# Patient Record
Sex: MALE | Race: OTHER | NOT HISPANIC OR LATINO | URBAN - METROPOLITAN AREA
[De-identification: names, ages, dates, MRNs, and addresses within clinical notes are randomized per-mention and may not be internally consistent; named-entity substitution may affect disease eponyms.]

---

## 2018-01-29 ENCOUNTER — INPATIENT (INPATIENT)
Facility: HOSPITAL | Age: 68
LOS: 10 days | Discharge: HOME | DRG: 246 | End: 2018-02-09
Attending: INTERNAL MEDICINE | Admitting: INTERNAL MEDICINE
Payer: MEDICARE

## 2018-01-29 VITALS
HEART RATE: 109 BPM | SYSTOLIC BLOOD PRESSURE: 162 MMHG | OXYGEN SATURATION: 99 % | TEMPERATURE: 98 F | RESPIRATION RATE: 20 BRPM | DIASTOLIC BLOOD PRESSURE: 101 MMHG

## 2018-01-29 LAB
ALBUMIN SERPL ELPH-MCNC: 4.1 G/DL — SIGNIFICANT CHANGE UP (ref 3.3–5)
ALP SERPL-CCNC: 74 U/L — SIGNIFICANT CHANGE UP (ref 40–120)
ALT FLD-CCNC: 12 U/L RC — SIGNIFICANT CHANGE UP (ref 10–45)
ANION GAP SERPL CALC-SCNC: 15 MMOL/L — SIGNIFICANT CHANGE UP (ref 5–17)
AST SERPL-CCNC: 12 U/L — SIGNIFICANT CHANGE UP (ref 10–40)
BASOPHILS # BLD AUTO: 0.1 K/UL — SIGNIFICANT CHANGE UP (ref 0–0.2)
BASOPHILS NFR BLD AUTO: 0.5 % — SIGNIFICANT CHANGE UP (ref 0–2)
BILIRUB SERPL-MCNC: 0.4 MG/DL — SIGNIFICANT CHANGE UP (ref 0.2–1.2)
BUN SERPL-MCNC: 9 MG/DL — SIGNIFICANT CHANGE UP (ref 7–23)
CALCIUM SERPL-MCNC: 9.6 MG/DL — SIGNIFICANT CHANGE UP (ref 8.4–10.5)
CHLORIDE SERPL-SCNC: 97 MMOL/L — SIGNIFICANT CHANGE UP (ref 96–108)
CO2 SERPL-SCNC: 25 MMOL/L — SIGNIFICANT CHANGE UP (ref 22–31)
CREAT SERPL-MCNC: 0.69 MG/DL — SIGNIFICANT CHANGE UP (ref 0.5–1.3)
EOSINOPHIL # BLD AUTO: 0 K/UL — SIGNIFICANT CHANGE UP (ref 0–0.5)
EOSINOPHIL NFR BLD AUTO: 0.3 % — SIGNIFICANT CHANGE UP (ref 0–6)
ETHANOL SERPL-MCNC: SIGNIFICANT CHANGE UP MG/DL (ref 0–10)
GAS PNL BLDV: SIGNIFICANT CHANGE UP
GLUCOSE SERPL-MCNC: 267 MG/DL — HIGH (ref 70–99)
HCT VFR BLD CALC: 37.5 % — LOW (ref 39–50)
HGB BLD-MCNC: 13.8 G/DL — SIGNIFICANT CHANGE UP (ref 13–17)
LYMPHOCYTES # BLD AUTO: 26.4 % — SIGNIFICANT CHANGE UP (ref 13–44)
LYMPHOCYTES # BLD AUTO: 3.2 K/UL — SIGNIFICANT CHANGE UP (ref 1–3.3)
MCHC RBC-ENTMCNC: 33.3 PG — SIGNIFICANT CHANGE UP (ref 27–34)
MCHC RBC-ENTMCNC: 36.7 GM/DL — HIGH (ref 32–36)
MCV RBC AUTO: 90.7 FL — SIGNIFICANT CHANGE UP (ref 80–100)
MONOCYTES # BLD AUTO: 0.7 K/UL — SIGNIFICANT CHANGE UP (ref 0–0.9)
MONOCYTES NFR BLD AUTO: 6 % — SIGNIFICANT CHANGE UP (ref 2–14)
NEUTROPHILS # BLD AUTO: 8 K/UL — HIGH (ref 1.8–7.4)
NEUTROPHILS NFR BLD AUTO: 66.9 % — SIGNIFICANT CHANGE UP (ref 43–77)
PLATELET # BLD AUTO: 281 K/UL — SIGNIFICANT CHANGE UP (ref 150–400)
POTASSIUM SERPL-MCNC: 4.3 MMOL/L — SIGNIFICANT CHANGE UP (ref 3.5–5.3)
POTASSIUM SERPL-SCNC: 4.3 MMOL/L — SIGNIFICANT CHANGE UP (ref 3.5–5.3)
PROT SERPL-MCNC: 7.8 G/DL — SIGNIFICANT CHANGE UP (ref 6–8.3)
RBC # BLD: 4.13 M/UL — LOW (ref 4.2–5.8)
RBC # FLD: 11.5 % — SIGNIFICANT CHANGE UP (ref 10.3–14.5)
SODIUM SERPL-SCNC: 137 MMOL/L — SIGNIFICANT CHANGE UP (ref 135–145)
WBC # BLD: 12 K/UL — HIGH (ref 3.8–10.5)
WBC # FLD AUTO: 12 K/UL — HIGH (ref 3.8–10.5)

## 2018-01-29 PROCEDURE — 99285 EMERGENCY DEPT VISIT HI MDM: CPT | Mod: 25,GC

## 2018-01-29 PROCEDURE — 93010 ELECTROCARDIOGRAM REPORT: CPT

## 2018-01-29 PROCEDURE — 73630 X-RAY EXAM OF FOOT: CPT | Mod: 26,LT

## 2018-01-29 PROCEDURE — 73590 X-RAY EXAM OF LOWER LEG: CPT | Mod: 26,LT

## 2018-01-29 RX ORDER — VANCOMYCIN HCL 1 G
1000 VIAL (EA) INTRAVENOUS ONCE
Qty: 0 | Refills: 0 | Status: COMPLETED | OUTPATIENT
Start: 2018-01-29 | End: 2018-01-30

## 2018-01-29 RX ORDER — SODIUM CHLORIDE 9 MG/ML
1000 INJECTION INTRAMUSCULAR; INTRAVENOUS; SUBCUTANEOUS ONCE
Qty: 0 | Refills: 0 | Status: COMPLETED | OUTPATIENT
Start: 2018-01-29 | End: 2018-01-29

## 2018-01-29 RX ORDER — PIPERACILLIN AND TAZOBACTAM 4; .5 G/20ML; G/20ML
3.38 INJECTION, POWDER, LYOPHILIZED, FOR SOLUTION INTRAVENOUS ONCE
Qty: 0 | Refills: 0 | Status: COMPLETED | OUTPATIENT
Start: 2018-01-29 | End: 2018-01-29

## 2018-01-29 RX ADMIN — SODIUM CHLORIDE 1000 MILLILITER(S): 9 INJECTION INTRAMUSCULAR; INTRAVENOUS; SUBCUTANEOUS at 23:50

## 2018-01-29 RX ADMIN — Medication 25 MILLIGRAM(S): at 23:50

## 2018-01-29 RX ADMIN — PIPERACILLIN AND TAZOBACTAM 200 GRAM(S): 4; .5 INJECTION, POWDER, LYOPHILIZED, FOR SOLUTION INTRAVENOUS at 23:50

## 2018-01-29 NOTE — ED PROVIDER NOTE - ATTENDING CONTRIBUTION TO CARE
Patient is a 66 yo M with history of DM, HTN, ETOH abuse sent in by pcp for gangrene of left 1st toe. Per wife, this has been worsening over the past 3 weeks. No fevers, chills, nausea, vomiting.   VS noted  Gen. no acute distress, Non toxic   HEENT: EOMI, mmm  Lungs: CTAB/L no C/ W /R   CVS: RRR   Abd; Soft non tender, non distended   Ext: left hallux: necrotic, green, no obvious discharge  Neuro: awake, follows commands, CN grossly intact, non focal clear speech  a/p: gangrene of left hallux: Abx, esr, crp, pre-op labs, ivf, podiatry consult

## 2018-01-29 NOTE — ED PROVIDER NOTE - OBJECTIVE STATEMENT
68yo male pmh DM, etoh pancreatitis p/w toe gangrene sent in by PCP Dr Pollo Pretty. 66yo male pmh DM, etoh pancreatitis p/w toe gangrene sent in by PCP Dr Pollo Pretty. C/o left 1st toe pain x 1 week. Last etoh this afternoon, ~100ml hard liquor. Denies fever, chills, chest pain, dyspnea, palpitations, dizziness, weakness, recent cough, nausea, vomiting, diarrhea, abdominal pain, bladder and bowel problems, back pain, leg swelling, sick contact, recent long travel.    Significant past medical hx/surgical hx/social hx and review of systems can be found above in the history of present illness.

## 2018-01-29 NOTE — ED ADULT NURSE NOTE - OBJECTIVE STATEMENT
Received patient awake and alert x 4, presenting with left toe gangrene. Patient was sent by his PCP. C/O left 1st toe pain x 1 week. Denies any fever/chills, no n/v/d, no abdominal pain. no CP/SOB. Breathing unlabored with no S/S of distress noted, safety maintained, will continue to monitor.

## 2018-01-29 NOTE — ED ADULT NURSE NOTE - PMH
Alcohol Dependency    CAD (coronary artery disease)    Diabetes mellitus    Diabetes Mellitus    Hypertension

## 2018-01-30 DIAGNOSIS — I96 GANGRENE, NOT ELSEWHERE CLASSIFIED: ICD-10-CM

## 2018-01-30 DIAGNOSIS — Z87.898 PERSONAL HISTORY OF OTHER SPECIFIED CONDITIONS: ICD-10-CM

## 2018-01-30 DIAGNOSIS — Z29.9 ENCOUNTER FOR PROPHYLACTIC MEASURES, UNSPECIFIED: ICD-10-CM

## 2018-01-30 DIAGNOSIS — E11.9 TYPE 2 DIABETES MELLITUS WITHOUT COMPLICATIONS: ICD-10-CM

## 2018-01-30 DIAGNOSIS — E11.69 TYPE 2 DIABETES MELLITUS WITH OTHER SPECIFIED COMPLICATION: ICD-10-CM

## 2018-01-30 DIAGNOSIS — I25.10 ATHEROSCLEROTIC HEART DISEASE OF NATIVE CORONARY ARTERY WITHOUT ANGINA PECTORIS: ICD-10-CM

## 2018-01-30 DIAGNOSIS — I77.1 STRICTURE OF ARTERY: ICD-10-CM

## 2018-01-30 LAB
ALBUMIN SERPL ELPH-MCNC: 3.7 G/DL — SIGNIFICANT CHANGE UP (ref 3.3–5)
ALP SERPL-CCNC: 76 U/L — SIGNIFICANT CHANGE UP (ref 40–120)
ALT FLD-CCNC: 12 U/L — SIGNIFICANT CHANGE UP (ref 10–45)
ANION GAP SERPL CALC-SCNC: 14 MMOL/L — SIGNIFICANT CHANGE UP (ref 5–17)
AST SERPL-CCNC: 16 U/L — SIGNIFICANT CHANGE UP (ref 10–40)
BASOPHILS # BLD AUTO: 0.02 K/UL — SIGNIFICANT CHANGE UP (ref 0–0.2)
BASOPHILS NFR BLD AUTO: 0.2 % — SIGNIFICANT CHANGE UP (ref 0–2)
BILIRUB SERPL-MCNC: 0.3 MG/DL — SIGNIFICANT CHANGE UP (ref 0.2–1.2)
BUN SERPL-MCNC: 10 MG/DL — SIGNIFICANT CHANGE UP (ref 7–23)
CALCIUM SERPL-MCNC: 8.9 MG/DL — SIGNIFICANT CHANGE UP (ref 8.4–10.5)
CHLORIDE SERPL-SCNC: 96 MMOL/L — SIGNIFICANT CHANGE UP (ref 96–108)
CO2 SERPL-SCNC: 25 MMOL/L — SIGNIFICANT CHANGE UP (ref 22–31)
CREAT SERPL-MCNC: 0.81 MG/DL — SIGNIFICANT CHANGE UP (ref 0.5–1.3)
EOSINOPHIL # BLD AUTO: 0.08 K/UL — SIGNIFICANT CHANGE UP (ref 0–0.5)
EOSINOPHIL NFR BLD AUTO: 0.8 % — SIGNIFICANT CHANGE UP (ref 0–6)
GLUCOSE BLDC GLUCOMTR-MCNC: 231 MG/DL — HIGH (ref 70–99)
GLUCOSE BLDC GLUCOMTR-MCNC: 253 MG/DL — HIGH (ref 70–99)
GLUCOSE BLDC GLUCOMTR-MCNC: 308 MG/DL — HIGH (ref 70–99)
GLUCOSE BLDC GLUCOMTR-MCNC: 316 MG/DL — HIGH (ref 70–99)
GLUCOSE SERPL-MCNC: 443 MG/DL — HIGH (ref 70–99)
HBA1C BLD-MCNC: 13.2 % — HIGH (ref 4–5.6)
HCT VFR BLD CALC: 37.4 % — LOW (ref 39–50)
HGB BLD-MCNC: 12.5 G/DL — LOW (ref 13–17)
IMM GRANULOCYTES NFR BLD AUTO: 0.1 % — SIGNIFICANT CHANGE UP (ref 0–1.5)
LYMPHOCYTES # BLD AUTO: 2.64 K/UL — SIGNIFICANT CHANGE UP (ref 1–3.3)
LYMPHOCYTES # BLD AUTO: 26 % — SIGNIFICANT CHANGE UP (ref 13–44)
MCHC RBC-ENTMCNC: 30.4 PG — SIGNIFICANT CHANGE UP (ref 27–34)
MCHC RBC-ENTMCNC: 33.4 GM/DL — SIGNIFICANT CHANGE UP (ref 32–36)
MCV RBC AUTO: 91 FL — SIGNIFICANT CHANGE UP (ref 80–100)
MONOCYTES # BLD AUTO: 0.93 K/UL — HIGH (ref 0–0.9)
MONOCYTES NFR BLD AUTO: 9.2 % — SIGNIFICANT CHANGE UP (ref 2–14)
NEUTROPHILS # BLD AUTO: 6.48 K/UL — SIGNIFICANT CHANGE UP (ref 1.8–7.4)
NEUTROPHILS NFR BLD AUTO: 63.7 % — SIGNIFICANT CHANGE UP (ref 43–77)
PLATELET # BLD AUTO: 232 K/UL — SIGNIFICANT CHANGE UP (ref 150–400)
POTASSIUM SERPL-MCNC: 4.1 MMOL/L — SIGNIFICANT CHANGE UP (ref 3.5–5.3)
POTASSIUM SERPL-SCNC: 4.1 MMOL/L — SIGNIFICANT CHANGE UP (ref 3.5–5.3)
PROT SERPL-MCNC: 7.2 G/DL — SIGNIFICANT CHANGE UP (ref 6–8.3)
RBC # BLD: 4.11 M/UL — LOW (ref 4.2–5.8)
RBC # FLD: 12.5 % — SIGNIFICANT CHANGE UP (ref 10.3–14.5)
SODIUM SERPL-SCNC: 135 MMOL/L — SIGNIFICANT CHANGE UP (ref 135–145)
WBC # BLD: 10.16 K/UL — SIGNIFICANT CHANGE UP (ref 3.8–10.5)
WBC # FLD AUTO: 10.16 K/UL — SIGNIFICANT CHANGE UP (ref 3.8–10.5)

## 2018-01-30 PROCEDURE — 99223 1ST HOSP IP/OBS HIGH 75: CPT

## 2018-01-30 PROCEDURE — 93923 UPR/LXTR ART STDY 3+ LVLS: CPT | Mod: 26

## 2018-01-30 PROCEDURE — 73720 MRI LWR EXTREMITY W/O&W/DYE: CPT | Mod: 26,LT

## 2018-01-30 PROCEDURE — 99222 1ST HOSP IP/OBS MODERATE 55: CPT

## 2018-01-30 PROCEDURE — 99221 1ST HOSP IP/OBS SF/LOW 40: CPT

## 2018-01-30 RX ORDER — SODIUM CHLORIDE 9 MG/ML
1000 INJECTION, SOLUTION INTRAVENOUS
Qty: 0 | Refills: 0 | Status: DISCONTINUED | OUTPATIENT
Start: 2018-01-30 | End: 2018-02-09

## 2018-01-30 RX ORDER — PREGABALIN 225 MG/1
1000 CAPSULE ORAL DAILY
Qty: 0 | Refills: 0 | Status: DISCONTINUED | OUTPATIENT
Start: 2018-01-30 | End: 2018-02-09

## 2018-01-30 RX ORDER — INSULIN GLARGINE 100 [IU]/ML
40 INJECTION, SOLUTION SUBCUTANEOUS EVERY MORNING
Qty: 0 | Refills: 0 | Status: DISCONTINUED | OUTPATIENT
Start: 2018-01-30 | End: 2018-01-30

## 2018-01-30 RX ORDER — INSULIN LISPRO 100/ML
12 VIAL (ML) SUBCUTANEOUS
Qty: 0 | Refills: 0 | Status: DISCONTINUED | OUTPATIENT
Start: 2018-01-30 | End: 2018-01-30

## 2018-01-30 RX ORDER — PIPERACILLIN AND TAZOBACTAM 4; .5 G/20ML; G/20ML
3.38 INJECTION, POWDER, LYOPHILIZED, FOR SOLUTION INTRAVENOUS EVERY 8 HOURS
Qty: 0 | Refills: 0 | Status: DISCONTINUED | OUTPATIENT
Start: 2018-01-30 | End: 2018-01-31

## 2018-01-30 RX ORDER — DEXTROSE 50 % IN WATER 50 %
25 SYRINGE (ML) INTRAVENOUS ONCE
Qty: 0 | Refills: 0 | Status: DISCONTINUED | OUTPATIENT
Start: 2018-01-30 | End: 2018-02-09

## 2018-01-30 RX ORDER — METOPROLOL TARTRATE 50 MG
0 TABLET ORAL
Qty: 60 | Refills: 0 | COMMUNITY

## 2018-01-30 RX ORDER — ACETAMINOPHEN 500 MG
650 TABLET ORAL EVERY 6 HOURS
Qty: 0 | Refills: 0 | Status: DISCONTINUED | OUTPATIENT
Start: 2018-01-30 | End: 2018-02-09

## 2018-01-30 RX ORDER — INSULIN LISPRO 100/ML
VIAL (ML) SUBCUTANEOUS AT BEDTIME
Qty: 0 | Refills: 0 | Status: DISCONTINUED | OUTPATIENT
Start: 2018-01-30 | End: 2018-02-09

## 2018-01-30 RX ORDER — PREGABALIN 225 MG/1
0 CAPSULE ORAL
Qty: 90 | Refills: 0 | COMMUNITY

## 2018-01-30 RX ORDER — INSULIN LISPRO 100/ML
VIAL (ML) SUBCUTANEOUS
Qty: 0 | Refills: 0 | Status: DISCONTINUED | OUTPATIENT
Start: 2018-01-30 | End: 2018-02-09

## 2018-01-30 RX ORDER — INSULIN GLARGINE 100 [IU]/ML
60 INJECTION, SOLUTION SUBCUTANEOUS
Qty: 0 | Refills: 0 | COMMUNITY

## 2018-01-30 RX ORDER — ATORVASTATIN CALCIUM 80 MG/1
0 TABLET, FILM COATED ORAL
Qty: 30 | Refills: 0 | COMMUNITY

## 2018-01-30 RX ORDER — HYDRALAZINE HCL 50 MG
5 TABLET ORAL ONCE
Qty: 0 | Refills: 0 | Status: COMPLETED | OUTPATIENT
Start: 2018-01-30 | End: 2018-01-30

## 2018-01-30 RX ORDER — VANCOMYCIN HCL 1 G
1000 VIAL (EA) INTRAVENOUS EVERY 12 HOURS
Qty: 0 | Refills: 0 | Status: DISCONTINUED | OUTPATIENT
Start: 2018-01-30 | End: 2018-01-31

## 2018-01-30 RX ORDER — GABAPENTIN 400 MG/1
0 CAPSULE ORAL
Qty: 0 | Refills: 0 | COMMUNITY

## 2018-01-30 RX ORDER — GLUCAGON INJECTION, SOLUTION 0.5 MG/.1ML
1 INJECTION, SOLUTION SUBCUTANEOUS ONCE
Qty: 0 | Refills: 0 | Status: DISCONTINUED | OUTPATIENT
Start: 2018-01-30 | End: 2018-02-09

## 2018-01-30 RX ORDER — INSULIN ASPART 100 [IU]/ML
15 INJECTION, SOLUTION SUBCUTANEOUS
Qty: 0 | Refills: 0 | COMMUNITY

## 2018-01-30 RX ORDER — INSULIN LISPRO 100/ML
8 VIAL (ML) SUBCUTANEOUS
Qty: 0 | Refills: 0 | Status: DISCONTINUED | OUTPATIENT
Start: 2018-01-30 | End: 2018-01-31

## 2018-01-30 RX ORDER — ASPIRIN/CALCIUM CARB/MAGNESIUM 324 MG
81 TABLET ORAL DAILY
Qty: 0 | Refills: 0 | Status: DISCONTINUED | OUTPATIENT
Start: 2018-01-30 | End: 2018-01-30

## 2018-01-30 RX ORDER — HEPARIN SODIUM 5000 [USP'U]/ML
5000 INJECTION INTRAVENOUS; SUBCUTANEOUS EVERY 12 HOURS
Qty: 0 | Refills: 0 | Status: DISCONTINUED | OUTPATIENT
Start: 2018-01-30 | End: 2018-02-09

## 2018-01-30 RX ORDER — DEXTROSE 50 % IN WATER 50 %
1 SYRINGE (ML) INTRAVENOUS ONCE
Qty: 0 | Refills: 0 | Status: DISCONTINUED | OUTPATIENT
Start: 2018-01-30 | End: 2018-02-09

## 2018-01-30 RX ORDER — METOPROLOL TARTRATE 50 MG
0 TABLET ORAL
Qty: 0 | Refills: 0 | COMMUNITY

## 2018-01-30 RX ORDER — INSULIN GLARGINE 100 [IU]/ML
40 INJECTION, SOLUTION SUBCUTANEOUS AT BEDTIME
Qty: 0 | Refills: 0 | Status: DISCONTINUED | OUTPATIENT
Start: 2018-01-30 | End: 2018-01-31

## 2018-01-30 RX ORDER — ATORVASTATIN CALCIUM 80 MG/1
20 TABLET, FILM COATED ORAL AT BEDTIME
Qty: 0 | Refills: 0 | Status: DISCONTINUED | OUTPATIENT
Start: 2018-01-30 | End: 2018-01-30

## 2018-01-30 RX ORDER — GABAPENTIN 400 MG/1
300 CAPSULE ORAL
Qty: 0 | Refills: 0 | Status: DISCONTINUED | OUTPATIENT
Start: 2018-01-30 | End: 2018-02-09

## 2018-01-30 RX ORDER — METOPROLOL TARTRATE 50 MG
25 TABLET ORAL DAILY
Qty: 0 | Refills: 0 | Status: DISCONTINUED | OUTPATIENT
Start: 2018-01-30 | End: 2018-02-09

## 2018-01-30 RX ORDER — METOPROLOL TARTRATE 50 MG
25 TABLET ORAL
Qty: 0 | Refills: 0 | Status: DISCONTINUED | OUTPATIENT
Start: 2018-01-30 | End: 2018-01-30

## 2018-01-30 RX ADMIN — PREGABALIN 1000 MICROGRAM(S): 225 CAPSULE ORAL at 17:33

## 2018-01-30 RX ADMIN — INSULIN GLARGINE 40 UNIT(S): 100 INJECTION, SOLUTION SUBCUTANEOUS at 09:59

## 2018-01-30 RX ADMIN — GABAPENTIN 300 MILLIGRAM(S): 400 CAPSULE ORAL at 13:32

## 2018-01-30 RX ADMIN — HEPARIN SODIUM 5000 UNIT(S): 5000 INJECTION INTRAVENOUS; SUBCUTANEOUS at 06:31

## 2018-01-30 RX ADMIN — HEPARIN SODIUM 5000 UNIT(S): 5000 INJECTION INTRAVENOUS; SUBCUTANEOUS at 17:37

## 2018-01-30 RX ADMIN — Medication 12 UNIT(S): at 10:01

## 2018-01-30 RX ADMIN — PIPERACILLIN AND TAZOBACTAM 25 GRAM(S): 4; .5 INJECTION, POWDER, LYOPHILIZED, FOR SOLUTION INTRAVENOUS at 09:57

## 2018-01-30 RX ADMIN — INSULIN GLARGINE 40 UNIT(S): 100 INJECTION, SOLUTION SUBCUTANEOUS at 23:10

## 2018-01-30 RX ADMIN — Medication 25 MILLIGRAM(S): at 06:31

## 2018-01-30 RX ADMIN — Medication 25 MILLIGRAM(S): at 17:37

## 2018-01-30 RX ADMIN — GABAPENTIN 300 MILLIGRAM(S): 400 CAPSULE ORAL at 21:20

## 2018-01-30 RX ADMIN — Medication 4: at 13:28

## 2018-01-30 RX ADMIN — PIPERACILLIN AND TAZOBACTAM 25 GRAM(S): 4; .5 INJECTION, POWDER, LYOPHILIZED, FOR SOLUTION INTRAVENOUS at 17:33

## 2018-01-30 RX ADMIN — Medication 250 MILLIGRAM(S): at 16:27

## 2018-01-30 RX ADMIN — Medication 6: at 17:53

## 2018-01-30 RX ADMIN — Medication 8: at 10:02

## 2018-01-30 RX ADMIN — Medication 8 UNIT(S): at 13:28

## 2018-01-30 RX ADMIN — GABAPENTIN 300 MILLIGRAM(S): 400 CAPSULE ORAL at 09:55

## 2018-01-30 RX ADMIN — Medication 250 MILLIGRAM(S): at 04:09

## 2018-01-30 RX ADMIN — Medication 5 MILLIGRAM(S): at 11:55

## 2018-01-30 RX ADMIN — Medication 2: at 23:08

## 2018-01-30 NOTE — CONSULT NOTE ADULT - SUBJECTIVE AND OBJECTIVE BOX
General Surgery Consult  Consulting surgical team: Vascular  Consulting attending: Alfredo    HPI:  67M with PMH significant for T2DM (uncontrolled) and CAD s/p stents presents with gangrene of 1st toe on LLE. Patient is not reliable historian, but wife states that wound has been present for ~1 week. No drainage or purulence. Patient intermittently complains of pain in LLE, denies any such problems on R side. No fevers or chills at home. Patient ambulates at home with cane very slowly and also has diabetic neuropathy.    Patient has never seen a vascular surgeon in the past. Quit smoking last year, used to smoke 1ppd for decades.  Patient currently only complaining of pain in L toe. Now s/p debridement at beside with podiatry.      PAST MEDICAL HISTORY:  CAD (coronary artery disease)  Diabetes mellitus  Diabetes mellitus  Hypertension  Alcohol Dependency  Diabetes Mellitus      PAST SURGICAL HISTORY:  Stented coronary artery      MEDICATIONS:  vancomycin  IVPB 1000 milliGRAM(s) IV Intermittent once      ALLERGIES:  No Known Allergies      VITALS & I/Os:  Vital Signs Last 24 Hrs  T(C): 36.9 (29 Jan 2018 20:27), Max: 36.9 (29 Jan 2018 20:27)  T(F): 98.5 (29 Jan 2018 20:27), Max: 98.5 (29 Jan 2018 20:27)  HR: 109 (29 Jan 2018 20:27) (109 - 109)  BP: 162/101 (29 Jan 2018 20:27) (162/101 - 162/101)  BP(mean): --  RR: 20 (29 Jan 2018 20:27) (20 - 20)  SpO2: 99% (29 Jan 2018 20:27) (99% - 99%)    I&O's Summary      PHYSICAL EXAM:  General: No acute distress  Respiratory: Nonlabored  Cardiovascular: RRR  Abdominal: Soft, nondistended, nontender. No rebound or guarding. No organomegaly, no palpable mass.  Extremities: Warm b/l palp fem and pop pulses. R DP PT singals. L PT signal only. L first toe with gangrenous tip.    LABS:                        13.8   12.0  )-----------( 281      ( 29 Jan 2018 23:03 )             37.5     01-29    137  |  97  |  9   ----------------------------<  267<H>  4.3   |  25  |  0.69    Ca    9.6      29 Jan 2018 23:03    TPro  7.8  /  Alb  4.1  /  TBili  0.4  /  DBili  x   /  AST  12  /  ALT  12  /  AlkPhos  74  01-29    Lactate:  01-29 @ 23:03  2.6                  IMAGING:

## 2018-01-30 NOTE — CONSULT NOTE ADULT - ASSESSMENT
67M with gangrene of L great toe  - F/u JOSAFAT/PVRs  - Continue IV abx  - Continue medical optimization  - D/w vascular fellow    DIMITRIOS Wilder MD PGY 2   [id number] 67M with gangrene of L great toe    - will d/w pt lle angio   - Continue IV abx  - Continue medical optimization  -

## 2018-01-30 NOTE — CONSULT NOTE ADULT - ASSESSMENT
Assessment  DMT2: 67y Male with DM T2 with hyperglycemia on insulin, blood sugars running high,  no hypoglycemia, non compliant with low carb diet.  CAD: On medications, monitored.  Gangrene: Vascular/pod FU  HTN: Controlled, On med.

## 2018-01-30 NOTE — H&P ADULT - NSHPLABSRESULTS_GEN_ALL_CORE
Labs personally reviewed:                          13.8   12.0  )-----------( 281      ( 29 Jan 2018 23:03 )             37.5     01-29    137  |  97  |  9   ----------------------------<  267<H>  4.3   |  25  |  0.69    Ca    9.6      29 Jan 2018 23:03    TPro  7.8  /  Alb  4.1  /  TBili  0.4  /  DBili  x   /  AST  12  /  ALT  12  /  AlkPhos  74  01-29        LIVER FUNCTIONS - ( 29 Jan 2018 23:03 )  Alb: 4.1 g/dL / Pro: 7.8 g/dL / ALK PHOS: 74 U/L / ALT: 12 U/L RC / AST: 12 U/L / GGT: x               CAPILLARY BLOOD GLUCOSE      POCT Blood Glucose.: 249 mg/dL (29 Jan 2018 20:36)      Imaging:  Xray left foot reviewed  :Lucency involving the tuft of the distal phalanx of the   hallux concerning for erosion.       EKG personally reviewed sinus tachycardia 104, no s-t elevation

## 2018-01-30 NOTE — CONSULT NOTE ADULT - SUBJECTIVE AND OBJECTIVE BOX
HPI:  Patient is a 67 year old male with a past medical history significant for DM type II, h/o  etoh abuse and  pancreatitis, Cad s/p stent,  PVD, presents with worsening  left foot infection over the past week.      Patients wife reports a sore on his left 1st toe which has worsened over the past week. She noticed pus and blood on his sock, and the infection has worsened. She has treated it with honey dressing with minimal improvement. On day of admission, patient was seen by PMD and referred to hospital for further evaluation. Patient reports minimal pain at site.  He has no fever or chills. Per wife patient has limited ambulation and confined mostly to home because of declining memory and function. He can climb a flight of stairs slowly without active cardio-pulmonary symptoms. He has no shortness of breath or chest pain. (30 Jan 2018 04:39)  Patient has history of diabetes,  on large dose insulin at home, no recent hypoglycemic episodes, no polyuria polydipsia. Patient follows up with PCP/Dr. Castillo for diabetes management.    PAST MEDICAL & SURGICAL HISTORY:  CAD (coronary artery disease)  Diabetes mellitus  Hypertension  Alcohol Dependency  Diabetes Mellitus  Stented coronary artery      FAMILY HISTORY:  Family history of diabetes mellitus (Father)      Social History:    Outpatient Medications:    MEDICATIONS  (STANDING):  atorvastatin 20 milliGRAM(s) Oral at bedtime  cyanocobalamin 1000 MICROGram(s) Oral daily  dextrose 5%. 1000 milliLiter(s) (50 mL/Hr) IV Continuous <Continuous>  dextrose 50% Injectable 25 Gram(s) IV Push once  gabapentin 300 milliGRAM(s) Oral three times a day  heparin  Injectable 5000 Unit(s) SubCutaneous every 12 hours  insulin glargine Injectable (LANTUS) 40 Unit(s) SubCutaneous at bedtime  insulin lispro (HumaLOG) corrective regimen sliding scale   SubCutaneous three times a day before meals  insulin lispro (HumaLOG) corrective regimen sliding scale   SubCutaneous at bedtime  insulin lispro Injectable (HumaLOG) 8 Unit(s) SubCutaneous three times a day before meals  metoprolol     tartrate 25 milliGRAM(s) Oral two times a day  piperacillin/tazobactam IVPB. 3.375 Gram(s) IV Intermittent every 8 hours  vancomycin  IVPB 1000 milliGRAM(s) IV Intermittent every 12 hours    MEDICATIONS  (PRN):  acetaminophen   Tablet. 650 milliGRAM(s) Oral every 6 hours PRN Mild Pain (1 - 3)  dextrose Gel 1 Dose(s) Oral once PRN Blood Glucose LESS THAN 70 milliGRAM(s)/deciliter  glucagon  Injectable 1 milliGRAM(s) IntraMuscular once PRN Glucose LESS THAN 70 milligrams/deciliter      Allergies    No Known Allergies    Intolerances      Review of Systems:  Constitutional: No fever, no chills  Eyes: No blurry vision  Neuro: No tremors  HEENT: No pain, no neck swelling  Cardiovascular: No chest pain, no palpitations  Respiratory: Has SOB, no cough  GI: No nausea, vomiting, abdominal pain  : No dysuria  Skin: no rash  MSK: Gangrene,  leg swelling, foot ulcers.  Psych: no depression  Endocrine: no polyuria, polydipsia    ALL OTHER SYSTEMS REVIEWED AND NEGATIVE    UNABLE TO OBTAIN    PHYSICAL EXAM:  VITALS: T(C): 37.2 (01-30-18 @ 12:30)  T(F): 98.9 (01-30-18 @ 12:30), Max: 98.9 (01-30-18 @ 08:30)  HR: 90 (01-30-18 @ 12:30) (89 - 109)  BP: 151/84 (01-30-18 @ 12:30) (149/91 - 169/86)  RR:  (16 - 20)  SpO2:  (95% - 99%)  Wt(kg): --  GENERAL: NAD, well-groomed, well-developed  EYES: No proptosis, no lid lag  HEENT:  Atraumatic, Normocephalic  THYROID: Normal size, no palpable nodules  RESPIRATORY: Clear to auscultation bilaterally; No rales, rhonchi, wheezing  CARDIOVASCULAR: Si S2, No murmurs;  GI: Soft, non distended, normal bowel sounds  SKIN: Dry, intact, No rashes or lesions  MUSCULOSKELETAL: Has BL lower extremity edema.  NEURO:  no tremor, sensation decreased in feet BL,    POCT Blood Glucose.: 231 mg/dL (01-30-18 @ 12:57)  POCT Blood Glucose.: 308 mg/dL (01-30-18 @ 09:56)  POCT Blood Glucose.: 249 mg/dL (01-29-18 @ 20:36)                            12.5   10.16 )-----------( 232      ( 30 Jan 2018 07:42 )             37.4       01-30    135  |  96  |  10  ----------------------------<  443<H>  4.1   |  25  |  0.81    EGFR if : 107  EGFR if non : 92    Ca    8.9      01-30    TPro  7.2  /  Alb  3.7  /  TBili  0.3  /  DBili  x   /  AST  16  /  ALT  12  /  AlkPhos  76  01-30      Thyroid Function Tests:      Hemoglobin A1C, Whole Blood: 13.2 % <H> [4.0 - 5.6] (01-30-18 @ 07:42)          Radiology:

## 2018-01-30 NOTE — CONSULT NOTE ADULT - SUBJECTIVE AND OBJECTIVE BOX
Podiatry pager #: [phone]    Patient is a 67y old  Male who presents with a chief complaint of L foot pain    HPI: 66yo male pmh DM, etoh pancreatitis p/w toe gangrene sent in by PCP Dr Pollo Pretty. C/o left 1st toe pain x 1 week. Last etoh this afternoon, ~100ml hard liquor. Denies fever, chills, chest pain, dyspnea, palpitations, dizziness, weakness, recent cough, nausea, vomiting, diarrhea, abdominal pain, bladder and bowel problems, back pain, leg swelling, sick contact, recent long travel.    Per wife, pt is non ambulatory due to painful neuropathy, states it is very painful when ambulating. takes gabapentin. Does not recall how long he has been like this but claims he has been worsening since his longterm. Denies previously seeing a vascular or podiatric doctor. Had card stent 10 years ago and has not seen cardiologist in 10 years.     PAST MEDICAL & SURGICAL HISTORY:  CAD (coronary artery disease)  Diabetes mellitus  Hypertension  Alcohol Dependency  Diabetes Mellitus  Stented coronary artery    MEDICATIONS  (STANDING):  vancomycin  IVPB 1000 milliGRAM(s) IV Intermittent once    MEDICATIONS  (PRN):    Allergies    No Known Allergies    Intolerances    VITALS:    Vital Signs Last 24 Hrs  T(C): 36.9 (29 Jan 2018 20:27), Max: 36.9 (29 Jan 2018 20:27)  T(F): 98.5 (29 Jan 2018 20:27), Max: 98.5 (29 Jan 2018 20:27)  HR: 109 (29 Jan 2018 20:27) (109 - 109)  BP: 162/101 (29 Jan 2018 20:27) (162/101 - 162/101)  BP(mean): --  RR: 20 (29 Jan 2018 20:27) (20 - 20)  SpO2: 99% (29 Jan 2018 20:27) (99% - 99%)    LABS:                   13.8   12.0  )-----------( 281      ( 29 Jan 2018 23:03 )             37.5     01-29    137  |  97  |  9   ----------------------------<  267<H>  4.3   |  25  |  0.69    Ca    9.6      29 Jan 2018 23:03    TPro  7.8  /  Alb  4.1  /  TBili  0.4  /  DBili  x   /  AST  12  /  ALT  12  /  AlkPhos  74  01-29    CAPILLARY BLOOD GLUCOSE    POCT Blood Glucose.: 249 mg/dL (29 Jan 2018 20:36)    LOWER EXTREMITY PHYSICAL EXAM:    Vasular: DP/PT 0/4, B/L, Temperature gradient WNL, B/L.   Neuro: Epicritic sensation intact to the level of the foot, B/L.  Musculoskeletal/Ortho: No gross pedal deformities noted, B/L.   Skin: Dry scaly flaking skin to shins B/L.     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 no purulence, no malodor, no periwound erythema or edema noted.     Wound #2 & 3:  L foot lateral 5th met head and shaft ulcers to subQ, fibrotic plug, no periwound erythema or edema, no purulence, no malodor    Wound #4:  L foot posterior heel fissure, no open lesion      RADIOLOGY & ADDITIONAL STUDIES:  < from: Xray Foot AP + Lateral + Oblique, Left (01.29.18 @ 23:33) >    ******PRELIMINARY REPORT******    ******PRELIMINARY REPORT******          EXAM:  FOOT COMPLETE LEFT (MIN 3 VIEWS)                            PROCEDURE DATE:  01/29/2018      ******PRELIMINARY REPORT******    ******PRELIMINARY REPORT******          INTERPRETATION:  Lucency involving the tuft of the distal phalanx of the   hallux concerning for erosion if clinical suspicion for active myelitis   recommend MRI with contrast if no contraindications for further   evaluation..    ******PRELIMINARY REPORT******    ******PRELIMINARY REPORT******          JESICA LEE M.D., RADIOLOGY RESIDENT    < end of copied text >    Cultures pending

## 2018-01-30 NOTE — H&P ADULT - NSHPSOCIALHISTORY_GEN_ALL_CORE
Social History:    Marital Status:  ( x  )    (   ) Single    (   )    (  )   Occupation:   Lives with: (  ) alone  (  ) children   ( x ) spouse   (  ) parents  (  ) other    Substance Use (street drugs): (x  ) never used  (  ) other:  Tobacco Usage:  (   ) never smoked   (  x ) former smoker, quit one year ago smoked for many years   (   ) current smoker  (     ) pack year  (        ) last cigarette date  Alcohol Usage: history of etoh abuse , no longer drinking per wife

## 2018-01-30 NOTE — H&P ADULT - NSHPPHYSICALEXAM_GEN_ALL_CORE
Vital Signs Last 24 Hrs  T(C): 36.8 (30 Jan 2018 00:15), Max: 36.9 (29 Jan 2018 20:27)  T(F): 98.2 (30 Jan 2018 00:15), Max: 98.5 (29 Jan 2018 20:27)  HR: 96 (30 Jan 2018 00:15) (96 - 109)  BP: 149/91 (30 Jan 2018 00:15) (149/91 - 162/101)  BP(mean): --  RR: 18 (30 Jan 2018 00:15) (18 - 20)  SpO2: 99% (30 Jan 2018 00:15) (99% - 99%)    GENERAL: No acute distress, well-developed, mild tremor , alert and oriented to self and place , not oriented to time   HEAD:  Atraumatic, Normocephalic  ENT: EOMI, PERRLA, conjunctiva and sclera clear,  moist mucosa no pharyngeal erythema or exudates   NECK: supple , no JVD   CHEST/LUNG: Clear to auscultation bilaterally; No wheeze, equal breath sounds bilaterally   BACK: No spinal tenderness,  No CVA tenderness   HEART: Regular rate and rhythm; No murmurs, rubs, or gallops  ABDOMEN: Soft, Nontender, Nondistended; Bowel sounds present  EXTREMITIES:  No clubbing, cyanosis, or edema  MSK: No joint swelling or effusions, ROM intact   PSYCH: Normal behavior/affect  NEUROLOGY: AAOx2, non-focal, cranial nerves intact  SKIN: left 1st toe with dry gangrene, non tender, sensation absent,  minimal purulent discharge, s/p sharp excisional debridement of left hallux, in clean dressing wound packed

## 2018-01-30 NOTE — CONSULT NOTE ADULT - SUBJECTIVE AND OBJECTIVE BOX
Patient is a 67y old  Male who presents with a chief complaint of Toe infection x one week       HPI:  Patient is a 67 year old male with a past medical history significant for DM type II, h/o  etoh abuse and  pancreatitis, Cad s/p stent,  PVD, presents with worsening  left foot infection over the past week.      Patients daughter reports they noted a sore on his left 1st toe which has worsened over the past week. She noticed pus and blood on his sock, and the infection has worsened. She has treated it with honey dressing with minimal improvement. On day of admission, patient was seen by PMD and referred to hospital for further evaluation. Patient denies any pain at site.  He had no fever or chills. Patient has limited ambulation and confined mostly to home because of declining memory and function. He can climb a flight of stairs slowly without active cardio-pulmonary symptoms. He has no shortness of breath or chest pain. Per daughter pt very non compliant, has chills all the time. They did not notice any worsening lethargy.       PAST MEDICAL & SURGICAL HISTORY:  CAD (coronary artery disease)  Diabetes mellitus  Hypertension  Alcohol Dependency  Diabetes Mellitus  Stented coronary artery      REVIEW OF SYSTEMS    General: chronic, unchanged chills. Fevers absent    Skin: Dry skin   	  Ophthalmologic: Denies any visual complaints, discharge, redness.  	  ENMT: No nasal congestion or throat pain.     Respiratory and Thorax: No cough, sputum or chest pain. Denies shortness of breath.  	  Cardiovascular: No chest pain, palpitations.    Gastrointestinal: No nausea, abdominal pain or diarrhea.    Genitourinary: No dysuria, frequency. No flank pain.    Musculoskeletal: No joint swelling or pain.    Neurological:  No extremity weakness.    Psychiatric: No hallucinations	    Allergic/Immunologic:	No hives or rash     Social history:  , retired [employer] worker, former smoker, current EtoH use.    FAMILY HISTORY:  Family history of diabetes mellitus (Father)      Allergies    No Known Allergies      Antimicrobials:    piperacillin/tazobactam IVPB. 3.375 Gram(s) IV Intermittent every 8 hours  vancomycin  IVPB 1000 milliGRAM(s) IV Intermittent every 12 hours        Vital Signs Last 24 Hrs  T(C): 36.6 (30 Jan 2018 13:30), Max: 37.2 (30 Jan 2018 08:30)  T(F): 97.9 (30 Jan 2018 13:30), Max: 98.9 (30 Jan 2018 08:30)  HR: 92 (30 Jan 2018 13:30) (89 - 109)  BP: 149/81 (30 Jan 2018 13:30) (149/81 - 169/86)  RR: 16 (30 Jan 2018 13:30) (16 - 20)  SpO2: 96% (30 Jan 2018 13:30) (95% - 99%)    PHYSICAL EXAM: Patient in no acute distress.    Constitutional: Comfortable. Awake and alert    Eyes: No discharge or conjunctival injection    ENMT: No thrush. No pharyngeal exudate or erythema.    Neck: Supple, No LN.    Respiratory: Good air entry bilaterally.    Cardiovascular: S1 S2 wnl, ? + murmurs. Pt examined in ER.     Gastrointestinal: Soft BS(+) no tenderness, non distended.    Genitourinary: No CVA tenderness     Extremities: No edema.    Vascular: peripheral pulses not palpable to me.     Neurological:  No grossly focal deficits.    Skin: Dry flaky skin with lt great toe tip with necrotic tissue, no malodor, no discharge. 2 more areas of superficial ulceration laterally with central scab.    Musculoskeletal: No joint swelling.                              12.5   10.16 )-----------( 232      ( 30 Jan 2018 07:42 )             37.4       01-30    135  |  96  |  10  ----------------------------<  443<H>  4.1   |  25  |  0.81    Ca    8.9      30 Jan 2018 07:32    TPro  7.2  /  Alb  3.7  /  TBili  0.3  /  DBili  x   /  AST  16  /  ALT  12  /  AlkPhos  76  01-30      Radiology: Films Reviewed by me [ x]  < from: [facility] Physiol Extremity Lower 3+ Level, BI (01.30.18 @ 09:34) >  Impression: The diagnostic quality of the examination is adversely   impacted by the noncompressible nature of the arteries.  The examination is sufficient to show severe left lower extremity   arterial vascular disease.    Likely the disease affects the left femoral-popliteal segment and the   left trifurcation arteries.    < from: Xray Foot AP + Lateral + Oblique, Left (01.29.18 @ 23:33) >  IMPRESSION:   Erosion involving the tuft of the distal phalanx of the hallux,   suspicious for osteomyelitis. Correlate with pending MRI.    < from: Xray Tibia + Fibula 2 Views, Left (01.29.18 @ 23:34) >  IMPRESSION:  No radiographic evidence of osteomyelitis

## 2018-01-30 NOTE — H&P ADULT - HISTORY OF PRESENT ILLNESS
Patient is a 67 year old male with a past medical history significant for DM type II, h/o  etoh abuse and  pancreatitis, Cad s/p stent,  PVD, presents with worsening  left foot infection over the past week.      Patients wife reports a sore on his left 1st toe which has worsened over the past week. She noticed pus and blood on his sock, and the infection has worsened. She has treated it with honey dressing with minimal improvement. On day of admission, patient was seen by PMD and referred to hospital for further evaluation. Patient reports minimal pain at site.  He has no fever or chills. Per wife patient has limited ambulation and confined mostly to home because of declining memory and function. He can climb a flight of stairs slowly without active cardio-pulmonary symptoms. He has no shortness of breath or chest pain.

## 2018-01-30 NOTE — H&P ADULT - PROBLEM SELECTOR PLAN 2
uncontrolled - Wife not clear on exact dosing  of insulin , will therefore reduce insulin dosing and up titrate to goal of -180   - Lantus   - Lispro   - Insulin correction scale   - f/u A1c - if > 9% would obtain endocrine consult   - consistent carb diet

## 2018-01-30 NOTE — CONSULT NOTE ADULT - ASSESSMENT
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 Leucocytosis resolved, no fever.     Plan:  Continue with Zosyn 3.375 gm iv q8h with vancomycin 1000 mg iv q12h  Check trough prior to 4th dose.   Follow up blood culture  Podiatry on board, s/p debridement  Follow up cx  Will need amputation  Vascular following  continue with wound care.

## 2018-01-30 NOTE — H&P ADULT - NSHPREVIEWOFSYSTEMS_GEN_ALL_CORE
CONSTITUTIONAL: No weakness, fevers or chills  EYES/ENT: No visual changes;  No dysphagia  NECK: No pain or stiffness  RESPIRATORY: No cough, wheezing, hemoptysis; No shortness of breath  CARDIOVASCULAR: No chest pain or palpitations; No lower extremity edema  EXTREMITIES: no le edema, cyanosis, clubbing  GASTROINTESTINAL: No abdominal or epigastric pain. No nausea, vomiting, or hematemesis; No diarrhea or constipation. No melena or hematochezia.  BACK: No back pain  GENITOURINARY: No dysuria, frequency or hematuria  NEUROLOGICAL: No numbness or weakness  MSK: + foot pain  SKIN: ulcers on feet , toe infection   PSYCH: no agitation  All other review of systems is negative unless indicated above.

## 2018-01-30 NOTE — CONSULT NOTE ADULT - ASSESSMENT
6 y/o M w/ L foot distal hallux ulcer to bone, lateral 5th met ulcers to subQ  - pt seen and examined  - Rec IV vanco, zosyn  - JOSAFAT/PVR ordered   - Pt cannot get MRI due to card stent, consider WBC scan in am  - Likely to need vasc consult (p) JOSAFAT's  - Sharp excisional debridement of left hallux ulcer to bone using sterile suture removal kit including removal of subQ tissue but not muscle, tendon or bone. Nail also removed. Pt tolerated well  - Cx taken  - Wounds dressed with betadine, DSD  - Pt will need hallux amputation at this admission  - please cardiac/ med optimize for OR  - Will cont to follow  - Will d/w attending

## 2018-01-30 NOTE — CONSULT NOTE ADULT - PROBLEM SELECTOR RECOMMENDATION 9
Will start Lantus 0 units at bed time.  Will decrease Humalog to 8 units before each meal in addition to Humalog correction scale coverage.  Patient counseled for compliance with consistent low carb diet.
as above

## 2018-01-30 NOTE — H&P ADULT - PROBLEM SELECTOR PLAN 1
case discussed with  podiatry resident will require hallux amputation , but first needs vascular studies,  possible erosion of bone  on plain film will obtain MRI to evaluate for osteomyelitis  , s/p sharp debridement and biopsy by podiatry   - Continue Vancomycin and zosyn   - MRI foot   - F/u JOSAFAT/PVR   - F/u wound culture  - Day team to follow up further vascular and podiatry recommendations

## 2018-01-30 NOTE — H&P ADULT - ASSESSMENT
67M w/pmh  DM , h/o  etoh abuse and  pancreatitis, Cad s/p stent,  PVD, presents with worsening  left foot infection over the past week.

## 2018-01-31 ENCOUNTER — TRANSCRIPTION ENCOUNTER (OUTPATIENT)
Age: 68
End: 2018-01-31

## 2018-01-31 LAB
ANION GAP SERPL CALC-SCNC: 14 MMOL/L — SIGNIFICANT CHANGE UP (ref 5–17)
BLD GP AB SCN SERPL QL: NEGATIVE — SIGNIFICANT CHANGE UP
BUN SERPL-MCNC: 16 MG/DL — SIGNIFICANT CHANGE UP (ref 7–23)
CALCIUM SERPL-MCNC: 9.2 MG/DL — SIGNIFICANT CHANGE UP (ref 8.4–10.5)
CHLORIDE SERPL-SCNC: 99 MMOL/L — SIGNIFICANT CHANGE UP (ref 96–108)
CO2 SERPL-SCNC: 23 MMOL/L — SIGNIFICANT CHANGE UP (ref 22–31)
CREAT SERPL-MCNC: 0.67 MG/DL — SIGNIFICANT CHANGE UP (ref 0.5–1.3)
GLUCOSE BLDC GLUCOMTR-MCNC: 220 MG/DL — HIGH (ref 70–99)
GLUCOSE BLDC GLUCOMTR-MCNC: 237 MG/DL — HIGH (ref 70–99)
GLUCOSE BLDC GLUCOMTR-MCNC: 265 MG/DL — HIGH (ref 70–99)
GLUCOSE BLDC GLUCOMTR-MCNC: 327 MG/DL — HIGH (ref 70–99)
GLUCOSE SERPL-MCNC: 355 MG/DL — HIGH (ref 70–99)
MAGNESIUM SERPL-MCNC: 1.9 MG/DL — SIGNIFICANT CHANGE UP (ref 1.6–2.6)
PHOSPHATE SERPL-MCNC: 2.9 MG/DL — SIGNIFICANT CHANGE UP (ref 2.5–4.5)
POTASSIUM SERPL-MCNC: 4.1 MMOL/L — SIGNIFICANT CHANGE UP (ref 3.5–5.3)
POTASSIUM SERPL-SCNC: 4.1 MMOL/L — SIGNIFICANT CHANGE UP (ref 3.5–5.3)
RH IG SCN BLD-IMP: POSITIVE — SIGNIFICANT CHANGE UP
SODIUM SERPL-SCNC: 136 MMOL/L — SIGNIFICANT CHANGE UP (ref 135–145)
VANCOMYCIN TROUGH SERPL-MCNC: 9.8 UG/ML — LOW (ref 10–20)

## 2018-01-31 PROCEDURE — 71045 X-RAY EXAM CHEST 1 VIEW: CPT | Mod: 26

## 2018-01-31 PROCEDURE — 99232 SBSQ HOSP IP/OBS MODERATE 35: CPT

## 2018-01-31 RX ORDER — PIPERACILLIN AND TAZOBACTAM 4; .5 G/20ML; G/20ML
3.38 INJECTION, POWDER, LYOPHILIZED, FOR SOLUTION INTRAVENOUS EVERY 8 HOURS
Qty: 0 | Refills: 0 | Status: DISCONTINUED | OUTPATIENT
Start: 2018-01-31 | End: 2018-02-08

## 2018-01-31 RX ORDER — INSULIN GLARGINE 100 [IU]/ML
32 INJECTION, SOLUTION SUBCUTANEOUS AT BEDTIME
Qty: 0 | Refills: 0 | Status: DISCONTINUED | OUTPATIENT
Start: 2018-01-31 | End: 2018-02-01

## 2018-01-31 RX ORDER — SODIUM CHLORIDE 9 MG/ML
1000 INJECTION INTRAMUSCULAR; INTRAVENOUS; SUBCUTANEOUS
Qty: 0 | Refills: 0 | Status: DISCONTINUED | OUTPATIENT
Start: 2018-01-31 | End: 2018-02-01

## 2018-01-31 RX ORDER — INSULIN GLARGINE 100 [IU]/ML
40 INJECTION, SOLUTION SUBCUTANEOUS AT BEDTIME
Qty: 0 | Refills: 0 | Status: DISCONTINUED | OUTPATIENT
Start: 2018-01-31 | End: 2018-01-31

## 2018-01-31 RX ORDER — VANCOMYCIN HCL 1 G
1000 VIAL (EA) INTRAVENOUS EVERY 12 HOURS
Qty: 0 | Refills: 0 | Status: DISCONTINUED | OUTPATIENT
Start: 2018-01-31 | End: 2018-01-31

## 2018-01-31 RX ORDER — INSULIN LISPRO 100/ML
12 VIAL (ML) SUBCUTANEOUS ONCE
Qty: 0 | Refills: 0 | Status: COMPLETED | OUTPATIENT
Start: 2018-01-31 | End: 2018-01-31

## 2018-01-31 RX ORDER — INSULIN LISPRO 100/ML
12 VIAL (ML) SUBCUTANEOUS
Qty: 0 | Refills: 0 | Status: DISCONTINUED | OUTPATIENT
Start: 2018-01-31 | End: 2018-02-01

## 2018-01-31 RX ADMIN — PIPERACILLIN AND TAZOBACTAM 25 GRAM(S): 4; .5 INJECTION, POWDER, LYOPHILIZED, FOR SOLUTION INTRAVENOUS at 01:22

## 2018-01-31 RX ADMIN — Medication 4: at 08:23

## 2018-01-31 RX ADMIN — Medication 250 MILLIGRAM(S): at 05:15

## 2018-01-31 RX ADMIN — Medication 8 UNIT(S): at 08:23

## 2018-01-31 RX ADMIN — Medication 6: at 17:30

## 2018-01-31 RX ADMIN — GABAPENTIN 300 MILLIGRAM(S): 400 CAPSULE ORAL at 05:14

## 2018-01-31 RX ADMIN — Medication 4: at 12:51

## 2018-01-31 RX ADMIN — Medication 12 UNIT(S): at 12:50

## 2018-01-31 RX ADMIN — Medication 2: at 22:17

## 2018-01-31 RX ADMIN — HEPARIN SODIUM 5000 UNIT(S): 5000 INJECTION INTRAVENOUS; SUBCUTANEOUS at 05:16

## 2018-01-31 RX ADMIN — Medication 25 MILLIGRAM(S): at 05:14

## 2018-01-31 RX ADMIN — PIPERACILLIN AND TAZOBACTAM 25 GRAM(S): 4; .5 INJECTION, POWDER, LYOPHILIZED, FOR SOLUTION INTRAVENOUS at 09:25

## 2018-01-31 RX ADMIN — HEPARIN SODIUM 5000 UNIT(S): 5000 INJECTION INTRAVENOUS; SUBCUTANEOUS at 17:33

## 2018-01-31 RX ADMIN — PREGABALIN 1000 MICROGRAM(S): 225 CAPSULE ORAL at 12:51

## 2018-01-31 RX ADMIN — GABAPENTIN 300 MILLIGRAM(S): 400 CAPSULE ORAL at 17:33

## 2018-01-31 RX ADMIN — PIPERACILLIN AND TAZOBACTAM 25 GRAM(S): 4; .5 INJECTION, POWDER, LYOPHILIZED, FOR SOLUTION INTRAVENOUS at 17:51

## 2018-01-31 RX ADMIN — INSULIN GLARGINE 32 UNIT(S): 100 INJECTION, SOLUTION SUBCUTANEOUS at 22:16

## 2018-01-31 RX ADMIN — Medication 12 UNIT(S): at 17:31

## 2018-01-31 NOTE — DISCHARGE NOTE ADULT - CARE PLAN
Principal Discharge DX:	Coronary artery disease involving native coronary artery of native heart without angina pectoris  Goal:	You will be chest pain free.  Assessment and plan of treatment:	Low salt, low fat diet.   Weight management.   Take medications as prescribed.    No smoking.  Follow up appointments with your doctor(s)  as instructed.  Secondary Diagnosis:	Essential hypertension  Goal:	Your blood pressure will be controlled.  Assessment and plan of treatment:	Continue with your blood pressure medications; eat a heart healthy diet with low salt diet; exercise regularly (consult with your physician or cardiologist first); maintain a heart healthy weight; if you smoke - quit (A resource to help you stop smoking is the [facility] – phone number [phone]); include healthy ways to manage stress. Continue to follow with your primary care physician and cardiologist.  Secondary Diagnosis:	Type 2 diabetes mellitus with diabetic peripheral angiopathy with gangrene, unspecified long term insulin use status  Goal:	Your hemoglobin A1C will be at a normal range (normal range is from 6-8)  Assessment and plan of treatment:	Continue to follow with your primary care MD or your endocrinologist. Discuss what the goal hemoglobin A1C level is for you.  Follow a heart healthy diabetic diet. If you check your fingerstick glucose at home, call your MD if it is greater than 250mg/dL on 2 occasions or less than 100mg/dL on 2 occasions. Know signs of low blood sugar, such as: dizziness, shakiness, sweating, confusion, hunger, nervousness- drink 4 ounces apple juice if occurs and call your doctor. Know early signs of high blood sugar, such as: frequent urination, increased thirst, blurry vision, fatigue, headache - call your doctor if this occurs.  Secondary Diagnosis:	Uncomplicated alcohol dependence  Goal:	You will quit drinking  Assessment and plan of treatment:	Follow recommendations given to you by   Secondary Diagnosis:	PVD (peripheral vascular disease) with claudication  Goal:	You will have relief of symptoms  Assessment and plan of treatment:	Follow-up with Dr Fuentes in 1 week and continue your Aspirin and Brilinta  Assessment and plan of treatment:	Do not stop your Aspirin or Brilinta unless instructed to do so by your cardiologist.

## 2018-01-31 NOTE — PROGRESS NOTE ADULT - ASSESSMENT
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 Leucocytosis resolved, no fever. polymicrobial growth on wound cx.     Plan:  Continue with Zosyn 3.375 gm iv q8h   No MRSA, can d/c vancomycin.   Follow up prelim blood culture  Podiatry on board, s/p debridement  Will eventually need amputation  Vascular following  continue with wound care.

## 2018-01-31 NOTE — DISCHARGE NOTE ADULT - CARE PROVIDERS DIRECT ADDRESSES
,DirectAddress_Unknown,[email or web address],DirectAddress_Unknown,DirectAddress_Unknown

## 2018-01-31 NOTE — PROGRESS NOTE ADULT - ASSESSMENT
67M with gangrene of L great toe    - d/w pt lle angio indisctions risks and benefits  pt wants to proceed   will proceed a lle angio thurs am   - Continue IV abx  - Continue medical optimization  -

## 2018-01-31 NOTE — PROGRESS NOTE ADULT - SUBJECTIVE AND OBJECTIVE BOX
PRE OPERATIVE NOTE    Pre-op Diagnosis: LLE PVD  Procedure: LLE angio  Surgeon: Alfredo                          12.5   10.16 )-----------( 232      ( 2018 07:42 )             37.4         136  |  99  |  16  ----------------------------<  355<H>  4.1   |  23  |  0.67    Ca    9.2      2018 07:55  Phos  2.9       Mg     1.9         TPro  7.2  /  Alb  3.7  /  TBili  0.3  /  DBili  x   /  AST  16  /  ALT  12  /  AlkPhos  76      LIVER FUNCTIONS - ( 2018 07:32 )  Alb: 3.7 g/dL / Pro: 7.2 g/dL / ALK PHOS: 76 U/L / ALT: 12 U/L / AST: 16 U/L / GGT: x               CAPILLARY BLOOD GLUCOSE      POCT Blood Glucose.: 220 mg/dL (2018 11:53)  Pregnancy n/a  UA pending  Type & Screen: pending  CXR: pending  EK/29 SINUS TACHYCARDIA POSSIBLE LEFT ATRIAL ENLARGEMENT LEFT VENTRICULAR HYPERTROPHY NONSPECIFIC ST AND T WAVE ABNORMALITY, ABNORMAL ECG

## 2018-01-31 NOTE — DISCHARGE NOTE ADULT - MEDICATION SUMMARY - MEDICATIONS TO TAKE
I will START or STAY ON the medications listed below when I get home from the hospital:    Ultra-Thin II Ins Pen Needles (pen needle, diabetic)   -- 1 each subcutaneous 3 times a day MDD:3  -- Indication: For Diabetes mellitus    aspirin 81 mg oral tablet  -- 1 tab(s) by mouth once a day MDD:1  -- Indication: For Heart     gabapentin 300 mg oral capsule  -- 1 cap(s) by mouth 2 times a day  -- Indication: For Neuropathy    NovoLOG FlexPen 100 units/mL injectable solution  -- 23 unit(s) injectable 3 times a day MDD:69 units per day  -- Check with your doctor before becoming pregnant.  Do not drink alcoholic beverages when taking this medication.  Keep in refrigerator.  Do not freeze.  Obtain medical advice before taking any non-prescription drugs as some may affect the action of this medication.    -- Indication: For Diabetes mellitus    insulin glargine  -- 20 unit(s)  once a day morning  -- Indication: For Diabetes mellitus    insulin glargine  -- 58 unit(s)  once a day (at bedtime)  -- Indication: For Diabetes mellitus    Brilinta (ticagrelor) 90 mg oral tablet  -- 1 tab(s) by mouth 2 times a day MDD:2  -- It is very important that you take or use this exactly as directed.  Do not skip doses or discontinue unless directed by your doctor.  Obtain medical advice before taking any non-prescription drugs as some may affect the action of this medication.    -- Indication: For Heart     metoprolol succinate 25 mg oral tablet, extended release  -- 1 tab(s) by mouth once a day  -- Indication: For Heart     amoxicillin-clavulanate 875 mg-125 mg oral tablet  -- 1 tab(s) by mouth 2 times a day MDD:2  -- Indication: For Toe infection    Multiple Vitamins oral tablet  -- 1 tab(s) by mouth once a day MDD:1  -- Indication: For Vitamin    VITAMIN B-12 1,000 MCG TABLET  -- 1 tab(s) by mouth once a day MDD:1  -- Indication: For Vitamin    folic acid 1 mg oral tablet  -- 1 tab(s) by mouth once a day MDD:1  -- Indication: For Vitamin    Vitamin D3  -- 1 cap(s) by mouth once a day  -- Indication: For Vitamin I will START or STAY ON the medications listed below when I get home from the hospital:    Ultra-Thin II Ins Pen Needles (pen needle, diabetic)   -- 1 each subcutaneous 3 times a day MDD:3  -- Indication: For Diabetes mellitus    aspirin 81 mg oral tablet  -- 1 tab(s) by mouth once a day MDD:1  -- Indication: For Stent of Heart     gabapentin 300 mg oral capsule  -- 1 cap(s) by mouth 2 times a day  -- Indication: For Neuropathy    NovoLOG FlexPen 100 units/mL injectable solution  -- 23 unit(s) injectable 3 times a day MDD:69 units per day  -- Check with your doctor before becoming pregnant.  Do not drink alcoholic beverages when taking this medication.  Keep in refrigerator.  Do not freeze.  Obtain medical advice before taking any non-prescription drugs as some may affect the action of this medication.    -- Indication: For Diabetes mellitus    insulin glargine  -- 20 unit(s)  once a day morning  -- Indication: For Diabetes mellitus    insulin glargine  -- 58 unit(s)  once a day (at bedtime)  -- Indication: For Diabetes mellitus    Brilinta (ticagrelor) 90 mg oral tablet  -- 1 tab(s) by mouth 2 times a day MDD:2  -- It is very important that you take or use this exactly as directed.  Do not skip doses or discontinue unless directed by your doctor.  Obtain medical advice before taking any non-prescription drugs as some may affect the action of this medication.    -- Indication: For Stent of HEART     metoprolol succinate 25 mg oral tablet, extended release  -- 1 tab(s) by mouth once a day  -- Indication: For Heart     amoxicillin-clavulanate 875 mg-125 mg oral tablet  -- 1 tab(s) by mouth 2 times a day MDD:2  -- Indication: For TOE infection    Multiple Vitamins oral tablet  -- 1 tab(s) by mouth once a day MDD:1  -- Indication: For Vitamin    folic acid 1 mg oral tablet  -- 1 tab(s) by mouth once a day MDD:1  -- Indication: For Vitamin    cyanocobalamin 1000 mcg oral tablet  -- 1 tab(s) by mouth once a day MDD:1  -- Indication: For VITAMIN    Vitamin D3  -- 1 cap(s) by mouth once a day  -- Indication: For Vitamin I will START or STAY ON the medications listed below when I get home from the hospital:    Ultra-Thin II Ins Pen Needles (pen needle, diabetic)   -- 1 each subcutaneous 3 times a day MDD:3  -- Indication: For Diabetes mellitus    Ultra-Thin II Ins Pen Needles (pen needle, diabetic)   -- 1 each subcutaneous 2 times a day MDD:2   -- Indication: For Diabetes mellitus    aspirin 81 mg oral tablet  -- 1 tab(s) by mouth once a day MDD:1  -- Indication: For Stent of Heart     gabapentin 300 mg oral capsule  -- 1 cap(s) by mouth 2 times a day  -- Indication: For Neuropathy    NovoLOG FlexPen 100 units/mL injectable solution  -- 23 unit(s) injectable 3 times a day MDD:69 units per day  -- Check with your doctor before becoming pregnant.  Do not drink alcoholic beverages when taking this medication.  Keep in refrigerator.  Do not freeze.  Obtain medical advice before taking any non-prescription drugs as some may affect the action of this medication.    -- Indication: For Diabetes mellitus    Lantus Solostar Pen 100 units/mL subcutaneous solution  -- 41 unit(s) subcutaneous 2 times a day  Take 58 units in bedtime, and 23 units in am MDD:2  -- Do not drink alcoholic beverages when taking this medication.  It is very important that you take or use this exactly as directed.  Do not skip doses or discontinue unless directed by your doctor.  Keep in refrigerator.  Do not freeze.    -- Indication: For Diabetes mellitus    Brilinta (ticagrelor) 90 mg oral tablet  -- 1 tab(s) by mouth 2 times a day MDD:2  -- It is very important that you take or use this exactly as directed.  Do not skip doses or discontinue unless directed by your doctor.  Obtain medical advice before taking any non-prescription drugs as some may affect the action of this medication.    -- Indication: For Stent of HEART     metoprolol succinate 25 mg oral tablet, extended release  -- 1 tab(s) by mouth once a day  -- Indication: For Heart     amoxicillin-clavulanate 875 mg-125 mg oral tablet  -- 1 tab(s) by mouth 2 times a day MDD:2  -- Indication: For TOE infection    Multiple Vitamins oral tablet  -- 1 tab(s) by mouth once a day MDD:1  -- Indication: For Vitamin    folic acid 1 mg oral tablet  -- 1 tab(s) by mouth once a day MDD:1  -- Indication: For Vitamin    cyanocobalamin 1000 mcg oral tablet  -- 1 tab(s) by mouth once a day MDD:1  -- Indication: For VITAMIN    Vitamin D3  -- 1 cap(s) by mouth once a day  -- Indication: For Vitamin

## 2018-01-31 NOTE — DISCHARGE NOTE ADULT - PLAN OF CARE
You will be chest pain free. Low salt, low fat diet.   Weight management.   Take medications as prescribed.    No smoking.  Follow up appointments with your doctor(s)  as instructed. Your blood pressure will be controlled. Continue with your blood pressure medications; eat a heart healthy diet with low salt diet; exercise regularly (consult with your physician or cardiologist first); maintain a heart healthy weight; if you smoke - quit (A resource to help you stop smoking is the [facility] – phone number [phone]); include healthy ways to manage stress. Continue to follow with your primary care physician and cardiologist. Your hemoglobin A1C will be at a normal range (normal range is from 6-8) Continue to follow with your primary care MD or your endocrinologist. Discuss what the goal hemoglobin A1C level is for you.  Follow a heart healthy diabetic diet. If you check your fingerstick glucose at home, call your MD if it is greater than 250mg/dL on 2 occasions or less than 100mg/dL on 2 occasions. Know signs of low blood sugar, such as: dizziness, shakiness, sweating, confusion, hunger, nervousness- drink 4 ounces apple juice if occurs and call your doctor. Know early signs of high blood sugar, such as: frequent urination, increased thirst, blurry vision, fatigue, headache - call your doctor if this occurs. You will quit drinking Follow recommendations given to you by  You will have relief of symptoms Follow-up with Dr Fuentes in 1 week and continue your Aspirin and Brilinta Do not stop your Aspirin or Brilinta unless instructed to do so by your cardiologist.

## 2018-01-31 NOTE — DISCHARGE NOTE ADULT - NSFTFHOMEOTHERFT_GEN_ALL_CORE
visiting nurse services apply betadine paint to wounds to left foot hallux and lateral fifth metatarsal and cover with 4x4 gauze and chani. To be performed daily. Make sure to keep wounds dry, DO NOT GET WET.

## 2018-01-31 NOTE — DISCHARGE NOTE ADULT - MEDICATION SUMMARY - MEDICATIONS TO STOP TAKING
I will STOP taking the medications listed below when I get home from the hospital:    gabapentin    metoprolol    Lantus 100 units/mL subcutaneous solution  -- 60 unit(s) subcutaneous 2 times a day    NovoLOG 100 units/mL injectable solution  -- 15 unit(s) injectable 3 times a day    VITAMIN B-12 1,000 MCG TABLET    Colace 100 mg oral capsule  -- 1 cap(s) by mouth once a day, As Needed    Tresiba FlexTouch 200 units/mL subcutaneous solution  -- 15 unit(s) subcutaneous 2 times a day    NovoLOG FlexPen 100 units/mL injectable solution  -- 20 unit(s) injectable 3 times a day

## 2018-01-31 NOTE — PROGRESS NOTE ADULT - ASSESSMENT
A/P: 67y Male planned for above procedure  NPO past midnight, except medications  IVF spoke with NP Brandi to adjust insulin overnight for NPO status, and for IVF   Pain/nausea control  AM labs, CBC, BMP, T&S, coags  CXR, UA in AM  Consent to be obtained    [id number]

## 2018-01-31 NOTE — CHART NOTE - NSCHARTNOTEFT_GEN_A_CORE
Vascular Surgery    Pt to undergo LLE angiogram tomorrow  Please keep pt NPOpMN and draw AM labs  Please document medical clearance for procedure.    DIMITRIOS Wilder MD PGY 2   [id number]

## 2018-01-31 NOTE — PROGRESS NOTE ADULT - ASSESSMENT
67M w/pmh  DM , h/o  etoh abuse and  pancreatitis, Cad s/p stent,  PVD, presents with worsening  left foot infection over the past week.    Problem/Plan - 1:  ·  Problem: Diabetic foot infection.  Plan: case discussed with  podiatry resident will require hallux amputation , but first needs vascular studies,  possible erosion of bone  on plain film will obtain MRI to evaluate for osteomyelitis  , s/p sharp debridement and biopsy by podiatry   - Continue Vancomycin and zosyn   - MRI foot REVIEWED  - Vascular fu  - amputation in am     Problem/Plan - 2:  ·  Problem: Diabetes mellitus.  Plan: uncontrolled - Wife not clear on exact dosing  of insulin , will therefore reduce insulin dosing and up titrate to goal of -180   - endo fu  - basal/bolus    Problem/Plan - 3:  ·  Problem: CAD (coronary artery disease).  Plan: -  hold ASA in anticipation for surgical intervention   -  continue metoprolol   - cards fu

## 2018-01-31 NOTE — DISCHARGE NOTE ADULT - CARE PROVIDER_API CALL
Afshan Gandhi), EndocrinologyMetabDiabetes; Internal Medicine  [address] [place] [address]  Phone: [phone]  Fax: [phone]    Jose Fuentes), Vascular Surgery  [address] [facility]  [address]  [place] [address]  Phone: [phone]  Fax: [phone]    Gurpreet Gavin), Cardiovascular Disease; Internal Medicine; Interventional Cardiology; Nuclear Cardiology  [address] [facility] [address]  [place] [address]  Phone: [phone]  Fax: [phone]    Lulu Michele (KIRSTIN), Surgery  Dept Director  [address] [place] [address]  Phone: [phone]  Fax: [phone]

## 2018-01-31 NOTE — PROGRESS NOTE ADULT - SUBJECTIVE AND OBJECTIVE BOX
67y old  Male who presents with a chief complaint of Toe infection x one week       Interval history:  Afebrile, having pain in the toe today.       Antimicrobials:    piperacillin/tazobactam IVPB. 3.375 Gram(s) IV Intermittent every 8 hours  vancomycin  IVPB 1000 milliGRAM(s) IV Intermittent every 12 hours    REVIEW OF SYSTEMS:    No chest pain or palpitations  No cough, no SOB  No N/V/D, no abdominal pain  No dysuria or frequency  No rash.     Vital Signs Last 24 Hrs  T(C): 36.7 (01-31-18 @ 12:25), Max: 37.5 (01-31-18 @ 00:34)  T(F): 98 (01-31-18 @ 12:25), Max: 99.5 (01-31-18 @ 00:34)  HR: 76 (01-31-18 @ 12:25) (74 - 93)  BP: 134/84 (01-31-18 @ 12:25) (114/71 - 164/89)  RR: 18 (01-31-18 @ 12:25) (17 - 18)  SpO2: 99% (01-31-18 @ 12:25) (97% - 100%)    PHYSICAL EXAM:  Patient in no acute distress. AAOX3.  No icterus, no oral ulcers.   Cardiovascular: S1S2 normal.  Lungs: Good air entry B/L lung fields.  Gastrointestinal: soft, nontender, nondistended.  Extremities: no edema. lt great toe dry gangrene at the tip.   IV sites not inflamed.                           12.5   10.16 )-----------( 232      ( 30 Jan 2018 07:42 )             37.4   01-31    136  |  99  |  16  ----------------------------<  355<H>  4.1   |  23  |  0.67    Ca    9.2      31 Jan 2018 07:55  Phos  2.9     01-31  Mg     1.9     01-31    TPro  7.2  /  Alb  3.7  /  TBili  0.3  /  DBili  x   /  AST  16  /  ALT  12  /  AlkPhos  76  01-30      LIVER FUNCTIONS - ( 30 Jan 2018 07:32 )  Alb: 3.7 g/dL / Pro: 7.2 g/dL / ALK PHOS: 76 U/L / ALT: 12 U/L / AST: 16 U/L / GGT: x               Culture - Other (collected 30 Jan 2018 03:57)  Source: Skin left first toe  Preliminary Report (31 Jan 2018 09:30):    Few Klebsiella pneumoniae Susceptibility to follow.    Numerous Alpha hemolytic strep    Rule Out Beta hemolytic streptococcus    Culture - Blood (collected 30 Jan 2018 01:33)  Source: .Blood Blood-Venous  Preliminary Report (31 Jan 2018 02:01):    No growth to date.    Culture - Blood (collected 30 Jan 2018 01:33)  Source: .Blood Blood-Peripheral  Preliminary Report (31 Jan 2018 02:01):    No growth to date.

## 2018-01-31 NOTE — PROGRESS NOTE ADULT - SUBJECTIVE AND OBJECTIVE BOX
Patient is a 67y old  Male who presents with a chief complaint of Toe infection x one week (31 Jan 2018 09:41)  nad      INTERVAL HPI/OVERNIGHT EVENTS:  T(C): 36.7 (01-31-18 @ 12:25), Max: 37.5 (01-31-18 @ 00:34)  HR: 76 (01-31-18 @ 12:25) (74 - 93)  BP: 134/84 (01-31-18 @ 12:25) (114/71 - 160/84)  RR: 18 (01-31-18 @ 12:25) (17 - 18)  SpO2: 99% (01-31-18 @ 12:25) (97% - 100%)  Wt(kg): --  I&O's Summary    30 Jan 2018 07:01  -  31 Jan 2018 07:00  --------------------------------------------------------  IN: 440 mL / OUT: 700 mL / NET: -260 mL    31 Jan 2018 07:01  -  31 Jan 2018 18:30  --------------------------------------------------------  IN: 800 mL / OUT: 300 mL / NET: 500 mL        LABS:                        12.5   10.16 )-----------( 232      ( 30 Jan 2018 07:42 )             37.4     01-31    136  |  99  |  16  ----------------------------<  355<H>  4.1   |  23  |  0.67    Ca    9.2      31 Jan 2018 07:55  Phos  2.9     01-31  Mg     1.9     01-31    TPro  7.2  /  Alb  3.7  /  TBili  0.3  /  DBili  x   /  AST  16  /  ALT  12  /  AlkPhos  76  01-30        CAPILLARY BLOOD GLUCOSE      POCT Blood Glucose.: 265 mg/dL (31 Jan 2018 16:55)  POCT Blood Glucose.: 220 mg/dL (31 Jan 2018 11:53)  POCT Blood Glucose.: 237 mg/dL (31 Jan 2018 08:03)  POCT Blood Glucose.: 316 mg/dL (30 Jan 2018 23:06)            MEDICATIONS  (STANDING):  cyanocobalamin 1000 MICROGram(s) Oral daily  dextrose 5%. 1000 milliLiter(s) (50 mL/Hr) IV Continuous <Continuous>  dextrose 50% Injectable 25 Gram(s) IV Push once  gabapentin 300 milliGRAM(s) Oral two times a day  heparin  Injectable 5000 Unit(s) SubCutaneous every 12 hours  insulin glargine Injectable (LANTUS) 32 Unit(s) SubCutaneous at bedtime  insulin lispro (HumaLOG) corrective regimen sliding scale   SubCutaneous three times a day before meals  insulin lispro (HumaLOG) corrective regimen sliding scale   SubCutaneous at bedtime  insulin lispro Injectable (HumaLOG) 12 Unit(s) SubCutaneous three times a day before meals  metoprolol succinate ER 25 milliGRAM(s) Oral daily  piperacillin/tazobactam IVPB. 3.375 Gram(s) IV Intermittent every 8 hours  sodium chloride 0.9%. 1000 milliLiter(s) (50 mL/Hr) IV Continuous <Continuous>    MEDICATIONS  (PRN):  acetaminophen   Tablet. 650 milliGRAM(s) Oral every 6 hours PRN Mild Pain (1 - 3)  dextrose Gel 1 Dose(s) Oral once PRN Blood Glucose LESS THAN 70 milliGRAM(s)/deciliter  glucagon  Injectable 1 milliGRAM(s) IntraMuscular once PRN Glucose LESS THAN 70 milligrams/deciliter          PHYSICAL EXAM:  GENERAL: NAD, well-groomed, well-developed  HEAD:  Atraumatic, Normocephalic  CHEST/LUNG: Clear to percussion bilaterally; No rales, rhonchi, wheezing, or rubs  HEART: Regular rate and rhythm; No murmurs, rubs, or gallops  ABDOMEN: Soft, Nontender, Nondistended; Bowel sounds present  EXTREMITIES:  2+ Peripheral Pulses, No clubbing, cyanosis, or edema  LYMPH: No lymphadenopathy noted  SKIN: No rashes or lesions    Care Discussed with Consultants/Other Providers [ ] YES  [ ] NO

## 2018-01-31 NOTE — PROGRESS NOTE ADULT - SUBJECTIVE AND OBJECTIVE BOX
Chief complaint  Patient is a 67y old  Male who presents with a chief complaint of Toe infection x one week (31 Jan 2018 09:41)   Review of systems  Patient in bed, looks comfortable, no fever, no hypoglycemia.    Labs and Fingersticks  CAPILLARY BLOOD GLUCOSE      POCT Blood Glucose.: 220 mg/dL (31 Jan 2018 11:53)  POCT Blood Glucose.: 237 mg/dL (31 Jan 2018 08:03)  POCT Blood Glucose.: 316 mg/dL (30 Jan 2018 23:06)  POCT Blood Glucose.: 253 mg/dL (30 Jan 2018 17:47)      Anion Gap, Serum: 14 (01-31 @ 07:55)  Anion Gap, Serum: 14 (01-30 @ 07:32)  Anion Gap, Serum: 15 (01-29 @ 23:03)    Hemoglobin A1C, Whole Blood: 13.2 <H> (01-30 @ 07:42)    Calcium, Total Serum: 9.2 (01-31 @ 07:55)  Calcium, Total Serum: 8.9 (01-30 @ 07:32)  Calcium, Total Serum: 9.6 (01-29 @ 23:03)  Albumin, Serum: 3.7 (01-30 @ 07:32)  Albumin, Serum: 4.1 (01-29 @ 23:03)    Alanine Aminotransferase (ALT/SGPT): 12 (01-30 @ 07:32)  Alanine Aminotransferase (ALT/SGPT): 12 (01-29 @ 23:03)  Alkaline Phosphatase, Serum: 76 (01-30 @ 07:32)  Alkaline Phosphatase, Serum: 74 (01-29 @ 23:03)  Aspartate Aminotransferase (AST/SGOT): 16 (01-30 @ 07:32)  Aspartate Aminotransferase (AST/SGOT): 12 (01-29 @ 23:03)        01-31    136  |  99  |  16  ----------------------------<  355<H>  4.1   |  23  |  0.67    Ca    9.2      31 Jan 2018 07:55  Phos  2.9     01-31  Mg     1.9     01-31    TPro  7.2  /  Alb  3.7  /  TBili  0.3  /  DBili  x   /  AST  16  /  ALT  12  /  AlkPhos  76  01-30                        12.5   10.16 )-----------( 232      ( 30 Jan 2018 07:42 )             37.4     Medications  MEDICATIONS  (STANDING):  cyanocobalamin 1000 MICROGram(s) Oral daily  dextrose 5%. 1000 milliLiter(s) (50 mL/Hr) IV Continuous <Continuous>  dextrose 50% Injectable 25 Gram(s) IV Push once  gabapentin 300 milliGRAM(s) Oral two times a day  heparin  Injectable 5000 Unit(s) SubCutaneous every 12 hours  insulin glargine Injectable (LANTUS) 40 Unit(s) SubCutaneous at bedtime  insulin lispro (HumaLOG) corrective regimen sliding scale   SubCutaneous three times a day before meals  insulin lispro (HumaLOG) corrective regimen sliding scale   SubCutaneous at bedtime  insulin lispro Injectable (HumaLOG) 12 Unit(s) SubCutaneous three times a day before meals  metoprolol succinate ER 25 milliGRAM(s) Oral daily  piperacillin/tazobactam IVPB. 3.375 Gram(s) IV Intermittent every 8 hours  vancomycin  IVPB 1000 milliGRAM(s) IV Intermittent every 12 hours      Physical Exam  General: Patient comfortable in bed  Vital Signs Last 12 Hrs  T(F): 98 (01-31-18 @ 12:25), Max: 99.1 (01-31-18 @ 02:35)  HR: 76 (01-31-18 @ 12:25) (74 - 88)  BP: 134/84 (01-31-18 @ 12:25) (114/71 - 159/80)  BP(mean): --  RR: 18 (01-31-18 @ 12:25) (17 - 18)  SpO2: 99% (01-31-18 @ 12:25) (98% - 100%)  Neck: No palpable thyroid nodules.  CVS: S1S2, No murmurs  Respiratory: No wheezing, no crepitations  GI: Abdomen soft, bowel sounds positive  Musculoskeletal:  edema lower extremities.   Skin: No skin rashes, no ecchymosis    Diagnostics

## 2018-01-31 NOTE — PROGRESS NOTE ADULT - SUBJECTIVE AND OBJECTIVE BOX
Patient is a 67y old  Male who presents with a chief complaint of Toe infection x one week (31 Jan 2018 09:41)      Vascular Surgery Attending Progress Note    Interval HPI: pt  c/o left toe mild discomfort    Medications:  acetaminophen   Tablet. 650 milliGRAM(s) Oral every 6 hours PRN  cyanocobalamin 1000 MICROGram(s) Oral daily  dextrose 5%. 1000 milliLiter(s) IV Continuous <Continuous>  dextrose 50% Injectable 25 Gram(s) IV Push once  dextrose Gel 1 Dose(s) Oral once PRN  gabapentin 300 milliGRAM(s) Oral two times a day  glucagon  Injectable 1 milliGRAM(s) IntraMuscular once PRN  heparin  Injectable 5000 Unit(s) SubCutaneous every 12 hours  insulin glargine Injectable (LANTUS) 40 Unit(s) SubCutaneous at bedtime  insulin lispro (HumaLOG) corrective regimen sliding scale   SubCutaneous three times a day before meals  insulin lispro (HumaLOG) corrective regimen sliding scale   SubCutaneous at bedtime  insulin lispro Injectable (HumaLOG) 8 Unit(s) SubCutaneous three times a day before meals  metoprolol succinate ER 25 milliGRAM(s) Oral daily  piperacillin/tazobactam IVPB. 3.375 Gram(s) IV Intermittent every 8 hours  vancomycin  IVPB 1000 milliGRAM(s) IV Intermittent every 12 hours      Vital Signs Last 24 Hrs  T(C): 37.2 (31 Jan 2018 08:27), Max: 37.5 (31 Jan 2018 00:34)  T(F): 99 (31 Jan 2018 08:27), Max: 99.5 (31 Jan 2018 00:34)  HR: 74 (31 Jan 2018 08:27) (74 - 96)  BP: 114/71 (31 Jan 2018 08:27) (114/71 - 164/89)  BP(mean): --  RR: 18 (31 Jan 2018 08:27) (16 - 18)  SpO2: 98% (31 Jan 2018 08:27) (96% - 100%)  I&O's Summary    30 Jan 2018 07:01  -  31 Jan 2018 07:00  --------------------------------------------------------  IN: 440 mL / OUT: 700 mL / NET: -260 mL    31 Jan 2018 07:01  -  31 Jan 2018 11:53  --------------------------------------------------------  IN: 360 mL / OUT: 300 mL / NET: 60 mL        Physical Exam:  Neuro  A&Ox3 VSS  Vascular:  toe wound stable   Musculoskeletal: wnl   LABS:                        12.5   10.16 )-----------( 232      ( 30 Jan 2018 07:42 )             37.4     01-31    136  |  99  |  16  ----------------------------<  355<H>  4.1   |  23  |  0.67    Ca    9.2      31 Jan 2018 07:55  Phos  2.9     01-31  Mg     1.9     01-31    TPro  7.2  /  Alb  3.7  /  TBili  0.3  /  DBili  x   /  AST  16  /  ALT  12  /  AlkPhos  76  01-30        VIVIENNE SKAGGS MD  [phone]

## 2018-01-31 NOTE — DISCHARGE NOTE ADULT - ADDITIONAL INSTRUCTIONS
Podiatry discharge instructions:   Please have visiting nurse services apply betadine paint to wounds to left foot hallux and lateral fifth metatarsal and cover with 4x4 gauze and chani. To be performed daily. Make sure to keep wounds dry, DO NOT GET WET  Please take full course of antibiotics as prescribed  Please follow up at the wound care center with Dr. Angel or Dr. Hernandez, associates of Dr. Marlow, within 1 week of discharge. Please call [phone] to make an appointment. Podiatry discharge instructions:   Please have visiting nurse services apply betadine paint to wounds to left foot hallux and lateral fifth metatarsal and cover with 4x4 gauze and chani. To be performed daily. Make sure to keep wounds dry, DO NOT GET WET.  Please take full course of antibiotics as prescribed.  Please follow up at the wound care center with Dr. Angel or Dr. Hernandez, associates of Dr. Marlow, within 1 week of discharge. Please call [phone] to make an appointment. Podiatry discharge instructions:   Please have visiting nurse services apply betadine paint to wounds to left foot hallux and lateral fifth metatarsal and cover with 4x4 gauze and chani. To be performed daily. Make sure to keep wounds dry, DO NOT GET WET.  Please take full course of antibiotics as prescribed.  Please follow up at the wound care center with Dr. Angel or Dr. Hernandez, associates of Dr. Marlow, within 1 week of discharge. Please call [phone] to make an appointment.    Cardiology follow-up with Dr Gavin f/u appt for 2/15/18 at 1:30 pm    Endocrinology follow -up with Dr Afshan Gandhi in office in 1 week Podiatry discharge instructions:   Please have visiting nurse services apply betadine paint to wounds to left foot hallux and lateral fifth metatarsal and cover with 4x4 gauze and chani. To be performed daily. Make sure to keep wounds dry, DO NOT GET WET.  Please take full course of antibiotics as prescribed.  Please follow up at the wound care center with Dr. Angel or Dr. Hernandez, associates of Dr. Marlow, within 1 week of discharge. Please call [phone] to make an appointment.    Cardiology follow-up with Dr Gavin f/u appt for 2/15/18 at 1:30 pm    Endocrinology follow -up with Dr Afshan Gandhi in office in 1 week  ****************************Do not stop your Asprin or Brilinta unless instructed to do so by your cardiologist. **********************

## 2018-01-31 NOTE — DISCHARGE NOTE ADULT - HOSPITAL COURSE
HPI:  Patient is a 67 year old male with a past medical history significant for DM type II, h/o  etoh abuse and  pancreatitis, Cad s/p stent,  PVD, presents with worsening  left foot infection over the past week.      Patients wife reports a sore on his left 1st toe which has worsened over the past week. She noticed pus and blood on his sock, and the infection has worsened. She has treated it with honey dressing with minimal improvement. On day of admission, patient was seen by PMD and referred to hospital for further evaluation. Patient reports minimal pain at site.  He has no fever or chills. Per wife patient has limited ambulation and confined mostly to home because of declining memory and function. He can climb a flight of stairs slowly without active cardio-pulmonary symptoms. He has no shortness of breath or chest pain. (30 Jan 2018 04:39)    Pt s/p PCI s/p cardiac cath CANDIDO x 1pLAD, CANDIDO x 1 dCx, CANDIDO x 1 pOM1 (02/06) via right radial and RCA (2/6) via right radial  asa 81mg daily/ ticagrelor BID/ BB   from cv standpoint cleared from DC , f/u appt for2/15/18 at 1:30 pm     pt with dry gangrene to left first toe w/ OM on po abx for 5 days as per ID recommendations. pt to follow-up with vascular surgery for PVD and podiatry.  Home on current insulin regimen and follow up with endocrinology in 1 week.   Pt to be discharged home with home PT and wound care set up by case management. HPI:  Patient is a 67 year old male with a past medical history significant for DM type II, h/o  etoh abuse and  pancreatitis, Cad s/p stent,  PVD, presents with worsening  left foot infection over the past week.      Patients wife reports a sore on his left 1st toe which has worsened over the past week. She noticed pus and blood on his sock, and the infection has worsened. She has treated it with honey dressing with minimal improvement. On day of admission, patient was seen by PMD and referred to hospital for further evaluation. Patient reports minimal pain at site.  He has no fever or chills. Per wife patient has limited ambulation and confined mostly to home because of declining memory and function. He can climb a flight of stairs slowly without active cardio-pulmonary symptoms. He has no shortness of breath or chest pain. (30 Jan 2018 04:39)    Pt s/p PCI s/p cardiac cath CANDIDO x 1pLAD, CANDIDO x 1 dCx, CANDIDO x 1 pOM1 (02/05) via right radial and RCA (2/6) via right radial  asa 81mg daily/ ticagrelor BID/ BB   from cv standpoint cleared from DC , f/u appt for2/15/18 at 1:30 pm     pt with dry gangrene to left first toe w/ OM on po abx for 5 days as per ID recommendations. pt to follow-up with vascular surgery for PVD and podiatry.  Home on current insulin regimen and follow up with endocrinology in 1 week.   Pt to be discharged home with home PT and wound care set up by case management. HPI:  Patient is a 67 year old male with a past medical history significant for DM type II, h/o  etoh abuse and  pancreatitis, Cad s/p stent,  PVD, presents with worsening  left foot infection over the past week.      Patients wife reports a sore on his left 1st toe which has worsened over the past week. She noticed pus and blood on his sock, and the infection has worsened. She has treated it with honey dressing with minimal improvement. On day of admission, patient was seen by PMD and referred to hospital for further evaluation. Patient reports minimal pain at site.  He has no fever or chills. Per wife patient has limited ambulation and confined mostly to home because of declining memory and function. He can climb a flight of stairs slowly without active cardio-pulmonary symptoms. He has no shortness of breath or chest pain. (30 Jan 2018 04:39)    Pt s/p PCI s/p cardiac cath CANDIDO x 1pLAD, CANDIDO x 1 dCx, CANDIDO x 1 pOM1 (02/05) via right radial and RCA (2/6) via right radial  asa 81mg daily/ ticagrelor BID/ BB   from cv standpoint cleared from DC , f/u appt for2/15/18 at 1:30 pm     pt with dry gangrene to left first toe w/ OM on po abx for 5 days (started 2/8/17) as per ID recommendations. pt to follow-up with vascular surgery for PVD and podiatry.  Home on current insulin regimen and follow up with endocrinology in 1 week.   Pt to be discharged home with home PT and wound care set up by case management.

## 2018-01-31 NOTE — DISCHARGE NOTE ADULT - SECONDARY DIAGNOSIS.
Essential hypertension Type 2 diabetes mellitus with diabetic peripheral angiopathy with gangrene, unspecified long term insulin use status Uncomplicated alcohol dependence PVD (peripheral vascular disease) with claudication

## 2018-01-31 NOTE — DISCHARGE NOTE ADULT - PATIENT PORTAL LINK FT
“You can access the Follow[employer] Patient Portal, offered by [facility], by registering with the following website: [email or web address]”

## 2018-01-31 NOTE — PROGRESS NOTE ADULT - SUBJECTIVE AND OBJECTIVE BOX
pt seen at bedside in NAD. dressing to left foot c/d/i  left hallux distal to IPJ necrotic and mummified no pus no signs of acute infection  MRI positive for OM distal phalanx  applied betadine with DSD  await [facility] plan for angio if revasc successful them will require toe amputation with this admission  will continue to follow

## 2018-01-31 NOTE — DISCHARGE NOTE ADULT - FINDINGS/TREATMENT
s/p PCI s/p cardiac cath CANDIDO x 1pLAD, CANDIDO x 1 dCx, CANDIDO x 1 pOM1 (02/06) via right radial and RCA (2/6) via right radial s/p PCI s/p cardiac cath CANDIDO x 1pLAD, CANDIDO x 1 dCx, CANDIDO x 1 pOM1 (02/05) via right radial and RCA (2/6) via right radial

## 2018-02-01 LAB
-  AMIKACIN: SIGNIFICANT CHANGE UP
-  AMPICILLIN/SULBACTAM: SIGNIFICANT CHANGE UP
-  AMPICILLIN: SIGNIFICANT CHANGE UP
-  AZTREONAM: SIGNIFICANT CHANGE UP
-  CEFAZOLIN: SIGNIFICANT CHANGE UP
-  CEFEPIME: SIGNIFICANT CHANGE UP
-  CEFOXITIN: SIGNIFICANT CHANGE UP
-  CEFTAZIDIME: SIGNIFICANT CHANGE UP
-  CEFTRIAXONE: SIGNIFICANT CHANGE UP
-  CIPROFLOXACIN: SIGNIFICANT CHANGE UP
-  ERTAPENEM: SIGNIFICANT CHANGE UP
-  GENTAMICIN: SIGNIFICANT CHANGE UP
-  IMIPENEM: SIGNIFICANT CHANGE UP
-  LEVOFLOXACIN: SIGNIFICANT CHANGE UP
-  MEROPENEM: SIGNIFICANT CHANGE UP
-  PIPERACILLIN/TAZOBACTAM: SIGNIFICANT CHANGE UP
-  TOBRAMYCIN: SIGNIFICANT CHANGE UP
-  TRIMETHOPRIM/SULFAMETHOXAZOLE: SIGNIFICANT CHANGE UP
APPEARANCE UR: CLEAR — SIGNIFICANT CHANGE UP
APTT BLD: 27.9 SEC — SIGNIFICANT CHANGE UP (ref 27.5–37.4)
BILIRUB UR-MCNC: NEGATIVE — SIGNIFICANT CHANGE UP
BUN SERPL-MCNC: 16 MG/DL — SIGNIFICANT CHANGE UP (ref 7–23)
CALCIUM SERPL-MCNC: 9.2 MG/DL — SIGNIFICANT CHANGE UP (ref 8.4–10.5)
CHLORIDE SERPL-SCNC: 101 MMOL/L — SIGNIFICANT CHANGE UP (ref 96–108)
CO2 SERPL-SCNC: 24 MMOL/L — SIGNIFICANT CHANGE UP (ref 22–31)
COLOR SPEC: YELLOW — SIGNIFICANT CHANGE UP
CREAT SERPL-MCNC: 0.81 MG/DL — SIGNIFICANT CHANGE UP (ref 0.5–1.3)
DIFF PNL FLD: NEGATIVE — SIGNIFICANT CHANGE UP
GLUCOSE BLDC GLUCOMTR-MCNC: 176 MG/DL — HIGH (ref 70–99)
GLUCOSE BLDC GLUCOMTR-MCNC: 223 MG/DL — HIGH (ref 70–99)
GLUCOSE BLDC GLUCOMTR-MCNC: 278 MG/DL — HIGH (ref 70–99)
GLUCOSE BLDC GLUCOMTR-MCNC: 290 MG/DL — HIGH (ref 70–99)
GLUCOSE BLDC GLUCOMTR-MCNC: 299 MG/DL — HIGH (ref 70–99)
GLUCOSE SERPL-MCNC: 213 MG/DL — HIGH (ref 70–99)
GLUCOSE UR QL: 1000 MG/DL
HCT VFR BLD CALC: 34.3 % — LOW (ref 39–50)
HGB BLD-MCNC: 12.2 G/DL — LOW (ref 13–17)
INR BLD: 1.01 RATIO — SIGNIFICANT CHANGE UP (ref 0.88–1.16)
KETONES UR-MCNC: NEGATIVE — SIGNIFICANT CHANGE UP
LEUKOCYTE ESTERASE UR-ACNC: NEGATIVE — SIGNIFICANT CHANGE UP
MCHC RBC-ENTMCNC: 32.7 PG — SIGNIFICANT CHANGE UP (ref 27–34)
MCHC RBC-ENTMCNC: 35.7 GM/DL — SIGNIFICANT CHANGE UP (ref 32–36)
MCV RBC AUTO: 91.8 FL — SIGNIFICANT CHANGE UP (ref 80–100)
METHOD TYPE: SIGNIFICANT CHANGE UP
NITRITE UR-MCNC: NEGATIVE — SIGNIFICANT CHANGE UP
PH UR: 6 — SIGNIFICANT CHANGE UP (ref 5–8)
PLATELET # BLD AUTO: 247 K/UL — SIGNIFICANT CHANGE UP (ref 150–400)
POTASSIUM SERPL-MCNC: 3.6 MMOL/L — SIGNIFICANT CHANGE UP (ref 3.5–5.3)
POTASSIUM SERPL-SCNC: 3.6 MMOL/L — SIGNIFICANT CHANGE UP (ref 3.5–5.3)
PROT UR-MCNC: NEGATIVE MG/DL — SIGNIFICANT CHANGE UP
PROTHROM AB SERPL-ACNC: 10.9 SEC — SIGNIFICANT CHANGE UP (ref 9.8–12.7)
RBC # BLD: 3.73 M/UL — LOW (ref 4.2–5.8)
RBC # FLD: 11.4 % — SIGNIFICANT CHANGE UP (ref 10.3–14.5)
SODIUM SERPL-SCNC: 138 MMOL/L — SIGNIFICANT CHANGE UP (ref 135–145)
SP GR SPEC: 1.02 — SIGNIFICANT CHANGE UP (ref 1.01–1.02)
UROBILINOGEN FLD QL: 1 MG/DL — SIGNIFICANT CHANGE UP
WBC # BLD: 9.1 K/UL — SIGNIFICANT CHANGE UP (ref 3.8–10.5)
WBC # FLD AUTO: 9.1 K/UL — SIGNIFICANT CHANGE UP (ref 3.8–10.5)

## 2018-02-01 PROCEDURE — 36245 INS CATH ABD/L-EXT ART 1ST: CPT | Mod: LT

## 2018-02-01 PROCEDURE — 75625 CONTRAST EXAM ABDOMINL AORTA: CPT | Mod: 26

## 2018-02-01 PROCEDURE — 99232 SBSQ HOSP IP/OBS MODERATE 35: CPT

## 2018-02-01 PROCEDURE — 75710 ARTERY X-RAYS ARM/LEG: CPT | Mod: 26,LT

## 2018-02-01 RX ORDER — INSULIN LISPRO 100/ML
15 VIAL (ML) SUBCUTANEOUS
Qty: 0 | Refills: 0 | Status: DISCONTINUED | OUTPATIENT
Start: 2018-02-01 | End: 2018-02-02

## 2018-02-01 RX ORDER — INSULIN GLARGINE 100 [IU]/ML
40 INJECTION, SOLUTION SUBCUTANEOUS AT BEDTIME
Qty: 0 | Refills: 0 | Status: DISCONTINUED | OUTPATIENT
Start: 2018-02-01 | End: 2018-02-02

## 2018-02-01 RX ORDER — SODIUM CHLORIDE 9 MG/ML
1000 INJECTION INTRAMUSCULAR; INTRAVENOUS; SUBCUTANEOUS
Qty: 0 | Refills: 0 | Status: DISCONTINUED | OUTPATIENT
Start: 2018-02-01 | End: 2018-02-09

## 2018-02-01 RX ADMIN — Medication 6: at 17:10

## 2018-02-01 RX ADMIN — HEPARIN SODIUM 5000 UNIT(S): 5000 INJECTION INTRAVENOUS; SUBCUTANEOUS at 17:10

## 2018-02-01 RX ADMIN — SODIUM CHLORIDE 50 MILLILITER(S): 9 INJECTION INTRAMUSCULAR; INTRAVENOUS; SUBCUTANEOUS at 00:58

## 2018-02-01 RX ADMIN — PIPERACILLIN AND TAZOBACTAM 25 GRAM(S): 4; .5 INJECTION, POWDER, LYOPHILIZED, FOR SOLUTION INTRAVENOUS at 18:42

## 2018-02-01 RX ADMIN — GABAPENTIN 300 MILLIGRAM(S): 400 CAPSULE ORAL at 05:02

## 2018-02-01 RX ADMIN — Medication 25 MILLIGRAM(S): at 05:02

## 2018-02-01 RX ADMIN — Medication 1: at 22:44

## 2018-02-01 RX ADMIN — Medication 15 UNIT(S): at 17:10

## 2018-02-01 RX ADMIN — GABAPENTIN 300 MILLIGRAM(S): 400 CAPSULE ORAL at 17:11

## 2018-02-01 RX ADMIN — PIPERACILLIN AND TAZOBACTAM 25 GRAM(S): 4; .5 INJECTION, POWDER, LYOPHILIZED, FOR SOLUTION INTRAVENOUS at 00:58

## 2018-02-01 RX ADMIN — Medication 2: at 10:58

## 2018-02-01 RX ADMIN — PREGABALIN 1000 MICROGRAM(S): 225 CAPSULE ORAL at 12:59

## 2018-02-01 RX ADMIN — Medication 15 UNIT(S): at 10:59

## 2018-02-01 RX ADMIN — PIPERACILLIN AND TAZOBACTAM 25 GRAM(S): 4; .5 INJECTION, POWDER, LYOPHILIZED, FOR SOLUTION INTRAVENOUS at 11:02

## 2018-02-01 RX ADMIN — INSULIN GLARGINE 40 UNIT(S): 100 INJECTION, SOLUTION SUBCUTANEOUS at 22:44

## 2018-02-01 NOTE — PROGRESS NOTE ADULT - ASSESSMENT
LEFT hallux ulcer to bone with underlying osteomyelitis, lateral 5th metatarsal ulcers to subQ    Plan:  - pt seen and examined  - Cont abx  - Imaging and cultures reviewed as above.  - Cont local care for now.  - Patient to undergo angiogram.  Pending results will plan for hallux amputation vs partial 1st ray amputation, likely to be performed Tuesday afternoon.  Please document clearance.

## 2018-02-01 NOTE — PROGRESS NOTE ADULT - ASSESSMENT
Assessment  DMT2: 67y Male with DM T2 with hyperglycemia on insulin, blood sugars improving, was NPO, no hypoglycemia, non compliant with low carb diet.  CAD: On medications, monitored.  Gangrene: Vascular/pod FU  HTN: Controlled, On med.

## 2018-02-01 NOTE — PROGRESS NOTE ADULT - ASSESSMENT
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 Leucocytosis resolved, no fever. polymicrobial growth on wound cx.     Plan:  Continue with Zosyn 3.375 gm iv q8h   Follow up prelim blood culture and wound cx.  Podiatry on board, s/p debridement  Will eventually need amputation  Vascular following, plan for angiogram.   continue with wound care.

## 2018-02-01 NOTE — PROGRESS NOTE ADULT - ASSESSMENT
67M w/pmh  DM , h/o  etoh abuse and  pancreatitis, Cad s/p stent,  PVD, presents with worsening  left foot infection over the past week.    Problem/Plan - 1:  ·  Problem: Diabetic foot infection.  Plan: case discussed with  podiatry resident will require hallux amputation , but first needs vascular studies,  possible erosion of bone  on plain film will obtain MRI to evaluate for osteomyelitis  , s/p sharp debridement and biopsy by podiatry   - Continue antibiotics as per ID  - MRI foot REVIEWED  - Vascular fu  - sp amputation     Problem/Plan - 2:  ·  Problem: Diabetes mellitus.  Plan: uncontrolled - Wife not clear on exact dosing  of insulin , will therefore reduce insulin dosing and up titrate to goal of -180   - endo fu  - basal/bolus    Problem/Plan - 3:  ·  Problem: CAD (coronary artery disease).  Plan: -  hold ASA in anticipation for surgical intervention   -  continue metoprolol   - cards fu    case discussed with pt and family at length 67M w/pmh  DM , h/o  etoh abuse and  pancreatitis, Cad s/p stent,  PVD, presents with worsening  left foot infection over the past week.    Problem/Plan - 1:  ·  Problem: Diabetic foot infection.  Plan: case discussed with  podiatry resident will require hallux amputation , but first needs vascular studies,  possible erosion of bone  on plain film will obtain MRI to evaluate for osteomyelitis  , s/p sharp debridement and biopsy by podiatry   - Continue antibiotics as per ID  - MRI foot REVIEWED  - Vascular fu  - sp angiogram    Problem/Plan - 2:  ·  Problem: Diabetes mellitus.  Plan: uncontrolled - Wife not clear on exact dosing  of insulin , will therefore reduce insulin dosing and up titrate to goal of -180   - endo fu  - basal/bolus    Problem/Plan - 3:  ·  Problem: CAD (coronary artery disease).  Plan: -  hold ASA in anticipation for surgical intervention   -  continue metoprolol   - cards fu    case discussed with pt and family at length

## 2018-02-01 NOTE — PROGRESS NOTE ADULT - SUBJECTIVE AND OBJECTIVE BOX
Patient is a 67y old  Male who presents with a chief complaint of Toe infection x one week (2018 09:41)       INTERVAL HPI/OVERNIGHT EVENTS:  Patient seen and evaluated at bedside.  Pt is resting comfortable in NAD. Denies N/V/F/C.     Allergies    No Known Allergies    Intolerances        Vital Signs Last 24 Hrs  T(C): 37.7 (2018 04:21), Max: 37.7 (2018 20:15)  T(F): 99.8 (2018 04:21), Max: 99.8 (2018 20:15)  HR: 83 (2018 04:21) (74 - 88)  BP: 179/91 (2018 04:21) (114/71 - 179/91)  BP(mean): --  RR: 18 (2018 04:21) (18 - 18)  SpO2: 97% (2018 04:21) (97% - 99%)    LABS:                        12.2   9.1   )-----------( 247      ( 2018 06:56 )             34.3     02-    138  |  101  |  16  ----------------------------<  213<H>  3.6   |  24  |  0.81    Ca    9.2      2018 06:56  Phos  2.9       Mg     1.9           PT/INR - ( 2018 06:56 )   PT: 10.9 sec;   INR: 1.01 ratio         PTT - ( 2018 06:56 )  PTT:27.9 sec  Urinalysis Basic - ( 2018 21:57 )    Color: Yellow / Appearance: Clear / S.021 / pH: x  Gluc: x / Ketone: Negative  / Bili: Negative / Urobili: 1.0 mg/dL   Blood: x / Protein: Negative mg/dL / Nitrite: Negative   Leuk Esterase: Negative / RBC: x / WBC x   Sq Epi: x / Non Sq Epi: x / Bacteria: x      CAPILLARY BLOOD GLUCOSE      POCT Blood Glucose.: 223 mg/dL (2018 06:21)  POCT Blood Glucose.: 327 mg/dL (2018 22:10)  POCT Blood Glucose.: 265 mg/dL (2018 16:55)  POCT Blood Glucose.: 220 mg/dL (2018 11:53)  POCT Blood Glucose.: 237 mg/dL (2018 08:03)      Lower Extremity Physical Exam:  Vasular: DP/PT 0/4, B/L, Temperature gradient WNL, B/L.   Neuro: Epicritic sensation intact to the level of the foot, B/L.  Musculoskeletal/Ortho: No gross pedal deformities noted, B/L.   Skin: Dry scaly flaking skin to shins B/L.     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 no purulence, no malodor, no periwound erythema or edema noted.     Wound #2 & 3:  L foot lateral 5th met head and shaft ulcers to subQ, fibrotic plug, no periwound erythema or edema, no purulence, no malodor    Wound #4:  L foot posterior heel fissure, no open lesion    RADIOLOGY & ADDITIONAL TESTS:  < from: MR Foot w/wo IV Cont, Left (18 @ 20:07) >  EXAM:  MR FOOT WAW IC LT                            PROCEDURE DATE:  2018            INTERPRETATION:  EXAMINATION: MRI left foot with and without contrast    CLINICAL INFORMATION: Left foot ulcer. Evaluate for osteomyelitis.    TECHNIQUE: Multiplanar, multisequential MR imaging was performed. This   includes T1-weighted images after the administration of 6 mL of   intravenous gadolinium. 1.5 mL were discarded    FINDINGS: The exam is limited by motion artifact. There is suspected to   be a cutaneous ulceration along the distal aspect of the hallux. There is   mild high T2 signal in the distal phalanx of the hallux. There is   nonspecific dorsal subcutaneous soft tissue edema. There are no gross   rim-enhancing fluid collections to suggest abscess. There is mild fatty   atrophy and increased T2 signal musculature surrounding the foot,   consistent with subacute denervation.    IMPRESSION: Motion limited exam. Mild signal alteration in the distal   phalanx of the hallux. Given that there appears to be an erosion of the   tuft of the hallux distal phalanx on the prior radiographs of 2018,   the findings are suspicious for osteomyelitis.                    GRUPO MARCUS M.D., ATTENDING RADIOLOGIST  This document has been electronically signed. 2018  9:55AM                < end of copied text >    < from: Xray Foot AP + Lateral + Oblique, Left (18 @ 23:33) >    EXAM:  FOOT COMPLETE LEFT (MIN 3 VIEWS)                            PROCEDURE DATE:  2018            INTERPRETATION:  CLINICAL INDICATION: Concern for osteomyelitis.    EXAM: Frontal, lateral and oblique views of the left foot     COMPARISON: There are no similar prior studies available for comparison.     FINDINGS:  Erosion involving the tuft of the distal phalanx of the hallux,   suspicious for osteomyelitis..  Chronic/healed malunited fracture of the distal aspect of the fifth   metatarsal. There are vascular calcifications. There is a plantar   calcaneal enthesophyte. There is diffuse soft tissue swelling.      IMPRESSION:   Erosion involving the tuft of the distal phalanx of the hallux,   suspicious for osteomyelitis. Correlate with pending MRI.        < end of copied text >  Culture - Other (18 @ 03:57)    Specimen Source: Skin left first toe    Culture Results:   Few Klebsiella pneumoniae Susceptibility to follow.  Numerous Alpha hemolytic strep  Rule Out Beta hemolytic streptococcus

## 2018-02-01 NOTE — BRIEF OPERATIVE NOTE - PROCEDURE
<<-----Click on this checkbox to enter Procedure Angiogram, extremity, left  02/01/2018    Active  ROCIO

## 2018-02-01 NOTE — PROGRESS NOTE ADULT - SUBJECTIVE AND OBJECTIVE BOX
Patient is a 67y old  Male who presents with a chief complaint of Toe infection x one week (2018 09:41)  NAD, family at bedside      INTERVAL HPI/OVERNIGHT EVENTS:  T(C): 37.1 (18 @ 11:44), Max: 37.7 (18 @ 20:15)  HR: 81 (18 @ 11:44) (71 - 88)  BP: 122/76 (18 @ 11:44) (122/72 - 179/91)  RR: 18 (18 @ 11:44) (18 - 18)  SpO2: 98% (18 @ 11:44) (71% - 99%)  Wt(kg): --  I&O's Summary    2018 07:  -  2018 07:00  --------------------------------------------------------  IN: 1600 mL / OUT: 300 mL / NET: 1300 mL    2018 07:01  -  2018 14:16  --------------------------------------------------------  IN: 480 mL / OUT: 0 mL / NET: 480 mL        LABS:                        12.2   9.1   )-----------( 247      ( 2018 06:56 )             34.3         138  |  101  |  16  ----------------------------<  213<H>  3.6   |  24  |  0.81    Ca    9.2      2018 06:56  Phos  2.9       Mg     1.9           PT/INR - ( 2018 06:56 )   PT: 10.9 sec;   INR: 1.01 ratio         PTT - ( 2018 06:56 )  PTT:27.9 sec  Urinalysis Basic - ( 2018 21:57 )    Color: Yellow / Appearance: Clear / S.021 / pH: x  Gluc: x / Ketone: Negative  / Bili: Negative / Urobili: 1.0 mg/dL   Blood: x / Protein: Negative mg/dL / Nitrite: Negative   Leuk Esterase: Negative / RBC: x / WBC x   Sq Epi: x / Non Sq Epi: x / Bacteria: x      CAPILLARY BLOOD GLUCOSE      POCT Blood Glucose.: 176 mg/dL (2018 10:52)  POCT Blood Glucose.: 223 mg/dL (2018 06:21)  POCT Blood Glucose.: 327 mg/dL (2018 22:10)  POCT Blood Glucose.: 265 mg/dL (2018 16:55)        Urinalysis Basic - ( 2018 21:57 )    Color: Yellow / Appearance: Clear / S.021 / pH: x  Gluc: x / Ketone: Negative  / Bili: Negative / Urobili: 1.0 mg/dL   Blood: x / Protein: Negative mg/dL / Nitrite: Negative   Leuk Esterase: Negative / RBC: x / WBC x   Sq Epi: x / Non Sq Epi: x / Bacteria: x        MEDICATIONS  (STANDING):  cyanocobalamin 1000 MICROGram(s) Oral daily  dextrose 5%. 1000 milliLiter(s) (50 mL/Hr) IV Continuous <Continuous>  dextrose 50% Injectable 25 Gram(s) IV Push once  gabapentin 300 milliGRAM(s) Oral two times a day  heparin  Injectable 5000 Unit(s) SubCutaneous every 12 hours  insulin glargine Injectable (LANTUS) 40 Unit(s) SubCutaneous at bedtime  insulin lispro (HumaLOG) corrective regimen sliding scale   SubCutaneous three times a day before meals  insulin lispro (HumaLOG) corrective regimen sliding scale   SubCutaneous at bedtime  insulin lispro Injectable (HumaLOG) 15 Unit(s) SubCutaneous three times a day before meals  metoprolol succinate ER 25 milliGRAM(s) Oral daily  piperacillin/tazobactam IVPB. 3.375 Gram(s) IV Intermittent every 8 hours  sodium chloride 0.9%. 1000 milliLiter(s) (100 mL/Hr) IV Continuous <Continuous>    MEDICATIONS  (PRN):  acetaminophen   Tablet. 650 milliGRAM(s) Oral every 6 hours PRN Mild Pain (1 - 3)  dextrose Gel 1 Dose(s) Oral once PRN Blood Glucose LESS THAN 70 milliGRAM(s)/deciliter  glucagon  Injectable 1 milliGRAM(s) IntraMuscular once PRN Glucose LESS THAN 70 milligrams/deciliter          PHYSICAL EXAM:  GENERAL: NAD, well-groomed, well-developed  HEAD:  Atraumatic, Normocephalic  CHEST/LUNG: Clear to percussion bilaterally; No rales, rhonchi, wheezing, or rubs  HEART: Regular rate and rhythm; No murmurs, rubs, or gallops  ABDOMEN: Soft, Nontender, Nondistended; Bowel sounds present  EXTREMITIES:  2+ Peripheral Pulses, No clubbing, cyanosis, or edema  LYMPH: No lymphadenopathy noted  SKIN: No rashes or lesions    Care Discussed with Consultants/Other Providers [+ ] YES  [ ] NO

## 2018-02-01 NOTE — CHART NOTE - NSCHARTNOTEFT_GEN_A_CORE
Vascular Surgery    Pt is s/p LLE angiogram showing multilevel disease and He will need lower extremity bypass.  Please obtain cardiac clearance and risk stratification for lower extremity bypass  D/w Dr. Alfredo Wilder MD PGY 2   0020

## 2018-02-01 NOTE — BRIEF OPERATIVE NOTE - OPERATION/FINDINGS
Diagnostic LLE Angio with R jackelyn access  Disease of SFA, above and below knee popliteal, and proximal AT.

## 2018-02-01 NOTE — PROGRESS NOTE ADULT - SUBJECTIVE AND OBJECTIVE BOX
Chief complaint  Patient is a 67y old  Male who presents with a chief complaint of Toe infection x one week (31 Jan 2018 09:41)   Review of systems  Patient in bed, looks comfortable, no fever, no hypoglycemia.    Labs and Fingersticks  CAPILLARY BLOOD GLUCOSE      POCT Blood Glucose.: 176 mg/dL (01 Feb 2018 10:52)  POCT Blood Glucose.: 223 mg/dL (01 Feb 2018 06:21)  POCT Blood Glucose.: 327 mg/dL (31 Jan 2018 22:10)  POCT Blood Glucose.: 265 mg/dL (31 Jan 2018 16:55)      Anion Gap, Serum: 14 (01-31 @ 07:55)      Calcium, Total Serum: 9.2 (02-01 @ 06:56)  Calcium, Total Serum: 9.2 (01-31 @ 07:55)          02-01    138  |  101  |  16  ----------------------------<  213<H>  3.6   |  24  |  0.81    Ca    9.2      01 Feb 2018 06:56  Phos  2.9     01-31  Mg     1.9     01-31                          12.2   9.1   )-----------( 247      ( 01 Feb 2018 06:56 )             34.3     Medications  MEDICATIONS  (STANDING):  cyanocobalamin 1000 MICROGram(s) Oral daily  dextrose 5%. 1000 milliLiter(s) (50 mL/Hr) IV Continuous <Continuous>  dextrose 50% Injectable 25 Gram(s) IV Push once  gabapentin 300 milliGRAM(s) Oral two times a day  heparin  Injectable 5000 Unit(s) SubCutaneous every 12 hours  insulin glargine Injectable (LANTUS) 40 Unit(s) SubCutaneous at bedtime  insulin lispro (HumaLOG) corrective regimen sliding scale   SubCutaneous three times a day before meals  insulin lispro (HumaLOG) corrective regimen sliding scale   SubCutaneous at bedtime  insulin lispro Injectable (HumaLOG) 15 Unit(s) SubCutaneous three times a day before meals  metoprolol succinate ER 25 milliGRAM(s) Oral daily  piperacillin/tazobactam IVPB. 3.375 Gram(s) IV Intermittent every 8 hours  sodium chloride 0.9%. 1000 milliLiter(s) (100 mL/Hr) IV Continuous <Continuous>      Physical Exam  General: Patient comfortable in bed  Vital Signs Last 12 Hrs  T(F): 98.8 (02-01-18 @ 11:44), Max: 99.8 (02-01-18 @ 04:21)  HR: 81 (02-01-18 @ 11:44) (71 - 83)  BP: 122/76 (02-01-18 @ 11:44) (122/76 - 179/91)  BP(mean): --  RR: 18 (02-01-18 @ 11:44) (18 - 18)  SpO2: 98% (02-01-18 @ 11:44) (71% - 98%)  Neck: No palpable thyroid nodules.  CVS: S1S2, No murmurs  Respiratory: No wheezing, no crepitations  GI: Abdomen soft, bowel sounds positive  Musculoskeletal:  edema lower extremities.   Skin: No skin rashes, no ecchymosis    Diagnostics

## 2018-02-02 LAB
-  AMPICILLIN/SULBACTAM: SIGNIFICANT CHANGE UP
-  CEFAZOLIN: SIGNIFICANT CHANGE UP
-  CIPROFLOXACIN: SIGNIFICANT CHANGE UP
-  CLINDAMYCIN: SIGNIFICANT CHANGE UP
-  ERYTHROMYCIN: SIGNIFICANT CHANGE UP
-  GENTAMICIN: SIGNIFICANT CHANGE UP
-  LEVOFLOXACIN: SIGNIFICANT CHANGE UP
-  MOXIFLOXACIN(AEROBIC): SIGNIFICANT CHANGE UP
-  OXACILLIN: SIGNIFICANT CHANGE UP
-  PENICILLIN: SIGNIFICANT CHANGE UP
-  RIFAMPIN: SIGNIFICANT CHANGE UP
-  TETRACYCLINE: SIGNIFICANT CHANGE UP
-  TRIMETHOPRIM/SULFAMETHOXAZOLE: SIGNIFICANT CHANGE UP
-  VANCOMYCIN: SIGNIFICANT CHANGE UP
CULTURE RESULTS: SIGNIFICANT CHANGE UP
GLUCOSE BLDC GLUCOMTR-MCNC: 147 MG/DL — HIGH (ref 70–99)
GLUCOSE BLDC GLUCOMTR-MCNC: 205 MG/DL — HIGH (ref 70–99)
GLUCOSE BLDC GLUCOMTR-MCNC: 230 MG/DL — HIGH (ref 70–99)
GLUCOSE BLDC GLUCOMTR-MCNC: 258 MG/DL — HIGH (ref 70–99)
GLUCOSE BLDC GLUCOMTR-MCNC: 269 MG/DL — HIGH (ref 70–99)
METHOD TYPE: SIGNIFICANT CHANGE UP
ORGANISM # SPEC MICROSCOPIC CNT: SIGNIFICANT CHANGE UP
SPECIMEN SOURCE: SIGNIFICANT CHANGE UP

## 2018-02-02 PROCEDURE — 99232 SBSQ HOSP IP/OBS MODERATE 35: CPT

## 2018-02-02 RX ORDER — INSULIN GLARGINE 100 [IU]/ML
50 INJECTION, SOLUTION SUBCUTANEOUS AT BEDTIME
Qty: 0 | Refills: 0 | Status: DISCONTINUED | OUTPATIENT
Start: 2018-02-02 | End: 2018-02-03

## 2018-02-02 RX ORDER — INSULIN LISPRO 100/ML
18 VIAL (ML) SUBCUTANEOUS
Qty: 0 | Refills: 0 | Status: DISCONTINUED | OUTPATIENT
Start: 2018-02-02 | End: 2018-02-03

## 2018-02-02 RX ADMIN — INSULIN GLARGINE 50 UNIT(S): 100 INJECTION, SOLUTION SUBCUTANEOUS at 22:17

## 2018-02-02 RX ADMIN — GABAPENTIN 300 MILLIGRAM(S): 400 CAPSULE ORAL at 17:32

## 2018-02-02 RX ADMIN — PIPERACILLIN AND TAZOBACTAM 25 GRAM(S): 4; .5 INJECTION, POWDER, LYOPHILIZED, FOR SOLUTION INTRAVENOUS at 00:32

## 2018-02-02 RX ADMIN — Medication 6: at 08:49

## 2018-02-02 RX ADMIN — Medication 15 UNIT(S): at 12:39

## 2018-02-02 RX ADMIN — Medication 15 UNIT(S): at 08:49

## 2018-02-02 RX ADMIN — Medication 4: at 12:40

## 2018-02-02 RX ADMIN — GABAPENTIN 300 MILLIGRAM(S): 400 CAPSULE ORAL at 05:27

## 2018-02-02 RX ADMIN — PIPERACILLIN AND TAZOBACTAM 25 GRAM(S): 4; .5 INJECTION, POWDER, LYOPHILIZED, FOR SOLUTION INTRAVENOUS at 17:35

## 2018-02-02 RX ADMIN — PIPERACILLIN AND TAZOBACTAM 25 GRAM(S): 4; .5 INJECTION, POWDER, LYOPHILIZED, FOR SOLUTION INTRAVENOUS at 10:05

## 2018-02-02 RX ADMIN — HEPARIN SODIUM 5000 UNIT(S): 5000 INJECTION INTRAVENOUS; SUBCUTANEOUS at 17:32

## 2018-02-02 RX ADMIN — HEPARIN SODIUM 5000 UNIT(S): 5000 INJECTION INTRAVENOUS; SUBCUTANEOUS at 05:27

## 2018-02-02 RX ADMIN — Medication 25 MILLIGRAM(S): at 05:27

## 2018-02-02 RX ADMIN — Medication 18 UNIT(S): at 17:31

## 2018-02-02 RX ADMIN — PREGABALIN 1000 MICROGRAM(S): 225 CAPSULE ORAL at 12:40

## 2018-02-02 NOTE — PROGRESS NOTE ADULT - SUBJECTIVE AND OBJECTIVE BOX
Vascular Surgery    Patient seen and examined at bedside. No events overnight. Feeling well this AM. No new complaints. No paresthesias or issues with motor or sensation in bilateral legs.    Vital Signs Last 24 Hrs  T(C): 37 (18 @ 04:23), Max: 37.1 (18 @ 11:44)  T(F): 98.6 (18 @ 04:23), Max: 98.8 (18 @ 11:44)  HR: 73 (18 @ 04:23) (73 - 82)  BP: 172/84 (18 @ 04:23) (122/76 - 172/84)  BP(mean): --  RR: 18 (18 @ 04:23) (18 - 18)  SpO2: 95% (18 @ 04:23) (95% - 98%)  I&O's Detail    2018 07:01  -  2018 07:00  --------------------------------------------------------  IN:    IV PiggyBack: 200 mL    Oral Fluid: 800 mL    sodium chloride 0.9%.: 200 mL  Total IN: 1200 mL    OUT:    Voided: 500 mL  Total OUT: 500 mL    Total NET: 700 mL      2018 07:01  -  2018 09:49  --------------------------------------------------------  IN:  Total IN: 0 mL    OUT:    Voided: 600 mL  Total OUT: 600 mL    Total NET: -600 mL    PE  Gen: NAD  R groin site soft, without hematoma  B/L feet with motor/sensation intact, warm, well perfused                          12.2   9.1   )-----------( 247      ( 2018 06:56 )             34.3     02-    138  |  101  |  16  ----------------------------<  213<H>  3.6   |  24  |  0.81    Ca    9.2      2018 06:56      PT/INR - ( 2018 06:56 )   PT: 10.9 sec;   INR: 1.01 ratio         PTT - ( 2018 06:56 )  PTT:27.9 sec  CAPILLARY BLOOD GLUCOSE      POCT Blood Glucose.: 258 mg/dL (2018 08:47)  POCT Blood Glucose.: 269 mg/dL (2018 07:40)  POCT Blood Glucose.: 290 mg/dL (2018 22:42)  POCT Blood Glucose.: 278 mg/dL (2018 21:31)  POCT Blood Glucose.: 299 mg/dL (2018 16:47)  POCT Blood Glucose.: 176 mg/dL (2018 10:52)    Urinalysis Basic - ( 2018 21:57 )    Color: Yellow / Appearance: Clear / S.021 / pH: x  Gluc: x / Ketone: Negative  / Bili: Negative / Urobili: 1.0 mg/dL   Blood: x / Protein: Negative mg/dL / Nitrite: Negative   Leuk Esterase: Negative / RBC: x / WBC x   Sq Epi: x / Non Sq Epi: x / Bacteria: x      MEDICATIONS  (STANDING):  cyanocobalamin 1000 MICROGram(s) Oral daily  dextrose 5%. 1000 milliLiter(s) (50 mL/Hr) IV Continuous <Continuous>  dextrose 50% Injectable 25 Gram(s) IV Push once  gabapentin 300 milliGRAM(s) Oral two times a day  heparin  Injectable 5000 Unit(s) SubCutaneous every 12 hours  insulin glargine Injectable (LANTUS) 40 Unit(s) SubCutaneous at bedtime  insulin lispro (HumaLOG) corrective regimen sliding scale   SubCutaneous three times a day before meals  insulin lispro (HumaLOG) corrective regimen sliding scale   SubCutaneous at bedtime  insulin lispro Injectable (HumaLOG) 15 Unit(s) SubCutaneous three times a day before meals  metoprolol succinate ER 25 milliGRAM(s) Oral daily  piperacillin/tazobactam IVPB. 3.375 Gram(s) IV Intermittent every 8 hours  sodium chloride 0.9%. 1000 milliLiter(s) (100 mL/Hr) IV Continuous <Continuous>    MEDICATIONS  (PRN):  acetaminophen   Tablet. 650 milliGRAM(s) Oral every 6 hours PRN Mild Pain (1 - 3)  dextrose Gel 1 Dose(s) Oral once PRN Blood Glucose LESS THAN 70 milliGRAM(s)/deciliter  glucagon  Injectable 1 milliGRAM(s) IntraMuscular once PRN Glucose LESS THAN 70 milligrams/deciliter Vascular Surgery    Patient seen and examined at bedside. No events overnight. Feeling well this AM. No new complaints. No paresthesias or issues with motor or sensation in bilateral legs. pt  c/o mild left  toe 1 tip discomfort     Vital Signs Last 24 Hrs  T(C): 37 (18 @ 04:23), Max: 37.1 (18 @ 11:44)  T(F): 98.6 (18 @ 04:23), Max: 98.8 (18 @ 11:44)  HR: 73 (18 @ 04:23) (73 - 82)  BP: 172/84 (18 @ 04:23) (122/76 - 172/84)  BP(mean): --  RR: 18 (18 @ 04:23) (18 - 18)  SpO2: 95% (18 @ 04:23) (95% - 98%)  I&O's Detail    2018 07:01  -  2018 07:00  --------------------------------------------------------  IN:    IV PiggyBack: 200 mL    Oral Fluid: 800 mL    sodium chloride 0.9%.: 200 mL  Total IN: 1200 mL    OUT:    Voided: 500 mL  Total OUT: 500 mL    Total NET: 700 mL      2018 07:01  -  2018 09:49  --------------------------------------------------------  IN:  Total IN: 0 mL    OUT:    Voided: 600 mL  Total OUT: 600 mL    Total NET: -600 mL    PE  Gen: NAD  R groin site soft, without hematoma dressing removed   B/L feet with motor/sensation intact, warm, well perfused rle  left foot ischemia unchanged                           12.2   9.1   )-----------( 247      ( 2018 06:56 )             34.3     02-01    138  |  101  |  16  ----------------------------<  213<H>  3.6   |  24  |  0.81    Ca    9.2      2018 06:56      PT/INR - ( 2018 06:56 )   PT: 10.9 sec;   INR: 1.01 ratio         PTT - ( 2018 06:56 )  PTT:27.9 sec  CAPILLARY BLOOD GLUCOSE      POCT Blood Glucose.: 258 mg/dL (2018 08:47)  POCT Blood Glucose.: 269 mg/dL (2018 07:40)  POCT Blood Glucose.: 290 mg/dL (2018 22:42)  POCT Blood Glucose.: 278 mg/dL (2018 21:31)  POCT Blood Glucose.: 299 mg/dL (2018 16:47)  POCT Blood Glucose.: 176 mg/dL (2018 10:52)    Urinalysis Basic - ( 2018 21:57 )    Color: Yellow / Appearance: Clear / S.021 / pH: x  Gluc: x / Ketone: Negative  / Bili: Negative / Urobili: 1.0 mg/dL   Blood: x / Protein: Negative mg/dL / Nitrite: Negative   Leuk Esterase: Negative / RBC: x / WBC x   Sq Epi: x / Non Sq Epi: x / Bacteria: x      MEDICATIONS  (STANDING):  cyanocobalamin 1000 MICROGram(s) Oral daily  dextrose 5%. 1000 milliLiter(s) (50 mL/Hr) IV Continuous <Continuous>  dextrose 50% Injectable 25 Gram(s) IV Push once  gabapentin 300 milliGRAM(s) Oral two times a day  heparin  Injectable 5000 Unit(s) SubCutaneous every 12 hours  insulin glargine Injectable (LANTUS) 40 Unit(s) SubCutaneous at bedtime  insulin lispro (HumaLOG) corrective regimen sliding scale   SubCutaneous three times a day before meals  insulin lispro (HumaLOG) corrective regimen sliding scale   SubCutaneous at bedtime  insulin lispro Injectable (HumaLOG) 15 Unit(s) SubCutaneous three times a day before meals  metoprolol succinate ER 25 milliGRAM(s) Oral daily  piperacillin/tazobactam IVPB. 3.375 Gram(s) IV Intermittent every 8 hours  sodium chloride 0.9%. 1000 milliLiter(s) (100 mL/Hr) IV Continuous <Continuous>    MEDICATIONS  (PRN):  acetaminophen   Tablet. 650 milliGRAM(s) Oral every 6 hours PRN Mild Pain (1 - 3)  dextrose Gel 1 Dose(s) Oral once PRN Blood Glucose LESS THAN 70 milliGRAM(s)/deciliter  glucagon  Injectable 1 milliGRAM(s) IntraMuscular once PRN Glucose LESS THAN 70 milligrams/deciliter

## 2018-02-02 NOTE — PROGRESS NOTE ADULT - ASSESSMENT
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 Leucocytosis resolved, no fever. polymicrobial growth on wound cx.     Plan:  Continue with Zosyn 3.375 gm iv q8h   Follow up prelim blood culture and wound cx.  Podiatry on board, s/p debridement  Will eventually need amputation  Vascular following, s/p angiogram, plan for bypass next week.   continue with wound care.

## 2018-02-02 NOTE — STUDENT SIGN OFF DOCUMENT - DOCUMENTS STUDENTS ARE SIGNED OFF ON
Assessment and Intervention/Vital Signs/Input and Output Provider Contact Note/Dietitian Initial Evaluation/Patient Profile

## 2018-02-02 NOTE — PROGRESS NOTE ADULT - SUBJECTIVE AND OBJECTIVE BOX
Patient is a 67y old  Male who presents with a chief complaint of Toe infection x one week (2018 09:41)      INTERVAL HPI/OVERNIGHT EVENTS:  T(C): 36.7 (18 @ 13:00), Max: 37 (18 @ 04:23)  HR: 80 (18 @ 13:00) (73 - 82)  BP: 118/74 (18 @ 13:00) (118/74 - 172/84)  RR: 18 (18 @ 13:00) (18 - 18)  SpO2: 96% (18 @ 13:00) (95% - 96%)  Wt(kg): --  I&O's Summary    2018 07:01  -  2018 07:00  --------------------------------------------------------  IN: 1200 mL / OUT: 500 mL / NET: 700 mL    2018 07:01  -  2018 17:25  --------------------------------------------------------  IN: 0 mL / OUT: 600 mL / NET: -600 mL        LABS:                        12.2   9.1   )-----------( 247      ( 2018 06:56 )             34.3     02-    138  |  101  |  16  ----------------------------<  213<H>  3.6   |  24  |  0.81    Ca    9.2      2018 06:56      PT/INR - ( 2018 06:56 )   PT: 10.9 sec;   INR: 1.01 ratio         PTT - ( 2018 06:56 )  PTT:27.9 sec  Urinalysis Basic - ( 2018 21:57 )    Color: Yellow / Appearance: Clear / S.021 / pH: x  Gluc: x / Ketone: Negative  / Bili: Negative / Urobili: 1.0 mg/dL   Blood: x / Protein: Negative mg/dL / Nitrite: Negative   Leuk Esterase: Negative / RBC: x / WBC x   Sq Epi: x / Non Sq Epi: x / Bacteria: x      CAPILLARY BLOOD GLUCOSE      POCT Blood Glucose.: 147 mg/dL (2018 16:56)  POCT Blood Glucose.: 230 mg/dL (2018 12:01)  POCT Blood Glucose.: 258 mg/dL (2018 08:47)  POCT Blood Glucose.: 269 mg/dL (2018 07:40)  POCT Blood Glucose.: 290 mg/dL (2018 22:42)  POCT Blood Glucose.: 278 mg/dL (2018 21:31)        Urinalysis Basic - ( 2018 21:57 )    Color: Yellow / Appearance: Clear / S.021 / pH: x  Gluc: x / Ketone: Negative  / Bili: Negative / Urobili: 1.0 mg/dL   Blood: x / Protein: Negative mg/dL / Nitrite: Negative   Leuk Esterase: Negative / RBC: x / WBC x   Sq Epi: x / Non Sq Epi: x / Bacteria: x        MEDICATIONS  (STANDING):  cyanocobalamin 1000 MICROGram(s) Oral daily  dextrose 5%. 1000 milliLiter(s) (50 mL/Hr) IV Continuous <Continuous>  dextrose 50% Injectable 25 Gram(s) IV Push once  gabapentin 300 milliGRAM(s) Oral two times a day  heparin  Injectable 5000 Unit(s) SubCutaneous every 12 hours  insulin glargine Injectable (LANTUS) 50 Unit(s) SubCutaneous at bedtime  insulin lispro (HumaLOG) corrective regimen sliding scale   SubCutaneous three times a day before meals  insulin lispro (HumaLOG) corrective regimen sliding scale   SubCutaneous at bedtime  insulin lispro Injectable (HumaLOG) 18 Unit(s) SubCutaneous three times a day before meals  metoprolol succinate ER 25 milliGRAM(s) Oral daily  piperacillin/tazobactam IVPB. 3.375 Gram(s) IV Intermittent every 8 hours  sodium chloride 0.9%. 1000 milliLiter(s) (100 mL/Hr) IV Continuous <Continuous>    MEDICATIONS  (PRN):  acetaminophen   Tablet. 650 milliGRAM(s) Oral every 6 hours PRN Mild Pain (1 - 3)  dextrose Gel 1 Dose(s) Oral once PRN Blood Glucose LESS THAN 70 milliGRAM(s)/deciliter  glucagon  Injectable 1 milliGRAM(s) IntraMuscular once PRN Glucose LESS THAN 70 milligrams/deciliter          PHYSICAL EXAM:  GENERAL: NAD, well-groomed, well-developed  HEAD:  Atraumatic, Normocephalic  CHEST/LUNG: Clear to percussion bilaterally; No rales, rhonchi, wheezing, or rubs  HEART: Regular rate and rhythm; No murmurs, rubs, or gallops  ABDOMEN: Soft, Nontender, Nondistended; Bowel sounds present  EXTREMITIES:  Left foot dressing  LYMPH: No lymphadenopathy noted  SKIN: No rashes or lesions    Care Discussed with Consultants/Other Providers [ ] YES  [ ] NO

## 2018-02-02 NOTE — PROGRESS NOTE ADULT - SUBJECTIVE AND OBJECTIVE BOX
67y old  Male who presents with a chief complaint of Toe infection x one week       Interval history/ROS:   Afebrile, no cough, no SOB, no N/V/D, no abdominal pain, denies dysuria.       Antimicrobials:    piperacillin/tazobactam IVPB. 3.375 Gram(s) IV Intermittent every 8 hours      Vital Signs Last 24 Hrs  T(C): 36.7 (02-02-18 @ 13:00), Max: 37 (02-02-18 @ 04:23)  T(F): 98.1 (02-02-18 @ 13:00), Max: 98.6 (02-02-18 @ 04:23)  HR: 80 (02-02-18 @ 13:00) (73 - 82)  BP: 118/74 (02-02-18 @ 13:00) (118/74 - 172/84)  RR: 18 (02-02-18 @ 13:00) (18 - 18)  SpO2: 96% (02-02-18 @ 13:00) (95% - 96%)    PHYSICAL EXAM:  Patient in no acute distress. Alert, awake.   No icterus, no oral ulcers.  Cardiovascular: S1S2 normal.  Lungs: Good air entry B/L lung fields.  Gastrointestinal: soft, nontender, nondistended.  Extremities: Lt foot with dressing, dry gangrene at the tip of great toe.   IV sites not inflamed.                           12.2   9.1   )-----------( 247      ( 01 Feb 2018 06:56 )             34.3   02-01    138  |  101  |  16  ----------------------------<  213<H>  3.6   |  24  |  0.81    Ca    9.2      01 Feb 2018 06:56

## 2018-02-02 NOTE — DIETITIAN INITIAL EVALUATION ADULT. - PT NOT SOURCE
Patient with lack of interest in interview, however patient's wife was able to provide information./other (specify)

## 2018-02-02 NOTE — DIETITIAN INITIAL EVALUATION ADULT. - NS AS NUTRI INTERV ED CONTENT
Patient sleeping during education. Education was provided to patient's wife. Education provided food sources of carbohydrate, simplex and complex carbohydrates, pairing carbohydrates with a protein for glycemic control, portion control and the importance of monitoring blood sugar levels./Purpose of the nutrition education Patient sleeping during education. Education was provided to patient's wife. Education provided food sources of carbohydrate, simplex and complex carbohydrates, pairing carbohydrates with a protein for glycemic control, portion control and the importance of monitoring blood sugar levels. Education was also provide on different ways to limit salt intake- using different herbs and spice and not using the salt shaker at the dinner table./Purpose of the nutrition education

## 2018-02-02 NOTE — DIETITIAN INITIAL EVALUATION ADULT. - ENERGY NEEDS
Ht: 62 inches Wt: 134.9 pounds  BMI: 24.6  kg/m2 IBW: 118 pounds (+/-10%) %IBW: 114 pounds.     Edema: No edema noted. Skin: Wound gangrene- left 1st toe, left upper lateral foot ulcer, left medial lateral foot ulcer.

## 2018-02-02 NOTE — DIETITIAN INITIAL EVALUATION ADULT. - NS AS NUTRI INTERV MEALS SNACK
General/healthful diet/Continue with current diet order: Consistent Carbohydrate snack/ Low sodium and regular

## 2018-02-02 NOTE — DIETITIAN INITIAL EVALUATION ADULT. - OTHER INFO
Patient seen for LOS on 4D. Patient is a 67 year old male with a past medical history significant for DM type II, h/o  etoh abuse and  pancreatitis, Cad s/p stent,  PVD, presents with worsening  left foot infection over the past week. Patient is planned for hallux amputation vs partial 1st ray amputation, likely to be to performed Tuesday afternoon. Patient s/p LLE angiogram showing multilevel disease and will need lower extremity bypass. Per patient's wife, patient is eating well. Denies any difficulties chewing or swallowing. Patient's wife reported patient is constipated, reporting last BM was on 1/30. Last BM per flow sheet: 2/1. Current weight: 134.9 pounds. No recent weight change. Patient wife stated patient only monitors his blood sugars once a week, however they notice when his blood sugars are high. Wife stated patent's blood sugars are normally in the 200s. Patient does take his insulin at home. Patient seen for nutrition consult of A1c of 13.2% on 4DSU. Patient is a 67 year old male with a past medical history significant for DM type II, h/o  etoh abuse and  pancreatitis, Cad s/p stent,  PVD, presents with worsening  left foot infection over the past week. Patient is planned for hallux amputation vs partial 1st ray amputation, likely to be to performed Tuesday afternoon. Patient s/p LLE angiogram showing multilevel disease and will need lower extremity bypass. Per patient's wife, patient is eating well. Denies any difficulties chewing or swallowing. Patient's wife reported patient is constipated, reporting last BM was on 1/30. Last BM per flow sheet: 2/1. Current weight: 134.9 pounds. No recent weight change. Patient wife stated patient only monitors his blood sugars once a week, however they notice when his blood sugars are high. Wife stated patent's blood sugars are normally in the 200s. Patient does take his insulin at home.

## 2018-02-02 NOTE — PROGRESS NOTE ADULT - SUBJECTIVE AND OBJECTIVE BOX
Chief complaint  Patient is a 67y old  Male who presents with a chief complaint of Toe infection x one week (31 Jan 2018 09:41)   Review of systems  Patient in bed, looks comfortable, no fever, no hypoglycemia.    Labs and Fingersticks  CAPILLARY BLOOD GLUCOSE      POCT Blood Glucose.: 230 mg/dL (02 Feb 2018 12:01)  POCT Blood Glucose.: 258 mg/dL (02 Feb 2018 08:47)  POCT Blood Glucose.: 269 mg/dL (02 Feb 2018 07:40)  POCT Blood Glucose.: 290 mg/dL (01 Feb 2018 22:42)  POCT Blood Glucose.: 278 mg/dL (01 Feb 2018 21:31)  POCT Blood Glucose.: 299 mg/dL (01 Feb 2018 16:47)          Calcium, Total Serum: 9.2 (02-01 @ 06:56)          02-01    138  |  101  |  16  ----------------------------<  213<H>  3.6   |  24  |  0.81    Ca    9.2      01 Feb 2018 06:56                          12.2   9.1   )-----------( 247      ( 01 Feb 2018 06:56 )             34.3     Medications  MEDICATIONS  (STANDING):  cyanocobalamin 1000 MICROGram(s) Oral daily  dextrose 5%. 1000 milliLiter(s) (50 mL/Hr) IV Continuous <Continuous>  dextrose 50% Injectable 25 Gram(s) IV Push once  gabapentin 300 milliGRAM(s) Oral two times a day  heparin  Injectable 5000 Unit(s) SubCutaneous every 12 hours  insulin glargine Injectable (LANTUS) 50 Unit(s) SubCutaneous at bedtime  insulin lispro (HumaLOG) corrective regimen sliding scale   SubCutaneous three times a day before meals  insulin lispro (HumaLOG) corrective regimen sliding scale   SubCutaneous at bedtime  insulin lispro Injectable (HumaLOG) 18 Unit(s) SubCutaneous three times a day before meals  metoprolol succinate ER 25 milliGRAM(s) Oral daily  piperacillin/tazobactam IVPB. 3.375 Gram(s) IV Intermittent every 8 hours  sodium chloride 0.9%. 1000 milliLiter(s) (100 mL/Hr) IV Continuous <Continuous>      Physical Exam  General: Patient comfortable in bed  Vital Signs Last 12 Hrs  T(F): 98.1 (02-02-18 @ 13:00), Max: 98.6 (02-02-18 @ 04:23)  HR: 80 (02-02-18 @ 13:00) (73 - 80)  BP: 118/74 (02-02-18 @ 13:00) (118/74 - 172/84)  BP(mean): --  RR: 18 (02-02-18 @ 13:00) (18 - 18)  SpO2: 96% (02-02-18 @ 13:00) (95% - 96%)  Neck: No palpable thyroid nodules.  CVS: S1S2, No murmurs  Respiratory: No wheezing, no crepitations  GI: Abdomen soft, bowel sounds positive  Musculoskeletal:  edema lower extremities.   Skin: No skin rashes, no ecchymosis    Diagnostics

## 2018-02-02 NOTE — PROGRESS NOTE ADULT - ASSESSMENT
Assessment  DMT2: 67y Male with DM T2 with hyperglycemia on insulin, blood sugars high, no hypoglycemia, non compliant with low carb diet.  CAD: On medications, monitored.  Gangrene: Vascular/pod FU  HTN: Controlled, On med.

## 2018-02-02 NOTE — DIETITIAN INITIAL EVALUATION ADULT. - ORAL INTAKE PTA
good/Per wife patient has a good appetite. Wife reported patient consumes a lot of carb heavy meals like bread, rice, and some concentrated sweets. Rarely eats fruits and vegetables at home. Lunch and dinner is usually chicken and fish with rice. Patient will drink tea( with sugar substitute), water and juice. Wife reported patient use to drink all day, however patient now drinks only 1 to 2 drinks a day about 150 mL. Patient's wife endorsed patient taking B12, reported patient take other vitamins, but unable to recall. good/Per wife patient has a good appetite. NKFA. Wife reported patient consumes a lot of carb heavy meals like bread, rice, and some concentrated sweets. Rarely eats fruits and vegetables at home. Lunch and dinner is usually chicken and fish with rice. Patient will drink tea( with sugar substitute), water and juice. Wife reported patient use to drink all day, however patient now drinks only 1 to 2 drinks a day about 150 mL. Patient's wife endorsed patient taking B12, reported patient take other vitamins, but unable to recall.

## 2018-02-02 NOTE — PROGRESS NOTE ADULT - ASSESSMENT
Problem/Plan - 1:  ·  Problem: Diabetic foot infection.  Plan: case discussed with  podiatry resident will require hallux amputation , but first needs vascular studies,  possible erosion of bone  on plain film will obtain MRI to evaluate for osteomyelitis  , s/p sharp debridement and biopsy by podiatry   - Continue antibiotics as per ID  - MRI foot REVIEWED  - Vascular fu  - sp angiogram  - amputation as per podiatry    Problem/Plan - 2:  ·  Problem: Diabetes mellitus.  Plan: uncontrolled - Wife not clear on exact dosing  of insulin , will therefore reduce insulin dosing and up titrate to goal of -180   - endo fu  - basal/bolus    Problem/Plan - 3:  ·  Problem: CAD (coronary artery disease).  Plan: -  hold ASA in anticipation for surgical intervention   -  continue metoprolol   - cards fu

## 2018-02-02 NOTE — PROGRESS NOTE ADULT - ASSESSMENT
67M with PAD, s/p LLE angio with multiple areas of disease  - Please obtain cardiac risk stratification for lower extremity bypass  - Please obtain b/l LE vein mapping    DIMITRIOS Wilder MD PGY 2   [id number] with PAD, s/p LLE angio with multilevel arterial occ dz     - Please obtain cardiac risk stratification for lower extremity bypass will need  fem distal byp  lle angio findings reviewed w pt and  operative options indications risks and benefits pt consents   - Please obtain b/l LE vein mapping  will dede tent for next rajendra Wilder MD PGY 2   [id number]

## 2018-02-03 LAB
ANION GAP SERPL CALC-SCNC: 14 MMOL/L — SIGNIFICANT CHANGE UP (ref 5–17)
BUN SERPL-MCNC: 19 MG/DL — SIGNIFICANT CHANGE UP (ref 7–23)
CALCIUM SERPL-MCNC: 9.9 MG/DL — SIGNIFICANT CHANGE UP (ref 8.4–10.5)
CHLORIDE SERPL-SCNC: 97 MMOL/L — SIGNIFICANT CHANGE UP (ref 96–108)
CO2 SERPL-SCNC: 24 MMOL/L — SIGNIFICANT CHANGE UP (ref 22–31)
CREAT SERPL-MCNC: 0.79 MG/DL — SIGNIFICANT CHANGE UP (ref 0.5–1.3)
GLUCOSE BLDC GLUCOMTR-MCNC: 216 MG/DL — HIGH (ref 70–99)
GLUCOSE BLDC GLUCOMTR-MCNC: 227 MG/DL — HIGH (ref 70–99)
GLUCOSE BLDC GLUCOMTR-MCNC: 250 MG/DL — HIGH (ref 70–99)
GLUCOSE BLDC GLUCOMTR-MCNC: 252 MG/DL — HIGH (ref 70–99)
GLUCOSE SERPL-MCNC: 231 MG/DL — HIGH (ref 70–99)
HCT VFR BLD CALC: 35.9 % — LOW (ref 39–50)
HGB BLD-MCNC: 12.7 G/DL — LOW (ref 13–17)
MCHC RBC-ENTMCNC: 31.1 PG — SIGNIFICANT CHANGE UP (ref 27–34)
MCHC RBC-ENTMCNC: 35.4 GM/DL — SIGNIFICANT CHANGE UP (ref 32–36)
MCV RBC AUTO: 88 FL — SIGNIFICANT CHANGE UP (ref 80–100)
PLATELET # BLD AUTO: 249 K/UL — SIGNIFICANT CHANGE UP (ref 150–400)
POTASSIUM SERPL-MCNC: 4 MMOL/L — SIGNIFICANT CHANGE UP (ref 3.5–5.3)
POTASSIUM SERPL-SCNC: 4 MMOL/L — SIGNIFICANT CHANGE UP (ref 3.5–5.3)
RBC # BLD: 4.08 M/UL — LOW (ref 4.2–5.8)
RBC # FLD: 12.6 % — SIGNIFICANT CHANGE UP (ref 10.3–14.5)
SODIUM SERPL-SCNC: 135 MMOL/L — SIGNIFICANT CHANGE UP (ref 135–145)
WBC # BLD: 9.66 K/UL — SIGNIFICANT CHANGE UP (ref 3.8–10.5)
WBC # FLD AUTO: 9.66 K/UL — SIGNIFICANT CHANGE UP (ref 3.8–10.5)

## 2018-02-03 RX ORDER — INSULIN LISPRO 100/ML
23 VIAL (ML) SUBCUTANEOUS
Qty: 0 | Refills: 0 | Status: DISCONTINUED | OUTPATIENT
Start: 2018-02-03 | End: 2018-02-09

## 2018-02-03 RX ORDER — INSULIN GLARGINE 100 [IU]/ML
58 INJECTION, SOLUTION SUBCUTANEOUS AT BEDTIME
Qty: 0 | Refills: 0 | Status: DISCONTINUED | OUTPATIENT
Start: 2018-02-03 | End: 2018-02-09

## 2018-02-03 RX ADMIN — PIPERACILLIN AND TAZOBACTAM 25 GRAM(S): 4; .5 INJECTION, POWDER, LYOPHILIZED, FOR SOLUTION INTRAVENOUS at 08:39

## 2018-02-03 RX ADMIN — Medication 4: at 08:36

## 2018-02-03 RX ADMIN — PREGABALIN 1000 MICROGRAM(S): 225 CAPSULE ORAL at 12:47

## 2018-02-03 RX ADMIN — Medication 18 UNIT(S): at 12:46

## 2018-02-03 RX ADMIN — GABAPENTIN 300 MILLIGRAM(S): 400 CAPSULE ORAL at 05:21

## 2018-02-03 RX ADMIN — Medication 6: at 17:23

## 2018-02-03 RX ADMIN — PIPERACILLIN AND TAZOBACTAM 25 GRAM(S): 4; .5 INJECTION, POWDER, LYOPHILIZED, FOR SOLUTION INTRAVENOUS at 00:29

## 2018-02-03 RX ADMIN — HEPARIN SODIUM 5000 UNIT(S): 5000 INJECTION INTRAVENOUS; SUBCUTANEOUS at 17:24

## 2018-02-03 RX ADMIN — Medication 4: at 12:46

## 2018-02-03 RX ADMIN — Medication 18 UNIT(S): at 08:35

## 2018-02-03 RX ADMIN — Medication 23 UNIT(S): at 17:23

## 2018-02-03 RX ADMIN — HEPARIN SODIUM 5000 UNIT(S): 5000 INJECTION INTRAVENOUS; SUBCUTANEOUS at 05:21

## 2018-02-03 RX ADMIN — INSULIN GLARGINE 58 UNIT(S): 100 INJECTION, SOLUTION SUBCUTANEOUS at 22:15

## 2018-02-03 RX ADMIN — GABAPENTIN 300 MILLIGRAM(S): 400 CAPSULE ORAL at 17:24

## 2018-02-03 RX ADMIN — Medication 25 MILLIGRAM(S): at 05:21

## 2018-02-03 RX ADMIN — PIPERACILLIN AND TAZOBACTAM 25 GRAM(S): 4; .5 INJECTION, POWDER, LYOPHILIZED, FOR SOLUTION INTRAVENOUS at 17:35

## 2018-02-03 NOTE — PROGRESS NOTE ADULT - SUBJECTIVE AND OBJECTIVE BOX
Chief complaint  Patient is a 67y old  Male who presents with a chief complaint of Toe infection x one week (31 Jan 2018 09:41)   Review of systems  Patient in bed, looks comfortable, no fever, no hypoglycemia.    Labs and Fingersticks  CAPILLARY BLOOD GLUCOSE      POCT Blood Glucose.: 252 mg/dL (03 Feb 2018 17:00)  POCT Blood Glucose.: 250 mg/dL (03 Feb 2018 12:02)  POCT Blood Glucose.: 227 mg/dL (03 Feb 2018 07:51)  POCT Blood Glucose.: 205 mg/dL (02 Feb 2018 22:13)      Anion Gap, Serum: 14 (02-03 @ 07:04)      Calcium, Total Serum: 9.9 (02-03 @ 07:04)          02-03    135  |  97  |  19  ----------------------------<  231<H>  4.0   |  24  |  0.79    Ca    9.9      03 Feb 2018 07:04                          12.7   9.66  )-----------( 249      ( 03 Feb 2018 07:10 )             35.9     Medications  MEDICATIONS  (STANDING):  cyanocobalamin 1000 MICROGram(s) Oral daily  dextrose 5%. 1000 milliLiter(s) (50 mL/Hr) IV Continuous <Continuous>  dextrose 50% Injectable 25 Gram(s) IV Push once  gabapentin 300 milliGRAM(s) Oral two times a day  heparin  Injectable 5000 Unit(s) SubCutaneous every 12 hours  insulin glargine Injectable (LANTUS) 58 Unit(s) SubCutaneous at bedtime  insulin lispro (HumaLOG) corrective regimen sliding scale   SubCutaneous three times a day before meals  insulin lispro (HumaLOG) corrective regimen sliding scale   SubCutaneous at bedtime  insulin lispro Injectable (HumaLOG) 23 Unit(s) SubCutaneous three times a day before meals  metoprolol succinate ER 25 milliGRAM(s) Oral daily  piperacillin/tazobactam IVPB. 3.375 Gram(s) IV Intermittent every 8 hours  sodium chloride 0.9%. 1000 milliLiter(s) (100 mL/Hr) IV Continuous <Continuous>      Physical Exam  General: Patient comfortable in bed  Vital Signs Last 12 Hrs  T(F): 97.9 (02-03-18 @ 12:25), Max: 97.9 (02-03-18 @ 12:25)  HR: 73 (02-03-18 @ 12:25) (73 - 73)  BP: 142/75 (02-03-18 @ 12:25) (142/75 - 142/75)  BP(mean): --  RR: 17 (02-03-18 @ 12:25) (17 - 17)  SpO2: 100% (02-03-18 @ 12:25) (100% - 100%)  Neck: No palpable thyroid nodules.  CVS: S1S2, No murmurs  Respiratory: No wheezing, no crepitations  GI: Abdomen soft, bowel sounds positive  Musculoskeletal:  edema lower extremities.   Skin: No skin rashes, no ecchymosis    Diagnostics

## 2018-02-03 NOTE — PROGRESS NOTE ADULT - SUBJECTIVE AND OBJECTIVE BOX
Patient is a 67y old  Male who presents with a chief complaint of Toe infection x one week (31 Jan 2018 09:41)      INTERVAL HPI/OVERNIGHT EVENTS:  T(C): 37.1 (02-03-18 @ 03:59), Max: 37.3 (02-02-18 @ 22:05)  HR: 78 (02-03-18 @ 03:59) (78 - 83)  BP: 169/88 (02-03-18 @ 03:59) (118/74 - 175/84)  RR: 18 (02-03-18 @ 03:59) (18 - 18)  SpO2: 95% (02-03-18 @ 03:59) (95% - 96%)  Wt(kg): --  I&O's Summary    02 Feb 2018 07:01  -  03 Feb 2018 07:00  --------------------------------------------------------  IN: 640 mL / OUT: 1600 mL / NET: -960 mL    03 Feb 2018 07:01  -  03 Feb 2018 12:24  --------------------------------------------------------  IN: 300 mL / OUT: 0 mL / NET: 300 mL        LABS:                        12.7   9.66  )-----------( 249      ( 03 Feb 2018 07:10 )             35.9     02-03    135  |  97  |  19  ----------------------------<  231<H>  4.0   |  24  |  0.79    Ca    9.9      03 Feb 2018 07:04          CAPILLARY BLOOD GLUCOSE      POCT Blood Glucose.: 250 mg/dL (03 Feb 2018 12:02)  POCT Blood Glucose.: 227 mg/dL (03 Feb 2018 07:51)  POCT Blood Glucose.: 205 mg/dL (02 Feb 2018 22:13)  POCT Blood Glucose.: 147 mg/dL (02 Feb 2018 16:56)            MEDICATIONS  (STANDING):  cyanocobalamin 1000 MICROGram(s) Oral daily  dextrose 5%. 1000 milliLiter(s) (50 mL/Hr) IV Continuous <Continuous>  dextrose 50% Injectable 25 Gram(s) IV Push once  gabapentin 300 milliGRAM(s) Oral two times a day  heparin  Injectable 5000 Unit(s) SubCutaneous every 12 hours  insulin glargine Injectable (LANTUS) 50 Unit(s) SubCutaneous at bedtime  insulin lispro (HumaLOG) corrective regimen sliding scale   SubCutaneous three times a day before meals  insulin lispro (HumaLOG) corrective regimen sliding scale   SubCutaneous at bedtime  insulin lispro Injectable (HumaLOG) 18 Unit(s) SubCutaneous three times a day before meals  metoprolol succinate ER 25 milliGRAM(s) Oral daily  piperacillin/tazobactam IVPB. 3.375 Gram(s) IV Intermittent every 8 hours  sodium chloride 0.9%. 1000 milliLiter(s) (100 mL/Hr) IV Continuous <Continuous>    MEDICATIONS  (PRN):  acetaminophen   Tablet. 650 milliGRAM(s) Oral every 6 hours PRN Mild Pain (1 - 3)  dextrose Gel 1 Dose(s) Oral once PRN Blood Glucose LESS THAN 70 milliGRAM(s)/deciliter  glucagon  Injectable 1 milliGRAM(s) IntraMuscular once PRN Glucose LESS THAN 70 milligrams/deciliter          PHYSICAL EXAM:  GENERAL: NAD, well-groomed, well-developed  HEAD:  Atraumatic, Normocephalic  CHEST/LUNG: Clear to percussion bilaterally; No rales, rhonchi, wheezing, or rubs  HEART: Regular rate and rhythm; No murmurs, rubs, or gallops  ABDOMEN: Soft, Nontender, Nondistended; Bowel sounds present  EXTREMITIES: r foot dressing  LYMPH: No lymphadenopathy noted  SKIN: No rashes or lesions    Care Discussed with Consultants/Other Providers [ ] YES  [ ] NO

## 2018-02-03 NOTE — PROGRESS NOTE ADULT - SUBJECTIVE AND OBJECTIVE BOX
pt seen at bedside in NAD. dressing to left foot c/d/i  left hallux distal to IPJ necrotic and mummified no pus no signs of acute infection  MRI positive for OM distal phalanx  applied betadine with DSD  vasc plan for bypass thursday will likely amp toe after  will continue to follow

## 2018-02-03 NOTE — CONSULT NOTE ADULT - ASSESSMENT
67 year old male with history of DM type II, etoh abuse and pancreatitis, CAD, s/p PCI, PVD admitted with worsening left foot infection       1. PAD  await LE bypass  patient with history of CAD, s/p pci  although without current chest pain or angina, patient no active with very little ambulation making it difficult to assess functional/exertional capacity and symptom burden  rec pharm nuclear stress test to help pre-op risk stratify  check echo   asa 81mg daily in light of cad, pci history if no ci from vascular standpoint     2. Vasc/med f/u    dvt ppx

## 2018-02-03 NOTE — CONSULT NOTE ADULT - SUBJECTIVE AND OBJECTIVE BOX
CARDIOLOGY CONSULT NOTE - DR. MUNOZ    CHIEF COMPLAINT/REASON FOR CONSULT:    HPI:  Patient is a 67 year old male with a past medical history significant for DM type II, h/o  etoh abuse and  pancreatitis, Cad s/p stent,  PVD, presents with worsening left foot infection over the past week.   await le bypass    Patient denies any chest pain, dyspnea, palpitations, cough, syncope, edema, nausea, abdominal pain, fever, chills,  or rash.  no recent ischemic eval   limited ambulation due to pad      PAST MEDICAL & SURGICAL HISTORY:  CAD (coronary artery disease)  Diabetes mellitus  Hypertension  Alcohol Dependency  Diabetes Mellitus  Stented coronary artery        PREVIOUS DIAGNOSTIC TESTING:    [ ] Echocardiogram:  [ ]  Catheterization:  [ ] Stress Test:  	    MEDICATIONS:  MEDICATIONS  (STANDING):  cyanocobalamin 1000 MICROGram(s) Oral daily  dextrose 5%. 1000 milliLiter(s) (50 mL/Hr) IV Continuous <Continuous>  dextrose 50% Injectable 25 Gram(s) IV Push once  gabapentin 300 milliGRAM(s) Oral two times a day  heparin  Injectable 5000 Unit(s) SubCutaneous every 12 hours  insulin glargine Injectable (LANTUS) 50 Unit(s) SubCutaneous at bedtime  insulin lispro (HumaLOG) corrective regimen sliding scale   SubCutaneous three times a day before meals  insulin lispro (HumaLOG) corrective regimen sliding scale   SubCutaneous at bedtime  insulin lispro Injectable (HumaLOG) 18 Unit(s) SubCutaneous three times a day before meals  metoprolol succinate ER 25 milliGRAM(s) Oral daily  piperacillin/tazobactam IVPB. 3.375 Gram(s) IV Intermittent every 8 hours  sodium chloride 0.9%. 1000 milliLiter(s) (100 mL/Hr) IV Continuous <Continuous>      FAMILY HISTORY:  Family history of diabetes mellitus (Father)      SOCIAL HISTORY:    [ ] Non-smoker  [ ] Smoker  [ ] Alcohol    Allergies    No Known Allergies    Intolerances    	    REVIEW OF SYSTEMS:  CONSTITUTIONAL: No fever, weight loss, or fatigue  EYES: No eye pain, visual disturbances, or discharge  ENMT:  No difficulty hearing, tinnitus, vertigo; No sinus or throat pain  NECK: No pain or stiffness  RESPIRATORY: No cough, wheezing, chills or hemoptysis; No Shortness of Breath  CARDIOVASCULAR: No chest pain, palpitations, passing out, dizziness, or leg swelling  GASTROINTESTINAL: No abdominal or epigastric pain. No nausea, vomiting, or hematemesis; No diarrhea or constipation. No melena or hematochezia.  GENITOURINARY: No dysuria, frequency, hematuria, or incontinence  NEUROLOGICAL: No headaches, memory loss, loss of strength, numbness, or tremors  SKIN: No itching, burning, rashes, or lesions   	    [ ] All others negative	  [ ] Unable to obtain    PHYSICAL EXAM:  T(C): 36.6 (02-03-18 @ 12:25), Max: 37.3 (02-02-18 @ 22:05)  HR: 73 (02-03-18 @ 12:25) (73 - 83)  BP: 142/75 (02-03-18 @ 12:25) (142/75 - 175/84)  RR: 17 (02-03-18 @ 12:25) (17 - 18)  SpO2: 100% (02-03-18 @ 12:25) (95% - 100%)  Wt(kg): --  I&O's Summary    02 Feb 2018 07:01  -  03 Feb 2018 07:00  --------------------------------------------------------  IN: 640 mL / OUT: 1600 mL / NET: -960 mL    03 Feb 2018 07:01  -  03 Feb 2018 13:08  --------------------------------------------------------  IN: 300 mL / OUT: 0 mL / NET: 300 mL        Appearance: Normal	  Psychiatry: A & O x 3, Mood & affect appropriate  HEENT:   Normal oral mucosa, PERRL, EOMI	  Lymphatic: No lymphadenopathy  Cardiovascular: Normal S1 S2,RRR, No JVD, No murmurs  Respiratory: Lungs clear to auscultation	  Gastrointestinal:  Soft, Non-tender, + BS	  Skin: No rashes, No ecchymoses, No cyanosis	  Neurologic: Non-focal  Extremities: Normal range of motion, No clubbing, cyanosis or edema  rally    TELEMETRY: 	    ECG:  	st, lvh, nonspec st abnl  RADIOLOGY:  OTHER: 	  	  LABS:	 	    CARDIAC MARKERS:                                  12.7   9.66  )-----------( 249      ( 03 Feb 2018 07:10 )             35.9     02-03    135  |  97  |  19  ----------------------------<  231<H>  4.0   |  24  |  0.79    Ca    9.9      03 Feb 2018 07:04        proBNP:   Lipid Profile:   HgA1c:   TSH:     ASSESSMENT/PLAN:

## 2018-02-03 NOTE — PROGRESS NOTE ADULT - ASSESSMENT
Problem/Plan - 1:  ·  Problem: Diabetic foot infection.  Plan: case discussed with  podiatry resident will require hallux amputation , but first needs vascular studies,  possible erosion of bone  on plain film will obtain MRI to evaluate for osteomyelitis  , s/p sharp debridement and biopsy by podiatry   - Continue antibiotics as per ID  - MRI foot REVIEWED  - Vascular fu  - sp angiogram  -  bypass thursday and then amputation as per podiatry    Problem/Plan - 2:  ·  Problem: Diabetes mellitus.  Plan: uncontrolled - Wife not clear on exact dosing  of insulin , will therefore reduce insulin dosing and up titrate to goal of -180   - endo fu  - basal/bolus    Problem/Plan - 3:  ·  Problem: CAD (coronary artery disease).  Plan: -  hold ASA in anticipation for surgical intervention   -  continue metoprolol   - cards fu

## 2018-02-04 LAB
CULTURE RESULTS: SIGNIFICANT CHANGE UP
CULTURE RESULTS: SIGNIFICANT CHANGE UP
GLUCOSE BLDC GLUCOMTR-MCNC: 172 MG/DL — HIGH (ref 70–99)
GLUCOSE BLDC GLUCOMTR-MCNC: 182 MG/DL — HIGH (ref 70–99)
GLUCOSE BLDC GLUCOMTR-MCNC: 253 MG/DL — HIGH (ref 70–99)
GLUCOSE BLDC GLUCOMTR-MCNC: 301 MG/DL — HIGH (ref 70–99)
SPECIMEN SOURCE: SIGNIFICANT CHANGE UP
SPECIMEN SOURCE: SIGNIFICANT CHANGE UP

## 2018-02-04 PROCEDURE — 93306 TTE W/DOPPLER COMPLETE: CPT | Mod: 26

## 2018-02-04 PROCEDURE — 78452 HT MUSCLE IMAGE SPECT MULT: CPT | Mod: 26

## 2018-02-04 PROCEDURE — 93018 CV STRESS TEST I&R ONLY: CPT

## 2018-02-04 PROCEDURE — 93016 CV STRESS TEST SUPVJ ONLY: CPT

## 2018-02-04 RX ORDER — INSULIN GLARGINE 100 [IU]/ML
20 INJECTION, SOLUTION SUBCUTANEOUS EVERY MORNING
Qty: 0 | Refills: 0 | Status: DISCONTINUED | OUTPATIENT
Start: 2018-02-05 | End: 2018-02-09

## 2018-02-04 RX ADMIN — HEPARIN SODIUM 5000 UNIT(S): 5000 INJECTION INTRAVENOUS; SUBCUTANEOUS at 17:23

## 2018-02-04 RX ADMIN — PIPERACILLIN AND TAZOBACTAM 25 GRAM(S): 4; .5 INJECTION, POWDER, LYOPHILIZED, FOR SOLUTION INTRAVENOUS at 00:56

## 2018-02-04 RX ADMIN — GABAPENTIN 300 MILLIGRAM(S): 400 CAPSULE ORAL at 17:23

## 2018-02-04 RX ADMIN — PIPERACILLIN AND TAZOBACTAM 25 GRAM(S): 4; .5 INJECTION, POWDER, LYOPHILIZED, FOR SOLUTION INTRAVENOUS at 17:43

## 2018-02-04 RX ADMIN — HEPARIN SODIUM 5000 UNIT(S): 5000 INJECTION INTRAVENOUS; SUBCUTANEOUS at 05:08

## 2018-02-04 RX ADMIN — INSULIN GLARGINE 58 UNIT(S): 100 INJECTION, SOLUTION SUBCUTANEOUS at 21:53

## 2018-02-04 RX ADMIN — Medication 8: at 17:22

## 2018-02-04 RX ADMIN — Medication 6: at 11:21

## 2018-02-04 RX ADMIN — Medication 25 MILLIGRAM(S): at 05:07

## 2018-02-04 RX ADMIN — Medication 23 UNIT(S): at 17:22

## 2018-02-04 RX ADMIN — PIPERACILLIN AND TAZOBACTAM 25 GRAM(S): 4; .5 INJECTION, POWDER, LYOPHILIZED, FOR SOLUTION INTRAVENOUS at 11:23

## 2018-02-04 RX ADMIN — Medication 23 UNIT(S): at 11:22

## 2018-02-04 RX ADMIN — GABAPENTIN 300 MILLIGRAM(S): 400 CAPSULE ORAL at 05:07

## 2018-02-04 RX ADMIN — PREGABALIN 1000 MICROGRAM(S): 225 CAPSULE ORAL at 11:24

## 2018-02-04 NOTE — PROGRESS NOTE ADULT - ASSESSMENT
Diabetic foot infection.  Plan: case discussed with  podiatry resident will require hallux amputation , but first needs vascular studies,  possible erosion of bone  on plain film will obtain MRI to evaluate for osteomyelitis  , s/p sharp debridement and biopsy by podiatry   - Continue antibiotics as per ID  - MRI foot REVIEWED  - Vascular fu  - sp angiogram  -  bypass thursday and then amputation as per podiatry     Diabetes mellitus.  Plan: uncontrolled - Wife not clear on exact dosing  of insulin , will therefore reduce insulin dosing and up titrate to goal of -180   - endo fu  - basal/bolus    CAD (coronary artery disease).  Plan: -  hold ASA in anticipation for surgical intervention   -  continue metoprolol   - cards fu  - check stress test  - cardiac clearance before surgery

## 2018-02-04 NOTE — PROGRESS NOTE ADULT - ASSESSMENT
Assessment  DMT2: 67y Male with DM T2 with hyperglycemia on insulin, blood sugars still running high, no hypoglycemia, non compliant with low carb diet.  CAD: On medications, monitored.  Gangrene: Vascular/pod FU  HTN: Controlled, On med.

## 2018-02-04 NOTE — PROGRESS NOTE ADULT - SUBJECTIVE AND OBJECTIVE BOX
Patient is a 67y old  Male who presents with a chief complaint of Toe infection x one week (31 Jan 2018 09:41)      INTERVAL HPI/OVERNIGHT EVENTS:  T(C): 36.9 (02-04-18 @ 12:09), Max: 37.7 (02-03-18 @ 22:01)  HR: 80 (02-04-18 @ 12:09) (80 - 90)  BP: 117/74 (02-04-18 @ 12:09) (117/74 - 167/83)  RR: 17 (02-04-18 @ 12:09) (17 - 18)  SpO2: 96% (02-04-18 @ 12:09) (96% - 96%)  Wt(kg): --  I&O's Summary    03 Feb 2018 07:01  -  04 Feb 2018 07:00  --------------------------------------------------------  IN: 720 mL / OUT: 1275 mL / NET: -555 mL    04 Feb 2018 07:01  -  04 Feb 2018 17:00  --------------------------------------------------------  IN: 540 mL / OUT: 650 mL / NET: -110 mL        LABS:                        12.7   9.66  )-----------( 249      ( 03 Feb 2018 07:10 )             35.9     02-03    135  |  97  |  19  ----------------------------<  231<H>  4.0   |  24  |  0.79    Ca    9.9      03 Feb 2018 07:04          CAPILLARY BLOOD GLUCOSE      POCT Blood Glucose.: 301 mg/dL (04 Feb 2018 16:46)  POCT Blood Glucose.: 253 mg/dL (04 Feb 2018 10:57)  POCT Blood Glucose.: 172 mg/dL (04 Feb 2018 08:06)  POCT Blood Glucose.: 216 mg/dL (03 Feb 2018 22:12)            MEDICATIONS  (STANDING):  cyanocobalamin 1000 MICROGram(s) Oral daily  dextrose 5%. 1000 milliLiter(s) (50 mL/Hr) IV Continuous <Continuous>  dextrose 50% Injectable 25 Gram(s) IV Push once  gabapentin 300 milliGRAM(s) Oral two times a day  heparin  Injectable 5000 Unit(s) SubCutaneous every 12 hours  insulin glargine Injectable (LANTUS) 58 Unit(s) SubCutaneous at bedtime  insulin lispro (HumaLOG) corrective regimen sliding scale   SubCutaneous three times a day before meals  insulin lispro (HumaLOG) corrective regimen sliding scale   SubCutaneous at bedtime  insulin lispro Injectable (HumaLOG) 23 Unit(s) SubCutaneous three times a day before meals  metoprolol succinate ER 25 milliGRAM(s) Oral daily  piperacillin/tazobactam IVPB. 3.375 Gram(s) IV Intermittent every 8 hours  sodium chloride 0.9%. 1000 milliLiter(s) (100 mL/Hr) IV Continuous <Continuous>    MEDICATIONS  (PRN):  acetaminophen   Tablet. 650 milliGRAM(s) Oral every 6 hours PRN Mild Pain (1 - 3)  dextrose Gel 1 Dose(s) Oral once PRN Blood Glucose LESS THAN 70 milliGRAM(s)/deciliter  glucagon  Injectable 1 milliGRAM(s) IntraMuscular once PRN Glucose LESS THAN 70 milligrams/deciliter          PHYSICAL EXAM:  GENERAL: NAD, well-groomed, well-developed  HEAD:  Atraumatic, Normocephalic  CHEST/LUNG: Clear to percussion bilaterally; No rales, rhonchi, wheezing, or rubs  HEART: Regular rate and rhythm; No murmurs, rubs, or gallops  ABDOMEN: Soft, Nontender, Nondistended; Bowel sounds present  EXTREMITIES:  L foot dressing  LYMPH: No lymphadenopathy noted  SKIN: No rashes or lesions    Care Discussed with Consultants/Other Providers [ ] YES  [ ] NO

## 2018-02-04 NOTE — PROGRESS NOTE ADULT - SUBJECTIVE AND OBJECTIVE BOX
Chief complaint  Patient is a 67y old  Male who presents with a chief complaint of Toe infection x one week (31 Jan 2018 09:41)   Review of systems  Patient in bed, looks comfortable, no fever, no hypoglycemia.    Labs and Fingersticks  CAPILLARY BLOOD GLUCOSE      POCT Blood Glucose.: 253 mg/dL (04 Feb 2018 10:57)  POCT Blood Glucose.: 172 mg/dL (04 Feb 2018 08:06)  POCT Blood Glucose.: 216 mg/dL (03 Feb 2018 22:12)  POCT Blood Glucose.: 252 mg/dL (03 Feb 2018 17:00)      Anion Gap, Serum: 14 (02-03 @ 07:04)      Calcium, Total Serum: 9.9 (02-03 @ 07:04)          02-03    135  |  97  |  19  ----------------------------<  231<H>  4.0   |  24  |  0.79    Ca    9.9      03 Feb 2018 07:04                          12.7   9.66  )-----------( 249      ( 03 Feb 2018 07:10 )             35.9     Medications  MEDICATIONS  (STANDING):  cyanocobalamin 1000 MICROGram(s) Oral daily  dextrose 5%. 1000 milliLiter(s) (50 mL/Hr) IV Continuous <Continuous>  dextrose 50% Injectable 25 Gram(s) IV Push once  gabapentin 300 milliGRAM(s) Oral two times a day  heparin  Injectable 5000 Unit(s) SubCutaneous every 12 hours  insulin glargine Injectable (LANTUS) 58 Unit(s) SubCutaneous at bedtime  insulin lispro (HumaLOG) corrective regimen sliding scale   SubCutaneous three times a day before meals  insulin lispro (HumaLOG) corrective regimen sliding scale   SubCutaneous at bedtime  insulin lispro Injectable (HumaLOG) 23 Unit(s) SubCutaneous three times a day before meals  metoprolol succinate ER 25 milliGRAM(s) Oral daily  piperacillin/tazobactam IVPB. 3.375 Gram(s) IV Intermittent every 8 hours  sodium chloride 0.9%. 1000 milliLiter(s) (100 mL/Hr) IV Continuous <Continuous>      Physical Exam  General: Patient comfortable in bed  Vital Signs Last 12 Hrs  T(F): 98.4 (02-04-18 @ 12:09), Max: 98.4 (02-04-18 @ 03:37)  HR: 80 (02-04-18 @ 12:09) (80 - 81)  BP: 117/74 (02-04-18 @ 12:09) (117/74 - 142/83)  BP(mean): --  RR: 17 (02-04-18 @ 12:09) (17 - 18)  SpO2: 96% (02-04-18 @ 12:09) (96% - 96%)  Neck: No palpable thyroid nodules.  CVS: S1S2, No murmurs  Respiratory: No wheezing, no crepitations  GI: Abdomen soft, bowel sounds positive  Musculoskeletal:  edema lower extremities.   Skin: No skin rashes, no ecchymosis    Diagnostics

## 2018-02-05 LAB
ANION GAP SERPL CALC-SCNC: 13 MMOL/L — SIGNIFICANT CHANGE UP (ref 5–17)
BUN SERPL-MCNC: 20 MG/DL — SIGNIFICANT CHANGE UP (ref 7–23)
CALCIUM SERPL-MCNC: 9.4 MG/DL — SIGNIFICANT CHANGE UP (ref 8.4–10.5)
CHLORIDE SERPL-SCNC: 101 MMOL/L — SIGNIFICANT CHANGE UP (ref 96–108)
CO2 SERPL-SCNC: 23 MMOL/L — SIGNIFICANT CHANGE UP (ref 22–31)
CREAT SERPL-MCNC: 0.75 MG/DL — SIGNIFICANT CHANGE UP (ref 0.5–1.3)
GLUCOSE BLDC GLUCOMTR-MCNC: 166 MG/DL — HIGH (ref 70–99)
GLUCOSE BLDC GLUCOMTR-MCNC: 169 MG/DL — HIGH (ref 70–99)
GLUCOSE BLDC GLUCOMTR-MCNC: 199 MG/DL — HIGH (ref 70–99)
GLUCOSE BLDC GLUCOMTR-MCNC: 236 MG/DL — HIGH (ref 70–99)
GLUCOSE SERPL-MCNC: 196 MG/DL — HIGH (ref 70–99)
HCT VFR BLD CALC: 36.5 % — LOW (ref 39–50)
HGB BLD-MCNC: 12.1 G/DL — LOW (ref 13–17)
MCHC RBC-ENTMCNC: 30.2 PG — SIGNIFICANT CHANGE UP (ref 27–34)
MCHC RBC-ENTMCNC: 33.2 GM/DL — SIGNIFICANT CHANGE UP (ref 32–36)
MCV RBC AUTO: 91 FL — SIGNIFICANT CHANGE UP (ref 80–100)
PLATELET # BLD AUTO: 268 K/UL — SIGNIFICANT CHANGE UP (ref 150–400)
POTASSIUM SERPL-MCNC: 4 MMOL/L — SIGNIFICANT CHANGE UP (ref 3.5–5.3)
POTASSIUM SERPL-SCNC: 4 MMOL/L — SIGNIFICANT CHANGE UP (ref 3.5–5.3)
RBC # BLD: 4.01 M/UL — LOW (ref 4.2–5.8)
RBC # FLD: 13.2 % — SIGNIFICANT CHANGE UP (ref 10.3–14.5)
SODIUM SERPL-SCNC: 137 MMOL/L — SIGNIFICANT CHANGE UP (ref 135–145)
WBC # BLD: 10.43 K/UL — SIGNIFICANT CHANGE UP (ref 3.8–10.5)
WBC # FLD AUTO: 10.43 K/UL — SIGNIFICANT CHANGE UP (ref 3.8–10.5)

## 2018-02-05 PROCEDURE — 99232 SBSQ HOSP IP/OBS MODERATE 35: CPT

## 2018-02-05 RX ORDER — TICAGRELOR 90 MG/1
90 TABLET ORAL
Qty: 0 | Refills: 0 | Status: DISCONTINUED | OUTPATIENT
Start: 2018-02-05 | End: 2018-02-09

## 2018-02-05 RX ORDER — HYDRALAZINE HCL 50 MG
10 TABLET ORAL ONCE
Qty: 0 | Refills: 0 | Status: COMPLETED | OUTPATIENT
Start: 2018-02-05 | End: 2018-02-05

## 2018-02-05 RX ORDER — RANOLAZINE 500 MG/1
500 TABLET, FILM COATED, EXTENDED RELEASE ORAL ONCE
Qty: 0 | Refills: 0 | Status: COMPLETED | OUTPATIENT
Start: 2018-02-05 | End: 2018-02-05

## 2018-02-05 RX ORDER — ASPIRIN/CALCIUM CARB/MAGNESIUM 324 MG
81 TABLET ORAL DAILY
Qty: 0 | Refills: 0 | Status: DISCONTINUED | OUTPATIENT
Start: 2018-02-05 | End: 2018-02-09

## 2018-02-05 RX ADMIN — PIPERACILLIN AND TAZOBACTAM 25 GRAM(S): 4; .5 INJECTION, POWDER, LYOPHILIZED, FOR SOLUTION INTRAVENOUS at 09:07

## 2018-02-05 RX ADMIN — Medication 23 UNIT(S): at 12:26

## 2018-02-05 RX ADMIN — INSULIN GLARGINE 20 UNIT(S): 100 INJECTION, SOLUTION SUBCUTANEOUS at 08:25

## 2018-02-05 RX ADMIN — PIPERACILLIN AND TAZOBACTAM 25 GRAM(S): 4; .5 INJECTION, POWDER, LYOPHILIZED, FOR SOLUTION INTRAVENOUS at 00:46

## 2018-02-05 RX ADMIN — Medication 650 MILLIGRAM(S): at 04:31

## 2018-02-05 RX ADMIN — GABAPENTIN 300 MILLIGRAM(S): 400 CAPSULE ORAL at 05:00

## 2018-02-05 RX ADMIN — Medication 2: at 19:34

## 2018-02-05 RX ADMIN — PIPERACILLIN AND TAZOBACTAM 25 GRAM(S): 4; .5 INJECTION, POWDER, LYOPHILIZED, FOR SOLUTION INTRAVENOUS at 20:00

## 2018-02-05 RX ADMIN — Medication 23 UNIT(S): at 19:35

## 2018-02-05 RX ADMIN — GABAPENTIN 300 MILLIGRAM(S): 400 CAPSULE ORAL at 21:41

## 2018-02-05 RX ADMIN — RANOLAZINE 500 MILLIGRAM(S): 500 TABLET, FILM COATED, EXTENDED RELEASE ORAL at 15:41

## 2018-02-05 RX ADMIN — Medication 10 MILLIGRAM(S): at 21:41

## 2018-02-05 RX ADMIN — HEPARIN SODIUM 5000 UNIT(S): 5000 INJECTION INTRAVENOUS; SUBCUTANEOUS at 05:00

## 2018-02-05 RX ADMIN — Medication 650 MILLIGRAM(S): at 03:33

## 2018-02-05 RX ADMIN — Medication 23 UNIT(S): at 08:05

## 2018-02-05 RX ADMIN — Medication 2: at 12:26

## 2018-02-05 RX ADMIN — Medication 25 MILLIGRAM(S): at 05:00

## 2018-02-05 RX ADMIN — PREGABALIN 1000 MICROGRAM(S): 225 CAPSULE ORAL at 11:31

## 2018-02-05 RX ADMIN — Medication 2: at 08:04

## 2018-02-05 RX ADMIN — INSULIN GLARGINE 58 UNIT(S): 100 INJECTION, SOLUTION SUBCUTANEOUS at 22:12

## 2018-02-05 NOTE — PROGRESS NOTE ADULT - ASSESSMENT
Diabetic foot infection.  Plan: case discussed with  podiatry resident will require hallux amputation , but first needs vascular studies,  possible erosion of bone  on plain film will obtain MRI to evaluate for osteomyelitis  , s/p sharp debridement and biopsy by podiatry   - Continue antibiotics as per ID  - MRI foot REVIEWED  - Vascular fu  - sp angiogram  -  bypass thursday and then amputation as per podiatry     Diabetes mellitus.  Plan: uncontrolled - Wife not clear on exact dosing  of insulin , will therefore reduce insulin dosing and up titrate to goal of -180   - endo fu  - basal/bolus    CAD (coronary artery disease).  Plan: -  hold ASA in anticipation for surgical intervention   -  continue metoprolol   - cards fu  -  stress test +  - check cath

## 2018-02-05 NOTE — PROGRESS NOTE ADULT - ASSESSMENT
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 Leucocytosis resolved, no fever. polymicrobial growth on wound cx.     Plan:  Continue with Zosyn 3.375 gm iv q8h   Podiatry on board, s/p debridement  Will eventually need amputation  Vascular following, s/p angiogram, plan for bypass later in the week.   continue with wound care.   Discussed with daughter

## 2018-02-05 NOTE — CHART NOTE - NSCHARTNOTEFT_GEN_A_CORE
Pulses in the right upper extremity are palpable. The patient was placed in the supine position. The insertion site was identified and the band deflated per protocol. The radial band was removed slowly. Direct pressure was applied for  5 minutes  Monitoring of the right wrists and both upper extremities including neuro-vascular checks and vital signs every 15 minutes x 4.  Complications: None

## 2018-02-05 NOTE — PROGRESS NOTE ADULT - SUBJECTIVE AND OBJECTIVE BOX
Vascular Surgery    Patient seen and examined at bedside. No events overnight. Feeling well this AM pain in foot but controlled currently.    Vital Signs Last 24 Hrs  T(C): 37.1 (02-05-18 @ 03:31), Max: 37.8 (02-04-18 @ 21:26)  T(F): 98.8 (02-05-18 @ 03:31), Max: 100.1 (02-04-18 @ 21:26)  HR: 78 (02-05-18 @ 06:38) (78 - 97)  BP: 154/82 (02-05-18 @ 06:38) (117/74 - 162/100)  BP(mean): --  RR: 18 (02-05-18 @ 03:31) (17 - 18)  SpO2: 98% (02-05-18 @ 03:31) (96% - 98%)  I&O's Detail    04 Feb 2018 07:01  -  05 Feb 2018 07:00  --------------------------------------------------------  IN:    IV PiggyBack: 200 mL    Oral Fluid: 740 mL  Total IN: 940 mL    OUT:    Voided: 1050 mL  Total OUT: 1050 mL    Total NET: -110 mL    PE  Gen: NAD  B/L feet with motor/sensation intact, warm, well perfused, RLE ischemia stable, no erythema or drainage                        12.1   10.43 )-----------( 268      ( 05 Feb 2018 07:17 )             36.5     02-05    137  |  101  |  20  ----------------------------<  196<H>  4.0   |  23  |  0.75    Ca    9.4      05 Feb 2018 07:17        CAPILLARY BLOOD GLUCOSE      POCT Blood Glucose.: 169 mg/dL (05 Feb 2018 07:35)  POCT Blood Glucose.: 182 mg/dL (04 Feb 2018 21:47)  POCT Blood Glucose.: 301 mg/dL (04 Feb 2018 16:46)  POCT Blood Glucose.: 253 mg/dL (04 Feb 2018 10:57)      MEDICATIONS  (STANDING):  cyanocobalamin 1000 MICROGram(s) Oral daily  dextrose 5%. 1000 milliLiter(s) (50 mL/Hr) IV Continuous <Continuous>  dextrose 50% Injectable 25 Gram(s) IV Push once  gabapentin 300 milliGRAM(s) Oral two times a day  heparin  Injectable 5000 Unit(s) SubCutaneous every 12 hours  insulin glargine Injectable (LANTUS) 58 Unit(s) SubCutaneous at bedtime  insulin glargine Injectable (LANTUS) 20 Unit(s) SubCutaneous every morning  insulin lispro (HumaLOG) corrective regimen sliding scale   SubCutaneous three times a day before meals  insulin lispro (HumaLOG) corrective regimen sliding scale   SubCutaneous at bedtime  insulin lispro Injectable (HumaLOG) 23 Unit(s) SubCutaneous three times a day before meals  metoprolol succinate ER 25 milliGRAM(s) Oral daily  piperacillin/tazobactam IVPB. 3.375 Gram(s) IV Intermittent every 8 hours  sodium chloride 0.9%. 1000 milliLiter(s) (100 mL/Hr) IV Continuous <Continuous>    MEDICATIONS  (PRN):  acetaminophen   Tablet. 650 milliGRAM(s) Oral every 6 hours PRN Mild Pain (1 - 3)  dextrose Gel 1 Dose(s) Oral once PRN Blood Glucose LESS THAN 70 milliGRAM(s)/deciliter  glucagon  Injectable 1 milliGRAM(s) IntraMuscular once PRN Glucose LESS THAN 70 milligrams/deciliter

## 2018-02-05 NOTE — PROGRESS NOTE ADULT - SUBJECTIVE AND OBJECTIVE BOX
Chief complaint  Patient is a 67y old  Male who presents with a chief complaint of Toe infection x one week (31 Jan 2018 09:41)   Review of systems  Patient in bed, looks comfortable, no fever, no hypoglycemia.    Labs and Fingersticks  CAPILLARY BLOOD GLUCOSE      POCT Blood Glucose.: 169 mg/dL (05 Feb 2018 07:35)  POCT Blood Glucose.: 182 mg/dL (04 Feb 2018 21:47)  POCT Blood Glucose.: 301 mg/dL (04 Feb 2018 16:46)      Anion Gap, Serum: 13 (02-05 @ 07:17)      Calcium, Total Serum: 9.4 (02-05 @ 07:17)          02-05    137  |  101  |  20  ----------------------------<  196<H>  4.0   |  23  |  0.75    Ca    9.4      05 Feb 2018 07:17                          12.1   10.43 )-----------( 268      ( 05 Feb 2018 07:17 )             36.5     Medications  MEDICATIONS  (STANDING):  cyanocobalamin 1000 MICROGram(s) Oral daily  dextrose 5%. 1000 milliLiter(s) (50 mL/Hr) IV Continuous <Continuous>  dextrose 50% Injectable 25 Gram(s) IV Push once  gabapentin 300 milliGRAM(s) Oral two times a day  heparin  Injectable 5000 Unit(s) SubCutaneous every 12 hours  insulin glargine Injectable (LANTUS) 58 Unit(s) SubCutaneous at bedtime  insulin glargine Injectable (LANTUS) 20 Unit(s) SubCutaneous every morning  insulin lispro (HumaLOG) corrective regimen sliding scale   SubCutaneous three times a day before meals  insulin lispro (HumaLOG) corrective regimen sliding scale   SubCutaneous at bedtime  insulin lispro Injectable (HumaLOG) 23 Unit(s) SubCutaneous three times a day before meals  metoprolol succinate ER 25 milliGRAM(s) Oral daily  piperacillin/tazobactam IVPB. 3.375 Gram(s) IV Intermittent every 8 hours  sodium chloride 0.9%. 1000 milliLiter(s) (100 mL/Hr) IV Continuous <Continuous>      Physical Exam  General: Patient comfortable in bed  Vital Signs Last 12 Hrs  T(F): 98.8 (02-05-18 @ 03:31), Max: 98.8 (02-05-18 @ 03:31)  HR: 78 (02-05-18 @ 06:38) (78 - 97)  BP: 154/82 (02-05-18 @ 06:38) (154/82 - 162/100)  BP(mean): --  RR: 18 (02-05-18 @ 03:31) (18 - 18)  SpO2: 98% (02-05-18 @ 03:31) (98% - 98%)  Neck: No palpable thyroid nodules.  CVS: S1S2, No murmurs  Respiratory: No wheezing, no crepitations  GI: Abdomen soft, bowel sounds positive  Musculoskeletal:  edema lower extremities.   Skin: No skin rashes, no ecchymosis    Diagnostics

## 2018-02-05 NOTE — PROGRESS NOTE ADULT - SUBJECTIVE AND OBJECTIVE BOX
67y old  Male who presents with a chief complaint of Toe infection x one week      Interval history:  Afebrile, no pain in the foot.       Antimicrobials:    piperacillin/tazobactam IVPB. 3.375 Gram(s) IV Intermittent every 8 hours    REVIEW OF SYSTEMS:    No chest pain or palpitations  No cough, no SOB  No N/V/D, no abdominal pain  No dysuria or frequency  No rash.     Vital Signs Last 24 Hrs  T(C): 36.7 (02-05-18 @ 14:35), Max: 37.8 (02-04-18 @ 21:26)  T(F): 98.1 (02-05-18 @ 14:35), Max: 100.1 (02-04-18 @ 21:26)  HR: 87 (02-05-18 @ 14:35) (78 - 97)  BP: 154/74 (02-05-18 @ 14:35) (121/75 - 162/100)  RR: 18 (02-05-18 @ 14:35) (18 - 18)  SpO2: 96% (02-05-18 @ 14:35) (96% - 98%)    PHYSICAL EXAM:  Patient in no acute distress. Alert, oriented.   No icterus, no oral ulcers.  Cardiovascular: S1S2 normal.  Lungs: + air entry B/L lung fields.  Gastrointestinal: soft, nontender, nondistended.  Extremities: lt great toe dry gangrene at the tip with dressing.   IV sites not inflamed.                           12.1   10.43 )-----------( 268      ( 05 Feb 2018 07:17 )             36.5   02-05    137  |  101  |  20  ----------------------------<  196<H>  4.0   |  23  |  0.75    Ca    9.4      05 Feb 2018 07:17

## 2018-02-05 NOTE — PROGRESS NOTE ADULT - ASSESSMENT
67 year old male with history of DM type II, etoh abuse and pancreatitis, CAD, s/p PCI, PVD admitted with worsening left foot infection       1. PAD  await LE bypass  patient with history of CAD, s/p pci  stress test abnormal , revealing reversible small to moderate defects to basal inferior/ apical lateral wall, consistent with ischemia, LVEF 53%  due to stress test findings, will discuss with MD for clearance   echo revealing normal lv sys fx , minimal MR   asa 81mg daily in light of cad, pci history if no ci from vascular standpoint       2. Vasc/med f/u    dvt ppx

## 2018-02-05 NOTE — PROGRESS NOTE ADULT - ASSESSMENT
67M with PAD, s/p LLE angio with multilevel arterial disease     - Per cardiac note, patient will need nuclear stress  - Please obtain b/l LE vein mapping  - Tentatively planning bypass for Thursday    DIMITRIOS Wilder MD PGY 2   [id number] 67M with PAD, s/p LLE angio with multilevel arterial disease    vein mapping pending   d/w cardiology attending  pt is s/p card cath will need additional ptca/stent  will hold off on le revasc and allow for cardiac optimazation  will follow       - [phone]

## 2018-02-05 NOTE — PROGRESS NOTE ADULT - SUBJECTIVE AND OBJECTIVE BOX
Patient is a 67y old  Male who presents with a chief complaint of Toe infection x one week (31 Jan 2018 09:41)  NAD, feels ok      INTERVAL HPI/OVERNIGHT EVENTS:  T(C): 36.7 (02-05-18 @ 14:35), Max: 37.8 (02-04-18 @ 21:26)  HR: 87 (02-05-18 @ 14:35) (78 - 97)  BP: 154/74 (02-05-18 @ 14:35) (121/75 - 162/100)  RR: 18 (02-05-18 @ 14:35) (18 - 18)  SpO2: 96% (02-05-18 @ 14:35) (96% - 98%)  Wt(kg): --  I&O's Summary    04 Feb 2018 07:01  -  05 Feb 2018 07:00  --------------------------------------------------------  IN: 940 mL / OUT: 1050 mL / NET: -110 mL    05 Feb 2018 07:01  -  05 Feb 2018 16:23  --------------------------------------------------------  IN: 480 mL / OUT: 600 mL / NET: -120 mL        LABS:                        12.1   10.43 )-----------( 268      ( 05 Feb 2018 07:17 )             36.5     02-05    137  |  101  |  20  ----------------------------<  196<H>  4.0   |  23  |  0.75    Ca    9.4      05 Feb 2018 07:17          CAPILLARY BLOOD GLUCOSE      POCT Blood Glucose.: 166 mg/dL (05 Feb 2018 12:13)  POCT Blood Glucose.: 169 mg/dL (05 Feb 2018 07:35)  POCT Blood Glucose.: 182 mg/dL (04 Feb 2018 21:47)  POCT Blood Glucose.: 301 mg/dL (04 Feb 2018 16:46)            MEDICATIONS  (STANDING):  cyanocobalamin 1000 MICROGram(s) Oral daily  dextrose 5%. 1000 milliLiter(s) (50 mL/Hr) IV Continuous <Continuous>  dextrose 50% Injectable 25 Gram(s) IV Push once  gabapentin 300 milliGRAM(s) Oral two times a day  heparin  Injectable 5000 Unit(s) SubCutaneous every 12 hours  insulin glargine Injectable (LANTUS) 58 Unit(s) SubCutaneous at bedtime  insulin glargine Injectable (LANTUS) 20 Unit(s) SubCutaneous every morning  insulin lispro (HumaLOG) corrective regimen sliding scale   SubCutaneous three times a day before meals  insulin lispro (HumaLOG) corrective regimen sliding scale   SubCutaneous at bedtime  insulin lispro Injectable (HumaLOG) 23 Unit(s) SubCutaneous three times a day before meals  metoprolol succinate ER 25 milliGRAM(s) Oral daily  piperacillin/tazobactam IVPB. 3.375 Gram(s) IV Intermittent every 8 hours  sodium chloride 0.9%. 1000 milliLiter(s) (100 mL/Hr) IV Continuous <Continuous>    MEDICATIONS  (PRN):  acetaminophen   Tablet. 650 milliGRAM(s) Oral every 6 hours PRN Mild Pain (1 - 3)  dextrose Gel 1 Dose(s) Oral once PRN Blood Glucose LESS THAN 70 milliGRAM(s)/deciliter  glucagon  Injectable 1 milliGRAM(s) IntraMuscular once PRN Glucose LESS THAN 70 milligrams/deciliter          PHYSICAL EXAM:  GENERAL: NAD, well-groomed, well-developed  HEAD:  Atraumatic, Normocephalic  CHEST/LUNG: Clear to percussion bilaterally; No rales, rhonchi, wheezing, or rubs  HEART: Regular rate and rhythm; No murmurs, rubs, or gallops  ABDOMEN: Soft, Nontender, Nondistended; Bowel sounds present  EXTREMITIES:  Left foot dressing  LYMPH: No lymphadenopathy noted  SKIN: No rashes or lesions    Care Discussed with Consultants/Other Providers [ ] YES  [ ] NO

## 2018-02-05 NOTE — PROGRESS NOTE ADULT - ASSESSMENT
Assessment  DMT2: 67y Male with DM T2 with hyperglycemia on insulin, blood sugars improving, no hypoglycemia, non compliant with low carb diet.  CAD: On medications, monitored.  Gangrene: Vascular/pod FU  HTN: Controlled, On med.

## 2018-02-05 NOTE — PROGRESS NOTE ADULT - SUBJECTIVE AND OBJECTIVE BOX
CARDIOLOGY FOLLOW UP - Dr. Gavin    CC no chest pain or sob        PHYSICAL EXAM:  T(C): 37.1 (02-05-18 @ 03:31), Max: 37.8 (02-04-18 @ 21:26)  HR: 78 (02-05-18 @ 06:38) (78 - 97)  BP: 154/82 (02-05-18 @ 06:38) (117/74 - 162/100)  RR: 18 (02-05-18 @ 03:31) (17 - 18)  SpO2: 98% (02-05-18 @ 03:31) (96% - 98%)  Wt(kg): --  I&O's Summary    04 Feb 2018 07:01  -  05 Feb 2018 07:00  --------------------------------------------------------  IN: 940 mL / OUT: 1050 mL / NET: -110 mL        Appearance: Normal	  Cardiovascular: Normal S1 S2,RRR, No JVD, No murmurs  Respiratory: Lungs clear to auscultation	  Gastrointestinal:  Soft, Non-tender, + BS	  Extremities: Normal range of motion, No clubbing, cyanosis or edema        MEDICATIONS  (STANDING):  cyanocobalamin 1000 MICROGram(s) Oral daily  dextrose 5%. 1000 milliLiter(s) (50 mL/Hr) IV Continuous <Continuous>  dextrose 50% Injectable 25 Gram(s) IV Push once  gabapentin 300 milliGRAM(s) Oral two times a day  heparin  Injectable 5000 Unit(s) SubCutaneous every 12 hours  insulin glargine Injectable (LANTUS) 58 Unit(s) SubCutaneous at bedtime  insulin glargine Injectable (LANTUS) 20 Unit(s) SubCutaneous every morning  insulin lispro (HumaLOG) corrective regimen sliding scale   SubCutaneous three times a day before meals  insulin lispro (HumaLOG) corrective regimen sliding scale   SubCutaneous at bedtime  insulin lispro Injectable (HumaLOG) 23 Unit(s) SubCutaneous three times a day before meals  metoprolol succinate ER 25 milliGRAM(s) Oral daily  piperacillin/tazobactam IVPB. 3.375 Gram(s) IV Intermittent every 8 hours  sodium chloride 0.9%. 1000 milliLiter(s) (100 mL/Hr) IV Continuous <Continuous>      TELEMETRY: 	    ECG:  	  RADIOLOGY:   DIAGNOSTIC TESTING:  [x ] Echocardiogram:  < from: Transthoracic Echocardiogram (02.04.18 @ 06:50) >  EF (Visual Estimate): 65-70 %    ------------------------------------------------------------------------  Conclusions:  1. Mitral annular calcification, otherwise normal mitral  valve. Minimal mitral regurgitation.  2. Calcified trileaflet aortic valve with normal opening.  Minimal aortic regurgitation.  3. Normal left ventricular systolic function. No segmental  wall motion abnormalities.  4. Normal right ventricular size and function.  *** No previous Echoexam.  ------------------------------------------------------------------------    < end of copied text >    [ ]  Catheterization:  [x ] Stress Test:    < from: Nuclear Stress Test-Pharmacologic (02.04.18 @ 09:27) >  GATED ANALYSIS:  Post-stress gated wall motion analysis was performed (LVEF  = 53 %;LVEDV = 67 ml.) Reduced systolic thickening of the  basal inferior wall  ------------------------------------------------------------------------  IMPRESSIONS:Abnormal Study  * Chest Pain: No chest pain with administration of  Regadenoson.  * Symptom: No Symptom.  * HR Response: Appropriate.  * BP Response: Hypotensive Response with Pharmacologic  Agent.  * Heart Rhythm: Normal Sinus Rhythm - 78 BPM.  * Baseline ECG: No significant ST abnormalities.  * ECG Abnormalities: No ischemic ECG changes.  * Arrhythmia: Rare VPDs occurred.  * Review of raw data shows: The study is of good technical  quality.  * The left ventricle was normal in size. There are small  moderate defects in the basal inferior wall and apical  lateral wall that are predominantly reversible consistent  with ischemia.  TID is 1.31 which is not elevated for this  protocol.  * Post-stress gated wall motion analysis was performed  (LVEF = 53 %;LVEDV = 67 ml.) Reduced systolic thickening  of the basal inferior wall  ------------------------------------------------------------------------  Confirmed on  2/4/2018 - 14:22:57 by Marcos Brian MD  ------------------------------------------------------------------------    < end of copied text >    OTHER: 	    LABS:	 	                                12.1   10.43 )-----------( 268      ( 05 Feb 2018 07:17 )             36.5     02-05    137  |  101  |  20  ----------------------------<  196<H>  4.0   |  23  |  0.75    Ca    9.4      05 Feb 2018 07:17

## 2018-02-05 NOTE — PROGRESS NOTE ADULT - SUBJECTIVE AND OBJECTIVE BOX
pt seen at bedside in NAD. dressing to left foot c/d/i  left hallux distal to IPJ necrotic and mummified no pus no signs of acute infection  MRI positive for OM distal phalanx  applied betadine with DSD  vasc plan for bypass thursday will likely amp toe after awaiting pt decision had long discussion with pt and family yesterday and again with pat today explained that if bypass performed then the toe needs to be removed to prevent infection spread  will continue to follow

## 2018-02-06 LAB
ANION GAP SERPL CALC-SCNC: 15 MMOL/L — SIGNIFICANT CHANGE UP (ref 5–17)
BUN SERPL-MCNC: 17 MG/DL — SIGNIFICANT CHANGE UP (ref 7–23)
CALCIUM SERPL-MCNC: 9.4 MG/DL — SIGNIFICANT CHANGE UP (ref 8.4–10.5)
CHLORIDE SERPL-SCNC: 101 MMOL/L — SIGNIFICANT CHANGE UP (ref 96–108)
CO2 SERPL-SCNC: 21 MMOL/L — LOW (ref 22–31)
CREAT SERPL-MCNC: 0.62 MG/DL — SIGNIFICANT CHANGE UP (ref 0.5–1.3)
GLUCOSE BLDC GLUCOMTR-MCNC: 113 MG/DL — HIGH (ref 70–99)
GLUCOSE BLDC GLUCOMTR-MCNC: 190 MG/DL — HIGH (ref 70–99)
GLUCOSE BLDC GLUCOMTR-MCNC: 195 MG/DL — HIGH (ref 70–99)
GLUCOSE BLDC GLUCOMTR-MCNC: 91 MG/DL — SIGNIFICANT CHANGE UP (ref 70–99)
GLUCOSE SERPL-MCNC: 222 MG/DL — HIGH (ref 70–99)
HCT VFR BLD CALC: 37 % — LOW (ref 39–50)
HGB BLD-MCNC: 13.3 G/DL — SIGNIFICANT CHANGE UP (ref 13–17)
MCHC RBC-ENTMCNC: 32.8 PG — SIGNIFICANT CHANGE UP (ref 27–34)
MCHC RBC-ENTMCNC: 35.8 GM/DL — SIGNIFICANT CHANGE UP (ref 32–36)
MCV RBC AUTO: 91.5 FL — SIGNIFICANT CHANGE UP (ref 80–100)
PLATELET # BLD AUTO: 270 K/UL — SIGNIFICANT CHANGE UP (ref 150–400)
POTASSIUM SERPL-MCNC: 4.4 MMOL/L — SIGNIFICANT CHANGE UP (ref 3.5–5.3)
POTASSIUM SERPL-SCNC: 4.4 MMOL/L — SIGNIFICANT CHANGE UP (ref 3.5–5.3)
RBC # BLD: 4.05 M/UL — LOW (ref 4.2–5.8)
RBC # FLD: 11.9 % — SIGNIFICANT CHANGE UP (ref 10.3–14.5)
SODIUM SERPL-SCNC: 137 MMOL/L — SIGNIFICANT CHANGE UP (ref 135–145)
WBC # BLD: 9.3 K/UL — SIGNIFICANT CHANGE UP (ref 3.8–10.5)
WBC # FLD AUTO: 9.3 K/UL — SIGNIFICANT CHANGE UP (ref 3.8–10.5)

## 2018-02-06 PROCEDURE — 93010 ELECTROCARDIOGRAM REPORT: CPT

## 2018-02-06 PROCEDURE — 99232 SBSQ HOSP IP/OBS MODERATE 35: CPT

## 2018-02-06 RX ADMIN — Medication 2: at 07:41

## 2018-02-06 RX ADMIN — GABAPENTIN 300 MILLIGRAM(S): 400 CAPSULE ORAL at 17:57

## 2018-02-06 RX ADMIN — Medication 23 UNIT(S): at 17:57

## 2018-02-06 RX ADMIN — Medication 23 UNIT(S): at 12:04

## 2018-02-06 RX ADMIN — Medication 2: at 12:04

## 2018-02-06 RX ADMIN — PIPERACILLIN AND TAZOBACTAM 25 GRAM(S): 4; .5 INJECTION, POWDER, LYOPHILIZED, FOR SOLUTION INTRAVENOUS at 04:00

## 2018-02-06 RX ADMIN — INSULIN GLARGINE 58 UNIT(S): 100 INJECTION, SOLUTION SUBCUTANEOUS at 22:24

## 2018-02-06 RX ADMIN — INSULIN GLARGINE 20 UNIT(S): 100 INJECTION, SOLUTION SUBCUTANEOUS at 07:42

## 2018-02-06 RX ADMIN — PIPERACILLIN AND TAZOBACTAM 25 GRAM(S): 4; .5 INJECTION, POWDER, LYOPHILIZED, FOR SOLUTION INTRAVENOUS at 14:32

## 2018-02-06 RX ADMIN — Medication 25 MILLIGRAM(S): at 05:13

## 2018-02-06 RX ADMIN — PIPERACILLIN AND TAZOBACTAM 25 GRAM(S): 4; .5 INJECTION, POWDER, LYOPHILIZED, FOR SOLUTION INTRAVENOUS at 22:24

## 2018-02-06 RX ADMIN — TICAGRELOR 90 MILLIGRAM(S): 90 TABLET ORAL at 17:57

## 2018-02-06 RX ADMIN — PREGABALIN 1000 MICROGRAM(S): 225 CAPSULE ORAL at 12:04

## 2018-02-06 RX ADMIN — Medication 23 UNIT(S): at 07:41

## 2018-02-06 RX ADMIN — GABAPENTIN 300 MILLIGRAM(S): 400 CAPSULE ORAL at 05:13

## 2018-02-06 RX ADMIN — Medication 81 MILLIGRAM(S): at 05:13

## 2018-02-06 RX ADMIN — TICAGRELOR 90 MILLIGRAM(S): 90 TABLET ORAL at 05:13

## 2018-02-06 NOTE — PROGRESS NOTE ADULT - ASSESSMENT
67M with PAD, s/p LLE angio with multilevel arterial disease      d/w pt  indications for coronary intervention and optimazation  will hold off  on lle revasc ideally for 4 weeks   if there is lle deterioration prior to this will re eval  the risk benefits reation given recent cardiac  intervention   will follow

## 2018-02-06 NOTE — PROGRESS NOTE ADULT - ASSESSMENT
Diabetic foot infection.  Plan: case discussed with  podiatry resident will require hallux amputation , but first needs vascular studies,  possible erosion of bone  on plain film will obtain MRI to evaluate for osteomyelitis  , s/p sharp debridement and biopsy by podiatry   - Continue antibiotics as per ID  - MRI foot REVIEWED  - Vascular fu  - sp angiogram  - sp cath     Diabetes mellitus.  Plan: uncontrolled - Wife not clear on exact dosing  of insulin , will therefore reduce insulin dosing and up titrate to goal of -180   - endo fu  - basal/bolus    CAD (coronary artery disease).  Plan: -  hold ASA in anticipation for surgical intervention   -  continue metoprolol   - cards fu  -  stress test +  - sp cath

## 2018-02-06 NOTE — PROGRESS NOTE ADULT - SUBJECTIVE AND OBJECTIVE BOX
Patient is a 67y old  Male who presents with a chief complaint of Toe infection x one week (2018 09:41). Pt is now s/p cardiac cath CANDIDO x 1 pLAD (80%), CNADIDO x 1 dCx (90%)  and CANDIDO x 1  pOM1 (80%) and Staged PCI  RCA (90%)  ISR via right radial artery access.           Allergies    No Known Allergies    Intolerances        Medications:  acetaminophen   Tablet. 650 milliGRAM(s) Oral every 6 hours PRN  aspirin enteric coated 81 milliGRAM(s) Oral daily  cyanocobalamin 1000 MICROGram(s) Oral daily  dextrose 5%. 1000 milliLiter(s) IV Continuous <Continuous>  dextrose 50% Injectable 25 Gram(s) IV Push once  dextrose Gel 1 Dose(s) Oral once PRN  gabapentin 300 milliGRAM(s) Oral two times a day  glucagon  Injectable 1 milliGRAM(s) IntraMuscular once PRN  heparin  Injectable 5000 Unit(s) SubCutaneous every 12 hours  insulin glargine Injectable (LANTUS) 58 Unit(s) SubCutaneous at bedtime  insulin glargine Injectable (LANTUS) 20 Unit(s) SubCutaneous every morning  insulin lispro (HumaLOG) corrective regimen sliding scale   SubCutaneous three times a day before meals  insulin lispro (HumaLOG) corrective regimen sliding scale   SubCutaneous at bedtime  insulin lispro Injectable (HumaLOG) 23 Unit(s) SubCutaneous three times a day before meals  metoprolol succinate ER 25 milliGRAM(s) Oral daily  piperacillin/tazobactam IVPB. 3.375 Gram(s) IV Intermittent every 8 hours  sodium chloride 0.9%. 1000 milliLiter(s) IV Continuous <Continuous>  ticagrelor 90 milliGRAM(s) Oral two times a day      Vitals:  T(C): 36.4 (18 @ 04:30), Max: 36.9 (18 @ 19:00)  HR: 93 (18 @ 04:30) (78 - 93)  BP: 164/86 (18 @ 04:30) (141/86 - 176/99)  BP(mean): --  RR: 17 (18 @ 04:30) (17 - 18)  SpO2: 98% (18 @ 04:30) (95% - 100%)  Wt(kg): --  Daily     Daily   I&O's Summary    2018 07:01  -  2018 07:00  --------------------------------------------------------  IN: 940 mL / OUT: 1050 mL / NET: -110 mL    2018 07:01  -  2018 06:04  --------------------------------------------------------  IN: 960 mL / OUT: 1500 mL / NET: -540 mL          Physical Exam:  Appearance: Normal  Eyes: PERRL, EOMI  Cardiovascular: S1S2, RRR, No M/R/G, no JVD, No Lower extremity edema  Procedural Access Site: [facility] radial artery access. No hematoma, Non-tender to palpation, 2+ pulse, No bruit, No Ecchymosis  Respiratory: Clear to auscultation bilaterally  Gastrointestinal: Soft, Non tender, Normal Bowel Sounds  Musculoskeletal: No clubbing, No joint deformity   Neurologic: Non-focal  Psychiatry: AAOx3, Mood & affect appropriate  Skin: No rashes, No ecchymoses, No cyanosis        137  |  101  |  20  ----------------------------<  196<H>  4.0   |  23  |  0.75    Ca    9.4      2018 07:17        Hgb A1c 13.02 ( Endo consult done )       Cath:  < from: Cardiac Cath Lab - Adult (18 @ 16:00) >  [facility]  Department of Cardiology  [address] [place] [id number]  [phone]  Cath Lab Report -- Comprehensive Report  Patient: CHAPIS BALLESTEROS  Study date: 2018  Account number: [id number]  MR number: [id number]  : 1950  Gender: Male  Race: O  Case Physician(s):  JASPREET Hobsono, M.D.  Fellow:  Ti Landrum M.D.  Referring Physician:  INDICATIONS: Abnormal nuclear stress testing. Pre-op vascular surgery  (Complicated LEBypass) with known history of CAD, s/p multiple PCI.  Decreased exercise tolerance and dyspnea. Nuclear stress testing with high  risk findings of ischemia in multiple territories and borderline TID.  HISTORY: The patient has hypertension, medication-treated dyslipidemia,  diabetes mellitus, severe PAD, and a family history of coronary artery  disease. PRIOR CARDIOVASCULAR PROCEDURES: Stent of the proximal LAD and  proximal RCA.  PROCEDURE:  --  Left heart catheterization.  --  Left coronary angiography.  --  Right coronary angiography.  --  Intervention on proximal LAD: drug-eluting stent.  --  Intervention on distal circumflex: drug-eluting stent.  --  Intervention on ramus intermedius: drug-eluting stent.  TECHNIQUE: The risks and alternatives of the procedures and conscious  sedation were explained to the patient and informed consent was obtained.  Cardiac catheterization performed electively.  Local anesthetic given. Right radial artery access. Left heart  catheterization. Left coronary artery angiography. The vessel was injected  utilizing a catheter. Right coronary artery angiography. The vessel was  injected utilizing a catheter. A drug-eluting stent was performed on the  90 % lesion in the proximal LAD. Following interventionthere was a 1 %  residual stenosis. Vessel setup was performed. A [employer] UNIVERSAL 190CM wire  was used to cross the lesion. Vessel setup was performed. A 6FR JL3.5  LAUNCHER guiding catheter was used to intubate the vessel. Balloon  angioplasty was performed, using a 2.5 X 12 EUPHORA balloon, with 1  inflations. A 3.00 X 15 JESSE drug-eluting stent was placed across the  lesion and deployed at a maximum inflation pressure of 16 edwin. Balloon  angioplasty was performed, with 1 inflations and a maximuminflation  pressure of 12 edwin. Balloon angioplasty was performed, using a 3.50 X 12  EUPHORA NC balloon, with 2 inflations and a maximum inflation pressure of  16 edwin. A drug-eluting stent was performed on the 95 % lesion in the  distal circumflex. Following intervention there was a 1 % residual  stenosis. Vessel setup was performed. A BMW UNIVERSAL 190CM wire was used  to cross the lesion. Vessel setup was performed. A 6FR JL3.5 LAUNCHER  guiding catheter was used to intubate the vessel. Balloonangioplasty was  performed, using a 2.5 X 12 EUPHORA balloon, with 2 inflations and a  maximum inflation pressure of 10 edwin. A 2.50 X18 JESSE drug-eluting stent  was placed across the lesion and deployed at a maximum inflation pressure  of 12 edwin. A drug-eluting stent was performed on the 95 % lesion in the  ramus intermedius. Following intervention there was a 1 % residual  stenosis. Vessel setup was performed. A BMW UNIVERSAL 190CM wire was used  to cross the lesion. Vessel setup was performed. A 6FR JL3.5 LAUNCHER  guiding catheter was used to intubate the vessel. Balloon angioplasty was  performed, using a 2.5 X 12 EUPHORA balloon, with 1 inflations and a  maximum inflation pressure of 10 edwin. A 2.25 X 18 JESSE drug-eluting stent  was placed across the lesion and deployed at a maximum inflation pressure  of 12 edwin.  CONTRAST GIVEN: Omnipaque 33 ml. Omnipaque 144 ml.  MEDICATIONS GIVEN: Fentanyl, 25 mcg, IV. Verapamil (Isoptin, Calan,  Covera), 1.5 mg, IA. Nitroglycerin, 100 mcg, intracoronary. Nitroglycerin,  100 mcg, intracoronary. Nitroglycerin, 100 mcg, intracoronary. Heparin,  3000 units, IA. Heparin, 3000 units, IV. Aspirin, 325 mg, PO. Ticagrelor,  180 mg, PO.  VENTRICLES: No left ventriculogram was performed.  CORONARY VESSELS: The coronary circulation is right dominant.  LM:   --  LM: Normal.  LAD:   --  Proximal LAD: There was a tubular 90 % stenosis at the distal  edge of prior stent.  CX:   --  Circumflex: Angiography showed minor luminal irregularities with  no flow limiting lesions.  --  Distal circumflex: There was a tubular 95 % stenosis.  --  OM1: The vessel was small sized.  --  OM2: The vessel was small sized.  --  OM3: There was a 30 % stenosis at the ostium of the vessel segment.  RI:   --  Ramus intermedius: There was a tubular 95 % stenosis in the  proximal third of the vessel segment.  RCA:   --  Proximal RCA: There was a 95 % stenosis at the site of a prior  stent.  --  RPDA: Angiography showed minor luminal irregularities with no flow  limiting lesions.  --  RPL1: Angiography showed minor luminal irregularities with no flow  limiting lesions.  COMPLICATIONS: There were no complications.  DIAGNOSTIC IMPRESSIONS: Pre-op vascular surgery (Complicated LE Bypass)  with known history of CAD, s/p multiple PCI. Decreased exercise tolerance  and dyspnea. Nuclear stress testing with high risk findings of ischemia in  multiple territories and borderline TID. Cath with severe focal lesions in  proximal LAD, proximal RCA, distal LCx, and proximal Ramus. EF overall  preserved on SPECT.  DIAGNOSTIC RECOMMENDATIONS: PCI with CANDIDO to severe lesions in proximal LAD,  proximal LCX, and Ramus. Lesions all critical in stenosis severity with  correlating ischemia on nuclear stress testing. Without coronary  revascularization, surgical risk would be prohibitive with this anatomy  and lesion burden. Revascularization performed. CABG deferred by patient  as he awaits needed LE bypass for wound healing/limb salvage. Continue ASA  81mg and Brilinta. Staged PCI to severe proximal RCA lesion to minimize  dye load and reduce risk of BERE.  INTERVENTIONAL IMPRESSIONS: Pre-op vascular surgery (Complicated LE Bypass)  with known history of CAD, s/p multiple PCI. Decreased exercise tolerance  and dyspnea. Nuclear stress testing with high risk findings of ischemia in  multiple territories and borderline TID. Cath with severe focal lesions in  proximal LAD, proximal RCA, distal LCx, and proximal Ramus. EF overall  preserved on SPECT.  INTERVENTIONAL RECOMMENDATIONS: PCI with CANDIDO to severe lesions in proximal  LAD, proximal LCX, and Ramus. Lesions all critical in stenosis severity  with correlating ischemia on nuclear stress testing. Without coronary  revascularization, surgical risk would be prohibitive with this anatomy  and lesion burden. Revascularization performed. CABG deferred by patient  as he awaits needed LE bypass for wound healing/limb salvage. Continue ASA  81mg and Brilinta. Staged PCI to severe proximal RCA lesion to minimize  dye load and reduce risk of BERE.  Prepared and signed by  Gurpreet Gavin M.D.  Signed 2018 17:55:30  HEMODYNAMIC TABLES  Pressures:  Baseline  Pressures:  - HR: 89  Pressures:  - Rhythm:  Pressures:  -- Aortic Pressure (S/D/M): 164/88/121  Outputs:  Baseline  Outputs:  -- CALCULATIONS[age]  Outputs:  -- CALCULATIONS: Body Surface Area: 1.61  Outputs:  -- CALCULATIONS: Height in cm: 157.00  Outputs:  -- CALCULATIONS: Sex: Male  Outputs:  -- CALCULATIONS: Weight in k.80    < end of copied text >        Interpretation of Telemetry: SR 70-100bmp    ASSESSMENT/PLAN  Patient is a 67y old  Male who presents with a chief complaint of Toe infection x one week (2018 09:41). Pt is now s/p cardiac cath CANDIDO x 1 pLAD (80%), CANDIDO x 1 dCx (90%)  and CANDIDO x 1  pOM1 (80%) and Staged PCI  RCA (90%)  ISR via right radial artery access. Pt tolerated the procedure well. Cardiac cath site benign. Staged PCI .

## 2018-02-06 NOTE — PROGRESS NOTE ADULT - SUBJECTIVE AND OBJECTIVE BOX
Patient is a 67y old  Male who presents with a chief complaint of Toe infection x one week (31 Jan 2018 09:41)      Vascular Surgery Attending Progress Note    Interval HPI: pt w/o new c/o     Medications:  acetaminophen   Tablet. 650 milliGRAM(s) Oral every 6 hours PRN  aspirin enteric coated 81 milliGRAM(s) Oral daily  cyanocobalamin 1000 MICROGram(s) Oral daily  dextrose 5%. 1000 milliLiter(s) IV Continuous <Continuous>  dextrose 50% Injectable 25 Gram(s) IV Push once  dextrose Gel 1 Dose(s) Oral once PRN  gabapentin 300 milliGRAM(s) Oral two times a day  glucagon  Injectable 1 milliGRAM(s) IntraMuscular once PRN  heparin  Injectable 5000 Unit(s) SubCutaneous every 12 hours  insulin glargine Injectable (LANTUS) 58 Unit(s) SubCutaneous at bedtime  insulin glargine Injectable (LANTUS) 20 Unit(s) SubCutaneous every morning  insulin lispro (HumaLOG) corrective regimen sliding scale   SubCutaneous three times a day before meals  insulin lispro (HumaLOG) corrective regimen sliding scale   SubCutaneous at bedtime  insulin lispro Injectable (HumaLOG) 23 Unit(s) SubCutaneous three times a day before meals  metoprolol succinate ER 25 milliGRAM(s) Oral daily  piperacillin/tazobactam IVPB. 3.375 Gram(s) IV Intermittent every 8 hours  sodium chloride 0.9%. 1000 milliLiter(s) IV Continuous <Continuous>  ticagrelor 90 milliGRAM(s) Oral two times a day      Vital Signs Last 24 Hrs  T(C): 36.7 (06 Feb 2018 11:30), Max: 36.9 (05 Feb 2018 19:00)  T(F): 98.1 (06 Feb 2018 11:30), Max: 98.4 (05 Feb 2018 19:00)  HR: 87 (06 Feb 2018 15:00) (80 - 93)  BP: 157/73 (06 Feb 2018 15:00) (106/75 - 176/99)  BP(mean): --  RR: 16 (06 Feb 2018 15:00) (16 - 18)  SpO2: 95% (06 Feb 2018 15:00) (95% - 100%)  I&O's Summary    05 Feb 2018 07:01  -  06 Feb 2018 07:00  --------------------------------------------------------  IN: 960 mL / OUT: 1500 mL / NET: -540 mL    06 Feb 2018 07:01  -  06 Feb 2018 17:03  --------------------------------------------------------  IN: 480 mL / OUT: 300 mL / NET: 180 mL        Physical Exam:  Neuro  A&Ox3 VSS  Vascular:  left toe ulcer stable   Musculoskeletal: wnl    LABS:                        13.3   9.3   )-----------( 270      ( 06 Feb 2018 04:53 )             37.0     02-06    137  |  101  |  17  ----------------------------<  222<H>  4.4   |  21<L>  |  0.62    Ca    9.4      06 Feb 2018 04:52          VIVIENNE SKAGGS MD  [phone]

## 2018-02-06 NOTE — PROGRESS NOTE ADULT - SUBJECTIVE AND OBJECTIVE BOX
Chief complaint  Patient is a 67y old  Male who presents with a chief complaint of Toe infection x one week (31 Jan 2018 09:41)   Review of systems  Patient in bed, looks comfortable, no fever, no hypoglycemia.    Labs and Fingersticks  CAPILLARY BLOOD GLUCOSE      POCT Blood Glucose.: 113 mg/dL (06 Feb 2018 17:50)  POCT Blood Glucose.: 195 mg/dL (06 Feb 2018 11:39)  POCT Blood Glucose.: 190 mg/dL (06 Feb 2018 07:22)  POCT Blood Glucose.: 236 mg/dL (05 Feb 2018 22:09)      Anion Gap, Serum: 15 (02-06 @ 04:52)  Anion Gap, Serum: 13 (02-05 @ 07:17)      Calcium, Total Serum: 9.4 (02-06 @ 04:52)  Calcium, Total Serum: 9.4 (02-05 @ 07:17)          02-06    137  |  101  |  17  ----------------------------<  222<H>  4.4   |  21<L>  |  0.62    Ca    9.4      06 Feb 2018 04:52                          13.3   9.3   )-----------( 270      ( 06 Feb 2018 04:53 )             37.0     Medications  MEDICATIONS  (STANDING):  aspirin enteric coated 81 milliGRAM(s) Oral daily  cyanocobalamin 1000 MICROGram(s) Oral daily  dextrose 5%. 1000 milliLiter(s) (50 mL/Hr) IV Continuous <Continuous>  dextrose 50% Injectable 25 Gram(s) IV Push once  gabapentin 300 milliGRAM(s) Oral two times a day  heparin  Injectable 5000 Unit(s) SubCutaneous every 12 hours  insulin glargine Injectable (LANTUS) 58 Unit(s) SubCutaneous at bedtime  insulin glargine Injectable (LANTUS) 20 Unit(s) SubCutaneous every morning  insulin lispro (HumaLOG) corrective regimen sliding scale   SubCutaneous three times a day before meals  insulin lispro (HumaLOG) corrective regimen sliding scale   SubCutaneous at bedtime  insulin lispro Injectable (HumaLOG) 23 Unit(s) SubCutaneous three times a day before meals  metoprolol succinate ER 25 milliGRAM(s) Oral daily  piperacillin/tazobactam IVPB. 3.375 Gram(s) IV Intermittent every 8 hours  sodium chloride 0.9%. 1000 milliLiter(s) (100 mL/Hr) IV Continuous <Continuous>  ticagrelor 90 milliGRAM(s) Oral two times a day      Physical Exam  General: Patient comfortable in bed  Vital Signs Last 12 Hrs  T(F): 98.1 (02-06-18 @ 11:30), Max: 98.1 (02-06-18 @ 11:30)  HR: 82 (02-06-18 @ 17:00) (82 - 89)  BP: 157/79 (02-06-18 @ 17:00) (106/75 - 157/79)  BP(mean): --  RR: 16 (02-06-18 @ 17:00) (16 - 18)  SpO2: 99% (02-06-18 @ 17:00) (95% - 100%)  Neck: No palpable thyroid nodules.  CVS: S1S2, No murmurs  Respiratory: No wheezing, no crepitations  GI: Abdomen soft, bowel sounds positive  Musculoskeletal:  edema lower extremities.   Skin: No skin rashes, no ecchymosis    Diagnostics

## 2018-02-06 NOTE — PROGRESS NOTE ADULT - SUBJECTIVE AND OBJECTIVE BOX
Patient is a 67y old  Male who presents with a chief complaint of Toe infection x one week (31 Jan 2018 09:41)  NAD      INTERVAL HPI/OVERNIGHT EVENTS:  T(C): 36.7 (02-06-18 @ 11:30), Max: 36.9 (02-05-18 @ 19:00)  HR: 82 (02-06-18 @ 17:00) (80 - 93)  BP: 157/79 (02-06-18 @ 17:00) (106/75 - 176/99)  RR: 16 (02-06-18 @ 17:00) (16 - 18)  SpO2: 99% (02-06-18 @ 17:00) (95% - 100%)  Wt(kg): --  I&O's Summary    05 Feb 2018 07:01  -  06 Feb 2018 07:00  --------------------------------------------------------  IN: 960 mL / OUT: 1500 mL / NET: -540 mL    06 Feb 2018 07:01  -  06 Feb 2018 18:10  --------------------------------------------------------  IN: 480 mL / OUT: 300 mL / NET: 180 mL        LABS:                        13.3   9.3   )-----------( 270      ( 06 Feb 2018 04:53 )             37.0     02-06    137  |  101  |  17  ----------------------------<  222<H>  4.4   |  21<L>  |  0.62    Ca    9.4      06 Feb 2018 04:52          CAPILLARY BLOOD GLUCOSE      POCT Blood Glucose.: 113 mg/dL (06 Feb 2018 17:50)  POCT Blood Glucose.: 195 mg/dL (06 Feb 2018 11:39)  POCT Blood Glucose.: 190 mg/dL (06 Feb 2018 07:22)  POCT Blood Glucose.: 236 mg/dL (05 Feb 2018 22:09)  POCT Blood Glucose.: 199 mg/dL (05 Feb 2018 18:48)            MEDICATIONS  (STANDING):  aspirin enteric coated 81 milliGRAM(s) Oral daily  cyanocobalamin 1000 MICROGram(s) Oral daily  dextrose 5%. 1000 milliLiter(s) (50 mL/Hr) IV Continuous <Continuous>  dextrose 50% Injectable 25 Gram(s) IV Push once  gabapentin 300 milliGRAM(s) Oral two times a day  heparin  Injectable 5000 Unit(s) SubCutaneous every 12 hours  insulin glargine Injectable (LANTUS) 58 Unit(s) SubCutaneous at bedtime  insulin glargine Injectable (LANTUS) 20 Unit(s) SubCutaneous every morning  insulin lispro (HumaLOG) corrective regimen sliding scale   SubCutaneous three times a day before meals  insulin lispro (HumaLOG) corrective regimen sliding scale   SubCutaneous at bedtime  insulin lispro Injectable (HumaLOG) 23 Unit(s) SubCutaneous three times a day before meals  metoprolol succinate ER 25 milliGRAM(s) Oral daily  piperacillin/tazobactam IVPB. 3.375 Gram(s) IV Intermittent every 8 hours  sodium chloride 0.9%. 1000 milliLiter(s) (100 mL/Hr) IV Continuous <Continuous>  ticagrelor 90 milliGRAM(s) Oral two times a day    MEDICATIONS  (PRN):  acetaminophen   Tablet. 650 milliGRAM(s) Oral every 6 hours PRN Mild Pain (1 - 3)  dextrose Gel 1 Dose(s) Oral once PRN Blood Glucose LESS THAN 70 milliGRAM(s)/deciliter  glucagon  Injectable 1 milliGRAM(s) IntraMuscular once PRN Glucose LESS THAN 70 milligrams/deciliter          PHYSICAL EXAM:  GENERAL: NAD, well-groomed, well-developed  HEAD:  Atraumatic, Normocephalic  CHEST/LUNG: Clear to percussion bilaterally; No rales, rhonchi, wheezing, or rubs  HEART: Regular rate and rhythm; No murmurs, rubs, or gallops  ABDOMEN: Soft, Nontender, Nondistended; Bowel sounds present  EXTREMITIES: L foot dressing  LYMPH: No lymphadenopathy noted  SKIN: No rashes or lesions    Care Discussed with Consultants/Other Providers [+ ] YES  [ ] NO

## 2018-02-07 LAB
ANION GAP SERPL CALC-SCNC: 10 MMOL/L — SIGNIFICANT CHANGE UP (ref 5–17)
BUN SERPL-MCNC: 15 MG/DL — SIGNIFICANT CHANGE UP (ref 7–23)
CALCIUM SERPL-MCNC: 9.4 MG/DL — SIGNIFICANT CHANGE UP (ref 8.4–10.5)
CHLORIDE SERPL-SCNC: 102 MMOL/L — SIGNIFICANT CHANGE UP (ref 96–108)
CO2 SERPL-SCNC: 25 MMOL/L — SIGNIFICANT CHANGE UP (ref 22–31)
CREAT SERPL-MCNC: 0.73 MG/DL — SIGNIFICANT CHANGE UP (ref 0.5–1.3)
GLUCOSE BLDC GLUCOMTR-MCNC: 132 MG/DL — HIGH (ref 70–99)
GLUCOSE BLDC GLUCOMTR-MCNC: 224 MG/DL — HIGH (ref 70–99)
GLUCOSE BLDC GLUCOMTR-MCNC: 225 MG/DL — HIGH (ref 70–99)
GLUCOSE BLDC GLUCOMTR-MCNC: 227 MG/DL — HIGH (ref 70–99)
GLUCOSE SERPL-MCNC: 98 MG/DL — SIGNIFICANT CHANGE UP (ref 70–99)
HCT VFR BLD CALC: 36.3 % — LOW (ref 39–50)
HGB BLD-MCNC: 12.5 G/DL — LOW (ref 13–17)
MCHC RBC-ENTMCNC: 31.8 PG — SIGNIFICANT CHANGE UP (ref 27–34)
MCHC RBC-ENTMCNC: 34.5 GM/DL — SIGNIFICANT CHANGE UP (ref 32–36)
MCV RBC AUTO: 92.2 FL — SIGNIFICANT CHANGE UP (ref 80–100)
PLATELET # BLD AUTO: 264 K/UL — SIGNIFICANT CHANGE UP (ref 150–400)
POTASSIUM SERPL-MCNC: 4.1 MMOL/L — SIGNIFICANT CHANGE UP (ref 3.5–5.3)
POTASSIUM SERPL-SCNC: 4.1 MMOL/L — SIGNIFICANT CHANGE UP (ref 3.5–5.3)
RBC # BLD: 3.94 M/UL — LOW (ref 4.2–5.8)
RBC # FLD: 11.9 % — SIGNIFICANT CHANGE UP (ref 10.3–14.5)
SODIUM SERPL-SCNC: 137 MMOL/L — SIGNIFICANT CHANGE UP (ref 135–145)
WBC # BLD: 12.5 K/UL — HIGH (ref 3.8–10.5)
WBC # FLD AUTO: 12.5 K/UL — HIGH (ref 3.8–10.5)

## 2018-02-07 PROCEDURE — 99233 SBSQ HOSP IP/OBS HIGH 50: CPT

## 2018-02-07 PROCEDURE — 99232 SBSQ HOSP IP/OBS MODERATE 35: CPT

## 2018-02-07 RX ORDER — THIAMINE MONONITRATE (VIT B1) 100 MG
100 TABLET ORAL DAILY
Qty: 0 | Refills: 0 | Status: DISCONTINUED | OUTPATIENT
Start: 2018-02-07 | End: 2018-02-09

## 2018-02-07 RX ORDER — FOLIC ACID 0.8 MG
1 TABLET ORAL DAILY
Qty: 0 | Refills: 0 | Status: DISCONTINUED | OUTPATIENT
Start: 2018-02-07 | End: 2018-02-09

## 2018-02-07 RX ADMIN — GABAPENTIN 300 MILLIGRAM(S): 400 CAPSULE ORAL at 06:03

## 2018-02-07 RX ADMIN — INSULIN GLARGINE 20 UNIT(S): 100 INJECTION, SOLUTION SUBCUTANEOUS at 07:55

## 2018-02-07 RX ADMIN — Medication 23 UNIT(S): at 07:56

## 2018-02-07 RX ADMIN — Medication 23 UNIT(S): at 11:51

## 2018-02-07 RX ADMIN — Medication 1 MILLIGRAM(S): at 18:05

## 2018-02-07 RX ADMIN — Medication 23 UNIT(S): at 18:05

## 2018-02-07 RX ADMIN — Medication 81 MILLIGRAM(S): at 06:03

## 2018-02-07 RX ADMIN — TICAGRELOR 90 MILLIGRAM(S): 90 TABLET ORAL at 06:02

## 2018-02-07 RX ADMIN — PIPERACILLIN AND TAZOBACTAM 25 GRAM(S): 4; .5 INJECTION, POWDER, LYOPHILIZED, FOR SOLUTION INTRAVENOUS at 14:27

## 2018-02-07 RX ADMIN — Medication 4: at 11:51

## 2018-02-07 RX ADMIN — PIPERACILLIN AND TAZOBACTAM 25 GRAM(S): 4; .5 INJECTION, POWDER, LYOPHILIZED, FOR SOLUTION INTRAVENOUS at 21:53

## 2018-02-07 RX ADMIN — TICAGRELOR 90 MILLIGRAM(S): 90 TABLET ORAL at 21:53

## 2018-02-07 RX ADMIN — Medication 4: at 18:05

## 2018-02-07 RX ADMIN — PREGABALIN 1000 MICROGRAM(S): 225 CAPSULE ORAL at 12:30

## 2018-02-07 RX ADMIN — Medication 1 TABLET(S): at 18:05

## 2018-02-07 RX ADMIN — GABAPENTIN 300 MILLIGRAM(S): 400 CAPSULE ORAL at 18:05

## 2018-02-07 RX ADMIN — INSULIN GLARGINE 58 UNIT(S): 100 INJECTION, SOLUTION SUBCUTANEOUS at 21:54

## 2018-02-07 RX ADMIN — HEPARIN SODIUM 5000 UNIT(S): 5000 INJECTION INTRAVENOUS; SUBCUTANEOUS at 18:05

## 2018-02-07 RX ADMIN — PIPERACILLIN AND TAZOBACTAM 25 GRAM(S): 4; .5 INJECTION, POWDER, LYOPHILIZED, FOR SOLUTION INTRAVENOUS at 06:02

## 2018-02-07 RX ADMIN — HEPARIN SODIUM 5000 UNIT(S): 5000 INJECTION INTRAVENOUS; SUBCUTANEOUS at 06:03

## 2018-02-07 RX ADMIN — Medication 25 MILLIGRAM(S): at 06:02

## 2018-02-07 NOTE — PROGRESS NOTE ADULT - ASSESSMENT
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 No fever. polymicrobial growth on wound cx. Pt s/p stent placement.  Leucocytosis most likely reactive, post procedure.   OM of the distal phalanx of the great toe with dry gangrene, given underlying dry gangrene no role of iv abx, no infection of surrounding skin and soft tissue at this time.   will treat as skin and soft tissue infection, and if any worsening infection would re-evaluate need for abx.     Plan:  Continue with Zosyn 3.375 gm iv q8h, day 9 of abx today.  Can switch to augmentin 875 mg po bid to complete 14 days total until 2/12/18.   Podiatry on board, continue with wound care.    Will eventually need amputation.  Vascular following, s/p angiogram, plan for bypass delayed, given recent stent placement for ischemic heart disease.     Discussed with daughter in detail, she verbalizes understanding.

## 2018-02-07 NOTE — PROGRESS NOTE ADULT - SUBJECTIVE AND OBJECTIVE BOX
Chief complaint  Patient is a 67y old  Male who presents with a chief complaint of Toe infection x one week (31 Jan 2018 09:41)   Review of systems  Patient in bed, looks comfortable, no fever, no hypoglycemia.    Labs and Fingersticks  CAPILLARY BLOOD GLUCOSE      POCT Blood Glucose.: 227 mg/dL (07 Feb 2018 11:12)  POCT Blood Glucose.: 132 mg/dL (07 Feb 2018 07:09)  POCT Blood Glucose.: 91 mg/dL (06 Feb 2018 21:33)  POCT Blood Glucose.: 113 mg/dL (06 Feb 2018 17:50)  POCT Blood Glucose.: 195 mg/dL (06 Feb 2018 11:39)      Anion Gap, Serum: 10 (02-07 @ 00:37)  Anion Gap, Serum: 15 (02-06 @ 04:52)      Calcium, Total Serum: 9.4 (02-07 @ 00:37)  Calcium, Total Serum: 9.4 (02-06 @ 04:52)          02-07    137  |  102  |  15  ----------------------------<  98  4.1   |  25  |  0.73    Ca    9.4      07 Feb 2018 00:37                          12.5   12.5  )-----------( 264      ( 07 Feb 2018 00:37 )             36.3     Medications  MEDICATIONS  (STANDING):  aspirin enteric coated 81 milliGRAM(s) Oral daily  cyanocobalamin 1000 MICROGram(s) Oral daily  dextrose 5%. 1000 milliLiter(s) (50 mL/Hr) IV Continuous <Continuous>  dextrose 50% Injectable 25 Gram(s) IV Push once  gabapentin 300 milliGRAM(s) Oral two times a day  heparin  Injectable 5000 Unit(s) SubCutaneous every 12 hours  insulin glargine Injectable (LANTUS) 58 Unit(s) SubCutaneous at bedtime  insulin glargine Injectable (LANTUS) 20 Unit(s) SubCutaneous every morning  insulin lispro (HumaLOG) corrective regimen sliding scale   SubCutaneous three times a day before meals  insulin lispro (HumaLOG) corrective regimen sliding scale   SubCutaneous at bedtime  insulin lispro Injectable (HumaLOG) 23 Unit(s) SubCutaneous three times a day before meals  metoprolol succinate ER 25 milliGRAM(s) Oral daily  piperacillin/tazobactam IVPB. 3.375 Gram(s) IV Intermittent every 8 hours  sodium chloride 0.9%. 1000 milliLiter(s) (100 mL/Hr) IV Continuous <Continuous>  ticagrelor 90 milliGRAM(s) Oral two times a day      Physical Exam  General: Patient comfortable in bed  Vital Signs Last 12 Hrs  T(F): 98.5 (02-07-18 @ 04:56), Max: 98.5 (02-07-18 @ 04:56)  HR: 92 (02-07-18 @ 04:56) (92 - 92)  BP: 142/73 (02-07-18 @ 04:56) (142/73 - 142/73)  BP(mean): --  RR: 17 (02-07-18 @ 04:56) (17 - 17)  SpO2: 97% (02-07-18 @ 04:56) (97% - 97%)  Neck: No palpable thyroid nodules.  CVS: S1S2, No murmurs  Respiratory: No wheezing, no crepitations  GI: Abdomen soft, bowel sounds positive  Musculoskeletal:  edema lower extremities.   Skin: No skin rashes, no ecchymosis    Diagnostics

## 2018-02-07 NOTE — PROGRESS NOTE ADULT - ASSESSMENT
67 year old male with history of DM type II, etoh abuse and pancreatitis, CAD, s/p PCI, PVD admitted with worsening left foot infection       1. PAD  await LE bypass  patient with history of CAD, s/p pci  echo revealing normal lv sys fx , minimal MR   s/p cardiac cath CANDIDO x 1pLAD, CANDIDO x 1 dCx, CANDIDO x 1 pOM1 (02/06) via right radial and RCA (2/6) via right radial  asa 81mg daily/ ticagrelor BID/ BB   from cv standpoint cleared from DC , f/u appt for2/15/18 at 1:30 pm     2. Vasc/med/ ID  f/u  Iv abx     dvt ppx
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
## 2018-02-07 NOTE — PROGRESS NOTE ADULT - SUBJECTIVE AND OBJECTIVE BOX
Patient is a 67y old  Male who presents with a chief complaint of Toe infection x one week (2018 09:41) now s/p cardiac cath CANDIDO x 1pLAD, CANDIDO x 1 dCx, CANDIDO x 1 pOM1 () via right radial and RCA () via right radial         Allergies    No Known Allergies    Intolerances        Medications:  acetaminophen   Tablet. 650 milliGRAM(s) Oral every 6 hours PRN  aspirin enteric coated 81 milliGRAM(s) Oral daily  cyanocobalamin 1000 MICROGram(s) Oral daily  dextrose 5%. 1000 milliLiter(s) IV Continuous <Continuous>  dextrose 50% Injectable 25 Gram(s) IV Push once  dextrose Gel 1 Dose(s) Oral once PRN  gabapentin 300 milliGRAM(s) Oral two times a day  glucagon  Injectable 1 milliGRAM(s) IntraMuscular once PRN  heparin  Injectable 5000 Unit(s) SubCutaneous every 12 hours  insulin glargine Injectable (LANTUS) 58 Unit(s) SubCutaneous at bedtime  insulin glargine Injectable (LANTUS) 20 Unit(s) SubCutaneous every morning  insulin lispro (HumaLOG) corrective regimen sliding scale   SubCutaneous three times a day before meals  insulin lispro (HumaLOG) corrective regimen sliding scale   SubCutaneous at bedtime  insulin lispro Injectable (HumaLOG) 23 Unit(s) SubCutaneous three times a day before meals  metoprolol succinate ER 25 milliGRAM(s) Oral daily  piperacillin/tazobactam IVPB. 3.375 Gram(s) IV Intermittent every 8 hours  sodium chloride 0.9%. 1000 milliLiter(s) IV Continuous <Continuous>  ticagrelor 90 milliGRAM(s) Oral two times a day      Vitals:  T(C): 36.9 (18 @ 04:56), Max: 37.3 (18 @ 19:19)  HR: 92 (18 @ 04:56) (82 - 93)  BP: 142/73 (18 @ 04:56) (106/75 - 157/79)  BP(mean): --  RR: 17 (18 @ 04:56) (16 - 18)  SpO2: 97% (18 @ 04:56) (95% - 100%)  Wt(kg): --  Daily     Daily   I&O's Summary    2018 07: 07:00  --------------------------------------------------------  IN: 960 mL / OUT: 1500 mL / NET: -540 mL    2018 07:  -  2018 05:23  --------------------------------------------------------  IN: 840 mL / OUT: 650 mL / NET: 190 mL      Physical Exam:  Appearance: Normal  Eyes: PERRL, EOMI  HENT: Normal oral muscosa, NC/AT  Cardiovascular: S1S2, RRR, No M/R/G, no JVD, No Lower extremity edema  Procedural Access Site: Right radial artery access. No hematoma, Non-tender to palpation, 2+ pulse, No bruit, No Ecchymosis  Respiratory: Clear to auscultation bilaterally  Gastrointestinal: Soft, Non tender, Normal Bowel Sounds  Musculoskeletal: No clubbing, No joint deformity   Neurologic: Non-focal  Psychiatry: AAOx3, Mood & affect appropriate  Skin: No rashes, No ecchymoses, No cyanosis        137  |  102  |  15  ----------------------------<  98  4.1   |  25  |  0.73    Ca    9.4      2018 00:37          Cath: < from: Cardiac Cath Lab - Adult (18 @ 16:00) >  [facility]  Department of Cardiology  [address] [place] [address]  [phone]  Cath Lab Report -- Comprehensive Report  Patient: CHAPIS BALLESTEROS  Study date: 2018  Account number: [id number]  MR number: [id number]  : 1950  Gender: Male  Race: O  Case Physician(s):  JASPREET Hobson M.D.  Fellow:  Ti Landrum M.D.  Referring Physician:  INDICATIONS: Abnormal nuclear stress testing. Pre-op vascular surgery  (Complicated LEBypass) with known history of CAD, s/p multiple PCI.  Decreased exercise tolerance and dyspnea. Nuclear stress testing with high  risk findings of ischemia in multiple territories and borderline TID.  HISTORY: The patient has hypertension, medication-treated dyslipidemia,  diabetes mellitus, severe PAD, and a family history of coronary artery  disease. PRIOR CARDIOVASCULAR PROCEDURES: Stent of the proximal LAD and  proximal RCA.  PROCEDURE:  --  Left heart catheterization.  --  Left coronary angiography.  --  Right coronary angiography.  --  Intervention on proximal LAD: drug-eluting stent.  --  Intervention on distal circumflex: drug-eluting stent.  --  Intervention on ramus intermedius: drug-eluting stent.  TECHNIQUE: The risks and alternatives of the procedures and conscious  sedation were explained to the patient and informed consent was obtained.  Cardiac catheterization performed electively.  Local anesthetic given. Right radial artery access. Left heart  catheterization. Left coronary artery angiography. The vessel was injected  utilizing a catheter. Right coronary artery angiography. The vessel was  injected utilizing a catheter. A drug-eluting stent was performed on the  90 % lesion in the proximal LAD. Following interventionthere was a 1 %  residual stenosis. Vessel setup was performed. A BMW UNIVERSAL 190CM wire  was used to cross the lesion. Vessel setup was performed. A 6FR JL3.5  LAUNCHER guiding catheter was used to intubate the vessel. Balloon  angioplasty was performed, using a 2.5 X 12 EUPHORA balloon, with 1  inflations. A 3.00 X 15 JESSE drug-eluting stent was placed across the  lesion and deployed at a maximum inflation pressure of 16 edwin. Balloon  angioplasty was performed, with 1 inflations and a maximuminflation  pressure of 12 edwin. Balloon angioplasty was performed, using a 3.50 X 12  EUPHORA NC balloon, with 2 inflations and a maximum inflation pressure of  16 edwin. A drug-eluting stent was performed on the 95 % lesion in the  distal circumflex. Following intervention there was a 1 % residual  stenosis. Vessel setup was performed. A BMW UNIVERSAL 190CM wire was used  to cross the lesion. Vessel setup was performed. A 6FR JL3.5 LAUNCHER  guiding catheter was used to intubate the vessel. Balloonangioplasty was  performed, using a 2.5 X 12 EUPHORA balloon, with 2 inflations and a  maximum inflation pressure of 10 edwin. A 2.50 X18 JESSE drug-eluting stent  was placed across the lesion and deployed at a maximum inflation pressure  of 12 edwin. A drug-eluting stent was performed on the 95 % lesion in the  ramus intermedius. Following intervention there was a 1 % residual  stenosis. Vessel setup was performed. A BMW UNIVERSAL 190CM wire was used  to cross the lesion. Vessel setup was performed. A 6FR JL3.5 LAUNCHER  guiding catheter was used to intubate the vessel. Balloon angioplasty was  performed, using a 2.5 X 12 EUPHORA balloon, with 1 inflations and a  maximum inflation pressure of 10 edwin. A 2.25 X 18 JESSE drug-eluting stent  was placed across the lesion and deployed at a maximum inflation pressure  of 12 edwin.  CONTRAST GIVEN: Omnipaque 33 ml. Omnipaque 144 ml.  MEDICATIONS GIVEN: Fentanyl, 25 mcg, IV. Verapamil (Isoptin, Calan,  Covera), 1.5 mg, IA. Nitroglycerin, 100 mcg, intracoronary. Nitroglycerin,  100 mcg, intracoronary. Nitroglycerin, 100 mcg, intracoronary. Heparin,  3000 units, IA. Heparin, 3000 units, IV. Aspirin, 325 mg, PO. Ticagrelor,  180 mg, PO.  VENTRICLES: No left ventriculogram was performed.  CORONARY VESSELS: The coronary circulation is right dominant.  LM:   --  LM: Normal.  LAD:   --  Proximal LAD: There was a tubular 90 % stenosis at the distal  edge of prior stent.  CX:   --  Circumflex: Angiography showed minor luminal irregularities with  no flow limiting lesions.  --  Distal circumflex: There was a tubular 95 % stenosis.  --  OM1: The vessel was small sized.  --  OM2: The vessel was small sized.  --  OM3: There was a 30 % stenosis at the ostium of the vessel segment.  RI:   --  Ramus intermedius: There was a tubular 95 % stenosis in the  proximal third of the vessel segment.  RCA:   --  Proximal RCA: There was a 95 % stenosis at the site of a prior  stent.  --  RPDA: Angiography showed minor luminal irregularities with no flow  limiting lesions.  --  RPL1: Angiography showed minor luminal irregularities with no flow  limiting lesions.  COMPLICATIONS: There were no complications.  DIAGNOSTIC IMPRESSIONS: Pre-op vascular surgery (Complicated LE Bypass)  with known history of CAD, s/p multiple PCI. Decreased exercise tolerance  and dyspnea. Nuclear stress testing with high risk findings of ischemia in  multiple territories and borderline TID. Cath with severe focal lesions in  proximal LAD, proximal RCA, distal LCx, and proximal Ramus. EF overall  preserved on SPECT.  DIAGNOSTIC RECOMMENDATIONS: PCI with CANDIDO to severe lesions in proximal LAD,  proximal LCX, and Ramus. Lesions all critical in stenosis severity with  correlating ischemia on nuclear stress testing. Without coronary  revascularization, surgical risk would be prohibitive with this anatomy  and lesion burden. Revascularization performed. CABG deferred by patient  as he awaits needed LE bypass for wound healing/limb salvage. Continue ASA  81mg and Brilinta. Staged PCI to severe proximal RCA lesion to minimize  dye load and reduce risk of BERE.  INTERVENTIONAL IMPRESSIONS: Pre-op vascular surgery (Complicated LE Bypass)  with known history of CAD, s/p multiple PCI. Decreased exercise tolerance  and dyspnea. Nuclear stress testing with high risk findings of ischemia in  multiple territories and borderline TID. Cath with severe focal lesions in  proximal LAD, proximal RCA, distal LCx, and proximal Ramus. EF overall  preserved on SPECT.  INTERVENTIONAL RECOMMENDATIONS: PCI with CANDIDO to severe lesions in proximal  LAD, proximal LCX, and Ramus. Lesions all critical in stenosis severity  with correlating ischemia on nuclear stress testing. Without coronary  revascularization, surgical risk would be prohibitive with this anatomy  and lesion burden. Revascularization performed. CABG deferred by patient  as he awaits needed LE bypass for wound healing/limb salvage. Continue ASA  81mg and Brilinta. Staged PCI to severe proximal RCA lesion to minimize  dye load and reduce risk of BERE.  Prepared and signed by  Gurpreet Gavin M.D.  Signed 2018 17:55:30  HEMODYNAMIC TABLES  Pressures:  Baseline  Pressures:  - HR: 89  Pressures:  - Rhythm:  Pressures:  -- Aortic Pressure (S/D/M): 164/88/121  Outputs:  Baseline  Outputs:  -- CALCULATIONS[age]  Outputs:  -- CALCULATIONS: Body Surface Area: 1.61  Outputs:  -- CALCULATIONS: Height in cm: 157.00  Outputs:  -- CALCULATIONS: Sex: Male  Outputs:  -- CALCULATIONS: Weight in k.80    < end of copied text >        Interpretation of Telemetry:    ASSESSMENT/PLAN  Patient is a 67y old  Male who presents with a chief complaint of Toe infection x one week (2018 09:41) now s/p cardiac cath CANDIDO x 1pLAD, CANDIDO x 1 dCx, CANDIDO x 1 pOM1 () via right radial and RCA () via right radial. Pt tolerated the procedure well. Cardiac cath site benign. Overnight remained uneventful. Labs reviewed WBC 9.3>12.5 . Post-procedure discharge instructions discussed and questions addressed. Discharge pending. Patient is a 67y old  Male who presents with a chief complaint of Toe infection x one week (2018 09:41) now s/p cardiac cath CANDIDO x 1pLAD, CANDIDO x 1 dCx, CANDIDO x 1 pOM1 () via right radial and RCA () via right radial         Allergies    No Known Allergies    Intolerances        Medications:  acetaminophen   Tablet. 650 milliGRAM(s) Oral every 6 hours PRN  aspirin enteric coated 81 milliGRAM(s) Oral daily  cyanocobalamin 1000 MICROGram(s) Oral daily  dextrose 5%. 1000 milliLiter(s) IV Continuous <Continuous>  dextrose 50% Injectable 25 Gram(s) IV Push once  dextrose Gel 1 Dose(s) Oral once PRN  gabapentin 300 milliGRAM(s) Oral two times a day  glucagon  Injectable 1 milliGRAM(s) IntraMuscular once PRN  heparin  Injectable 5000 Unit(s) SubCutaneous every 12 hours  insulin glargine Injectable (LANTUS) 58 Unit(s) SubCutaneous at bedtime  insulin glargine Injectable (LANTUS) 20 Unit(s) SubCutaneous every morning  insulin lispro (HumaLOG) corrective regimen sliding scale   SubCutaneous three times a day before meals  insulin lispro (HumaLOG) corrective regimen sliding scale   SubCutaneous at bedtime  insulin lispro Injectable (HumaLOG) 23 Unit(s) SubCutaneous three times a day before meals  metoprolol succinate ER 25 milliGRAM(s) Oral daily  piperacillin/tazobactam IVPB. 3.375 Gram(s) IV Intermittent every 8 hours  sodium chloride 0.9%. 1000 milliLiter(s) IV Continuous <Continuous>  ticagrelor 90 milliGRAM(s) Oral two times a day      Vitals:  T(C): 36.9 (18 @ 04:56), Max: 37.3 (18 @ 19:19)  HR: 92 (18 @ 04:56) (82 - 93)  BP: 142/73 (18 @ 04:56) (106/75 - 157/79)  BP(mean): --  RR: 17 (18 @ 04:56) (16 - 18)  SpO2: 97% (18 @ 04:56) (95% - 100%)  Wt(kg): --  Daily     Daily   I&O's Summary    2018 07: 07:00  --------------------------------------------------------  IN: 960 mL / OUT: 1500 mL / NET: -540 mL    2018 07:  -  2018 05:23  --------------------------------------------------------  IN: 840 mL / OUT: 650 mL / NET: 190 mL      Physical Exam:  Appearance: Normal  Eyes: PERRL, EOMI  HENT: Normal oral muscosa, NC/AT  Cardiovascular: S1S2, RRR, No M/R/G, no JVD, No Lower extremity edema  Procedural Access Site: Right radial artery access. No hematoma, Non-tender to palpation, 2+ pulse, No bruit, No Ecchymosis  Respiratory: Clear to auscultation bilaterally  Gastrointestinal: Soft, Non tender, Normal Bowel Sounds  Musculoskeletal: No clubbing, No joint deformity   Neurologic: Non-focal  Psychiatry: AAOx3, Mood & affect appropriate  Skin: No rashes, No ecchymoses, No cyanosis        137  |  102  |  15  ----------------------------<  98  4.1   |  25  |  0.73    Ca    9.4      2018 00:37          Cath: < from: Cardiac Cath Lab - Adult (18 @ 16:00) >  [facility]  Department of Cardiology  [address] [place] [address]  [phone]  Cath Lab Report -- Comprehensive Report  Patient: CHAPIS BALLESTEROS  Study date: 2018  Account number: [id number]  MR number: [id number]  : 1950  Gender: Male  Race: O  Case Physician(s):  JASPREET Hobson M.D.  Fellow:  Ti Landrum M.D.  Referring Physician:  INDICATIONS: Abnormal nuclear stress testing. Pre-op vascular surgery  (Complicated LEBypass) with known history of CAD, s/p multiple PCI.  Decreased exercise tolerance and dyspnea. Nuclear stress testing with high  risk findings of ischemia in multiple territories and borderline TID.  HISTORY: The patient has hypertension, medication-treated dyslipidemia,  diabetes mellitus, severe PAD, and a family history of coronary artery  disease. PRIOR CARDIOVASCULAR PROCEDURES: Stent of the proximal LAD and  proximal RCA.  PROCEDURE:  --  Left heart catheterization.  --  Left coronary angiography.  --  Right coronary angiography.  --  Intervention on proximal LAD: drug-eluting stent.  --  Intervention on distal circumflex: drug-eluting stent.  --  Intervention on ramus intermedius: drug-eluting stent.  TECHNIQUE: The risks and alternatives of the procedures and conscious  sedation were explained to the patient and informed consent was obtained.  Cardiac catheterization performed electively.  Local anesthetic given. Right radial artery access. Left heart  catheterization. Left coronary artery angiography. The vessel was injected  utilizing a catheter. Right coronary artery angiography. The vessel was  injected utilizing a catheter. A drug-eluting stent was performed on the  90 % lesion in the proximal LAD. Following interventionthere was a 1 %  residual stenosis. Vessel setup was performed. A BMW UNIVERSAL 190CM wire  was used to cross the lesion. Vessel setup was performed. A 6FR JL3.5  LAUNCHER guiding catheter was used to intubate the vessel. Balloon  angioplasty was performed, using a 2.5 X 12 EUPHORA balloon, with 1  inflations. A 3.00 X 15 JESSE drug-eluting stent was placed across the  lesion and deployed at a maximum inflation pressure of 16 edwin. Balloon  angioplasty was performed, with 1 inflations and a maximuminflation  pressure of 12 edwin. Balloon angioplasty was performed, using a 3.50 X 12  EUPHORA NC balloon, with 2 inflations and a maximum inflation pressure of  16 edwin. A drug-eluting stent was performed on the 95 % lesion in the  distal circumflex. Following intervention there was a 1 % residual  stenosis. Vessel setup was performed. A BMW UNIVERSAL 190CM wire was used  to cross the lesion. Vessel setup was performed. A 6FR JL3.5 LAUNCHER  guiding catheter was used to intubate the vessel. Balloonangioplasty was  performed, using a 2.5 X 12 EUPHORA balloon, with 2 inflations and a  maximum inflation pressure of 10 edwin. A 2.50 X18 JESSE drug-eluting stent  was placed across the lesion and deployed at a maximum inflation pressure  of 12 edwin. A drug-eluting stent was performed on the 95 % lesion in the  ramus intermedius. Following intervention there was a 1 % residual  stenosis. Vessel setup was performed. A BMW UNIVERSAL 190CM wire was used  to cross the lesion. Vessel setup was performed. A 6FR JL3.5 LAUNCHER  guiding catheter was used to intubate the vessel. Balloon angioplasty was  performed, using a 2.5 X 12 EUPHORA balloon, with 1 inflations and a  maximum inflation pressure of 10 edwin. A 2.25 X 18 JESSE drug-eluting stent  was placed across the lesion and deployed at a maximum inflation pressure  of 12 edwin.  CONTRAST GIVEN: Omnipaque 33 ml. Omnipaque 144 ml.  MEDICATIONS GIVEN: Fentanyl, 25 mcg, IV. Verapamil (Isoptin, Calan,  Covera), 1.5 mg, IA. Nitroglycerin, 100 mcg, intracoronary. Nitroglycerin,  100 mcg, intracoronary. Nitroglycerin, 100 mcg, intracoronary. Heparin,  3000 units, IA. Heparin, 3000 units, IV. Aspirin, 325 mg, PO. Ticagrelor,  180 mg, PO.  VENTRICLES: No left ventriculogram was performed.  CORONARY VESSELS: The coronary circulation is right dominant.  LM:   --  LM: Normal.  LAD:   --  Proximal LAD: There was a tubular 90 % stenosis at the distal  edge of prior stent.  CX:   --  Circumflex: Angiography showed minor luminal irregularities with  no flow limiting lesions.  --  Distal circumflex: There was a tubular 95 % stenosis.  --  OM1: The vessel was small sized.  --  OM2: The vessel was small sized.  --  OM3: There was a 30 % stenosis at the ostium of the vessel segment.  RI:   --  Ramus intermedius: There was a tubular 95 % stenosis in the  proximal third of the vessel segment.  RCA:   --  Proximal RCA: There was a 95 % stenosis at the site of a prior  stent.  --  RPDA: Angiography showed minor luminal irregularities with no flow  limiting lesions.  --  RPL1: Angiography showed minor luminal irregularities with no flow  limiting lesions.  COMPLICATIONS: There were no complications.  DIAGNOSTIC IMPRESSIONS: Pre-op vascular surgery (Complicated LE Bypass)  with known history of CAD, s/p multiple PCI. Decreased exercise tolerance  and dyspnea. Nuclear stress testing with high risk findings of ischemia in  multiple territories and borderline TID. Cath with severe focal lesions in  proximal LAD, proximal RCA, distal LCx, and proximal Ramus. EF overall  preserved on SPECT.  DIAGNOSTIC RECOMMENDATIONS: PCI with CANDIDO to severe lesions in proximal LAD,  proximal LCX, and Ramus. Lesions all critical in stenosis severity with  correlating ischemia on nuclear stress testing. Without coronary  revascularization, surgical risk would be prohibitive with this anatomy  and lesion burden. Revascularization performed. CABG deferred by patient  as he awaits needed LE bypass for wound healing/limb salvage. Continue ASA  81mg and Brilinta. Staged PCI to severe proximal RCA lesion to minimize  dye load and reduce risk of BERE.  INTERVENTIONAL IMPRESSIONS: Pre-op vascular surgery (Complicated LE Bypass)  with known history of CAD, s/p multiple PCI. Decreased exercise tolerance  and dyspnea. Nuclear stress testing with high risk findings of ischemia in  multiple territories and borderline TID. Cath with severe focal lesions in  proximal LAD, proximal RCA, distal LCx, and proximal Ramus. EF overall  preserved on SPECT.  INTERVENTIONAL RECOMMENDATIONS: PCI with CANDIDO to severe lesions in proximal  LAD, proximal LCX, and Ramus. Lesions all critical in stenosis severity  with correlating ischemia on nuclear stress testing. Without coronary  revascularization, surgical risk would be prohibitive with this anatomy  and lesion burden. Revascularization performed. CABG deferred by patient  as he awaits needed LE bypass for wound healing/limb salvage. Continue ASA  81mg and Brilinta. Staged PCI to severe proximal RCA lesion to minimize  dye load and reduce risk of BERE.  Prepared and signed by  Gurpreet Gavin M.D.  Signed 2018 17:55:30  HEMODYNAMIC TABLES  Pressures:  Baseline  Pressures:  - HR: 89  Pressures:  - Rhythm:  Pressures:  -- Aortic Pressure (S/D/M): 164/88/121  Outputs:  Baseline  Outputs:  -- CALCULATIONS[age]  Outputs:  -- CALCULATIONS: Body Surface Area: 1.61  Outputs:  -- CALCULATIONS: Height in cm: 157.00  Outputs:  -- CALCULATIONS: Sex: Male  Outputs:  -- CALCULATIONS: Weight in k.80    < end of copied text >        Interpretation of Telemetry: SR 80-110bpm    ASSESSMENT/PLAN  Patient is a 67y old  Male who presents with a chief complaint of Toe infection x one week (2018 09:41) now s/p cardiac cath CANDIDO x 1pLAD, CANDIDO x 1 dCx, CANDIDO x 1 pOM1 () via right radial and RCA () via right radial. Pt tolerated the procedure well. Cardiac cath site benign. Overnight remained uneventful. Labs reviewed WBC 9.3>12.5 . Post-procedure discharge instructions discussed and questions addressed. Discharge pending.

## 2018-02-07 NOTE — PROGRESS NOTE ADULT - SUBJECTIVE AND OBJECTIVE BOX
CARDIOLOGY FOLLOW UP - Dr. Gavin    CC no chest pain or sob        PHYSICAL EXAM:  T(C): 36.9 (02-07-18 @ 04:56), Max: 37.3 (02-06-18 @ 19:19)  HR: 92 (02-07-18 @ 04:56) (82 - 93)  BP: 142/73 (02-07-18 @ 04:56) (106/75 - 157/79)  RR: 17 (02-07-18 @ 04:56) (16 - 18)  SpO2: 97% (02-07-18 @ 04:56) (95% - 100%)  Wt(kg): --  I&O's Summary    06 Feb 2018 07:01  -  07 Feb 2018 07:00  --------------------------------------------------------  IN: 960 mL / OUT: 950 mL / NET: 10 mL    07 Feb 2018 07:01  -  07 Feb 2018 09:34  --------------------------------------------------------  IN: 240 mL / OUT: 0 mL / NET: 240 mL        Appearance: Normal	  Cardiovascular: Normal S1 S2,RRR, No JVD, No murmurs  Respiratory: Lungs clear to auscultation	  Gastrointestinal:  Soft, Non-tender, + BS	  Extremities: Normal range of motion, No clubbing, cyanosis or edema  right radial, CDI no hematoma, + pulses bl   Left foot dressing : CDI       MEDICATIONS  (STANDING):  aspirin enteric coated 81 milliGRAM(s) Oral daily  cyanocobalamin 1000 MICROGram(s) Oral daily  dextrose 5%. 1000 milliLiter(s) (50 mL/Hr) IV Continuous <Continuous>  dextrose 50% Injectable 25 Gram(s) IV Push once  gabapentin 300 milliGRAM(s) Oral two times a day  heparin  Injectable 5000 Unit(s) SubCutaneous every 12 hours  insulin glargine Injectable (LANTUS) 58 Unit(s) SubCutaneous at bedtime  insulin glargine Injectable (LANTUS) 20 Unit(s) SubCutaneous every morning  insulin lispro (HumaLOG) corrective regimen sliding scale   SubCutaneous three times a day before meals  insulin lispro (HumaLOG) corrective regimen sliding scale   SubCutaneous at bedtime  insulin lispro Injectable (HumaLOG) 23 Unit(s) SubCutaneous three times a day before meals  metoprolol succinate ER 25 milliGRAM(s) Oral daily  piperacillin/tazobactam IVPB. 3.375 Gram(s) IV Intermittent every 8 hours  sodium chloride 0.9%. 1000 milliLiter(s) (100 mL/Hr) IV Continuous <Continuous>  ticagrelor 90 milliGRAM(s) Oral two times a day      TELEMETRY: NSR  	    ECG:  	  RADIOLOGY:   DIAGNOSTIC TESTING:  [ ] Echocardiogram:  [x ]  Catheterization:  < from: Cardiac Cath Lab - Adult (02.05.18 @ 16:00) >  with known history of CAD, s/p multiple PCI. Decreased exercise tolerance  and dyspnea. Nuclear stress testing with high risk findings of ischemia in  multiple territories and borderline TID. Cath with severe focal lesions in  proximal LAD, proximal RCA, distal LCx, and proximal Ramus. EF overall  preserved on SPECT.  INTERVENTIONAL RECOMMENDATIONS: PCI with CANDIDO to severe lesions in proximal  LAD, proximal LCX, and Ramus. Lesions all critical in stenosis severity  with correlating ischemia on nuclear stress testing. Without coronary  revascularization, surgical risk would be prohibitive with this anatomy  and lesion burden. Revascularization performed. CABG deferred by patient  as he awaits needed LE bypass for wound healing/limb salvage. Continue ASA  81mg and Brilinta. Staged PCI to severe proximal RCA lesion to minimize  dye load and reduce risk of BERE.    < end of copied text >    [ ] Stress Test:    OTHER: 	    LABS:	 	                                12.5   12.5  )-----------( 264      ( 07 Feb 2018 00:37 )             36.3     02-07    137  |  102  |  15  ----------------------------<  98  4.1   |  25  |  0.73    Ca    9.4      07 Feb 2018 00:37

## 2018-02-07 NOTE — PROGRESS NOTE ADULT - ASSESSMENT
Diabetic foot infection.  Plan: case discussed with  podiatry resident will require hallux amputation , but first needs vascular studies,  possible erosion of bone  on plain film will obtain MRI to evaluate for osteomyelitis  , s/p sharp debridement and biopsy by podiatry   - Continue antibiotics as per ID  - MRI foot REVIEWED  - Vascular fu  - sp angiogram  - bypass after 1 month     Diabetes mellitus.  Plan: uncontrolled - Wife not clear on exact dosing  of insulin , will therefore reduce insulin dosing and up titrate to goal of -180   - endo fu  - basal/bolus    CAD (coronary artery disease).  Plan: -  hold ASA in anticipation for surgical intervention   -  continue metoprolol   - cards fu  -  stress test +  - sp cath and PCI    DC planning

## 2018-02-07 NOTE — CONSULT NOTE ADULT - ASSESSMENT
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 Probably not a withdrawal risk now as he has been here since 1/30/18.  Dementia. Rule out from alcohol, B12 deficiency.  He could benefit from a trial of antidepressant as he may be self medicating depression/anxiety for years with alcohol. Cannot state his irritability is from scar as alcohol can mimic this.     Recommend  Pt. refuses to start an antidepressant.  Educated wife to stop giving pt. alcohol.   Obtain a dementia workup with B12, folate, TSH, head CT  Thiamine 100mg p.o. QD. Folate 1mg qd. Multivitamin one tab qd.  Social work consult. Alcohol rehab referral.  Continue CIWA but if by the weekend scores are still zero, can d/c it by then.  Will follow with you  here.     Chidi Woodward M.D.  Psychiatry  [phone]

## 2018-02-07 NOTE — PROGRESS NOTE ADULT - SUBJECTIVE AND OBJECTIVE BOX
Patient is a 67y old  Male who presents with a chief complaint of Toe infection x one week (31 Jan 2018 09:41)      INTERVAL HPI/OVERNIGHT EVENTS:  T(C): 37.1 (02-07-18 @ 12:22), Max: 37.3 (02-06-18 @ 19:19)  HR: 89 (02-07-18 @ 12:22) (89 - 93)  BP: 128/72 (02-07-18 @ 12:22) (128/72 - 155/88)  RR: 17 (02-07-18 @ 12:22) (16 - 17)  SpO2: 97% (02-07-18 @ 12:22) (97% - 98%)  Wt(kg): --  I&O's Summary    06 Feb 2018 07:01  -  07 Feb 2018 07:00  --------------------------------------------------------  IN: 960 mL / OUT: 950 mL / NET: 10 mL    07 Feb 2018 07:01  -  07 Feb 2018 17:26  --------------------------------------------------------  IN: 560 mL / OUT: 400 mL / NET: 160 mL        LABS:                        12.5   12.5  )-----------( 264      ( 07 Feb 2018 00:37 )             36.3     02-07    137  |  102  |  15  ----------------------------<  98  4.1   |  25  |  0.73    Ca    9.4      07 Feb 2018 00:37          CAPILLARY BLOOD GLUCOSE      POCT Blood Glucose.: 227 mg/dL (07 Feb 2018 11:12)  POCT Blood Glucose.: 132 mg/dL (07 Feb 2018 07:09)  POCT Blood Glucose.: 91 mg/dL (06 Feb 2018 21:33)  POCT Blood Glucose.: 113 mg/dL (06 Feb 2018 17:50)            MEDICATIONS  (STANDING):  aspirin enteric coated 81 milliGRAM(s) Oral daily  cyanocobalamin 1000 MICROGram(s) Oral daily  dextrose 5%. 1000 milliLiter(s) (50 mL/Hr) IV Continuous <Continuous>  dextrose 50% Injectable 25 Gram(s) IV Push once  folic acid 1 milliGRAM(s) Oral daily  gabapentin 300 milliGRAM(s) Oral two times a day  heparin  Injectable 5000 Unit(s) SubCutaneous every 12 hours  insulin glargine Injectable (LANTUS) 58 Unit(s) SubCutaneous at bedtime  insulin glargine Injectable (LANTUS) 20 Unit(s) SubCutaneous every morning  insulin lispro (HumaLOG) corrective regimen sliding scale   SubCutaneous three times a day before meals  insulin lispro (HumaLOG) corrective regimen sliding scale   SubCutaneous at bedtime  insulin lispro Injectable (HumaLOG) 23 Unit(s) SubCutaneous three times a day before meals  metoprolol succinate ER 25 milliGRAM(s) Oral daily  multivitamin 1 Tablet(s) Oral daily  piperacillin/tazobactam IVPB. 3.375 Gram(s) IV Intermittent every 8 hours  sodium chloride 0.9%. 1000 milliLiter(s) (100 mL/Hr) IV Continuous <Continuous>  thiamine 100 milliGRAM(s) Oral daily  ticagrelor 90 milliGRAM(s) Oral two times a day    MEDICATIONS  (PRN):  acetaminophen   Tablet. 650 milliGRAM(s) Oral every 6 hours PRN Mild Pain (1 - 3)  dextrose Gel 1 Dose(s) Oral once PRN Blood Glucose LESS THAN 70 milliGRAM(s)/deciliter  glucagon  Injectable 1 milliGRAM(s) IntraMuscular once PRN Glucose LESS THAN 70 milligrams/deciliter          PHYSICAL EXAM:  GENERAL: NAD, well-groomed, well-developed  HEAD:  Atraumatic, Normocephalic  CHEST/LUNG: Clear to percussion bilaterally; No rales, rhonchi, wheezing, or rubs  HEART: Regular rate and rhythm; No murmurs, rubs, or gallops  ABDOMEN: Soft, Nontender, Nondistended; Bowel sounds present  EXTREMITIES: Left foot dressing  LYMPH: No lymphadenopathy noted  SKIN: No rashes or lesions    Care Discussed with Consultants/Other Providers [ ] YES  [ ] NO

## 2018-02-07 NOTE — PROGRESS NOTE ADULT - ASSESSMENT
67M with PAD, s/p LLE angio with multilevel arterial disease    - Outpatient follow up for eventual revascularization     DIMITRIOS Wilder MD PGY 2   [id number]M with PAD, s/p LLE angio with multilevel arterial disease    - Outpatient follow up for eventual revascularization   d/w pt that his cardiac function needs to be optimized  and ideally would wait for  4-6 weeks  before any revasc of the le is attempted  will monitor  toe ulcer closely as out pt  ov f/u 1 week   continue antiplt rx as per cardiology

## 2018-02-07 NOTE — PROGRESS NOTE ADULT - SUBJECTIVE AND OBJECTIVE BOX
Vascular Surgery    Pt feeling well, pain controlled, without complaints    Vital Signs Last 24 Hrs  T(C): 37.1 (02-07-18 @ 12:22), Max: 37.3 (02-06-18 @ 19:19)  T(F): 98.7 (02-07-18 @ 12:22), Max: 99.1 (02-06-18 @ 19:19)  HR: 89 (02-07-18 @ 12:22) (89 - 93)  BP: 128/72 (02-07-18 @ 12:22) (128/72 - 155/88)  BP(mean): --  RR: 17 (02-07-18 @ 12:22) (16 - 17)  SpO2: 97% (02-07-18 @ 12:22) (97% - 98%)  I&O's Detail    06 Feb 2018 07:01  -  07 Feb 2018 07:00  --------------------------------------------------------  IN:    Oral Fluid: 960 mL  Total IN: 960 mL    OUT:    Voided: 950 mL  Total OUT: 950 mL    Total NET: 10 mL      07 Feb 2018 07:01  -  07 Feb 2018 17:46  --------------------------------------------------------  IN:    Oral Fluid: 560 mL  Total IN: 560 mL    OUT:    Voided: 400 mL  Total OUT: 400 mL    Total NET: 160 mL    PE  Gen: NAD  LLE: ulcer of toe and lateral foot with betadine, dry without drainage or erythema                            12.5   12.5  )-----------( 264      ( 07 Feb 2018 00:37 )             36.3     02-07    137  |  102  |  15  ----------------------------<  98  4.1   |  25  |  0.73    Ca    9.4      07 Feb 2018 00:37        CAPILLARY BLOOD GLUCOSE      POCT Blood Glucose.: 227 mg/dL (07 Feb 2018 11:12)  POCT Blood Glucose.: 132 mg/dL (07 Feb 2018 07:09)  POCT Blood Glucose.: 91 mg/dL (06 Feb 2018 21:33)  POCT Blood Glucose.: 113 mg/dL (06 Feb 2018 17:50)      MEDICATIONS  (STANDING):  aspirin enteric coated 81 milliGRAM(s) Oral daily  cyanocobalamin 1000 MICROGram(s) Oral daily  dextrose 5%. 1000 milliLiter(s) (50 mL/Hr) IV Continuous <Continuous>  dextrose 50% Injectable 25 Gram(s) IV Push once  folic acid 1 milliGRAM(s) Oral daily  gabapentin 300 milliGRAM(s) Oral two times a day  heparin  Injectable 5000 Unit(s) SubCutaneous every 12 hours  insulin glargine Injectable (LANTUS) 58 Unit(s) SubCutaneous at bedtime  insulin glargine Injectable (LANTUS) 20 Unit(s) SubCutaneous every morning  insulin lispro (HumaLOG) corrective regimen sliding scale   SubCutaneous three times a day before meals  insulin lispro (HumaLOG) corrective regimen sliding scale   SubCutaneous at bedtime  insulin lispro Injectable (HumaLOG) 23 Unit(s) SubCutaneous three times a day before meals  metoprolol succinate ER 25 milliGRAM(s) Oral daily  multivitamin 1 Tablet(s) Oral daily  piperacillin/tazobactam IVPB. 3.375 Gram(s) IV Intermittent every 8 hours  sodium chloride 0.9%. 1000 milliLiter(s) (100 mL/Hr) IV Continuous <Continuous>  thiamine 100 milliGRAM(s) Oral daily  ticagrelor 90 milliGRAM(s) Oral two times a day    MEDICATIONS  (PRN):  acetaminophen   Tablet. 650 milliGRAM(s) Oral every 6 hours PRN Mild Pain (1 - 3)  dextrose Gel 1 Dose(s) Oral once PRN Blood Glucose LESS THAN 70 milliGRAM(s)/deciliter  glucagon  Injectable 1 milliGRAM(s) IntraMuscular once PRN Glucose LESS THAN 70 milligrams/deciliter Vascular Surgery    Pt feeling well, pain controlled, without complaints    Vital Signs Last 24 Hrs  T(C): 37.1 (02-07-18 @ 12:22), Max: 37.3 (02-06-18 @ 19:19)  T(F): 98.7 (02-07-18 @ 12:22), Max: 99.1 (02-06-18 @ 19:19)  HR: 89 (02-07-18 @ 12:22) (89 - 93)  BP: 128/72 (02-07-18 @ 12:22) (128/72 - 155/88)  BP(mean): --  RR: 17 (02-07-18 @ 12:22) (16 - 17)  SpO2: 97% (02-07-18 @ 12:22) (97% - 98%)  I&O's Detail    06 Feb 2018 07:01  -  07 Feb 2018 07:00  --------------------------------------------------------  IN:    Oral Fluid: 960 mL  Total IN: 960 mL    OUT:    Voided: 950 mL  Total OUT: 950 mL    Total NET: 10 mL      07 Feb 2018 07:01  -  07 Feb 2018 17:46  --------------------------------------------------------  IN:    Oral Fluid: 560 mL  Total IN: 560 mL    OUT:    Voided: 400 mL  Total OUT: 400 mL    Total NET: 160 mL    PE  Gen: NAD  LLE: ulcer of toe and lateral foot with betadine, dry without drainage or erythema no acute changes                             12.5   12.5  )-----------( 264      ( 07 Feb 2018 00:37 )             36.3     02-07    137  |  102  |  15  ----------------------------<  98  4.1   |  25  |  0.73    Ca    9.4      07 Feb 2018 00:37        CAPILLARY BLOOD GLUCOSE      POCT Blood Glucose.: 227 mg/dL (07 Feb 2018 11:12)  POCT Blood Glucose.: 132 mg/dL (07 Feb 2018 07:09)  POCT Blood Glucose.: 91 mg/dL (06 Feb 2018 21:33)  POCT Blood Glucose.: 113 mg/dL (06 Feb 2018 17:50)      MEDICATIONS  (STANDING):  aspirin enteric coated 81 milliGRAM(s) Oral daily  cyanocobalamin 1000 MICROGram(s) Oral daily  dextrose 5%. 1000 milliLiter(s) (50 mL/Hr) IV Continuous <Continuous>  dextrose 50% Injectable 25 Gram(s) IV Push once  folic acid 1 milliGRAM(s) Oral daily  gabapentin 300 milliGRAM(s) Oral two times a day  heparin  Injectable 5000 Unit(s) SubCutaneous every 12 hours  insulin glargine Injectable (LANTUS) 58 Unit(s) SubCutaneous at bedtime  insulin glargine Injectable (LANTUS) 20 Unit(s) SubCutaneous every morning  insulin lispro (HumaLOG) corrective regimen sliding scale   SubCutaneous three times a day before meals  insulin lispro (HumaLOG) corrective regimen sliding scale   SubCutaneous at bedtime  insulin lispro Injectable (HumaLOG) 23 Unit(s) SubCutaneous three times a day before meals  metoprolol succinate ER 25 milliGRAM(s) Oral daily  multivitamin 1 Tablet(s) Oral daily  piperacillin/tazobactam IVPB. 3.375 Gram(s) IV Intermittent every 8 hours  sodium chloride 0.9%. 1000 milliLiter(s) (100 mL/Hr) IV Continuous <Continuous>  thiamine 100 milliGRAM(s) Oral daily  ticagrelor 90 milliGRAM(s) Oral two times a day    MEDICATIONS  (PRN):  acetaminophen   Tablet. 650 milliGRAM(s) Oral every 6 hours PRN Mild Pain (1 - 3)  dextrose Gel 1 Dose(s) Oral once PRN Blood Glucose LESS THAN 70 milliGRAM(s)/deciliter  glucagon  Injectable 1 milliGRAM(s) IntraMuscular once PRN Glucose LESS THAN 70 milligrams/deciliter

## 2018-02-07 NOTE — PROGRESS NOTE ADULT - SUBJECTIVE AND OBJECTIVE BOX
67y old  Male who presents with a chief complaint of Toe infection x one week       Interval history:  Afebrile, denies pain in the foot, offers no complains.       Antimicrobials:    piperacillin/tazobactam IVPB. 3.375 Gram(s) IV Intermittent every 8 hours    REVIEW OF SYSTEMS:    No chest pain or palpitations  No cough, no SOB  No N/V/D, no abdominal pain  No dysuria or frequency  No rash.     Vital Signs Last 24 Hrs  T(C): 37.1 (02-07-18 @ 12:22), Max: 37.3 (02-06-18 @ 19:19)  T(F): 98.7 (02-07-18 @ 12:22), Max: 99.1 (02-06-18 @ 19:19)  HR: 89 (02-07-18 @ 12:22) (82 - 93)  BP: 128/72 (02-07-18 @ 12:22) (128/72 - 157/79)  RR: 17 (02-07-18 @ 12:22) (16 - 17)  SpO2: 97% (02-07-18 @ 12:22) (95% - 99%)    PHYSICAL EXAM:  Patient in no acute distress. Alert, awake, communicative.  Cardiovascular: S1S2 normal.  Lungs: + air entry B/L lung fields.  Gastrointestinal: soft, nontender, nondistended.  Extremities: Lt great toe with dry gangrene at the tip, no surrounding erythema, no warmth, no drainage.   IV sites not inflamed.                           12.5   12.5  )-----------( 264      ( 07 Feb 2018 00:37 )             36.3   02-07    137  |  102  |  15  ----------------------------<  98  4.1   |  25  |  0.73    Ca    9.4      07 Feb 2018 00:37

## 2018-02-07 NOTE — PROGRESS NOTE ADULT - SUBJECTIVE AND OBJECTIVE BOX
pt seen at bedside in NAD. dressing to left foot c/d/i  left hallux distal to IPJ necrotic and mummified no pus no signs of acute infection  MRI positive for OM distal phalanx  applied betadine with DSD  vasc plan for bypass on hold due to cardiac intervention will need to wait a month or so before can undergo bypass. will continue local care once bypass performed pt will need amp of toe  will continue to follow

## 2018-02-07 NOTE — CHART NOTE - NSCHARTNOTEFT_GEN_A_CORE
Pt cleared by cardiology may d/c tele and may transfer to Medicine unit.  D/W Dr. Waller pt needs to cont care as inpt 2/2 OM until cleared by ID.      Carol Serna, NP-C  [facility]/Cardiology NP

## 2018-02-08 LAB
FOLATE SERPL-MCNC: >20 NG/ML — SIGNIFICANT CHANGE UP (ref 4.8–24.2)
GLUCOSE BLDC GLUCOMTR-MCNC: 230 MG/DL — HIGH (ref 70–99)
GLUCOSE BLDC GLUCOMTR-MCNC: 241 MG/DL — HIGH (ref 70–99)
GLUCOSE BLDC GLUCOMTR-MCNC: 275 MG/DL — HIGH (ref 70–99)
GLUCOSE BLDC GLUCOMTR-MCNC: 324 MG/DL — HIGH (ref 70–99)
TSH SERPL-MCNC: 2.21 UIU/ML — SIGNIFICANT CHANGE UP (ref 0.27–4.2)
VIT B12 SERPL-MCNC: 716 PG/ML — SIGNIFICANT CHANGE UP (ref 232–1245)

## 2018-02-08 PROCEDURE — 70450 CT HEAD/BRAIN W/O DYE: CPT | Mod: 26

## 2018-02-08 PROCEDURE — 99232 SBSQ HOSP IP/OBS MODERATE 35: CPT

## 2018-02-08 RX ORDER — INSULIN ASPART 100 [IU]/ML
23 INJECTION, SOLUTION SUBCUTANEOUS
Qty: 1 | Refills: 1 | OUTPATIENT
Start: 2018-02-08 | End: 2018-04-08

## 2018-02-08 RX ORDER — TICAGRELOR 90 MG/1
1 TABLET ORAL
Qty: 60 | Refills: 0 | OUTPATIENT
Start: 2018-02-08 | End: 2018-03-09

## 2018-02-08 RX ORDER — INSULIN ASPART 100 [IU]/ML
15 INJECTION, SOLUTION SUBCUTANEOUS
Qty: 0 | Refills: 1 | COMMUNITY
Start: 2018-02-08 | End: 2018-04-08

## 2018-02-08 RX ADMIN — Medication 23 UNIT(S): at 11:39

## 2018-02-08 RX ADMIN — Medication 650 MILLIGRAM(S): at 06:22

## 2018-02-08 RX ADMIN — HEPARIN SODIUM 5000 UNIT(S): 5000 INJECTION INTRAVENOUS; SUBCUTANEOUS at 17:30

## 2018-02-08 RX ADMIN — Medication 4: at 07:44

## 2018-02-08 RX ADMIN — Medication 23 UNIT(S): at 17:30

## 2018-02-08 RX ADMIN — Medication 81 MILLIGRAM(S): at 06:09

## 2018-02-08 RX ADMIN — Medication 1 MILLIGRAM(S): at 11:39

## 2018-02-08 RX ADMIN — Medication 25 MILLIGRAM(S): at 06:09

## 2018-02-08 RX ADMIN — GABAPENTIN 300 MILLIGRAM(S): 400 CAPSULE ORAL at 06:09

## 2018-02-08 RX ADMIN — Medication 8: at 17:29

## 2018-02-08 RX ADMIN — GABAPENTIN 300 MILLIGRAM(S): 400 CAPSULE ORAL at 17:30

## 2018-02-08 RX ADMIN — Medication 100 MILLIGRAM(S): at 17:30

## 2018-02-08 RX ADMIN — Medication 23 UNIT(S): at 07:44

## 2018-02-08 RX ADMIN — PREGABALIN 1000 MICROGRAM(S): 225 CAPSULE ORAL at 11:40

## 2018-02-08 RX ADMIN — INSULIN GLARGINE 58 UNIT(S): 100 INJECTION, SOLUTION SUBCUTANEOUS at 21:48

## 2018-02-08 RX ADMIN — TICAGRELOR 90 MILLIGRAM(S): 90 TABLET ORAL at 17:30

## 2018-02-08 RX ADMIN — TICAGRELOR 90 MILLIGRAM(S): 90 TABLET ORAL at 06:09

## 2018-02-08 RX ADMIN — INSULIN GLARGINE 20 UNIT(S): 100 INJECTION, SOLUTION SUBCUTANEOUS at 07:44

## 2018-02-08 RX ADMIN — Medication 650 MILLIGRAM(S): at 07:30

## 2018-02-08 RX ADMIN — HEPARIN SODIUM 5000 UNIT(S): 5000 INJECTION INTRAVENOUS; SUBCUTANEOUS at 06:18

## 2018-02-08 RX ADMIN — Medication 1 TABLET(S): at 11:39

## 2018-02-08 RX ADMIN — Medication 1 TABLET(S): at 17:30

## 2018-02-08 RX ADMIN — Medication 6: at 11:38

## 2018-02-08 NOTE — PROGRESS NOTE ADULT - PROBLEM SELECTOR PROBLEM 3
CAD (coronary artery disease)

## 2018-02-08 NOTE — PROGRESS NOTE ADULT - PROBLEM SELECTOR PROBLEM 2
Wet gangrene

## 2018-02-08 NOTE — PROGRESS NOTE ADULT - PROBLEM SELECTOR PLAN 2
Considering amputation toe, Podiatry, Sx team FU
Podiatry, Sx team FU

## 2018-02-08 NOTE — PHYSICAL THERAPY INITIAL EVALUATION ADULT - PRECAUTIONS/LIMITATIONS, REHAB EVAL
MRI- L halllux reveals osteomyelitis, Vascular: s/p LLE angiogram showing multilevel disease and will need lower extremity bypass. Podiatry states pt will need hallux amputation. both surgeries planned however will be postponed 2/2 s/p cardiac cath CANDIDO x 1pLAD, CANDIDO x 1 dCx, CANDIDO x 1 pOM1 (02/06) via right radial and RCA (2/6) via right radialtimized  and ideally would wait for  4-6 weeks  before any revasc of the le is attempted/cardiac precautions/fall precautions

## 2018-02-08 NOTE — PROGRESS NOTE ADULT - SUBJECTIVE AND OBJECTIVE BOX
CARDIOLOGY FOLLOW UP - Dr. Gavin    CC no chest pain or sob        PHYSICAL EXAM:  T(C): 36.8 (02-08-18 @ 08:00), Max: 37.5 (02-07-18 @ 22:15)  HR: 80 (02-08-18 @ 08:00) (80 - 89)  BP: 144/60 (02-08-18 @ 08:00) (128/72 - 155/88)  RR: 18 (02-08-18 @ 08:00) (17 - 18)  SpO2: 98% (02-08-18 @ 08:00) (97% - 98%)  Wt(kg): --  I&O's Summary    07 Feb 2018 07:01  -  08 Feb 2018 07:00  --------------------------------------------------------  IN: 1020 mL / OUT: 1700 mL / NET: -680 mL    08 Feb 2018 07:01  -  08 Feb 2018 11:10  --------------------------------------------------------  IN: 240 mL / OUT: 300 mL / NET: -60 mL        Appearance: Normal	  Cardiovascular: Normal S1 S2,RRR, No JVD, No murmurs  Respiratory: Lungs clear to auscultation	  Gastrointestinal:  Soft, Non-tender, + BS	  Extremities: Normal range of motion, No clubbing, cyanosis or edema  right radial no hemaoma, dressing CDI + pulses bl     MEDICATIONS  (STANDING):  amoxicillin  875 milliGRAM(s)/clavulanate 1 Tablet(s) Oral two times a day  aspirin enteric coated 81 milliGRAM(s) Oral daily  cyanocobalamin 1000 MICROGram(s) Oral daily  dextrose 5%. 1000 milliLiter(s) (50 mL/Hr) IV Continuous <Continuous>  dextrose 50% Injectable 25 Gram(s) IV Push once  folic acid 1 milliGRAM(s) Oral daily  gabapentin 300 milliGRAM(s) Oral two times a day  heparin  Injectable 5000 Unit(s) SubCutaneous every 12 hours  insulin glargine Injectable (LANTUS) 58 Unit(s) SubCutaneous at bedtime  insulin glargine Injectable (LANTUS) 20 Unit(s) SubCutaneous every morning  insulin lispro (HumaLOG) corrective regimen sliding scale   SubCutaneous three times a day before meals  insulin lispro (HumaLOG) corrective regimen sliding scale   SubCutaneous at bedtime  insulin lispro Injectable (HumaLOG) 23 Unit(s) SubCutaneous three times a day before meals  metoprolol succinate ER 25 milliGRAM(s) Oral daily  multivitamin 1 Tablet(s) Oral daily  sodium chloride 0.9%. 1000 milliLiter(s) (100 mL/Hr) IV Continuous <Continuous>  thiamine 100 milliGRAM(s) Oral daily  ticagrelor 90 milliGRAM(s) Oral two times a day      TELEMETRY: off tele  	    ECG:  	  RADIOLOGY:   DIAGNOSTIC TESTING:  [ ] Echocardiogram:  [ ]  Catheterization:  [ ] Stress Test:    OTHER: 	    LABS:	 	                                12.5   12.5  )-----------( 264      ( 07 Feb 2018 00:37 )             36.3     02-07    137  |  102  |  15  ----------------------------<  98  4.1   |  25  |  0.73    Ca    9.4      07 Feb 2018 00:37

## 2018-02-08 NOTE — PROGRESS NOTE ADULT - SUBJECTIVE AND OBJECTIVE BOX
Patient is a 67y old  Male who presents with a chief complaint of Toe infection x one week (31 Jan 2018 09:41)      SUBJECTIVE / OVERNIGHT EVENTS:  No chest pain. No shortness of breath. No complaints. No events overnight.     MEDICATIONS  (STANDING):  amoxicillin  875 milliGRAM(s)/clavulanate 1 Tablet(s) Oral two times a day  aspirin enteric coated 81 milliGRAM(s) Oral daily  cyanocobalamin 1000 MICROGram(s) Oral daily  dextrose 5%. 1000 milliLiter(s) (50 mL/Hr) IV Continuous <Continuous>  dextrose 50% Injectable 25 Gram(s) IV Push once  folic acid 1 milliGRAM(s) Oral daily  gabapentin 300 milliGRAM(s) Oral two times a day  heparin  Injectable 5000 Unit(s) SubCutaneous every 12 hours  insulin glargine Injectable (LANTUS) 58 Unit(s) SubCutaneous at bedtime  insulin glargine Injectable (LANTUS) 20 Unit(s) SubCutaneous every morning  insulin lispro (HumaLOG) corrective regimen sliding scale   SubCutaneous three times a day before meals  insulin lispro (HumaLOG) corrective regimen sliding scale   SubCutaneous at bedtime  insulin lispro Injectable (HumaLOG) 23 Unit(s) SubCutaneous three times a day before meals  metoprolol succinate ER 25 milliGRAM(s) Oral daily  multivitamin 1 Tablet(s) Oral daily  sodium chloride 0.9%. 1000 milliLiter(s) (100 mL/Hr) IV Continuous <Continuous>  thiamine 100 milliGRAM(s) Oral daily  ticagrelor 90 milliGRAM(s) Oral two times a day    MEDICATIONS  (PRN):  acetaminophen   Tablet. 650 milliGRAM(s) Oral every 6 hours PRN Mild Pain (1 - 3)  dextrose Gel 1 Dose(s) Oral once PRN Blood Glucose LESS THAN 70 milliGRAM(s)/deciliter  glucagon  Injectable 1 milliGRAM(s) IntraMuscular once PRN Glucose LESS THAN 70 milligrams/deciliter  LORazepam     Tablet 1 milliGRAM(s) Oral every 2 hours PRN CIWA-Ar score increase by 2 points and a total score of 7 or less      Vital Signs Last 24 Hrs  T(C): 36.6 (08 Feb 2018 12:00), Max: 37.5 (07 Feb 2018 22:15)  T(F): 97.9 (08 Feb 2018 12:00), Max: 99.5 (07 Feb 2018 22:15)  HR: 80 (08 Feb 2018 12:38) (80 - 87)  BP: 145/80 (08 Feb 2018 12:38) (131/70 - 155/88)  BP(mean): --  RR: 18 (08 Feb 2018 12:38) (17 - 18)  SpO2: 99% (08 Feb 2018 12:38) (97% - 99%)  CAPILLARY BLOOD GLUCOSE      POCT Blood Glucose.: 275 mg/dL (08 Feb 2018 11:16)  POCT Blood Glucose.: 241 mg/dL (08 Feb 2018 07:13)  POCT Blood Glucose.: 224 mg/dL (07 Feb 2018 21:32)  POCT Blood Glucose.: 225 mg/dL (07 Feb 2018 18:03)    I&O's Summary    07 Feb 2018 07:01  -  08 Feb 2018 07:00  --------------------------------------------------------  IN: 1020 mL / OUT: 1700 mL / NET: -680 mL    08 Feb 2018 07:01  -  08 Feb 2018 14:22  --------------------------------------------------------  IN: 480 mL / OUT: 600 mL / NET: -120 mL        PHYSICAL EXAM:  GENERAL: NAD, well-developed  HEAD:  Atraumatic, Normocephalic  EYES: EOMI, PERRLA, conjunctiva and sclera clear  NECK: Supple, No JVD  CHEST/LUNG: Clear to auscultation bilaterally; No wheeze  HEART: Regular rate and rhythm; No murmurs, rubs, or gallops  ABDOMEN: Soft, Nontender, Nondistended; Bowel sounds present  EXTREMITIES:  2+ Peripheral Pulses, No clubbing, cyanosis, or edema  PSYCH: AAOx3  NEUROLOGY: non-focal  SKIN: No rashes or lesions    LABS:                        12.5   12.5  )-----------( 264      ( 07 Feb 2018 00:37 )             36.3     02-07    137  |  102  |  15  ----------------------------<  98  4.1   |  25  |  0.73    Ca    9.4      07 Feb 2018 00:37                RADIOLOGY & ADDITIONAL TESTS:    Imaging Personally Reviewed:    Consultant(s) Notes Reviewed:      Care Discussed with Consultants/Other Providers:

## 2018-02-08 NOTE — PHYSICAL THERAPY INITIAL EVALUATION ADULT - PERTINENT HX OF CURRENT PROBLEM, REHAB EVAL
67 year old male with a past medical history significant for DM type II, h/o  etoh abuse and  pancreatitis, Cad s/p stent,  PVD, presents with worsening  left foot infection over the past week. Dx:L foot ulcer ETOH abuse

## 2018-02-08 NOTE — PROGRESS NOTE ADULT - ASSESSMENT
67M with PAD, s/p LLE angio with multilevel arterial disease    - Outpatient follow up for eventual revascularization   d/w pt that his cardiac function needs to be optimized  and ideally would wait for  4-6 weeks  before any revasc of the le is attempted  will monitor  toe ulcer closely as out pt  ov f/u 1 week   continue antiplt rx as per cardiology

## 2018-02-08 NOTE — PHYSICAL THERAPY INITIAL EVALUATION ADULT - PLANNED THERAPY INTERVENTIONS, PT EVAL
balance training/Patient is currently functioning at baseline (independent) and will be D/C from PT program at this time. Patient will remain on ambulation aide to ensure OOB/mobility/ambulation for remainder of hospital stay./bed mobility training

## 2018-02-08 NOTE — PROGRESS NOTE ADULT - PROBLEM SELECTOR PLAN 3
On medications, monitored and followed up by primary/cardiology team

## 2018-02-08 NOTE — PROGRESS NOTE ADULT - SUBJECTIVE AND OBJECTIVE BOX
Chief complaint  Patient is a 67y old  Male who presents with a chief complaint of Toe infection x one week (31 Jan 2018 09:41)   Review of systems  Patient in bed, looks comfortable, no fever, no hypoglycemia.    Labs and Fingersticks  CAPILLARY BLOOD GLUCOSE      POCT Blood Glucose.: 324 mg/dL (08 Feb 2018 17:14)  POCT Blood Glucose.: 275 mg/dL (08 Feb 2018 11:16)  POCT Blood Glucose.: 241 mg/dL (08 Feb 2018 07:13)  POCT Blood Glucose.: 224 mg/dL (07 Feb 2018 21:32)      Anion Gap, Serum: 10 (02-07 @ 00:37)      Calcium, Total Serum: 9.4 (02-07 @ 00:37)          02-07    137  |  102  |  15  ----------------------------<  98  4.1   |  25  |  0.73    Ca    9.4      07 Feb 2018 00:37                          12.5   12.5  )-----------( 264      ( 07 Feb 2018 00:37 )             36.3     Medications  MEDICATIONS  (STANDING):  amoxicillin  875 milliGRAM(s)/clavulanate 1 Tablet(s) Oral two times a day  aspirin enteric coated 81 milliGRAM(s) Oral daily  cyanocobalamin 1000 MICROGram(s) Oral daily  dextrose 5%. 1000 milliLiter(s) (50 mL/Hr) IV Continuous <Continuous>  dextrose 50% Injectable 25 Gram(s) IV Push once  folic acid 1 milliGRAM(s) Oral daily  gabapentin 300 milliGRAM(s) Oral two times a day  heparin  Injectable 5000 Unit(s) SubCutaneous every 12 hours  insulin glargine Injectable (LANTUS) 58 Unit(s) SubCutaneous at bedtime  insulin glargine Injectable (LANTUS) 20 Unit(s) SubCutaneous every morning  insulin lispro (HumaLOG) corrective regimen sliding scale   SubCutaneous three times a day before meals  insulin lispro (HumaLOG) corrective regimen sliding scale   SubCutaneous at bedtime  insulin lispro Injectable (HumaLOG) 23 Unit(s) SubCutaneous three times a day before meals  metoprolol succinate ER 25 milliGRAM(s) Oral daily  multivitamin 1 Tablet(s) Oral daily  sodium chloride 0.9%. 1000 milliLiter(s) (100 mL/Hr) IV Continuous <Continuous>  thiamine 100 milliGRAM(s) Oral daily  ticagrelor 90 milliGRAM(s) Oral two times a day      Physical Exam  General: Patient comfortable in bed  Vital Signs Last 12 Hrs  T(F): 98 (02-08-18 @ 16:00), Max: 98.3 (02-08-18 @ 08:00)  HR: 92 (02-08-18 @ 16:00) (80 - 92)  BP: 141/82 (02-08-18 @ 16:00) (141/82 - 155/86)  BP(mean): --  RR: 16 (02-08-18 @ 16:00) (16 - 18)  SpO2: 97% (02-08-18 @ 16:00) (97% - 99%)  Neck: No palpable thyroid nodules.  CVS: S1S2, No murmurs  Respiratory: No wheezing, no crepitations  GI: Abdomen soft, bowel sounds positive  Musculoskeletal:  edema lower extremities.   Skin: No skin rashes, no ecchymosis    Diagnostics

## 2018-02-08 NOTE — PHYSICAL THERAPY INITIAL EVALUATION ADULT - CRITERIA FOR SKILLED THERAPEUTIC INTERVENTIONS
risk reduction/prevention/predicted duration of therapy intervention/impairments found/rehab potential/therapy frequency/anticipated equipment needs at discharge/anticipated discharge recommendation/functional limitations in following categories

## 2018-02-08 NOTE — PROGRESS NOTE ADULT - ASSESSMENT
67 year old male with history of DM type II, etoh abuse and pancreatitis, CAD, s/p PCI, PVD admitted with worsening left foot infection       1. PAD  await LE bypass  patient with history of CAD, s/p pci  echo revealing normal lv sys fx , minimal MR   s/p cardiac cath CANDIDO x 1pLAD, CANDIDO x 1 dCx, CANDIDO x 1 pOM1 (02/06) via right radial and RCA (2/6) via right radial  asa 81mg daily/ ticagrelor BID/ BB   from cv standpoint cleared from DC , f/u appt for2/15/18 at 1:30 pm     2. Vasc/med/ ID  f/u  po abx     dvt ppx

## 2018-02-08 NOTE — PROGRESS NOTE ADULT - SUBJECTIVE AND OBJECTIVE BOX
Patient is a 67y old  Male who presents with a chief complaint of Toe infection x one week (31 Jan 2018 09:41)      Vascular Surgery Attending Progress Note    Interval HPI: pt w/o c/o     Medications:  acetaminophen   Tablet. 650 milliGRAM(s) Oral every 6 hours PRN  amoxicillin  875 milliGRAM(s)/clavulanate 1 Tablet(s) Oral two times a day  aspirin enteric coated 81 milliGRAM(s) Oral daily  cyanocobalamin 1000 MICROGram(s) Oral daily  dextrose 5%. 1000 milliLiter(s) IV Continuous <Continuous>  dextrose 50% Injectable 25 Gram(s) IV Push once  dextrose Gel 1 Dose(s) Oral once PRN  folic acid 1 milliGRAM(s) Oral daily  gabapentin 300 milliGRAM(s) Oral two times a day  glucagon  Injectable 1 milliGRAM(s) IntraMuscular once PRN  heparin  Injectable 5000 Unit(s) SubCutaneous every 12 hours  insulin glargine Injectable (LANTUS) 58 Unit(s) SubCutaneous at bedtime  insulin glargine Injectable (LANTUS) 20 Unit(s) SubCutaneous every morning  insulin lispro (HumaLOG) corrective regimen sliding scale   SubCutaneous three times a day before meals  insulin lispro (HumaLOG) corrective regimen sliding scale   SubCutaneous at bedtime  insulin lispro Injectable (HumaLOG) 23 Unit(s) SubCutaneous three times a day before meals  LORazepam     Tablet 1 milliGRAM(s) Oral every 2 hours PRN  metoprolol succinate ER 25 milliGRAM(s) Oral daily  multivitamin 1 Tablet(s) Oral daily  sodium chloride 0.9%. 1000 milliLiter(s) IV Continuous <Continuous>  thiamine 100 milliGRAM(s) Oral daily  ticagrelor 90 milliGRAM(s) Oral two times a day      Vital Signs Last 24 Hrs  T(C): 37.2 (08 Feb 2018 20:05), Max: 37.5 (07 Feb 2018 22:15)  T(F): 98.9 (08 Feb 2018 20:05), Max: 99.5 (07 Feb 2018 22:15)  HR: 94 (08 Feb 2018 20:05) (80 - 94)  BP: 157/89 (08 Feb 2018 20:05) (131/70 - 157/89)  BP(mean): --  RR: 16 (08 Feb 2018 20:05) (16 - 18)  SpO2: 98% (08 Feb 2018 20:05) (97% - 99%)  I&O's Summary    07 Feb 2018 07:01  -  08 Feb 2018 07:00  --------------------------------------------------------  IN: 1020 mL / OUT: 1700 mL / NET: -680 mL    08 Feb 2018 07:01  -  08 Feb 2018 20:25  --------------------------------------------------------  IN: 1060 mL / OUT: 900 mL / NET: 160 mL        Physical Exam:  Neuro  A&Ox3 VSS  Vascular:  toe ulcer/wound stable   Musculoskeletal: wnl    LABS:                        12.5   12.5  )-----------( 264      ( 07 Feb 2018 00:37 )             36.3     02-07    137  |  102  |  15  ----------------------------<  98  4.1   |  25  |  0.73    Ca    9.4      07 Feb 2018 00:37          VIVIENNE SKAGGS MD  [phone]

## 2018-02-08 NOTE — PROGRESS NOTE ADULT - SUBJECTIVE AND OBJECTIVE BOX
Events noted. NP denies agitation. CIWA is 0. Sleep and appetite wnl.       Vital Signs Last 24 Hrs  T(C): 36.8 (08 Feb 2018 08:00), Max: 37.5 (07 Feb 2018 22:15)  T(F): 98.3 (08 Feb 2018 08:00), Max: 99.5 (07 Feb 2018 22:15)  HR: 80 (08 Feb 2018 08:00) (80 - 89)  BP: 144/60 (08 Feb 2018 08:00) (128/72 - 155/88)  BP(mean): --  RR: 18 (08 Feb 2018 08:00) (17 - 18)  SpO2: 98% (08 Feb 2018 08:00) (97% - 98%)                          12.5   12.5  )-----------( 264      ( 07 Feb 2018 00:37 )             36.3       02-07    137  |  102  |  15  ----------------------------<  98  4.1   |  25  |  0.73    Ca    9.4      07 Feb 2018 00:37                MEDICATIONS  (STANDING):  amoxicillin  875 milliGRAM(s)/clavulanate 1 Tablet(s) Oral two times a day  aspirin enteric coated 81 milliGRAM(s) Oral daily  cyanocobalamin 1000 MICROGram(s) Oral daily  dextrose 5%. 1000 milliLiter(s) (50 mL/Hr) IV Continuous <Continuous>  dextrose 50% Injectable 25 Gram(s) IV Push once  folic acid 1 milliGRAM(s) Oral daily  gabapentin 300 milliGRAM(s) Oral two times a day  heparin  Injectable 5000 Unit(s) SubCutaneous every 12 hours  insulin glargine Injectable (LANTUS) 58 Unit(s) SubCutaneous at bedtime  insulin glargine Injectable (LANTUS) 20 Unit(s) SubCutaneous every morning  insulin lispro (HumaLOG) corrective regimen sliding scale   SubCutaneous three times a day before meals  insulin lispro (HumaLOG) corrective regimen sliding scale   SubCutaneous at bedtime  insulin lispro Injectable (HumaLOG) 23 Unit(s) SubCutaneous three times a day before meals  metoprolol succinate ER 25 milliGRAM(s) Oral daily  multivitamin 1 Tablet(s) Oral daily  sodium chloride 0.9%. 1000 milliLiter(s) (100 mL/Hr) IV Continuous <Continuous>  thiamine 100 milliGRAM(s) Oral daily  ticagrelor 90 milliGRAM(s) Oral two times a day    MEDICATIONS  (PRN):  acetaminophen   Tablet. 650 milliGRAM(s) Oral every 6 hours PRN Mild Pain (1 - 3)  dextrose Gel 1 Dose(s) Oral once PRN Blood Glucose LESS THAN 70 milliGRAM(s)/deciliter  glucagon  Injectable 1 milliGRAM(s) IntraMuscular once PRN Glucose LESS THAN 70 milligrams/deciliter  LORazepam     Tablet 1 milliGRAM(s) Oral every 2 hours PRN CIWA-Ar score increase by 2 points and a total score of 7 or less      Elderly  male in bed, calm, cooperative, eating a meal, smiling,  alert and oriented x 2-3 (not to hospital name) . No diaphoresis nor tremors.  No psychomotor abnormalities. Insight and judgment are impaired. Speech is coherent with normal rate and volume. No hallucinations nor delusions. The patient denied suicidal and homicidal ideation and plan. Mood is euthymic and affect full range and appropriate. Attention and concentration fair but impaired short term memory. Long term memory within normal limits.

## 2018-02-08 NOTE — PROGRESS NOTE ADULT - ASSESSMENT
No signs of alcohol withdrawal. He is pleasant today. Suspect he gets more irritable when intoxicated with ETOH.    Recommend  If CIWA remains 0 for another 24 hours, can d/c scoring but continue with Thiamine/folate/multivitamin daily.  Educated wife again about not enabling pt. by providing ETOH.  Pt. prefers no psychiatric meds.  Discussed with MICHEL Woodward M.D.  Psychiatry  [phone]

## 2018-02-09 VITALS
HEART RATE: 80 BPM | TEMPERATURE: 98 F | RESPIRATION RATE: 17 BRPM | SYSTOLIC BLOOD PRESSURE: 137 MMHG | DIASTOLIC BLOOD PRESSURE: 79 MMHG | OXYGEN SATURATION: 98 %

## 2018-02-09 LAB
ANION GAP SERPL CALC-SCNC: 13 MMOL/L — SIGNIFICANT CHANGE UP (ref 5–17)
BUN SERPL-MCNC: 16 MG/DL — SIGNIFICANT CHANGE UP (ref 7–23)
CALCIUM SERPL-MCNC: 9.6 MG/DL — SIGNIFICANT CHANGE UP (ref 8.4–10.5)
CHLORIDE SERPL-SCNC: 101 MMOL/L — SIGNIFICANT CHANGE UP (ref 96–108)
CO2 SERPL-SCNC: 23 MMOL/L — SIGNIFICANT CHANGE UP (ref 22–31)
CREAT SERPL-MCNC: 0.66 MG/DL — SIGNIFICANT CHANGE UP (ref 0.5–1.3)
GLUCOSE BLDC GLUCOMTR-MCNC: 188 MG/DL — HIGH (ref 70–99)
GLUCOSE BLDC GLUCOMTR-MCNC: 195 MG/DL — HIGH (ref 70–99)
GLUCOSE BLDC GLUCOMTR-MCNC: 223 MG/DL — HIGH (ref 70–99)
GLUCOSE SERPL-MCNC: 208 MG/DL — HIGH (ref 70–99)
HCT VFR BLD CALC: 37.8 % — LOW (ref 39–50)
HGB BLD-MCNC: 13.2 G/DL — SIGNIFICANT CHANGE UP (ref 13–17)
MCHC RBC-ENTMCNC: 32 PG — SIGNIFICANT CHANGE UP (ref 27–34)
MCHC RBC-ENTMCNC: 35 GM/DL — SIGNIFICANT CHANGE UP (ref 32–36)
MCV RBC AUTO: 91.4 FL — SIGNIFICANT CHANGE UP (ref 80–100)
PLATELET # BLD AUTO: 290 K/UL — SIGNIFICANT CHANGE UP (ref 150–400)
POTASSIUM SERPL-MCNC: 4 MMOL/L — SIGNIFICANT CHANGE UP (ref 3.5–5.3)
POTASSIUM SERPL-SCNC: 4 MMOL/L — SIGNIFICANT CHANGE UP (ref 3.5–5.3)
RBC # BLD: 4.14 M/UL — LOW (ref 4.2–5.8)
RBC # FLD: 11.9 % — SIGNIFICANT CHANGE UP (ref 10.3–14.5)
SODIUM SERPL-SCNC: 137 MMOL/L — SIGNIFICANT CHANGE UP (ref 135–145)
WBC # BLD: 9.9 K/UL — SIGNIFICANT CHANGE UP (ref 3.8–10.5)
WBC # FLD AUTO: 9.9 K/UL — SIGNIFICANT CHANGE UP (ref 3.8–10.5)

## 2018-02-09 PROCEDURE — 85652 RBC SED RATE AUTOMATED: CPT

## 2018-02-09 PROCEDURE — 84443 ASSAY THYROID STIM HORMONE: CPT

## 2018-02-09 PROCEDURE — 78452 HT MUSCLE IMAGE SPECT MULT: CPT

## 2018-02-09 PROCEDURE — 80202 ASSAY OF VANCOMYCIN: CPT

## 2018-02-09 PROCEDURE — 86901 BLOOD TYPING SEROLOGIC RH(D): CPT

## 2018-02-09 PROCEDURE — 80048 BASIC METABOLIC PNL TOTAL CA: CPT

## 2018-02-09 PROCEDURE — 82947 ASSAY GLUCOSE BLOOD QUANT: CPT

## 2018-02-09 PROCEDURE — 86140 C-REACTIVE PROTEIN: CPT

## 2018-02-09 PROCEDURE — 99285 EMERGENCY DEPT VISIT HI MDM: CPT | Mod: 25

## 2018-02-09 PROCEDURE — 80053 COMPREHEN METABOLIC PANEL: CPT

## 2018-02-09 PROCEDURE — 93005 ELECTROCARDIOGRAM TRACING: CPT

## 2018-02-09 PROCEDURE — 97162 PT EVAL MOD COMPLEX 30 MIN: CPT

## 2018-02-09 PROCEDURE — 96374 THER/PROPH/DIAG INJ IV PUSH: CPT

## 2018-02-09 PROCEDURE — A9500: CPT

## 2018-02-09 PROCEDURE — 73720 MRI LWR EXTREMITY W/O&W/DYE: CPT

## 2018-02-09 PROCEDURE — 83036 HEMOGLOBIN GLYCOSYLATED A1C: CPT

## 2018-02-09 PROCEDURE — 71045 X-RAY EXAM CHEST 1 VIEW: CPT

## 2018-02-09 PROCEDURE — 86850 RBC ANTIBODY SCREEN: CPT

## 2018-02-09 PROCEDURE — C9600: CPT | Mod: RI

## 2018-02-09 PROCEDURE — 83605 ASSAY OF LACTIC ACID: CPT

## 2018-02-09 PROCEDURE — 85027 COMPLETE CBC AUTOMATED: CPT

## 2018-02-09 PROCEDURE — 86900 BLOOD TYPING SEROLOGIC ABO: CPT

## 2018-02-09 PROCEDURE — 82746 ASSAY OF FOLIC ACID SERUM: CPT

## 2018-02-09 PROCEDURE — 82330 ASSAY OF CALCIUM: CPT

## 2018-02-09 PROCEDURE — 93306 TTE W/DOPPLER COMPLETE: CPT

## 2018-02-09 PROCEDURE — 99153 MOD SED SAME PHYS/QHP EA: CPT

## 2018-02-09 PROCEDURE — C1894: CPT

## 2018-02-09 PROCEDURE — 85014 HEMATOCRIT: CPT

## 2018-02-09 PROCEDURE — 93017 CV STRESS TEST TRACING ONLY: CPT

## 2018-02-09 PROCEDURE — 82607 VITAMIN B-12: CPT

## 2018-02-09 PROCEDURE — 81003 URINALYSIS AUTO W/O SCOPE: CPT

## 2018-02-09 PROCEDURE — C1874: CPT

## 2018-02-09 PROCEDURE — 93458 L HRT ARTERY/VENTRICLE ANGIO: CPT | Mod: 59

## 2018-02-09 PROCEDURE — 93923 UPR/LXTR ART STDY 3+ LVLS: CPT

## 2018-02-09 PROCEDURE — 85610 PROTHROMBIN TIME: CPT

## 2018-02-09 PROCEDURE — 84295 ASSAY OF SERUM SODIUM: CPT

## 2018-02-09 PROCEDURE — 82803 BLOOD GASES ANY COMBINATION: CPT

## 2018-02-09 PROCEDURE — 85730 THROMBOPLASTIN TIME PARTIAL: CPT

## 2018-02-09 PROCEDURE — 84132 ASSAY OF SERUM POTASSIUM: CPT

## 2018-02-09 PROCEDURE — 73590 X-RAY EXAM OF LOWER LEG: CPT

## 2018-02-09 PROCEDURE — C1760: CPT

## 2018-02-09 PROCEDURE — 83735 ASSAY OF MAGNESIUM: CPT

## 2018-02-09 PROCEDURE — 99152 MOD SED SAME PHYS/QHP 5/>YRS: CPT

## 2018-02-09 PROCEDURE — C1769: CPT

## 2018-02-09 PROCEDURE — A9505: CPT

## 2018-02-09 PROCEDURE — 82435 ASSAY OF BLOOD CHLORIDE: CPT

## 2018-02-09 PROCEDURE — 70450 CT HEAD/BRAIN W/O DYE: CPT

## 2018-02-09 PROCEDURE — 99232 SBSQ HOSP IP/OBS MODERATE 35: CPT

## 2018-02-09 PROCEDURE — 82962 GLUCOSE BLOOD TEST: CPT

## 2018-02-09 PROCEDURE — 87070 CULTURE OTHR SPECIMN AEROBIC: CPT

## 2018-02-09 PROCEDURE — 75716 ARTERY X-RAYS ARMS/LEGS: CPT

## 2018-02-09 PROCEDURE — 87040 BLOOD CULTURE FOR BACTERIA: CPT

## 2018-02-09 PROCEDURE — C1887: CPT

## 2018-02-09 PROCEDURE — 84100 ASSAY OF PHOSPHORUS: CPT

## 2018-02-09 PROCEDURE — 87186 SC STD MICRODIL/AGAR DIL: CPT

## 2018-02-09 PROCEDURE — 73630 X-RAY EXAM OF FOOT: CPT

## 2018-02-09 PROCEDURE — 36246 INS CATH ABD/L-EXT ART 2ND: CPT

## 2018-02-09 PROCEDURE — 80307 DRUG TEST PRSMV CHEM ANLYZR: CPT

## 2018-02-09 PROCEDURE — C1725: CPT

## 2018-02-09 RX ORDER — ASPIRIN/CALCIUM CARB/MAGNESIUM 324 MG
1 TABLET ORAL
Qty: 30 | Refills: 11 | OUTPATIENT
Start: 2018-02-09 | End: 2019-02-03

## 2018-02-09 RX ORDER — INSULIN GLARGINE 100 [IU]/ML
58 INJECTION, SOLUTION SUBCUTANEOUS
Qty: 0 | Refills: 0 | COMMUNITY
Start: 2018-02-09

## 2018-02-09 RX ORDER — FOLIC ACID 0.8 MG
1 TABLET ORAL
Qty: 30 | Refills: 2 | OUTPATIENT
Start: 2018-02-09 | End: 2018-05-09

## 2018-02-09 RX ORDER — INSULIN ASPART 100 [IU]/ML
20 INJECTION, SOLUTION SUBCUTANEOUS
Qty: 0 | Refills: 0 | COMMUNITY

## 2018-02-09 RX ORDER — ENOXAPARIN SODIUM 100 MG/ML
41 INJECTION SUBCUTANEOUS
Qty: 30 | Refills: 1 | OUTPATIENT
Start: 2018-02-09 | End: 2018-04-09

## 2018-02-09 RX ORDER — TICAGRELOR 90 MG/1
1 TABLET ORAL
Qty: 60 | Refills: 0 | OUTPATIENT
Start: 2018-02-09 | End: 2018-03-10

## 2018-02-09 RX ORDER — METOPROLOL TARTRATE 50 MG
1 TABLET ORAL
Qty: 0 | Refills: 0 | COMMUNITY
Start: 2018-02-09

## 2018-02-09 RX ORDER — ENOXAPARIN SODIUM 100 MG/ML
41 INJECTION SUBCUTANEOUS
Qty: 1 | Refills: 1 | OUTPATIENT
Start: 2018-02-09 | End: 2018-04-09

## 2018-02-09 RX ORDER — DOCUSATE SODIUM 100 MG
1 CAPSULE ORAL
Qty: 0 | Refills: 0 | COMMUNITY

## 2018-02-09 RX ORDER — FOLIC ACID 0.8 MG
1 TABLET ORAL
Qty: 0 | Refills: 0 | COMMUNITY

## 2018-02-09 RX ORDER — PREGABALIN 225 MG/1
1 CAPSULE ORAL
Qty: 30 | Refills: 0 | OUTPATIENT
Start: 2018-02-09 | End: 2018-03-10

## 2018-02-09 RX ORDER — ASPIRIN/CALCIUM CARB/MAGNESIUM 324 MG
1 TABLET ORAL
Qty: 30 | Refills: 0 | OUTPATIENT
Start: 2018-02-09 | End: 2018-03-10

## 2018-02-09 RX ORDER — TICAGRELOR 90 MG/1
1 TABLET ORAL
Qty: 60 | Refills: 11 | OUTPATIENT
Start: 2018-02-09 | End: 2019-02-03

## 2018-02-09 RX ORDER — INSULIN GLARGINE 100 [IU]/ML
20 INJECTION, SOLUTION SUBCUTANEOUS
Qty: 0 | Refills: 0 | COMMUNITY
Start: 2018-02-09

## 2018-02-09 RX ORDER — FOLIC ACID 0.8 MG
1 TABLET ORAL
Qty: 30 | Refills: 0 | OUTPATIENT
Start: 2018-02-09 | End: 2018-03-10

## 2018-02-09 RX ORDER — PREGABALIN 225 MG/1
1 CAPSULE ORAL
Qty: 30 | Refills: 2 | OUTPATIENT
Start: 2018-02-09 | End: 2018-05-09

## 2018-02-09 RX ORDER — INSULIN DEGLUDEC 100 U/ML
15 INJECTION, SOLUTION SUBCUTANEOUS
Qty: 0 | Refills: 0 | COMMUNITY

## 2018-02-09 RX ORDER — METOPROLOL TARTRATE 50 MG
1 TABLET ORAL
Qty: 0 | Refills: 0 | COMMUNITY

## 2018-02-09 RX ORDER — PREGABALIN 225 MG/1
1 CAPSULE ORAL
Qty: 0 | Refills: 0 | COMMUNITY

## 2018-02-09 RX ORDER — ASPIRIN/CALCIUM CARB/MAGNESIUM 324 MG
1 TABLET ORAL
Qty: 0 | Refills: 0 | COMMUNITY

## 2018-02-09 RX ADMIN — Medication 1 TABLET(S): at 05:07

## 2018-02-09 RX ADMIN — PREGABALIN 1000 MICROGRAM(S): 225 CAPSULE ORAL at 12:34

## 2018-02-09 RX ADMIN — Medication 23 UNIT(S): at 12:36

## 2018-02-09 RX ADMIN — Medication 1 MILLIGRAM(S): at 12:34

## 2018-02-09 RX ADMIN — TICAGRELOR 90 MILLIGRAM(S): 90 TABLET ORAL at 05:07

## 2018-02-09 RX ADMIN — Medication 4: at 12:35

## 2018-02-09 RX ADMIN — Medication 81 MILLIGRAM(S): at 05:07

## 2018-02-09 RX ADMIN — Medication 1 TABLET(S): at 12:34

## 2018-02-09 RX ADMIN — HEPARIN SODIUM 5000 UNIT(S): 5000 INJECTION INTRAVENOUS; SUBCUTANEOUS at 05:07

## 2018-02-09 RX ADMIN — Medication 100 MILLIGRAM(S): at 12:34

## 2018-02-09 RX ADMIN — Medication 23 UNIT(S): at 07:26

## 2018-02-09 RX ADMIN — Medication 25 MILLIGRAM(S): at 05:07

## 2018-02-09 RX ADMIN — Medication 2: at 07:26

## 2018-02-09 RX ADMIN — INSULIN GLARGINE 20 UNIT(S): 100 INJECTION, SOLUTION SUBCUTANEOUS at 07:26

## 2018-02-09 RX ADMIN — GABAPENTIN 300 MILLIGRAM(S): 400 CAPSULE ORAL at 05:07

## 2018-02-09 NOTE — PROGRESS NOTE ADULT - ATTENDING COMMENTS
Agree with above NP note.  cv stable  s/p multiple pci for severe multivessel disease before high risk vascular surgery  cont asa + brilinta  d/c planning   outpt f/u for vasc sx timing
Patient seen and examined, agree with the above assessment and plan by MICHEL Pickens.  Pt remains stable s/p staged PCI to RCA yest  Cont current medical mgmt  outpt followup with cardio and vasc for eventual LE revascularization  DC planning
Patient seen and examined.  Agree with above NP note.  cv stable  stress with significant ischemic burden  will need cardiac cath before surgery to assess for new obstructive CAD  will plan for cath today  further pre-op risk stratification for vasc surgery based on findings  d/w vasc
Zachary Howell  Pager: [phone]. If no response or past 5 pm call [phone].
Zachary Howell  Pager: [phone]. If no response or past 5 pm call [phone].
Zachary Howell  Pager: [phone]. If no response or past 5 pm call [phone].
Zachary Howell  Pager: [phone]. If no response or past 5 pm call [phone].
Zachary Howell  Pager: [phone]. If no response or past 5 pm call [phone].
as above

## 2018-02-09 NOTE — PROGRESS NOTE ADULT - SUBJECTIVE AND OBJECTIVE BOX
Events noted. No report of agitation. Eats, sleeps well.      Vital Signs Last 24 Hrs  T(C): 36.8 (09 Feb 2018 04:00), Max: 37.2 (08 Feb 2018 20:05)  T(F): 98.2 (09 Feb 2018 04:00), Max: 98.9 (08 Feb 2018 20:05)  HR: 84 (09 Feb 2018 05:04) (80 - 94)  BP: 160/95 (09 Feb 2018 05:04) (141/82 - 165/90)  BP(mean): --  RR: 17 (09 Feb 2018 04:00) (16 - 18)  SpO2: 97% (09 Feb 2018 04:00) (97% - 99%)                          13.2   9.9   )-----------( 290      ( 09 Feb 2018 03:55 )             37.8       02-09    137  |  101  |  16  ----------------------------<  208<H>  4.0   |  23  |  0.66    Ca    9.6      09 Feb 2018 03:55    CIWA 0 over the last 24 hours.             MEDICATIONS  (STANDING):  amoxicillin  875 milliGRAM(s)/clavulanate 1 Tablet(s) Oral two times a day  aspirin enteric coated 81 milliGRAM(s) Oral daily  cyanocobalamin 1000 MICROGram(s) Oral daily  dextrose 5%. 1000 milliLiter(s) (50 mL/Hr) IV Continuous <Continuous>  dextrose 50% Injectable 25 Gram(s) IV Push once  folic acid 1 milliGRAM(s) Oral daily  gabapentin 300 milliGRAM(s) Oral two times a day  heparin  Injectable 5000 Unit(s) SubCutaneous every 12 hours  insulin glargine Injectable (LANTUS) 58 Unit(s) SubCutaneous at bedtime  insulin glargine Injectable (LANTUS) 20 Unit(s) SubCutaneous every morning  insulin lispro (HumaLOG) corrective regimen sliding scale   SubCutaneous three times a day before meals  insulin lispro (HumaLOG) corrective regimen sliding scale   SubCutaneous at bedtime  insulin lispro Injectable (HumaLOG) 23 Unit(s) SubCutaneous three times a day before meals  metoprolol succinate ER 25 milliGRAM(s) Oral daily  multivitamin 1 Tablet(s) Oral daily  sodium chloride 0.9%. 1000 milliLiter(s) (100 mL/Hr) IV Continuous <Continuous>  thiamine 100 milliGRAM(s) Oral daily  ticagrelor 90 milliGRAM(s) Oral two times a day    MEDICATIONS  (PRN):  acetaminophen   Tablet. 650 milliGRAM(s) Oral every 6 hours PRN Mild Pain (1 - 3)  dextrose Gel 1 Dose(s) Oral once PRN Blood Glucose LESS THAN 70 milliGRAM(s)/deciliter  glucagon  Injectable 1 milliGRAM(s) IntraMuscular once PRN Glucose LESS THAN 70 milligrams/deciliter  LORazepam     Tablet 1 milliGRAM(s) Oral every 2 hours PRN CIWA-Ar score increase by 2 points and a total score of 7 or less      Elderly  male sitting up in bed, calm, smiling, eating salad, cooperative, alert and oriented x 3 .  No diaphoresis nor tremors. No psychomotor abnormalities. Insight and judgment are fair. Speech is coherent with normal rate and volume. No hallucinations nor delusions. The patient denied suicidal and homicidal ideation and plan. Mood is euthymic and affect full range and appropriate. Attention and concentration, short term memory, and long term memory within normal limits.

## 2018-02-09 NOTE — PROGRESS NOTE ADULT - NSHPATTENDINGPLANDISCUSS_GEN_ALL_CORE
Podiatry, Medicine Attending
medicine attending
surg ho

## 2018-02-09 NOTE — PROGRESS NOTE ADULT - SUBJECTIVE AND OBJECTIVE BOX
Patient is a 67y old  Male who presents with a chief complaint of Toe infection x one week (31 Jan 2018 09:41)       INTERVAL HPI/OVERNIGHT EVENTS:  Patient seen and evaluated at bedside.  Pt is resting comfortable in NAD. Denies N/V/F/C.  Pain rated at X/10    Allergies    No Known Allergies    Intolerances        Vital Signs Last 24 Hrs  T(C): 36.8 (09 Feb 2018 04:00), Max: 37.2 (08 Feb 2018 20:05)  T(F): 98.2 (09 Feb 2018 04:00), Max: 98.9 (08 Feb 2018 20:05)  HR: 84 (09 Feb 2018 05:04) (80 - 94)  BP: 160/95 (09 Feb 2018 05:04) (141/82 - 165/90)  BP(mean): --  RR: 17 (09 Feb 2018 04:00) (16 - 18)  SpO2: 97% (09 Feb 2018 04:00) (97% - 99%)    LABS:                        13.2   9.9   )-----------( 290      ( 09 Feb 2018 03:55 )             37.8     02-09    137  |  101  |  16  ----------------------------<  208<H>  4.0   |  23  |  0.66    Ca    9.6      09 Feb 2018 03:55          CAPILLARY BLOOD GLUCOSE      POCT Blood Glucose.: 188 mg/dL (09 Feb 2018 07:18)  POCT Blood Glucose.: 230 mg/dL (08 Feb 2018 21:37)  POCT Blood Glucose.: 324 mg/dL (08 Feb 2018 17:14)  POCT Blood Glucose.: 275 mg/dL (08 Feb 2018 11:16)      Lower Extremity Physical Exam:  Vasular: DP/PT 0/4, B/L, Temperature gradient WNL, B/L.   Neuro: Epicritic sensation intact to the level of the foot, B/L.  Musculoskeletal/Ortho: No gross pedal deformities noted, B/L.   Skin: Dry scaly flaking skin to shins B/L.     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 no purulence, no malodor, no periwound erythema or edema noted.     Wound #2 & 3:  L foot lateral 5th met head and shaft ulcers to subQ, fibrotic plug, no periwound erythema or edema, no purulence, no malodor    Wound #4:  L foot posterior heel fissure, no open lesion    RADIOLOGY & ADDITIONAL TESTS:  < from: MR Foot w/wo IV Cont, Left (01.30.18 @ 20:07) >    EXAM:  MR FOOT WAW IC LT                            PROCEDURE DATE:  01/30/2018            INTERPRETATION:  EXAMINATION: MRI left foot with and without contrast    CLINICAL INFORMATION: Left foot ulcer. Evaluate for osteomyelitis.    TECHNIQUE: Multiplanar, multisequential MR imaging was performed. This   includes T1-weighted images after the administration of 6 mL of   intravenous gadolinium. 1.5 mL were discarded    FINDINGS: The exam is limited by motion artifact. There is suspected to   be a cutaneous ulceration along the distal aspect of the hallux. There is   mild high T2 signal in the distal phalanx of the hallux. There is   nonspecific dorsal subcutaneous soft tissue edema. There are no gross   rim-enhancing fluid collections to suggest abscess. There is mild fatty   atrophy and increased T2 signal musculature surrounding the foot,   consistent with subacute denervation.    IMPRESSION: Motion limited exam. Mild signal alteration in the distal   phalanx of the hallux. Given that there appears to be an erosion of the   tuft of the hallux distal phalanx on the prior radiographs of 1/29/2018,   the findings are suspicious for osteomyelitis.                    GRUPO MARCUS M.D., ATTENDING RADIOLOGIST  This document has been electronically signed. Jan 31 2018  9:55AM                < end of copied text >

## 2018-02-09 NOTE — PROGRESS NOTE ADULT - SUBJECTIVE AND OBJECTIVE BOX
Patient is a 67y old  Male who presents with a chief complaint of Toe infection x one week (31 Jan 2018 09:41)      Vascular Surgery Attending Progress Note    Interval HPI: pt w/o new c/o     Medications:  acetaminophen   Tablet. 650 milliGRAM(s) Oral every 6 hours PRN  amoxicillin  875 milliGRAM(s)/clavulanate 1 Tablet(s) Oral two times a day  aspirin enteric coated 81 milliGRAM(s) Oral daily  cyanocobalamin 1000 MICROGram(s) Oral daily  dextrose 5%. 1000 milliLiter(s) IV Continuous <Continuous>  dextrose 50% Injectable 25 Gram(s) IV Push once  dextrose Gel 1 Dose(s) Oral once PRN  folic acid 1 milliGRAM(s) Oral daily  gabapentin 300 milliGRAM(s) Oral two times a day  glucagon  Injectable 1 milliGRAM(s) IntraMuscular once PRN  heparin  Injectable 5000 Unit(s) SubCutaneous every 12 hours  insulin glargine Injectable (LANTUS) 58 Unit(s) SubCutaneous at bedtime  insulin glargine Injectable (LANTUS) 20 Unit(s) SubCutaneous every morning  insulin lispro (HumaLOG) corrective regimen sliding scale   SubCutaneous three times a day before meals  insulin lispro (HumaLOG) corrective regimen sliding scale   SubCutaneous at bedtime  insulin lispro Injectable (HumaLOG) 23 Unit(s) SubCutaneous three times a day before meals  LORazepam     Tablet 1 milliGRAM(s) Oral every 2 hours PRN  metoprolol succinate ER 25 milliGRAM(s) Oral daily  multivitamin 1 Tablet(s) Oral daily  sodium chloride 0.9%. 1000 milliLiter(s) IV Continuous <Continuous>  thiamine 100 milliGRAM(s) Oral daily  ticagrelor 90 milliGRAM(s) Oral two times a day      Vital Signs Last 24 Hrs  T(C): 36.6 (09 Feb 2018 13:39), Max: 37.2 (08 Feb 2018 20:05)  T(F): 97.8 (09 Feb 2018 13:39), Max: 98.9 (08 Feb 2018 20:05)  HR: 80 (09 Feb 2018 13:39) (80 - 94)  BP: 137/79 (09 Feb 2018 13:39) (137/79 - 165/90)  BP(mean): --  RR: 17 (09 Feb 2018 13:39) (16 - 17)  SpO2: 98% (09 Feb 2018 13:39) (97% - 98%)  I&O's Summary    08 Feb 2018 07:01  -  09 Feb 2018 07:00  --------------------------------------------------------  IN: 1240 mL / OUT: 1900 mL / NET: -660 mL    09 Feb 2018 07:01  -  09 Feb 2018 13:55  --------------------------------------------------------  IN: 240 mL / OUT: 0 mL / NET: 240 mL        Physical Exam:  Neuro  A&Ox3 VSS  Vascular:   lt toe 1 tip ulcer stable   Musculoskeletal: wnl    LABS:                        13.2   9.9   )-----------( 290      ( 09 Feb 2018 03:55 )             37.8     02-09    137  |  101  |  16  ----------------------------<  208<H>  4.0   |  23  |  0.66    Ca    9.6      09 Feb 2018 03:55          VIVIENNE SKAGGS MD  [phone]

## 2018-02-09 NOTE — PROGRESS NOTE ADULT - SUBJECTIVE AND OBJECTIVE BOX
CC: no CP/SOB    TELEMETRY:     PHYSICAL EXAM:    T(C): 36.8 (02-09-18 @ 04:00), Max: 37.2 (02-08-18 @ 20:05)  HR: 84 (02-09-18 @ 05:04) (80 - 94)  BP: 160/95 (02-09-18 @ 05:04) (141/82 - 165/90)  RR: 17 (02-09-18 @ 04:00) (16 - 18)  SpO2: 97% (02-09-18 @ 04:00) (97% - 99%)  Wt(kg): --  I&O's Summary    08 Feb 2018 07:01  -  09 Feb 2018 07:00  --------------------------------------------------------  IN: 1240 mL / OUT: 1900 mL / NET: -660 mL    09 Feb 2018 07:01  -  09 Feb 2018 09:48  --------------------------------------------------------  IN: 240 mL / OUT: 0 mL / NET: 240 mL        Appearance: Normal	  Cardiovascular: Normal S1 S2,RRR, No JVD, No murmurs  Respiratory: Lungs clear to auscultation	  Gastrointestinal:  Soft, Non-tender, + BS	  Extremities: Normal range of motion, No clubbing, cyanosis or edema  Vascular: Peripheral pulses palpable 2+ bilaterally     LABS:	 	                          13.2   9.9   )-----------( 290      ( 09 Feb 2018 03:55 )             37.8     02-09    137  |  101  |  16  ----------------------------<  208<H>  4.0   |  23  |  0.66    Ca    9.6      09 Feb 2018 03:55            CARDIAC MARKERS:

## 2018-02-09 NOTE — PROGRESS NOTE ADULT - PROBLEM SELECTOR PLAN 1
Will continue current insulin regimen for now. Will continue monitoring FS, log, will Follow up.  DC home on current insulin regimen, FU in office in 1 week. Discussed with patient and wife.  Patient counseled for compliance with consistent low carb diet.
Will add Lantus 20 units in am.  Will continue current Humalog regimen for now. Will continue monitoring FS, log, will Follow up.  Patient counseled for compliance with consistent low carb diet.
Will continue current insulin regimen for now. Will continue monitoring FS, log, will Follow up.  DC home on current insulin regimen, FU in office in 1 week. Discussed with patient and wife.  Patient counseled for compliance with consistent low carb diet.
Will continue current insulin regimen for now. Will continue monitoring FS, log, will Follow up.  Patient counseled for compliance with consistent low carb diet.
Will increase Lantus to 40 units at bed time.  Will increase Humalog to 12 units before each meal in addition to Humalog correction scale coverage.  Patient counseled for compliance with consistent low carb diet.
Will increase Lantus to 50 units at bed time.  Will increase Humalog to 18 units before each meal in addition to Humalog correction scale coverage.  Patient counseled for compliance with consistent low carb diet.
Will increase Lantus to 58 units at bed time.  Will increase Humalog to 23 units before each meal in addition to Humalog correction scale coverage.  Patient counseled for compliance with consistent low carb diet.
as above

## 2018-02-09 NOTE — PROGRESS NOTE ADULT - ASSESSMENT
LEFT hallux osteomyelitis.    Plan:   Patient seen and evaluated. All questions answered.  Vascular surgery to perform outpatient intervention due to cardiac intervention.  We will hold off on any intervention until after revasc.  Cont local care for now.  Will cont to follow.

## 2018-02-09 NOTE — PROGRESS NOTE ADULT - ASSESSMENT
No signs of alcohol withdrawal, major depression, nor scar.  Alcohol abuse is most likely factor for his irritability prior to admission.    Recommend  Educated pt. again to abstain from all alcohol. Social work to give alcohol rehab outpt. referrals to the pt.   Continue with daily p.o. Thiamine, Folate, MVI    Chidi Woodward M.D.  Psychiatry  [phone]

## 2018-02-09 NOTE — PROGRESS NOTE ADULT - SUBJECTIVE AND OBJECTIVE BOX
Patient is a 67y old  Male who presents with a chief complaint of Toe infection x one week (31 Jan 2018 09:41)  NAD      INTERVAL HPI/OVERNIGHT EVENTS:  T(C): 36.6 (02-09-18 @ 13:39), Max: 37.2 (02-08-18 @ 20:05)  HR: 80 (02-09-18 @ 13:39) (80 - 94)  BP: 137/79 (02-09-18 @ 13:39) (137/79 - 165/90)  RR: 17 (02-09-18 @ 13:39) (16 - 17)  SpO2: 98% (02-09-18 @ 13:39) (97% - 98%)  Wt(kg): --  I&O's Summary    08 Feb 2018 07:01  -  09 Feb 2018 07:00  --------------------------------------------------------  IN: 1240 mL / OUT: 1900 mL / NET: -660 mL    09 Feb 2018 07:01  -  09 Feb 2018 14:35  --------------------------------------------------------  IN: 240 mL / OUT: 0 mL / NET: 240 mL        LABS:                        13.2   9.9   )-----------( 290      ( 09 Feb 2018 03:55 )             37.8     02-09    137  |  101  |  16  ----------------------------<  208<H>  4.0   |  23  |  0.66    Ca    9.6      09 Feb 2018 03:55          CAPILLARY BLOOD GLUCOSE      POCT Blood Glucose.: 223 mg/dL (09 Feb 2018 12:32)  POCT Blood Glucose.: 195 mg/dL (09 Feb 2018 11:03)  POCT Blood Glucose.: 188 mg/dL (09 Feb 2018 07:18)  POCT Blood Glucose.: 230 mg/dL (08 Feb 2018 21:37)  POCT Blood Glucose.: 324 mg/dL (08 Feb 2018 17:14)            MEDICATIONS  (STANDING):  amoxicillin  875 milliGRAM(s)/clavulanate 1 Tablet(s) Oral two times a day  aspirin enteric coated 81 milliGRAM(s) Oral daily  cyanocobalamin 1000 MICROGram(s) Oral daily  dextrose 5%. 1000 milliLiter(s) (50 mL/Hr) IV Continuous <Continuous>  dextrose 50% Injectable 25 Gram(s) IV Push once  folic acid 1 milliGRAM(s) Oral daily  gabapentin 300 milliGRAM(s) Oral two times a day  heparin  Injectable 5000 Unit(s) SubCutaneous every 12 hours  insulin glargine Injectable (LANTUS) 58 Unit(s) SubCutaneous at bedtime  insulin glargine Injectable (LANTUS) 20 Unit(s) SubCutaneous every morning  insulin lispro (HumaLOG) corrective regimen sliding scale   SubCutaneous three times a day before meals  insulin lispro (HumaLOG) corrective regimen sliding scale   SubCutaneous at bedtime  insulin lispro Injectable (HumaLOG) 23 Unit(s) SubCutaneous three times a day before meals  metoprolol succinate ER 25 milliGRAM(s) Oral daily  multivitamin 1 Tablet(s) Oral daily  sodium chloride 0.9%. 1000 milliLiter(s) (100 mL/Hr) IV Continuous <Continuous>  thiamine 100 milliGRAM(s) Oral daily  ticagrelor 90 milliGRAM(s) Oral two times a day    MEDICATIONS  (PRN):  acetaminophen   Tablet. 650 milliGRAM(s) Oral every 6 hours PRN Mild Pain (1 - 3)  dextrose Gel 1 Dose(s) Oral once PRN Blood Glucose LESS THAN 70 milliGRAM(s)/deciliter  glucagon  Injectable 1 milliGRAM(s) IntraMuscular once PRN Glucose LESS THAN 70 milligrams/deciliter  LORazepam     Tablet 1 milliGRAM(s) Oral every 2 hours PRN CIWA-Ar score increase by 2 points and a total score of 7 or less          PHYSICAL EXAM:  GENERAL: NAD, well-groomed, well-developed  HEAD:  Atraumatic, Normocephalic  CHEST/LUNG: Clear to percussion bilaterally; No rales, rhonchi, wheezing, or rubs  HEART: Regular rate and rhythm; No murmurs, rubs, or gallops  ABDOMEN: Soft, Nontender, Nondistended; Bowel sounds present  EXTREMITIES:  L foot dressing  LYMPH: No lymphadenopathy noted  SKIN: No rashes or lesions    Care Discussed with Consultants/Other Providers [* ] YES  [ ] NO

## 2018-02-09 NOTE — PROGRESS NOTE ADULT - PROBLEM SELECTOR PROBLEM 1
Arterial insufficiency with ischemic ulcer
Diabetes mellitus
Arterial insufficiency with ischemic ulcer
Diabetes mellitus

## 2018-02-26 ENCOUNTER — INPATIENT (INPATIENT)
Facility: HOSPITAL | Age: 68
LOS: 15 days | Discharge: OTHER INSTITUTION | DRG: 617 | End: 2018-03-14
Attending: SURGERY | Admitting: SURGERY
Payer: MEDICARE

## 2018-02-26 VITALS
TEMPERATURE: 98 F | HEART RATE: 106 BPM | DIASTOLIC BLOOD PRESSURE: 73 MMHG | SYSTOLIC BLOOD PRESSURE: 156 MMHG | OXYGEN SATURATION: 100 % | RESPIRATION RATE: 18 BRPM

## 2018-02-26 PROCEDURE — 99285 EMERGENCY DEPT VISIT HI MDM: CPT | Mod: GC

## 2018-02-26 NOTE — ED ADULT NURSE NOTE - OBJECTIVE STATEMENT
67y male with hx of CAD, type 2 DM presents to the ER c/o left big toe infection. pt is alert and oriented x 4 and speaking coherently. Pt states he was in [place] 3 weeks ago for stent and was unable to get sx on the toe due to his cardiac condition. pt left toe appears necrotic to top portion of toe. bandage and betadine applied to toe. pt has loss of sensation to entire big toe, able to move toe. pt ambulated with boot.

## 2018-02-27 DIAGNOSIS — M86.9 OSTEOMYELITIS, UNSPECIFIED: ICD-10-CM

## 2018-02-27 LAB
ALBUMIN SERPL ELPH-MCNC: 3.8 G/DL — SIGNIFICANT CHANGE UP (ref 3.3–5)
ALBUMIN SERPL ELPH-MCNC: 4 G/DL — SIGNIFICANT CHANGE UP (ref 3.3–5)
ALP SERPL-CCNC: 71 U/L — SIGNIFICANT CHANGE UP (ref 40–120)
ALP SERPL-CCNC: 79 U/L — SIGNIFICANT CHANGE UP (ref 40–120)
ALT FLD-CCNC: 6 U/L — LOW (ref 10–45)
ALT FLD-CCNC: 7 U/L RC — LOW (ref 10–45)
ANION GAP SERPL CALC-SCNC: 14 MMOL/L — SIGNIFICANT CHANGE UP (ref 5–17)
ANION GAP SERPL CALC-SCNC: 15 MMOL/L — SIGNIFICANT CHANGE UP (ref 5–17)
APPEARANCE UR: ABNORMAL
APTT BLD: 32.6 SEC — SIGNIFICANT CHANGE UP (ref 27.5–37.4)
AST SERPL-CCNC: 10 U/L — SIGNIFICANT CHANGE UP (ref 10–40)
AST SERPL-CCNC: 14 U/L — SIGNIFICANT CHANGE UP (ref 10–40)
BASE EXCESS BLDV CALC-SCNC: 1.9 MMOL/L — SIGNIFICANT CHANGE UP (ref -2–2)
BASOPHILS # BLD AUTO: 0 K/UL — SIGNIFICANT CHANGE UP (ref 0–0.2)
BASOPHILS # BLD AUTO: 0.03 K/UL — SIGNIFICANT CHANGE UP (ref 0–0.2)
BASOPHILS NFR BLD AUTO: 0.2 % — SIGNIFICANT CHANGE UP (ref 0–2)
BASOPHILS NFR BLD AUTO: 0.3 % — SIGNIFICANT CHANGE UP (ref 0–2)
BILIRUB SERPL-MCNC: 0.3 MG/DL — SIGNIFICANT CHANGE UP (ref 0.2–1.2)
BILIRUB SERPL-MCNC: 0.5 MG/DL — SIGNIFICANT CHANGE UP (ref 0.2–1.2)
BILIRUB UR-MCNC: NEGATIVE — SIGNIFICANT CHANGE UP
BLD GP AB SCN SERPL QL: NEGATIVE — SIGNIFICANT CHANGE UP
BUN SERPL-MCNC: 14 MG/DL — SIGNIFICANT CHANGE UP (ref 7–23)
BUN SERPL-MCNC: 17 MG/DL — SIGNIFICANT CHANGE UP (ref 7–23)
CA-I SERPL-SCNC: 1.26 MMOL/L — SIGNIFICANT CHANGE UP (ref 1.12–1.3)
CALCIUM SERPL-MCNC: 10.3 MG/DL — SIGNIFICANT CHANGE UP (ref 8.4–10.5)
CALCIUM SERPL-MCNC: 9.7 MG/DL — SIGNIFICANT CHANGE UP (ref 8.4–10.5)
CHLORIDE BLDV-SCNC: 100 MMOL/L — SIGNIFICANT CHANGE UP (ref 96–108)
CHLORIDE SERPL-SCNC: 96 MMOL/L — SIGNIFICANT CHANGE UP (ref 96–108)
CHLORIDE SERPL-SCNC: 98 MMOL/L — SIGNIFICANT CHANGE UP (ref 96–108)
CO2 BLDV-SCNC: 28 MMOL/L — SIGNIFICANT CHANGE UP (ref 22–30)
CO2 SERPL-SCNC: 23 MMOL/L — SIGNIFICANT CHANGE UP (ref 22–31)
CO2 SERPL-SCNC: 25 MMOL/L — SIGNIFICANT CHANGE UP (ref 22–31)
COLOR SPEC: YELLOW — SIGNIFICANT CHANGE UP
CREAT SERPL-MCNC: 0.75 MG/DL — SIGNIFICANT CHANGE UP (ref 0.5–1.3)
CREAT SERPL-MCNC: 0.87 MG/DL — SIGNIFICANT CHANGE UP (ref 0.5–1.3)
DIFF PNL FLD: NEGATIVE — SIGNIFICANT CHANGE UP
EOSINOPHIL # BLD AUTO: 0.2 K/UL — SIGNIFICANT CHANGE UP (ref 0–0.5)
EOSINOPHIL # BLD AUTO: 0.27 K/UL — SIGNIFICANT CHANGE UP (ref 0–0.5)
EOSINOPHIL NFR BLD AUTO: 1.9 % — SIGNIFICANT CHANGE UP (ref 0–6)
EOSINOPHIL NFR BLD AUTO: 2.1 % — SIGNIFICANT CHANGE UP (ref 0–6)
EPI CELLS # UR: SIGNIFICANT CHANGE UP /HPF
GAS PNL BLDV: 136 MMOL/L — SIGNIFICANT CHANGE UP (ref 136–145)
GAS PNL BLDV: SIGNIFICANT CHANGE UP
GAS PNL BLDV: SIGNIFICANT CHANGE UP
GLUCOSE BLDC GLUCOMTR-MCNC: 127 MG/DL — HIGH (ref 70–99)
GLUCOSE BLDC GLUCOMTR-MCNC: 143 MG/DL — HIGH (ref 70–99)
GLUCOSE BLDC GLUCOMTR-MCNC: 222 MG/DL — HIGH (ref 70–99)
GLUCOSE BLDC GLUCOMTR-MCNC: 225 MG/DL — HIGH (ref 70–99)
GLUCOSE BLDC GLUCOMTR-MCNC: 259 MG/DL — HIGH (ref 70–99)
GLUCOSE BLDV-MCNC: 186 MG/DL — HIGH (ref 70–99)
GLUCOSE SERPL-MCNC: 188 MG/DL — HIGH (ref 70–99)
GLUCOSE SERPL-MCNC: 207 MG/DL — HIGH (ref 70–99)
GLUCOSE UR QL: 50 MG/DL
HCO3 BLDV-SCNC: 27 MMOL/L — SIGNIFICANT CHANGE UP (ref 21–29)
HCT VFR BLD CALC: 36 % — LOW (ref 39–50)
HCT VFR BLD CALC: 38.7 % — LOW (ref 39–50)
HCT VFR BLDA CALC: 38 % — LOW (ref 39–50)
HGB BLD CALC-MCNC: 12.5 G/DL — LOW (ref 13–17)
HGB BLD-MCNC: 12.5 G/DL — LOW (ref 13–17)
HGB BLD-MCNC: 12.7 G/DL — LOW (ref 13–17)
IMM GRANULOCYTES NFR BLD AUTO: 0.4 % — SIGNIFICANT CHANGE UP (ref 0–1.5)
INR BLD: 1.06 RATIO — SIGNIFICANT CHANGE UP (ref 0.88–1.16)
KETONES UR-MCNC: NEGATIVE — SIGNIFICANT CHANGE UP
LACTATE BLDV-MCNC: 1.4 MMOL/L — SIGNIFICANT CHANGE UP (ref 0.7–2)
LEUKOCYTE ESTERASE UR-ACNC: ABNORMAL
LYMPHOCYTES # BLD AUTO: 1.83 K/UL — SIGNIFICANT CHANGE UP (ref 1–3.3)
LYMPHOCYTES # BLD AUTO: 14.4 % — SIGNIFICANT CHANGE UP (ref 13–44)
LYMPHOCYTES # BLD AUTO: 2.7 K/UL — SIGNIFICANT CHANGE UP (ref 1–3.3)
LYMPHOCYTES # BLD AUTO: 22.8 % — SIGNIFICANT CHANGE UP (ref 13–44)
MCHC RBC-ENTMCNC: 29.8 PG — SIGNIFICANT CHANGE UP (ref 27–34)
MCHC RBC-ENTMCNC: 32.3 GM/DL — SIGNIFICANT CHANGE UP (ref 32–36)
MCHC RBC-ENTMCNC: 32.4 PG — SIGNIFICANT CHANGE UP (ref 27–34)
MCHC RBC-ENTMCNC: 35.2 GM/DL — SIGNIFICANT CHANGE UP (ref 32–36)
MCV RBC AUTO: 92.1 FL — SIGNIFICANT CHANGE UP (ref 80–100)
MCV RBC AUTO: 92.4 FL — SIGNIFICANT CHANGE UP (ref 80–100)
MONOCYTES # BLD AUTO: 1 K/UL — HIGH (ref 0–0.9)
MONOCYTES # BLD AUTO: 1.01 K/UL — HIGH (ref 0–0.9)
MONOCYTES NFR BLD AUTO: 7.9 % — SIGNIFICANT CHANGE UP (ref 2–14)
MONOCYTES NFR BLD AUTO: 8.7 % — SIGNIFICANT CHANGE UP (ref 2–14)
NEUTROPHILS # BLD AUTO: 7.7 K/UL — HIGH (ref 1.8–7.4)
NEUTROPHILS # BLD AUTO: 9.53 K/UL — HIGH (ref 1.8–7.4)
NEUTROPHILS NFR BLD AUTO: 66.3 % — SIGNIFICANT CHANGE UP (ref 43–77)
NEUTROPHILS NFR BLD AUTO: 75 % — SIGNIFICANT CHANGE UP (ref 43–77)
NITRITE UR-MCNC: NEGATIVE — SIGNIFICANT CHANGE UP
PCO2 BLDV: 47 MMHG — SIGNIFICANT CHANGE UP (ref 35–50)
PH BLDV: 7.38 — SIGNIFICANT CHANGE UP (ref 7.35–7.45)
PH UR: 5.5 — SIGNIFICANT CHANGE UP (ref 5–8)
PLATELET # BLD AUTO: 281 K/UL — SIGNIFICANT CHANGE UP (ref 150–400)
PLATELET # BLD AUTO: 300 K/UL — SIGNIFICANT CHANGE UP (ref 150–400)
PO2 BLDV: 23 MMHG — LOW (ref 25–45)
POTASSIUM BLDV-SCNC: 4 MMOL/L — SIGNIFICANT CHANGE UP (ref 3.5–5)
POTASSIUM SERPL-MCNC: 4.2 MMOL/L — SIGNIFICANT CHANGE UP (ref 3.5–5.3)
POTASSIUM SERPL-MCNC: 4.6 MMOL/L — SIGNIFICANT CHANGE UP (ref 3.5–5.3)
POTASSIUM SERPL-SCNC: 4.2 MMOL/L — SIGNIFICANT CHANGE UP (ref 3.5–5.3)
POTASSIUM SERPL-SCNC: 4.6 MMOL/L — SIGNIFICANT CHANGE UP (ref 3.5–5.3)
PROT SERPL-MCNC: 8.2 G/DL — SIGNIFICANT CHANGE UP (ref 6–8.3)
PROT SERPL-MCNC: 8.7 G/DL — HIGH (ref 6–8.3)
PROT UR-MCNC: SIGNIFICANT CHANGE UP
PROTHROM AB SERPL-ACNC: 11.6 SEC — SIGNIFICANT CHANGE UP (ref 9.8–12.7)
RBC # BLD: 3.91 M/UL — LOW (ref 4.2–5.8)
RBC # BLD: 4.19 M/UL — LOW (ref 4.2–5.8)
RBC # FLD: 11.8 % — SIGNIFICANT CHANGE UP (ref 10.3–14.5)
RBC # FLD: 13.1 % — SIGNIFICANT CHANGE UP (ref 10.3–14.5)
RBC CASTS # UR COMP ASSIST: SIGNIFICANT CHANGE UP /HPF (ref 0–2)
RH IG SCN BLD-IMP: POSITIVE — SIGNIFICANT CHANGE UP
SAO2 % BLDV: 32 % — LOW (ref 67–88)
SODIUM SERPL-SCNC: 134 MMOL/L — LOW (ref 135–145)
SODIUM SERPL-SCNC: 137 MMOL/L — SIGNIFICANT CHANGE UP (ref 135–145)
SP GR SPEC: 1.02 — SIGNIFICANT CHANGE UP (ref 1.01–1.02)
UROBILINOGEN FLD QL: NEGATIVE — SIGNIFICANT CHANGE UP
WBC # BLD: 11.6 K/UL — HIGH (ref 3.8–10.5)
WBC # BLD: 12.72 K/UL — HIGH (ref 3.8–10.5)
WBC # FLD AUTO: 11.6 K/UL — HIGH (ref 3.8–10.5)
WBC # FLD AUTO: 12.72 K/UL — HIGH (ref 3.8–10.5)
WBC UR QL: SIGNIFICANT CHANGE UP /HPF (ref 0–5)

## 2018-02-27 PROCEDURE — 99223 1ST HOSP IP/OBS HIGH 75: CPT

## 2018-02-27 PROCEDURE — 73630 X-RAY EXAM OF FOOT: CPT | Mod: 26,LT

## 2018-02-27 PROCEDURE — 99222 1ST HOSP IP/OBS MODERATE 55: CPT

## 2018-02-27 RX ORDER — PREGABALIN 225 MG/1
1000 CAPSULE ORAL DAILY
Qty: 0 | Refills: 0 | Status: DISCONTINUED | OUTPATIENT
Start: 2018-02-27 | End: 2018-03-06

## 2018-02-27 RX ORDER — INSULIN LISPRO 100/ML
VIAL (ML) SUBCUTANEOUS AT BEDTIME
Qty: 0 | Refills: 0 | Status: DISCONTINUED | OUTPATIENT
Start: 2018-02-27 | End: 2018-03-06

## 2018-02-27 RX ORDER — HEPARIN SODIUM 5000 [USP'U]/ML
5000 INJECTION INTRAVENOUS; SUBCUTANEOUS EVERY 12 HOURS
Qty: 0 | Refills: 0 | Status: DISCONTINUED | OUTPATIENT
Start: 2018-02-27 | End: 2018-03-06

## 2018-02-27 RX ORDER — PIPERACILLIN AND TAZOBACTAM 4; .5 G/20ML; G/20ML
3.38 INJECTION, POWDER, LYOPHILIZED, FOR SOLUTION INTRAVENOUS ONCE
Qty: 0 | Refills: 0 | Status: COMPLETED | OUTPATIENT
Start: 2018-02-27 | End: 2018-02-27

## 2018-02-27 RX ORDER — ASPIRIN/CALCIUM CARB/MAGNESIUM 324 MG
81 TABLET ORAL DAILY
Qty: 0 | Refills: 0 | Status: DISCONTINUED | OUTPATIENT
Start: 2018-02-27 | End: 2018-03-06

## 2018-02-27 RX ORDER — INSULIN LISPRO 100/ML
22 VIAL (ML) SUBCUTANEOUS
Qty: 0 | Refills: 0 | Status: DISCONTINUED | OUTPATIENT
Start: 2018-02-27 | End: 2018-03-06

## 2018-02-27 RX ORDER — PIPERACILLIN AND TAZOBACTAM 4; .5 G/20ML; G/20ML
3.38 INJECTION, POWDER, LYOPHILIZED, FOR SOLUTION INTRAVENOUS EVERY 8 HOURS
Qty: 0 | Refills: 0 | Status: DISCONTINUED | OUTPATIENT
Start: 2018-02-27 | End: 2018-02-28

## 2018-02-27 RX ORDER — DEXTROSE 50 % IN WATER 50 %
25 SYRINGE (ML) INTRAVENOUS ONCE
Qty: 0 | Refills: 0 | Status: DISCONTINUED | OUTPATIENT
Start: 2018-02-27 | End: 2018-03-06

## 2018-02-27 RX ORDER — METOPROLOL TARTRATE 50 MG
25 TABLET ORAL DAILY
Qty: 0 | Refills: 0 | Status: DISCONTINUED | OUTPATIENT
Start: 2018-02-27 | End: 2018-03-06

## 2018-02-27 RX ORDER — INSULIN GLARGINE 100 [IU]/ML
58 INJECTION, SOLUTION SUBCUTANEOUS AT BEDTIME
Qty: 0 | Refills: 0 | Status: DISCONTINUED | OUTPATIENT
Start: 2018-02-27 | End: 2018-03-06

## 2018-02-27 RX ORDER — VANCOMYCIN HCL 1 G
1000 VIAL (EA) INTRAVENOUS ONCE
Qty: 0 | Refills: 0 | Status: COMPLETED | OUTPATIENT
Start: 2018-02-27 | End: 2018-02-27

## 2018-02-27 RX ORDER — GABAPENTIN 400 MG/1
300 CAPSULE ORAL
Qty: 0 | Refills: 0 | Status: DISCONTINUED | OUTPATIENT
Start: 2018-02-27 | End: 2018-03-06

## 2018-02-27 RX ORDER — INSULIN LISPRO 100/ML
VIAL (ML) SUBCUTANEOUS
Qty: 0 | Refills: 0 | Status: DISCONTINUED | OUTPATIENT
Start: 2018-02-27 | End: 2018-03-06

## 2018-02-27 RX ORDER — GLUCAGON INJECTION, SOLUTION 0.5 MG/.1ML
1 INJECTION, SOLUTION SUBCUTANEOUS ONCE
Qty: 0 | Refills: 0 | Status: DISCONTINUED | OUTPATIENT
Start: 2018-02-27 | End: 2018-03-06

## 2018-02-27 RX ORDER — INSULIN GLARGINE 100 [IU]/ML
23 INJECTION, SOLUTION SUBCUTANEOUS EVERY MORNING
Qty: 0 | Refills: 0 | Status: DISCONTINUED | OUTPATIENT
Start: 2018-02-27 | End: 2018-03-06

## 2018-02-27 RX ORDER — DEXTROSE 50 % IN WATER 50 %
1 SYRINGE (ML) INTRAVENOUS ONCE
Qty: 0 | Refills: 0 | Status: DISCONTINUED | OUTPATIENT
Start: 2018-02-27 | End: 2018-03-06

## 2018-02-27 RX ORDER — TICAGRELOR 90 MG/1
90 TABLET ORAL
Qty: 0 | Refills: 0 | Status: DISCONTINUED | OUTPATIENT
Start: 2018-02-27 | End: 2018-03-06

## 2018-02-27 RX ORDER — SODIUM CHLORIDE 9 MG/ML
1000 INJECTION, SOLUTION INTRAVENOUS
Qty: 0 | Refills: 0 | Status: DISCONTINUED | OUTPATIENT
Start: 2018-02-27 | End: 2018-03-06

## 2018-02-27 RX ORDER — FOLIC ACID 0.8 MG
1 TABLET ORAL DAILY
Qty: 0 | Refills: 0 | Status: DISCONTINUED | OUTPATIENT
Start: 2018-02-27 | End: 2018-03-06

## 2018-02-27 RX ADMIN — GABAPENTIN 300 MILLIGRAM(S): 400 CAPSULE ORAL at 18:21

## 2018-02-27 RX ADMIN — Medication 81 MILLIGRAM(S): at 13:40

## 2018-02-27 RX ADMIN — INSULIN GLARGINE 23 UNIT(S): 100 INJECTION, SOLUTION SUBCUTANEOUS at 08:40

## 2018-02-27 RX ADMIN — TICAGRELOR 90 MILLIGRAM(S): 90 TABLET ORAL at 06:25

## 2018-02-27 RX ADMIN — Medication 2: at 08:44

## 2018-02-27 RX ADMIN — INSULIN GLARGINE 58 UNIT(S): 100 INJECTION, SOLUTION SUBCUTANEOUS at 22:13

## 2018-02-27 RX ADMIN — Medication 1 MILLIGRAM(S): at 13:40

## 2018-02-27 RX ADMIN — Medication 22 UNIT(S): at 18:20

## 2018-02-27 RX ADMIN — Medication 22 UNIT(S): at 13:40

## 2018-02-27 RX ADMIN — Medication 1 TABLET(S): at 13:40

## 2018-02-27 RX ADMIN — PREGABALIN 1000 MICROGRAM(S): 225 CAPSULE ORAL at 18:20

## 2018-02-27 RX ADMIN — PIPERACILLIN AND TAZOBACTAM 25 GRAM(S): 4; .5 INJECTION, POWDER, LYOPHILIZED, FOR SOLUTION INTRAVENOUS at 10:29

## 2018-02-27 RX ADMIN — GABAPENTIN 300 MILLIGRAM(S): 400 CAPSULE ORAL at 06:25

## 2018-02-27 RX ADMIN — Medication 2: at 13:41

## 2018-02-27 RX ADMIN — TICAGRELOR 90 MILLIGRAM(S): 90 TABLET ORAL at 18:20

## 2018-02-27 RX ADMIN — PIPERACILLIN AND TAZOBACTAM 200 GRAM(S): 4; .5 INJECTION, POWDER, LYOPHILIZED, FOR SOLUTION INTRAVENOUS at 02:27

## 2018-02-27 RX ADMIN — Medication 25 MILLIGRAM(S): at 06:25

## 2018-02-27 RX ADMIN — HEPARIN SODIUM 5000 UNIT(S): 5000 INJECTION INTRAVENOUS; SUBCUTANEOUS at 18:21

## 2018-02-27 RX ADMIN — Medication 250 MILLIGRAM(S): at 04:06

## 2018-02-27 RX ADMIN — PIPERACILLIN AND TAZOBACTAM 25 GRAM(S): 4; .5 INJECTION, POWDER, LYOPHILIZED, FOR SOLUTION INTRAVENOUS at 18:21

## 2018-02-27 RX ADMIN — Medication 22 UNIT(S): at 10:28

## 2018-02-27 NOTE — ED PROVIDER NOTE - MEDICAL DECISION MAKING DETAILS
66yo male hx of CAD, left hallux osteomyelitis, and DM presenting with 3 days of pus from left hallux. Left big toe blackened with intermittent, very hard to feel pulse on left pedal pulse. Pus draining from one tiny spot within blackened big toe. Decreased sensation, but ROM intact. Will get labs, lactate, blood cultures, ekg, broad spec antibiotics. Will consult vascular and podiatry. Will need admission.

## 2018-02-27 NOTE — ED PROVIDER NOTE - CHIEF COMPLAINT
The patient is a 67y Male complaining of The patient is a 67y Male complaining of left toe infection

## 2018-02-27 NOTE — ED PROVIDER NOTE - OBJECTIVE STATEMENT
66yo male hx of CAD, left hallux osteomyelitis, and DM presenting with 3 days of pus from left hallux. Was seen by podiatry today who recommended IV antibiotics and ED presentation. Also complaining of pain in left foot for 2-3 days. Recently in the hospital with left hallux osteomyelitis, but was unable to get surgery because was found to have heart blockage per pt. Had 5 stents placed. Discharged on antiplatelet feb 9 and was supposed to follow-up with vascular surgery for potential surgery in future.  No fevers, chills, other symptoms.

## 2018-02-27 NOTE — ED PROVIDER NOTE - NOTES
difficulty to appreciate DP/PT pulses on dopppler, however b/l feet appear well-perfused.  known vasculopath

## 2018-02-27 NOTE — CONSULT NOTE ADULT - SUBJECTIVE AND OBJECTIVE BOX
CC: left hallux pain  HPI: The patient is a 67 year old male with medical and surgical history significant for DM II, alcohol abuse, pancreatitis, CAD s/p CANDIDO x3 2/6/18 on ASA/Ticagrelor who presents with known left hallux gangrene/pain, now wet gangrene and x-ray imaging concerning for osteomyelitis. The patient underwent a LLE angiogram his last hospital admission that showed multilevel arterial disease and that the patient would need to wait 4-6 weeks for cardiac optimization prior to any revascularization was attempted. The patient was supposed to follow up with Dr. Fuentes as an outpatient, however per his wife, they were unable to obtain an appointment with the office.  Patient and wife denies fevers, chills, abdominal pain, nausea, vomiting, diarrhea or other signs of systemic infection. Sher shortness of breath, chest pain, dizziness or syncope.       PAST MEDICAL & SURGICAL HISTORY:  CAD (coronary artery disease)  Diabetes mellitus  Hypertension  Alcohol Dependency  Diabetes Mellitus  Stented coronary artery     Review of Systems:   General: denies weight change, fever or fatigue  HEENT: denies sore throat, hoarseness  Respiratory: denies cough, shortness of breath at rest and on exertion, wheezing  Cardiovascular: denies chest pain, abnormal heart rhythm, PND, palpitations  Gastrointestinal: denies nausea, vomiting, diarrhea, bloody or black bowel movements  Genitourinary: denies frequent urination, painful urination, kidney disease  Neurological: denies seizures, headaches  Musculoskeletal: +minimal L hallux pain    MEDICATIONS  (STANDING):  MEDICATIONS  (PRN):    Allergies  No Known Allergies  Intolerances     SOCIAL HISTORY:  Occupation:   Smoking Hx: denies  Etoh Hx: denies  IVDA Hx: denies    FAMILY HISTORY:  Family history of diabetes mellitus (Father)  - unless noted, no significant family hx with Mother, Father, Siblings      Objective:   Vital Signs Last 24 Hrs  T(C): 36.9 (27 Feb 2018 03:16), Max: 36.9 (27 Feb 2018 03:16)  T(F): 98.5 (27 Feb 2018 03:16), Max: 98.5 (27 Feb 2018 03:16)  HR: 97 (27 Feb 2018 03:16) (97 - 106)  BP: 145/86 (27 Feb 2018 03:16) (145/86 - 156/73)  BP(mean): --  RR: 16 (27 Feb 2018 03:16) (16 - 18)  SpO2: 98% (27 Feb 2018 03:16) (98% - 100%)    Physical Exam:  General: Well developed, well nourished, alert and cooperative, and appears to be in no acute distress.  HEENT: normocephalic, vision is grossly intact  Neck: Neck supple, non-tender without lymphadenopathy, masses or thyromegaly  Chest: lungs clear bilaterally, non-labored breathing, no wheezing or rhonci  Cardiac: Regular rhythm, rate of 90, +S1 & S2, no murmurs, rubs or gallops  Abdomen: soft, non-distended, non-tender, no guarding or rebound  Extremities: Warm b/l palp fem and pop pulses. R DP/PT signals. L PT signal only. L first toe with gangrenous tip wrapped w/pus     LABS:                        12.7   11.6  )-----------( 281      ( 27 Feb 2018 02:02 )             36.0     02-27    137  |  98  |  17  ----------------------------<  188<H>  4.2   |  25  |  0.75    Ca    9.7      27 Feb 2018 02:02    TPro  8.2  /  Alb  3.8  /  TBili  0.3  /  DBili  x   /  AST  10  /  ALT  7<L>  /  AlkPhos  71  02-27    PT/INR - ( 27 Feb 2018 02:02 )   PT: 11.6 sec;   INR: 1.06 ratio         PTT - ( 27 Feb 2018 02:02 )  PTT:32.6 sec CC: left hallux pain  HPI: The patient is a 67 year old male with medical and surgical history significant for DM II, alcohol abuse, pancreatitis, CAD s/p CANDIDO x3 2/6/18 on ASA/Ticagrelor who presents with known left hallux gangrene/pain, now wet gangrene and x-ray imaging concerning for osteomyelitis. The patient underwent a LLE angiogram his last hospital admission that showed multilevel arterial disease and that the patient would need to wait 4-6 weeks for cardiac optimization prior to any revascularization was attempted. The patient was supposed to follow up with Dr. Fuentes as an outpatient, however per his wife, they were unable to obtain an appointment with the office.  Patient and wife denies fevers, chills, abdominal pain, nausea, vomiting, diarrhea or other signs of systemic infection. Sher shortness of breath, chest pain, dizziness or syncope.       PAST MEDICAL & SURGICAL HISTORY:  CAD (coronary artery disease)  Diabetes mellitus  Hypertension  Alcohol Dependency  Diabetes Mellitus  Stented coronary artery     Review of Systems:   General: denies weight change, fever or fatigue  HEENT: denies sore throat, hoarseness  Respiratory: denies cough, shortness of breath at rest and on exertion, wheezing  Cardiovascular: denies chest pain, abnormal heart rhythm, PND, palpitations  Gastrointestinal: denies nausea, vomiting, diarrhea, bloody or black bowel movements  Genitourinary: denies frequent urination, painful urination, kidney disease  Neurological: denies seizures, headaches  Musculoskeletal: +minimal L hallux pain    MEDICATIONS  (STANDING):  MEDICATIONS  (PRN):    Allergies  No Known Allergies  Intolerances     SOCIAL HISTORY:  Occupation:   Smoking Hx: denies  Etoh Hx: denies  IVDA Hx: denies    FAMILY HISTORY:  Family history of diabetes mellitus (Father)  - unless noted, no significant family hx with Mother, Father, Siblings      Objective:   Vital Signs Last 24 Hrs  T(C): 36.9 (27 Feb 2018 03:16), Max: 36.9 (27 Feb 2018 03:16)  T(F): 98.5 (27 Feb 2018 03:16), Max: 98.5 (27 Feb 2018 03:16)  HR: 97 (27 Feb 2018 03:16) (97 - 106)  BP: 145/86 (27 Feb 2018 03:16) (145/86 - 156/73)  BP(mean): --  RR: 16 (27 Feb 2018 03:16) (16 - 18)  SpO2: 98% (27 Feb 2018 03:16) (98% - 100%)    Physical Exam:  General: Well developed, well nourished, alert and cooperative, and appears to be in no acute distress.  HEENT: normocephalic, vision is grossly intact  Neck: Neck supple, non-tender without lymphadenopathy, masses or thyromegaly  Chest: lungs clear bilaterally, non-labored breathing, no wheezing or rhonci  Cardiac: Regular rhythm, rate of 90, +S1 & S2, no murmurs, rubs or gallops  Abdomen: soft, non-distended, non-tender, no guarding or rebound  Extremities: Warm b/l palp fem and pop pulses. R DP/PT signals. L PT signal only. L first toe with dry gangrenous tip wrapped w/pus     LABS:                        12.7   11.6  )-----------( 281      ( 27 Feb 2018 02:02 )             36.0     02-27    137  |  98  |  17  ----------------------------<  188<H>  4.2   |  25  |  0.75    Ca    9.7      27 Feb 2018 02:02    TPro  8.2  /  Alb  3.8  /  TBili  0.3  /  DBili  x   /  AST  10  /  ALT  7<L>  /  AlkPhos  71  02-27    PT/INR - ( 27 Feb 2018 02:02 )   PT: 11.6 sec;   INR: 1.06 ratio         PTT - ( 27 Feb 2018 02:02 )  PTT:32.6 sec

## 2018-02-27 NOTE — H&P ADULT - NSHPLABSRESULTS_GEN_ALL_CORE
Labs personally reviewed:                          12.7   11.6  )-----------( 281      ( 2018 02:02 )             36.0         137  |  98  |  17  ----------------------------<  188<H>  4.2   |  25  |  0.75    Ca    9.7      2018 02:02    TPro  8.2  /  Alb  3.8  /  TBili  0.3  /  DBili  x   /  AST  10  /  ALT  7<L>  /  AlkPhos  71          LIVER FUNCTIONS - ( 2018 02:02 )  Alb: 3.8 g/dL / Pro: 8.2 g/dL / ALK PHOS: 71 U/L / ALT: 7 U/L RC / AST: 10 U/L / GGT: x           PT/INR - ( 2018 02:02 )   PT: 11.6 sec;   INR: 1.06 ratio         PTT - ( 2018 02:02 )  PTT:32.6 sec  Urinalysis Basic - ( 2018 03:04 )    Color: Yellow / Appearance: SL Turbid / S.017 / pH: x  Gluc: x / Ketone: Negative  / Bili: Negative / Urobili: Negative   Blood: x / Protein: Trace / Nitrite: Negative   Leuk Esterase: Small / RBC: 0-2 /HPF / WBC 6-10 /HPF   Sq Epi: x / Non Sq Epi: MOD /HPF / Bacteria: x      CAPILLARY BLOOD GLUCOSE          Imaging:  CXR personally reviewed: no focal opacity  XRAY of left foot : cortical heterogeneity and osteal lucency of the tuft of the left first digit, not significantly changed as compared to 2018, concerning for osteomyelitis , No definite subcutaneous gas tracking along the soft tissues.         EKG personally reviewed: nsr, no acute st changes

## 2018-02-27 NOTE — H&P ADULT - NSHPREVIEWOFSYSTEMS_GEN_ALL_CORE
CONSTITUTIONAL: No weakness, fevers or chills  EYES/ENT: No visual changes;  No dysphagia  NECK: No pain or stiffness  RESPIRATORY: No cough, wheezing, hemoptysis; No shortness of breath  CARDIOVASCULAR: No chest pain or palpitations; No lower extremity edema  EXTREMITIES: no le edema, cyanosis, clubbing  GASTROINTESTINAL: No abdominal or epigastric pain. No nausea, vomiting, or hematemesis; No diarrhea or constipation. No melena or hematochezia.  BACK: No back pain  GENITOURINARY: No dysuria, frequency or hematuria  NEUROLOGICAL: No numbness or weakness  MSK: no joint swelling or pain  SKIN: left foot wound   PSYCH: no agitation  All other review of systems is negative unless indicated above.

## 2018-02-27 NOTE — H&P ADULT - PROBLEM SELECTOR PLAN 2
- Lantus   - Lispro   - Insulin correction scale   - consistent carb diet - Lantus bid   - Lispro w/meals   - Insulin correction scale   - consistent carb diet

## 2018-02-27 NOTE — CONSULT NOTE ADULT - ASSESSMENT
The patient is a 67 year old male with medical and surgical history significant for DM II, alcohol abuse, pancreatitis, CAD s/p CANDIDO x3 2/6/18 on ASA/Ticagrelor who presents with known left hallux gangrene/pain, now wet gangrene and x-ray imaging concerning for osteomyelitis. The patient underwent a LLE angiogram his last hospital admission that showed multilevel arterial disease and that the patient would need to wait 4-6 weeks for cardiac optimization prior to any revascularization was attempted. The patient was supposed to follow up with Dr. Fuentes as an outpatient, however per his wife, they were unable to obtain an appointment with the office.  Patient and wife denies fevers, chills, abdominal pain, nausea, vomiting, diarrhea or other signs of systemic infection. Sher shortness of breath, chest pain, dizziness or syncope.       - admit to medicine for concerns of osteomyelitis  - s/p LLE angiogram which revealed multilevel arterial disease        - would need to wait 4-6 weeks from previous CANDIDO (2/6/18) for cardiac optimization prior to any revascularization was attempted  - will d/w vascular fellow/attending    Nick Jarrell PGY2  [phone]

## 2018-02-27 NOTE — H&P ADULT - HISTORY OF PRESENT ILLNESS
Patient is a 67 year old male with a past medical history significant for DM type II, h/o  etoh abuse and  pancreatitis, Cad s/p stent,  PVD, recent admission for (1/30-2/9) for  left hallux osteomyelitis s/p debridement  , during course patient underwent  cardiac cath CANDIDO x 1pLAD, CANDIDO x 1 dCx, CANDIDO x 1 pOM1 (02/05) and RCA (2/6), was treated with antibiotics for osteomyelitis, was evaluated by vascular surgery and had a LLE angiogram  with multilevel arterial disease, however revascularization postponed until cardiac function optimized ; patient now presents for worsened infection. He Was seen by podiatry today who recommended IV antibiotics. Patient has been complaining of pain in left foot for 2-3 days. Patient is a 67 year old male with a past medical history significant for DM type II, h/o  etoh abuse and  pancreatitis, Cad s/p stent,  PVD, recent admission for (1/30-2/9) for  left hallux osteomyelitis s/p debridement  , during course patient underwent  cardiac cath CANDIDO x 1pLAD, CANDIDO x 1 dCx, CANDIDO x 1 pOM1 (02/05) and RCA (2/6), was treated with antibiotics for osteomyelitis, was evaluated by vascular surgery and had a LLE angiogram  with multilevel arterial disease, however revascularization postponed until cardiac function optimized ; patient now presents for worsened infection. He was seen by podiatry today who recommended IV antibiotics. Patient has been complaining of pain in left foot for 2-3 days. also reports pus draining from wound .  no fevers or chills . Able to ambulate.

## 2018-02-27 NOTE — H&P ADULT - ASSESSMENT
67M w/pmh  DM , h/o  etoh abuse and  pancreatitis, Cad s/p stent,  PVD, recent admission for osteomyelitis , s/p cardiac cath and LLE angiogram in anticipation for revascularization , p/w worsening  left foot infection x few days , labs with mild leukocytosis.

## 2018-02-27 NOTE — CONSULT NOTE ADULT - ASSESSMENT
·	Pt seen and evaluated w/ surg team  ·	Wcx taken from hallux drainage  ·	XR reviewed  ·	dressed w/ betadine and DSD  ·	rec admission for IV abx  ·	per vasc team likely no intervention due to recent cardiac episodes  ·	no emergent pod surg intervention   ·	d/w attending  ·	will follow LEFT hallux osteomyelitis    Plan:  ·	Pt seen and evaluated w/ surg team  ·	Wound cx taken from hallux drainage  ·	XR reviewed, as above findings consistent with acute OM.  ·	dressed w/ betadine and DSD  ·	rec admission for IV abx  ·	per vasc team likely no intervention due to recent cardiac stents  ·	no emergent pod surg intervention   ·	d/w attending  ·	will follow

## 2018-02-27 NOTE — ED PROVIDER NOTE - ATTENDING CONTRIBUTION TO CARE
67F hx DM on insulin now better controlled as per wife, CAD s/p stents x5 most recently 3 weeks, 67F hx DM on insulin now better controlled as per wife, CAD s/p stents x5 most recently 3 weeks, HTN, PAD presents for concern for infected L first toe wound.  +purulent discharge.  No fever/chills.  Pt with prior CTA demonstrating PAD.  Pt sent in by podiatry for IV abx and potential sx procedure.    VSS.   exam as above.  L first toe with erosive skin changes/swelling, no noted discharge.  ulcers of lateral foot  No erythema, no crepitus.  foot well perfused.  palpable DP pulse b/l.  will obtain FS, vbg with lactate, electrolytes, cx, xrays for underlying osteolucencies, subq air however no crepitus appreciated.  .  broad spectrum abx, tba. 67F hx DM on insulin now better controlled as per wife, CAD s/p stents x5 most recently 3 weeks, HTN, PAD presents for concern for infected L first toe wound.  +purulent discharge.  No fever/chills.  Pt with prior CTA demonstrating PAD.  Pt sent in by podiatry for IV abx and potential sx procedure.    VSS.   exam as above.  L first toe with erosive skin changes/swelling, no noted discharge.  ulcers of lateral foot  No erythema, no crepitus.  foot well perfused.  dopplerable pulses of feet b/l as per vascular sx evaluation.  finding suggestive of wet gangrene, will obtain FS, vbg with lactate, electrolytes, cx, xrays for underlying osteolucencies, subq air however no crepitus appreciated.  .  broad spectrum abx, tba.

## 2018-02-27 NOTE — CONSULT NOTE ADULT - SUBJECTIVE AND OBJECTIVE BOX
Patient is a 67y old  Male who presents with a chief complaint of Worsened left foot infection.       HPI:  Patient is a 67 year old male with a past medical history significant for DM type II, h/o  Etoh abuse and  pancreatitis, Cad s/p stent,  PVD, recent admission for (1/30-2/9) for  left hallux osteomyelitis s/p debridement, during course patient underwent  cardiac cath CANDIDO x 1pLAD, CANDIDO x 1 dCx, CANDIDO x 1 pOM1 (02/05) and RCA (2/6), was treated with antibiotics for polymicrobial growth, was evaluated by vascular surgery and had a LLE angiogram  with multilevel arterial disease, however revascularization postponed until cardiac function optimized; patient now presents for worsened infection. Per wife noted to have some purulent drainage last week, went to see his PMD who prescribed augmentin which he has been taking for last 4-5 days. Few days ago was again noted to have some purulence. He was seen by podiatry today who recommended IV antibiotics. No fevers or chills . Able to ambulate.       PAST MEDICAL & SURGICAL HISTORY:  CAD (coronary artery disease)  Diabetes mellitus  Hypertension  Alcohol Dependency  Diabetes Mellitus  Stented coronary artery      REVIEW OF SYSTEMS    General: Denies any chills. Fevers absent    Skin: Per HPI  	  Ophthalmologic: Denies any visual complaints, discharge, redness.  	  ENMT: No nasal congestion or throat pain.     Respiratory and Thorax: No cough, sputum or chest pain. Denies shortness of breath.  	  Cardiovascular: No chest pain, palpitations.    Gastrointestinal: No nausea, abdominal pain or diarrhea.    Genitourinary: No dysuria, frequency. No flank pain.    Musculoskeletal: No joint swelling or pain.    Neurological: No confusion. No extremity weakness.    Psychiatric: No hallucinations	    Allergic/Immunologic:	No hives or rash       Social history: , retired. Former smoker.    FAMILY HISTORY:  Family history of diabetes mellitus      Allergies    No Known Allergies      Antimicrobials:    piperacillin/tazobactam IVPB. 3.375 Gram(s) IV Intermittent every 8 hours        Vital Signs Last 24 Hrs  T(C): 37.4 (27 Feb 2018 15:20), Max: 37.4 (27 Feb 2018 15:20)  T(F): 99.3 (27 Feb 2018 15:20), Max: 99.3 (27 Feb 2018 15:20)  HR: 97 (27 Feb 2018 15:20) (78 - 106)  BP: 128/68 (27 Feb 2018 15:20) (108/71 - 164/89)  RR: 18 (27 Feb 2018 15:20) (16 - 18)  SpO2: 99% (27 Feb 2018 15:20) (95% - 100%)      PHYSICAL EXAM: Patient in no acute distress.    Constitutional: Comfortable. Awake and alert    Eyes: No discharge or conjunctival injection    ENMT: No thrush. No pharyngeal exudate or erythema.    Neck: Supple, No LN.    Respiratory: Good air entry bilaterally.    Cardiovascular: S1 S2 wnl, No murmurs.    Gastrointestinal: Soft BS(+) no tenderness, non distended.    Genitourinary: No CVA tenderness     Extremities: No edema. Dry flaky skin with lt great toe tip with necrotic tissue, no malodor, no discharge. no warmth or swelling of the foot. 2 more areas of superficial ulceration laterally with central scab.    Vascular: peripheral pulses rt > lt foot.     Neurological:  No grossly focal deficits.    Skin: No rash     Musculoskeletal: No joint swelling.    Psychiatric: Affect normal.                          12.5   12.72 )-----------( 300      ( 27 Feb 2018 11:33 )             38.7       02-27    134<L>  |  96  |  14  ----------------------------<  207<H>  4.6   |  23  |  0.87    Ca    10.3      27 Feb 2018 11:34    TPro  8.7<H>  /  Alb  4.0  /  TBili  0.5  /  DBili  x   /  AST  14  /  ALT  6<L>  /  AlkPhos  79  02-27            Radiology: Films Reviewed by me [ x]    < from: Xray Foot AP + Lateral + Oblique, Left (02.27.18 @ 02:13) >  IMPRESSION:     Again noted is soft tissue swelling of the distal aspect of the hallux   with adjacent cortical irregularity and lucency, suspicious for   osteomyelitis. No definite subcutaneous gas tracking. Joint spaces are   maintained. Vascular calcifications are noted. Chronic fracture deformity   of the distal fifth metatarsal. Plantar calcaneal enthesophyte.

## 2018-02-27 NOTE — H&P ADULT - PROBLEM SELECTOR PLAN 1
patient seen by podiatry , s/p wound culture , will continue IV antibiotics   - Continue  zosyn   - Podiatry consult appreciated   - vascular consult appreciated   - ID consult to be arranged by day team if needed   - F/u wound culture

## 2018-02-27 NOTE — CONSULT NOTE ADULT - SUBJECTIVE AND OBJECTIVE BOX
CHIEF COMPLAINT:    HPI:  Patient is a 67 year old male with a past medical history significant for DM type II, h/o  etoh abuse and  pancreatitis, Cad s/p multivessel PCI three weeks ago, known PAD awaiting possible LE bypass, who presents for worsened foot infection. He was seen by podiatry  who recommended IV antibiotics and no surgical intervention at this time. He denies any chest pain, dyspnea, palpitations,orthopnea or PND.      PAST MEDICAL & SURGICAL HISTORY:  CAD (coronary artery disease)  Diabetes mellitus  Hypertension  Alcohol Dependency  Diabetes Mellitus  Stented coronary artery          PREVIOUS DIAGNOSTIC TESTING:    [ ] Echocardiogram:  [ ]  Catheterization:  [ ] Stress Test:  	    MEDICATIONS:  MEDICATIONS  (STANDING):  aspirin enteric coated 81 milliGRAM(s) Oral daily  cyanocobalamin 1000 MICROGram(s) Oral daily  dextrose 5%. 1000 milliLiter(s) (50 mL/Hr) IV Continuous <Continuous>  dextrose 50% Injectable 25 Gram(s) IV Push once  folic acid 1 milliGRAM(s) Oral daily  gabapentin 300 milliGRAM(s) Oral two times a day  heparin  Injectable 5000 Unit(s) SubCutaneous every 12 hours  insulin glargine Injectable (LANTUS) 23 Unit(s) SubCutaneous every morning  insulin glargine Injectable (LANTUS) 58 Unit(s) SubCutaneous at bedtime  insulin lispro (HumaLOG) corrective regimen sliding scale   SubCutaneous three times a day before meals  insulin lispro (HumaLOG) corrective regimen sliding scale   SubCutaneous at bedtime  insulin lispro Injectable (HumaLOG) 22 Unit(s) SubCutaneous three times a day before meals  metoprolol succinate ER 25 milliGRAM(s) Oral daily  multivitamin 1 Tablet(s) Oral daily  piperacillin/tazobactam IVPB. 3.375 Gram(s) IV Intermittent every 8 hours  ticagrelor 90 milliGRAM(s) Oral two times a day      FAMILY HISTORY:  Family history of diabetes mellitus      SOCIAL HISTORY:    [ ] Non-smoker  [ ] Smoker  [ ] Alcohol    Allergies    No Known Allergies    Intolerances    	    REVIEW OF SYSTEMS:  CONSTITUTIONAL: No fever, weight loss, or fatigue  EYES: No eye pain, visual disturbances, or discharge  ENMT:  No difficulty hearing, tinnitus, vertigo; No sinus or throat pain  NECK: No pain or stiffness  RESPIRATORY: No cough, wheezing, chills or hemoptysis; No Shortness of Breath  CARDIOVASCULAR: see HPI  GASTROINTESTINAL: No abdominal or epigastric pain. No nausea, vomiting, or hematemesis; No diarrhea or constipation. No melena or hematochezia.  GENITOURINARY: No dysuria, frequency, hematuria, or incontinence  NEUROLOGICAL: No headaches, memory loss, loss of strength, numbness, or tremors  SKIN: No itching, burning, rashes, or lesions   	    [ ] All others negative	  [ ] Unable to obtain    PHYSICAL EXAM:  T(C): 37.4 (02-27-18 @ 15:20), Max: 37.4 (02-27-18 @ 15:20)  HR: 97 (02-27-18 @ 15:20) (78 - 106)  BP: 128/68 (02-27-18 @ 15:20) (108/71 - 164/89)  RR: 18 (02-27-18 @ 15:20) (16 - 18)  SpO2: 99% (02-27-18 @ 15:20) (95% - 100%)  Wt(kg): --  I&O's Summary      Appearance: Normal	  Psychiatry: A & O x 3, Mood & affect appropriate  HEENT:   Normal oral mucosa, PERRL, EOMI	  Lymphatic: No lymphadenopathy  Cardiovascular: Normal S1 S2,RRR, No JVD, No murmurs  Respiratory: Lungs clear to auscultation	  Gastrointestinal:  Soft, Non-tender, + BS	  Skin: No rashes, No ecchymoses, No cyanosis	  Neurologic: Non-focal  Extremities: no edema, left foot dressing on hallux  Vascular: Peripheral pulses palpable 2+ bilaterally    TELEMETRY: 	    ECG:  	NSR @ 93 BPM  RADIOLOGY:  OTHER: 	  	  LABS:	 	    CARDIAC MARKERS:                                  12.5   12.72 )-----------( 300      ( 27 Feb 2018 11:33 )             38.7     02-27    134<L>  |  96  |  14  ----------------------------<  207<H>  4.6   |  23  |  0.87    Ca    10.3      27 Feb 2018 11:34    TPro  8.7<H>  /  Alb  4.0  /  TBili  0.5  /  DBili  x   /  AST  14  /  ALT  6<L>  /  AlkPhos  79  02-27    PT/INR - ( 27 Feb 2018 02:02 )   PT: 11.6 sec;   INR: 1.06 ratio         PTT - ( 27 Feb 2018 02:02 )  PTT:32.6 sec  proBNP:   Lipid Profile:   HgA1c:   TSH:     ASSESSMENT/PLAN:

## 2018-02-27 NOTE — H&P ADULT - NSHPPHYSICALEXAM_GEN_ALL_CORE
Vital Signs Last 24 Hrs  T(C): 36.9 (27 Feb 2018 03:16), Max: 36.9 (27 Feb 2018 03:16)  T(F): 98.5 (27 Feb 2018 03:16), Max: 98.5 (27 Feb 2018 03:16)  HR: 97 (27 Feb 2018 03:16) (97 - 106)  BP: 145/86 (27 Feb 2018 03:16) (145/86 - 156/73)  BP(mean): --  RR: 16 (27 Feb 2018 03:16) (16 - 18)  SpO2: 98% (27 Feb 2018 03:16) (98% - 100%)    GENERAL: No acute distress, well-developed, mild tremor , alert and oriented to self and place , not oriented to time   HEAD:  Atraumatic, Normocephalic  ENT: EOMI, PERRLA, conjunctiva and sclera clear,  moist mucosa no pharyngeal erythema or exudates   NECK: supple , no JVD   CHEST/LUNG: Clear to auscultation bilaterally; No wheeze, equal breath sounds bilaterally   BACK: No spinal tenderness,  No CVA tenderness   HEART: Regular rate and rhythm; No murmurs, rubs, or gallops  ABDOMEN: Soft, Nontender, Nondistended; Bowel sounds present  EXTREMITIES:  No clubbing, cyanosis, or edema  MSK: No joint swelling or effusions,    PSYCH: Normal behavior/affect  NEUROLOGY: AAOx2, non-focal, cranial nerves intact  SKIN:  L distal hallux dry gangrene with mixed fibronecrotic material, bone exposed and necrotic. scant purulence, no malodor, no periwound erythema or edema noted. L  lateral 5th met head and shaft ulcers to subQ no purulence, no malodor

## 2018-02-27 NOTE — ED PROVIDER NOTE - PHYSICAL EXAMINATION
Gen: No acute distress, alert, cooperative  Head: Normocephalic, Atraumatic  HEENT: PERRL, oral mucosa moist, normal conjunctiva  Lung: CTAB, no respiratory distress, no crackles or wheezes  CV: rrr, no murmur  Abd: soft, NTND, no rebound or guarding  MSK: No LE edema, no visible deformities  Neuro: No focal neurologic deficits  Skin: Warm and dry, no evidence of rash   Psych: normal affect, follows commands Gen: No acute distress, alert, cooperative  Head: Normocephalic, Atraumatic  HEENT: PERRL, oral mucosa moist, normal conjunctiva  Lung: CTAB, no respiratory distress, no crackles or wheezes  CV: rrr, no murmur  Abd: soft, NTND, no rebound or guarding  MSK: Right LE, pulse intact, normal ROM, normal sensation other than decreased sensation bottom of foot. LEFT: Pulse intermittently felt possibly. Tried with doppler, and got a doppler once only.   Neuro: No focal neurologic deficits  Skin: Warm and dry, no evidence of rash   Psych: normal affect, follows commands Gen: No acute distress, alert, cooperative  Head: Normocephalic, Atraumatic  HEENT: PERRL, oral mucosa moist, normal conjunctiva  Lung: CTAB, no respiratory distress, no crackles or wheezes  CV: rrr, no murmur  Abd: soft, NTND, no rebound or guarding  MSK: Right LE, pulse weak with doppler, normal ROM, normal sensation other than decreased sensation bottom of foot. LEFT: No pulses felt, no pulse with doppler.    Neuro: No focal neurologic deficits  Skin: Warm and dry, no evidence of rash   Psych: normal affect, follows commands Gen: No acute distress, alert, cooperative  Head: Normocephalic, Atraumatic  HEENT: PERRL, oral mucosa moist, normal conjunctiva  Lung: CTAB, no respiratory distress, no crackles or wheezes  CV: rrr, no murmur  Abd: soft, NTND, no rebound or guarding  MSK: Right LE, pulse weak with doppler, normal ROM, normal sensation other than decreased sensation bottom of foot. LEFT: No pulses felt, no pulse with doppler. Left hallux blackened at the distal tip, one tiny spot draining pus. No sensation top or bottom of left foot, sensation intact in ankles. Normal ROM, all intact. Skin warm and dry. No crepitations.   Neuro: See above, no other focal neurologic deficits  Skin: See MSK, venous changes in TYSON shins.  Psych: normal affect, follows commands

## 2018-02-27 NOTE — PROGRESS NOTE ADULT - SUBJECTIVE AND OBJECTIVE BOX
Patient is a 67y old  Male who presents with a chief complaint of Worsened left foot infection (2018 05:00)       INTERVAL HPI/OVERNIGHT EVENTS:  Patient seen and evaluated at bedside.  Pt is resting comfortable in NAD. Denies N/V/F/C.      Allergies    No Known Allergies    Intolerances        Vital Signs Last 24 Hrs  T(C): 37 (2018 07:30), Max: 37 (2018 07:30)  T(F): 98.6 (2018 07:30), Max: 98.6 (2018 07:30)  HR: 78 (2018 07:30) (78 - 106)  BP: 135/84 (2018 07:30) (135/84 - 164/89)  BP(mean): --  RR: 16 (2018 07:30) (16 - 18)  SpO2: 97% (2018 07:30) (97% - 100%)    LABS:                        12.7   11.6  )-----------( 281      ( 2018 02:02 )             36.0         137  |  98  |  17  ----------------------------<  188<H>  4.2   |  25  |  0.75    Ca    9.7      2018 02:02    TPro  8.2  /  Alb  3.8  /  TBili  0.3  /  DBili  x   /  AST  10  /  ALT  7<L>  /  AlkPhos  71      PT/INR - ( 2018 02:02 )   PT: 11.6 sec;   INR: 1.06 ratio         PTT - ( 2018 02:02 )  PTT:32.6 sec  Urinalysis Basic - ( 2018 03:04 )    Color: Yellow / Appearance: SL Turbid / S.017 / pH: x  Gluc: x / Ketone: Negative  / Bili: Negative / Urobili: Negative   Blood: x / Protein: Trace / Nitrite: Negative   Leuk Esterase: Small / RBC: 0-2 /HPF / WBC 6-10 /HPF   Sq Epi: x / Non Sq Epi: MOD /HPF / Bacteria: x      CAPILLARY BLOOD GLUCOSE      POCT Blood Glucose.: 225 mg/dL (2018 07:24)      Lower Extremity Physical Exam:  Vascular: DP/PT 0/4, B/L, Temperature gradient WNL, B/L.   Neuro: Epicritic sensation intact to the level of the foot, B/L.  Musculoskeletal/Ortho: No gross pedal deformities noted, B/L.   Skin: Dry scaly flaking skin to shins B/L.     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 scant purulence, no malodor, no periwound erythema or edema noted.     Wound #2 & 3:  L foot lateral 5th met head and shaft ulcers to subQ, fibrotic plug, no periwound erythema or edema, no purulence, no malodor Patient is a 67y old  Male who presents with a chief complaint of Worsened left foot infection (2018 05:00)       INTERVAL HPI/OVERNIGHT EVENTS:  Patient seen and evaluated at bedside.  Pt is resting comfortable in NAD. Denies N/V/F/C.      Allergies    No Known Allergies    Intolerances        Vital Signs Last 24 Hrs  T(C): 37 (2018 07:30), Max: 37 (2018 07:30)  T(F): 98.6 (2018 07:30), Max: 98.6 (2018 07:30)  HR: 78 (2018 07:30) (78 - 106)  BP: 135/84 (2018 07:30) (135/84 - 164/89)  BP(mean): --  RR: 16 (2018 07:30) (16 - 18)  SpO2: 97% (2018 07:30) (97% - 100%)    LABS:                        12.7   11.6  )-----------( 281      ( 2018 02:02 )             36.0         137  |  98  |  17  ----------------------------<  188<H>  4.2   |  25  |  0.75    Ca    9.7      2018 02:02    TPro  8.2  /  Alb  3.8  /  TBili  0.3  /  DBili  x   /  AST  10  /  ALT  7<L>  /  AlkPhos  71      PT/INR - ( 2018 02:02 )   PT: 11.6 sec;   INR: 1.06 ratio         PTT - ( 2018 02:02 )  PTT:32.6 sec  Urinalysis Basic - ( 2018 03:04 )    Color: Yellow / Appearance: SL Turbid / S.017 / pH: x  Gluc: x / Ketone: Negative  / Bili: Negative / Urobili: Negative   Blood: x / Protein: Trace / Nitrite: Negative   Leuk Esterase: Small / RBC: 0-2 /HPF / WBC 6-10 /HPF   Sq Epi: x / Non Sq Epi: MOD /HPF / Bacteria: x      CAPILLARY BLOOD GLUCOSE      POCT Blood Glucose.: 225 mg/dL (2018 07:24)      Lower Extremity Physical Exam:  Vascular: DP/PT 0/4, B/L, Temperature gradient WNL, B/L.   Neuro: Epicritic sensation intact to the level of the foot, B/L.  Musculoskeletal/Ortho: No gross pedal deformities noted, B/L.   Skin: Dry scaly flaking skin to shins B/L.   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 scant purulence, no malodor, no periwound erythema or edema noted.   Wound #2 & 3:  L foot lateral 5th met head and shaft ulcers to subQ with a fibrotic plug, no periwound erythema or edema, no purulence, no malodor    RADIOLOGY:  < from: Xray Foot AP + Lateral + Oblique, Left (18 @ 02:13) >    EXAM:  FOOT COMPLETE LEFT (MIN 3 VIEWS)                            PROCEDURE DATE:  2018            INTERPRETATION:  CLINICAL INFORMATION: History of left big toe infection,   now presenting with dark-colored discharge and necrosis.    TECHNIQUE: 2 views of the left foot.    COMPARISON: X-ray left foot 2018.    IMPRESSION:     Again noted is soft tissue swelling of the distal aspect of the hallux   with adjacent cortical irregularity and lucency, suspicious for   osteomyelitis. No definite subcutaneous gas tracking. Joint spaces are   maintained. Vascular calcifications are noted. Chronic fracture deformity   of the distal fifth metatarsal. Plantar calcaneal enthesophyte.                CYNTHIA SANTACRUZ M.D., RADIOLOGY RESIDENT  This document has been electronically signed.  GRUPO MARCUS M.D., ATTENDING RADIOLOGIST  This document has been electronically signed. 2018  8:53AM              < end of copied text >

## 2018-02-27 NOTE — PROGRESS NOTE ADULT - ASSESSMENT
LEFT hallux osteomyelitis    Plan:  ·	Pt seen and evaluated  ·	XR consistent with acute OM.  ·	awaiting culture  ·	dressed w/ betadine and DSD  ·	per vasc team likely no intervention due to recent cardiac stents  ·	no emergent pod surg intervention   ·	seen w/ attending  ·	will follow LEFT hallux osteomyelitis    Plan:  ·	Pt seen and evaluated  ·	Xrays reviewed, consistent with acute OM.  ·	Wound culture pending  ·	dressed w/ betadine and DSD  ·	per vasc team likely no intervention due to recent cardiac stents  ·	no emergent pod surg intervention   ·	seen w/ attending  ·	will follow

## 2018-02-27 NOTE — CONSULT NOTE ADULT - ASSESSMENT
67 year old male with history of DM type II, etoh abuse and pancreatitis, CAD, s/p PCI, PVD admitted with worsening left foot infection     1. CAD s/p PCI  CV stable  recent echo revealing normal lv sys fx , minimal MR   s/p cardiac cath CANDIDO x 1pLAD, CANDIDO x 1 dCx, CANDIDO x 1 pOM1 (02/06) via right radial and RCA (2/6) via right radial  cont asa 81mg daily/ ticagrelor BID/ BB       2. PAD  await LE bypass  from cv standpoint , given recent PCI <1mo, surgical revascularization at this time would carry an increased cardiac risk (pt requires to remain on DAPT)  No surgical plan from vascular or podiatry at this time   will discuss timing of surgery w vascular    3. Foot ulcer/OM  -IV abx  -podiatry followup     dvt ppx

## 2018-02-27 NOTE — CONSULT NOTE ADULT - ASSESSMENT
Patient is a 67 year old male with a past medical history significant for DM type II, h/o EtOH abuse and  pancreatitis, Cad s/p stent,  PVD, presents with purulent drainage from the left great toe over the past week found to have dry gangrene of the distal lt great toe. + Leucocytosis, no fever. No active drainage rt now. Pt s/p angio by vascular on last admission.     Plan:  Continue with Zosyn 3.375 gm iv q8h  S/P one dose of vancomycin 1000 mg, no MRSA in prior cx, would hold off on redosing rt now.   Follow up blood culture  Podiatry on board,   Follow up wound cx  Will need amputation after optimized from cardiac standpoint and can be revascularized.   Vascular following  Continue with wound care.

## 2018-02-27 NOTE — CONSULT NOTE ADULT - SUBJECTIVE AND OBJECTIVE BOX
· HPI Objective Statement: 68yo male hx of CAD, left hallux osteomyelitis, and DM presenting with 3 days of pus from left hallux. Was seen by podiatry today who recommended IV antibiotics and ED presentation. Also complaining of pain in left foot for 2-3 days. Recently in the hospital with left hallux osteomyelitis, but was unable to get surgery because was found to have heart blockage per pt. Had 5 stents placed. Discharged on antiplatelet feb 9 and was supposed to follow-up with vascular surgery for potential surgery in future. No fevers, chills, other symptoms.	  · Chief Complaint: The patient is a 67y Male complaining of left toe infection	        PAST MEDICAL & SURGICAL HISTORY:  CAD (coronary artery disease)  Diabetes mellitus  Hypertension  Alcohol Dependency  Diabetes Mellitus  Stented coronary artery      MEDICATIONS  (STANDING):  vancomycin  IVPB 1000 milliGRAM(s) IV Intermittent once    MEDICATIONS  (PRN):      Allergies    No Known Allergies    Intolerances        VITALS:    Vital Signs Last 24 Hrs  T(C): 36.9 (27 Feb 2018 03:16), Max: 36.9 (27 Feb 2018 03:16)  T(F): 98.5 (27 Feb 2018 03:16), Max: 98.5 (27 Feb 2018 03:16)  HR: 97 (27 Feb 2018 03:16) (97 - 106)  BP: 145/86 (27 Feb 2018 03:16) (145/86 - 156/73)  BP(mean): --  RR: 16 (27 Feb 2018 03:16) (16 - 18)  SpO2: 98% (27 Feb 2018 03:16) (98% - 100%)    LABS:                          12.7   11.6  )-----------( 281      ( 27 Feb 2018 02:02 )             36.0       02-27    137  |  98  |  17  ----------------------------<  188<H>  4.2   |  25  |  0.75    Ca    9.7      27 Feb 2018 02:02    TPro  8.2  /  Alb  3.8  /  TBili  0.3  /  DBili  x   /  AST  10  /  ALT  7<L>  /  AlkPhos  71  02-27      CAPILLARY BLOOD GLUCOSE          PT/INR - ( 27 Feb 2018 02:02 )   PT: 11.6 sec;   INR: 1.06 ratio         PTT - ( 27 Feb 2018 02:02 )  PTT:32.6 sec    LOWER EXTREMITY PHYSICAL EXAM:    Vascular: DP/PT 0/4, B/L, Temperature gradient WNL, B/L.   Neuro: Epicritic sensation intact to the level of the foot, B/L.  Musculoskeletal/Ortho: No gross pedal deformities noted, B/L.   Skin: Dry scaly flaking skin to shins B/L.     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 scant purulence, no malodor, no periwound erythema or edema noted.     Wound #2 & 3:  L foot lateral 5th met head and shaft ulcers to subQ, fibrotic plug, no periwound erythema or edema, no purulence, no malodor    RADIOLOGY & ADDITIONAL STUDIES:    < from: Xray Foot AP + Lateral + Oblique, Left (02.27.18 @ 02:13) >  ******PRELIMINARY REPORT******    ******PRELIMINARY REPORT******          EXAM:  FOOT COMPLETE LEFT (MIN 3 VIEWS)                            PROCEDURE DATE:  02/27/2018      ******PRELIMINARY REPORT******    ******PRELIMINARY REPORT******              INTERPRETATION:  There is cortical heterogeneity and osteal lucency of   the tuft of the left first digit, not significantly changed as compared   to 1/29/2018. There is unchanged soft tissue heterogeneity and erosion   overlying the tuft. Findings are again concerning for acute   osteomyelitis. No definite subcutaneous gas tracking along the soft   tissues.              ******PRELIMINARY REPORT******    ******PRELIMINARY REPORT******          CYNTHIA SANTACRUZ M.D., RADIOLOGY RESIDENT    < end of copied text >

## 2018-02-28 DIAGNOSIS — I77.1 STRICTURE OF ARTERY: ICD-10-CM

## 2018-02-28 DIAGNOSIS — I96 GANGRENE, NOT ELSEWHERE CLASSIFIED: ICD-10-CM

## 2018-02-28 LAB
ANION GAP SERPL CALC-SCNC: 15 MMOL/L — SIGNIFICANT CHANGE UP (ref 5–17)
BUN SERPL-MCNC: 18 MG/DL — SIGNIFICANT CHANGE UP (ref 7–23)
CALCIUM SERPL-MCNC: 9.6 MG/DL — SIGNIFICANT CHANGE UP (ref 8.4–10.5)
CHLORIDE SERPL-SCNC: 97 MMOL/L — SIGNIFICANT CHANGE UP (ref 96–108)
CO2 SERPL-SCNC: 22 MMOL/L — SIGNIFICANT CHANGE UP (ref 22–31)
CREAT SERPL-MCNC: 0.83 MG/DL — SIGNIFICANT CHANGE UP (ref 0.5–1.3)
GLUCOSE BLDC GLUCOMTR-MCNC: 125 MG/DL — HIGH (ref 70–99)
GLUCOSE BLDC GLUCOMTR-MCNC: 129 MG/DL — HIGH (ref 70–99)
GLUCOSE BLDC GLUCOMTR-MCNC: 163 MG/DL — HIGH (ref 70–99)
GLUCOSE BLDC GLUCOMTR-MCNC: 164 MG/DL — HIGH (ref 70–99)
GLUCOSE BLDC GLUCOMTR-MCNC: 212 MG/DL — HIGH (ref 70–99)
GLUCOSE SERPL-MCNC: 101 MG/DL — HIGH (ref 70–99)
HCT VFR BLD CALC: 33.9 % — LOW (ref 39–50)
HGB BLD-MCNC: 11.5 G/DL — LOW (ref 13–17)
MCHC RBC-ENTMCNC: 30.1 PG — SIGNIFICANT CHANGE UP (ref 27–34)
MCHC RBC-ENTMCNC: 33.9 GM/DL — SIGNIFICANT CHANGE UP (ref 32–36)
MCV RBC AUTO: 88.7 FL — SIGNIFICANT CHANGE UP (ref 80–100)
PLATELET # BLD AUTO: 252 K/UL — SIGNIFICANT CHANGE UP (ref 150–400)
POTASSIUM SERPL-MCNC: 3.9 MMOL/L — SIGNIFICANT CHANGE UP (ref 3.5–5.3)
POTASSIUM SERPL-SCNC: 3.9 MMOL/L — SIGNIFICANT CHANGE UP (ref 3.5–5.3)
RBC # BLD: 3.82 M/UL — LOW (ref 4.2–5.8)
RBC # FLD: 13 % — SIGNIFICANT CHANGE UP (ref 10.3–14.5)
SODIUM SERPL-SCNC: 134 MMOL/L — LOW (ref 135–145)
WBC # BLD: 11.98 K/UL — HIGH (ref 3.8–10.5)
WBC # FLD AUTO: 11.98 K/UL — HIGH (ref 3.8–10.5)

## 2018-02-28 PROCEDURE — 99232 SBSQ HOSP IP/OBS MODERATE 35: CPT

## 2018-02-28 RX ORDER — VANCOMYCIN HCL 1 G
1000 VIAL (EA) INTRAVENOUS EVERY 12 HOURS
Qty: 0 | Refills: 0 | Status: DISCONTINUED | OUTPATIENT
Start: 2018-02-28 | End: 2018-03-01

## 2018-02-28 RX ADMIN — Medication 1 MILLIGRAM(S): at 13:07

## 2018-02-28 RX ADMIN — Medication 22 UNIT(S): at 13:04

## 2018-02-28 RX ADMIN — Medication 2: at 17:30

## 2018-02-28 RX ADMIN — Medication 250 MILLIGRAM(S): at 17:33

## 2018-02-28 RX ADMIN — TICAGRELOR 90 MILLIGRAM(S): 90 TABLET ORAL at 05:20

## 2018-02-28 RX ADMIN — Medication 81 MILLIGRAM(S): at 13:05

## 2018-02-28 RX ADMIN — INSULIN GLARGINE 23 UNIT(S): 100 INJECTION, SOLUTION SUBCUTANEOUS at 08:48

## 2018-02-28 RX ADMIN — HEPARIN SODIUM 5000 UNIT(S): 5000 INJECTION INTRAVENOUS; SUBCUTANEOUS at 05:20

## 2018-02-28 RX ADMIN — PIPERACILLIN AND TAZOBACTAM 25 GRAM(S): 4; .5 INJECTION, POWDER, LYOPHILIZED, FOR SOLUTION INTRAVENOUS at 02:18

## 2018-02-28 RX ADMIN — GABAPENTIN 300 MILLIGRAM(S): 400 CAPSULE ORAL at 17:30

## 2018-02-28 RX ADMIN — Medication 25 MILLIGRAM(S): at 05:20

## 2018-02-28 RX ADMIN — TICAGRELOR 90 MILLIGRAM(S): 90 TABLET ORAL at 17:30

## 2018-02-28 RX ADMIN — Medication 22 UNIT(S): at 17:30

## 2018-02-28 RX ADMIN — HEPARIN SODIUM 5000 UNIT(S): 5000 INJECTION INTRAVENOUS; SUBCUTANEOUS at 17:31

## 2018-02-28 RX ADMIN — GABAPENTIN 300 MILLIGRAM(S): 400 CAPSULE ORAL at 05:20

## 2018-02-28 RX ADMIN — INSULIN GLARGINE 58 UNIT(S): 100 INJECTION, SOLUTION SUBCUTANEOUS at 22:57

## 2018-02-28 RX ADMIN — Medication 1 TABLET(S): at 13:06

## 2018-02-28 RX ADMIN — PREGABALIN 1000 MICROGRAM(S): 225 CAPSULE ORAL at 13:06

## 2018-02-28 RX ADMIN — Medication 22 UNIT(S): at 08:47

## 2018-02-28 RX ADMIN — PIPERACILLIN AND TAZOBACTAM 25 GRAM(S): 4; .5 INJECTION, POWDER, LYOPHILIZED, FOR SOLUTION INTRAVENOUS at 11:17

## 2018-02-28 NOTE — PROGRESS NOTE ADULT - ASSESSMENT
67 y old male w multilevel art insuff and  left toe 1 tip ischemic progression s/p recent card cath w ptca/stent     pt need risk stratification by cardiology  due to recent cardiac ptca/stent  for upcoming lle bypass revasc if le toe amp is needed by podiatry   will foolow  above d/w daughter  jeffrey via tel    will follow

## 2018-02-28 NOTE — PROGRESS NOTE ADULT - SUBJECTIVE AND OBJECTIVE BOX
Patient is a 67y old  Male who presents with a chief complaint of Worsened left foot infection (27 Feb 2018 05:00)      Vascular Surgery Attending Progress Note    Interval HPI: pt w/o new c/o     Medications:  aspirin enteric coated 81 milliGRAM(s) Oral daily  cyanocobalamin 1000 MICROGram(s) Oral daily  dextrose 5%. 1000 milliLiter(s) IV Continuous <Continuous>  dextrose 50% Injectable 25 Gram(s) IV Push once  dextrose Gel 1 Dose(s) Oral once PRN  folic acid 1 milliGRAM(s) Oral daily  gabapentin 300 milliGRAM(s) Oral two times a day  glucagon  Injectable 1 milliGRAM(s) IntraMuscular once PRN  heparin  Injectable 5000 Unit(s) SubCutaneous every 12 hours  insulin glargine Injectable (LANTUS) 23 Unit(s) SubCutaneous every morning  insulin glargine Injectable (LANTUS) 58 Unit(s) SubCutaneous at bedtime  insulin lispro (HumaLOG) corrective regimen sliding scale   SubCutaneous three times a day before meals  insulin lispro (HumaLOG) corrective regimen sliding scale   SubCutaneous at bedtime  insulin lispro Injectable (HumaLOG) 22 Unit(s) SubCutaneous three times a day before meals  metoprolol succinate ER 25 milliGRAM(s) Oral daily  multivitamin 1 Tablet(s) Oral daily  ticagrelor 90 milliGRAM(s) Oral two times a day  vancomycin  IVPB 1000 milliGRAM(s) IV Intermittent every 12 hours      Vital Signs Last 24 Hrs  T(C): 37.8 (28 Feb 2018 20:29), Max: 37.8 (28 Feb 2018 20:29)  T(F): 100.1 (28 Feb 2018 20:29), Max: 100.1 (28 Feb 2018 20:29)  HR: 95 (28 Feb 2018 20:29) (78 - 98)  BP: 131/70 (28 Feb 2018 20:29) (104/64 - 132/74)  BP(mean): --  RR: 18 (28 Feb 2018 20:29) (18 - 18)  SpO2: 93% (28 Feb 2018 20:29) (93% - 98%)  I&O's Summary    27 Feb 2018 07:01  -  28 Feb 2018 07:00  --------------------------------------------------------  IN: 100 mL / OUT: 900 mL / NET: -800 mL    28 Feb 2018 07:01  -  28 Feb 2018 20:39  --------------------------------------------------------  IN: 480 mL / OUT: 0 mL / NET: 480 mL        Physical Exam:  Neuro  A&Ox2 VSS  Vascular:  lt toe 1 tip ischemia stable no further progression   Musculoskeletal: wnl     LABS:                        11.5   11.98 )-----------( 252      ( 28 Feb 2018 07:33 )             33.9     02-28    134<L>  |  97  |  18  ----------------------------<  101<H>  3.9   |  22  |  0.83    Ca    9.6      28 Feb 2018 07:01    TPro  8.7<H>  /  Alb  4.0  /  TBili  0.5  /  DBili  x   /  AST  14  /  ALT  6<L>  /  AlkPhos  79  02-27    PT/INR - ( 27 Feb 2018 02:02 )   PT: 11.6 sec;   INR: 1.06 ratio         PTT - ( 27 Feb 2018 02:02 )  PTT:32.6 sec    VIVIENNE SKAGGS MD  [phone]

## 2018-02-28 NOTE — PROGRESS NOTE ADULT - ASSESSMENT
Problem/Plan - 1:  ·  Problem: Diabetic foot infection.  Plan: patient seen by podiatry , s/p wound culture , will continue IV antibiotics   - Continue  antibiotics as per ID  - Podiatry consult appreciated   - vascular consult appreciated   - management as per daysi    Problem/Plan - 2:  ·  Problem: Diabetes mellitus.  Plan: - Lantus bid   - Lispro w/meals   - Insulin correction scale   - consistent carb diet.    Problem/Plan - 3:  ·  Problem: CAD (coronary artery disease).  Plan: - continue ASA and Brilinta   -  continue metoprolol   -  continue statin.     Problem/Plan - 4:  ·  Problem: H/O alcohol abuse.  Plan: Per wife no longer drinking.monitor for any withdrawal    Problem/Plan - 5:  ·  Problem: Need for prophylactic measure.  Plan: subq heparin for DVT ppx.

## 2018-02-28 NOTE — PROGRESS NOTE ADULT - SUBJECTIVE AND OBJECTIVE BOX
Patient is a 67y old  Male who presents with a chief complaint of Worsened left foot infection (2018 05:00)      INTERVAL HPI/OVERNIGHT EVENTS:  T(C): 36.8 (18 @ 11:01), Max: 37.7 (18 @ 20:11)  HR: 78 (18 @ 12:36) (78 - 102)  BP: 122/75 (18 @ 12:36) (104/64 - 134/81)  RR: 18 (18 @ 11:01) (18 - 18)  SpO2: 98% (18 @ 11:01) (95% - 98%)  Wt(kg): --  I&O's Summary    2018 07:  -  2018 07:00  --------------------------------------------------------  IN: 100 mL / OUT: 900 mL / NET: -800 mL    2018 07:01  -  2018 18:59  --------------------------------------------------------  IN: 480 mL / OUT: 0 mL / NET: 480 mL        LABS:                        11.5   11.98 )-----------( 252      ( 2018 07:33 )             33.9         134<L>  |  97  |  18  ----------------------------<  101<H>  3.9   |  22  |  0.83    Ca    9.6      2018 07:01    TPro  8.7<H>  /  Alb  4.0  /  TBili  0.5  /  DBili  x   /  AST  14  /  ALT  6<L>  /  AlkPhos  79      PT/INR - ( 2018 02:02 )   PT: 11.6 sec;   INR: 1.06 ratio         PTT - ( 2018 02:02 )  PTT:32.6 sec  Urinalysis Basic - ( 2018 03:04 )    Color: Yellow / Appearance: SL Turbid / S.017 / pH: x  Gluc: x / Ketone: Negative  / Bili: Negative / Urobili: Negative   Blood: x / Protein: Trace / Nitrite: Negative   Leuk Esterase: Small / RBC: 0-2 /HPF / WBC 6-10 /HPF   Sq Epi: x / Non Sq Epi: MOD /HPF / Bacteria: x      CAPILLARY BLOOD GLUCOSE      POCT Blood Glucose.: 212 mg/dL (2018 17:28)  POCT Blood Glucose.: 129 mg/dL (2018 12:25)  POCT Blood Glucose.: 125 mg/dL (2018 08:17)  POCT Blood Glucose.: 143 mg/dL (2018 21:36)        Urinalysis Basic - ( 2018 03:04 )    Color: Yellow / Appearance: SL Turbid / S.017 / pH: x  Gluc: x / Ketone: Negative  / Bili: Negative / Urobili: Negative   Blood: x / Protein: Trace / Nitrite: Negative   Leuk Esterase: Small / RBC: 0-2 /HPF / WBC 6-10 /HPF   Sq Epi: x / Non Sq Epi: MOD /HPF / Bacteria: x        MEDICATIONS  (STANDING):  aspirin enteric coated 81 milliGRAM(s) Oral daily  cyanocobalamin 1000 MICROGram(s) Oral daily  dextrose 5%. 1000 milliLiter(s) (50 mL/Hr) IV Continuous <Continuous>  dextrose 50% Injectable 25 Gram(s) IV Push once  folic acid 1 milliGRAM(s) Oral daily  gabapentin 300 milliGRAM(s) Oral two times a day  heparin  Injectable 5000 Unit(s) SubCutaneous every 12 hours  insulin glargine Injectable (LANTUS) 23 Unit(s) SubCutaneous every morning  insulin glargine Injectable (LANTUS) 58 Unit(s) SubCutaneous at bedtime  insulin lispro (HumaLOG) corrective regimen sliding scale   SubCutaneous three times a day before meals  insulin lispro (HumaLOG) corrective regimen sliding scale   SubCutaneous at bedtime  insulin lispro Injectable (HumaLOG) 22 Unit(s) SubCutaneous three times a day before meals  metoprolol succinate ER 25 milliGRAM(s) Oral daily  multivitamin 1 Tablet(s) Oral daily  ticagrelor 90 milliGRAM(s) Oral two times a day  vancomycin  IVPB 1000 milliGRAM(s) IV Intermittent every 12 hours    MEDICATIONS  (PRN):  dextrose Gel 1 Dose(s) Oral once PRN Blood Glucose LESS THAN 70 milliGRAM(s)/deciliter  glucagon  Injectable 1 milliGRAM(s) IntraMuscular once PRN Glucose LESS THAN 70 milligrams/deciliter      REVIEW OF SYSTEMS:  CONSTITUTIONAL: No fever, weight loss, or fatigue  EYES: No eye pain, visual disturbances, or discharge  ENMT:  No difficulty hearing, tinnitus, vertigo; No sinus or throat pain  NECK: No pain or stiffness  RESPIRATORY: No cough, wheezing, chills or hemoptysis; No shortness of breath  CARDIOVASCULAR: No chest pain, palpitations, dizziness, or leg swelling  GASTROINTESTINAL: No abdominal or epigastric pain. No nausea, vomiting, or hematemesis; No diarrhea or constipation. No melena or hematochezia.  GENITOURINARY: No dysuria, frequency, hematuria, or incontinence  NEUROLOGICAL: No headaches, memory loss, loss of strength, numbness, or tremors  SKIN: No itching, burning, rashes, or lesions   LYMPH NODES: No enlarged glands  ENDOCRINE: No heat or cold intolerance; No hair loss  MUSCULOSKELETAL: No joint pain or swelling; No muscle, back, or extremity pain  PSYCHIATRIC: No depression, anxiety, mood swings, or difficulty sleeping  HEME/LYMPH: No easy bruising, or bleeding gums  ALLERY AND IMMUNOLOGIC: No hives or eczema    RADIOLOGY & ADDITIONAL TESTS:    Imaging Personally Reviewed:  [ ] YES  [ ] NO    Consultant(s) Notes Reviewed:  [ ] YES  [ ] NO    PHYSICAL EXAM:  GENERAL: NAD, well-groomed, well-developed  HEAD:  Atraumatic, Normocephalic  EYES: EOMI, PERRLA, conjunctiva and sclera clear  ENMT: No tonsillar erythema, exudates, or enlargement; Moist mucous membranes, Good dentition, No lesions  NECK: Supple, No JVD, Normal thyroid  NERVOUS SYSTEM:  Alert & Oriented X3, Good concentration; Motor Strength 5/5 B/L upper and lower extremities; DTRs 2+ intact and symmetric  CHEST/LUNG: Clear to percussion bilaterally; No rales, rhonchi, wheezing, or rubs  HEART: Regular rate and rhythm; No murmurs, rubs, or gallops  ABDOMEN: Soft, Nontender, Nondistended; Bowel sounds present  EXTREMITIES:  foot dressing  LYMPH: No lymphadenopathy noted  SKIN: No rashes or lesions    Care Discussed with Consultants/Other Providers [ ] YES  [ ] NO

## 2018-02-28 NOTE — PROGRESS NOTE ADULT - SUBJECTIVE AND OBJECTIVE BOX
67y old  Male who presents with a chief complaint of Worsened left foot infection.       Interval history:  Afebrile, pain in the great toe. Otherwise feels well.       Antimicrobials:    vancomycin  IVPB 1000 milliGRAM(s) IV Intermittent every 12 hours    REVIEW OF SYSTEMS:    No chest pain or palpitations  No cough, no SOB  No N/V/D, no abdominal pain  No dysuria or frequency  No rash.     Vital Signs Last 24 Hrs  T(C): 36.8 (02-28-18 @ 11:01), Max: 37.7 (02-27-18 @ 20:11)  T(F): 98.2 (02-28-18 @ 11:01), Max: 99.8 (02-27-18 @ 20:11)  HR: 78 (02-28-18 @ 12:36) (78 - 102)  BP: 122/75 (02-28-18 @ 12:36) (104/64 - 134/81)  RR: 18 (02-28-18 @ 11:01) (18 - 18)  SpO2: 98% (02-28-18 @ 11:01) (95% - 99%)    PHYSICAL EXAM:  Patient in no acute distress. AAOX3.  Cardiovascular: S1S2 normal.  Lungs: Good air entry B/L lung fields.  Gastrointestinal: soft, nontender, nondistended.  Extremities: no edema. No warmth, no active drainage from the toe at this time.   IV sites not inflamed.                           11.5   11.98 )-----------( 252      ( 28 Feb 2018 07:33 )             33.9   02-28    134<L>  |  97  |  18  ----------------------------<  101<H>  3.9   |  22  |  0.83    Ca    9.6      28 Feb 2018 07:01    TPro  8.7<H>  /  Alb  4.0  /  TBili  0.5  /  DBili  x   /  AST  14  /  ALT  6<L>  /  AlkPhos  79  02-27      LIVER FUNCTIONS - ( 27 Feb 2018 11:34 )  Alb: 4.0 g/dL / Pro: 8.7 g/dL / ALK PHOS: 79 U/L / ALT: 6 U/L / AST: 14 U/L / GGT: x               Culture - Surgical Swab (collected 27 Feb 2018 06:42)  Source: .Surgical Swab Other, left footno tissue in swab  Preliminary Report (28 Feb 2018 08:37):    Numerous Staphylococcus aureus Susceptibility to follow.    Culture - Blood (collected 27 Feb 2018 04:58)  Source: .Blood Blood-Venous  Preliminary Report (28 Feb 2018 05:01):    No growth to date.    Culture - Blood (collected 27 Feb 2018 04:58)  Source: .Blood Blood-Peripheral  Preliminary Report (28 Feb 2018 05:01):    No growth to date.

## 2018-02-28 NOTE — PROGRESS NOTE ADULT - SUBJECTIVE AND OBJECTIVE BOX
CARDIOLOGY FOLLOW UP - Dr. Gavin    CC no chest pain or sob       PHYSICAL EXAM:  T(C): 36.8 (02-28-18 @ 11:01), Max: 37.7 (02-27-18 @ 20:11)  HR: 78 (02-28-18 @ 12:36) (78 - 102)  BP: 122/75 (02-28-18 @ 12:36) (104/64 - 134/81)  RR: 18 (02-28-18 @ 11:01) (18 - 18)  SpO2: 98% (02-28-18 @ 11:01) (95% - 98%)  Wt(kg): --  I&O's Summary    27 Feb 2018 07:01  -  28 Feb 2018 07:00  --------------------------------------------------------  IN: 100 mL / OUT: 900 mL / NET: -800 mL    28 Feb 2018 07:01  -  28 Feb 2018 15:39  --------------------------------------------------------  IN: 480 mL / OUT: 0 mL / NET: 480 mL        Appearance: Normal	  Cardiovascular: Normal S1 S2,RRR, No JVD, No murmurs  Respiratory: Lungs clear to auscultation	  Gastrointestinal:  Soft, Non-tender, + BS	  Extremities: Normal range of motion, No clubbing, cyanosis or edema        MEDICATIONS  (STANDING):  aspirin enteric coated 81 milliGRAM(s) Oral daily  cyanocobalamin 1000 MICROGram(s) Oral daily  dextrose 5%. 1000 milliLiter(s) (50 mL/Hr) IV Continuous <Continuous>  dextrose 50% Injectable 25 Gram(s) IV Push once  folic acid 1 milliGRAM(s) Oral daily  gabapentin 300 milliGRAM(s) Oral two times a day  heparin  Injectable 5000 Unit(s) SubCutaneous every 12 hours  insulin glargine Injectable (LANTUS) 23 Unit(s) SubCutaneous every morning  insulin glargine Injectable (LANTUS) 58 Unit(s) SubCutaneous at bedtime  insulin lispro (HumaLOG) corrective regimen sliding scale   SubCutaneous three times a day before meals  insulin lispro (HumaLOG) corrective regimen sliding scale   SubCutaneous at bedtime  insulin lispro Injectable (HumaLOG) 22 Unit(s) SubCutaneous three times a day before meals  metoprolol succinate ER 25 milliGRAM(s) Oral daily  multivitamin 1 Tablet(s) Oral daily  ticagrelor 90 milliGRAM(s) Oral two times a day  vancomycin  IVPB 1000 milliGRAM(s) IV Intermittent every 12 hours      TELEMETRY: 	    ECG:  	  RADIOLOGY:   DIAGNOSTIC TESTING:  [ ] Echocardiogram:  [ ]  Catheterization:  [ ] Stress Test:    OTHER: 	    LABS:	 	                                11.5   11.98 )-----------( 252      ( 28 Feb 2018 07:33 )             33.9     02-28    134<L>  |  97  |  18  ----------------------------<  101<H>  3.9   |  22  |  0.83    Ca    9.6      28 Feb 2018 07:01    TPro  8.7<H>  /  Alb  4.0  /  TBili  0.5  /  DBili  x   /  AST  14  /  ALT  6<L>  /  AlkPhos  79  02-27    PT/INR - ( 27 Feb 2018 02:02 )   PT: 11.6 sec;   INR: 1.06 ratio         PTT - ( 27 Feb 2018 02:02 )  PTT:32.6 sec

## 2018-02-28 NOTE — PROGRESS NOTE ADULT - ASSESSMENT
Patient is a 67 year old male with a past medical history significant for DM type II, h/o EtOH abuse and  pancreatitis, Cad s/p stent,  PVD, presents with purulent drainage from the left great toe over the past week found to have dry gangrene of the distal lt great toe. + Leucocytosis trending down, no fever. No active drainage rt now. Staph aureus in wound cx.     Plan:  Will switch Zosyn to Vanco 1 gm iv q12h.   Follow up prelim blood culture  Podiatry on board,   Follow up final identification of staph aureus.   Will need amputation after optimized from cardiac standpoint and can be revascularized.   Vascular following  Continue with wound care.

## 2018-02-28 NOTE — PROGRESS NOTE ADULT - ASSESSMENT
67 year old male with history of DM type II, etoh abuse and pancreatitis, CAD, s/p PCI, PVD admitted with worsening left foot infection     1. CAD s/p PCI  CV stable no chest pain or sob    recent echo revealing normal lv sys fx , minimal MR   recent cath performed in Feb 2018 ( CANDIDO x 1pLAD, CANDIDO x 1 dCx, CANDIDO x 1 pOM1 and RCA )  cont asa 81mg daily/ ticagrelor / BB       2. PAD  await LE bypass  from cv standpoint , given recent PCI <1mo, surgical revascularization at this time would carry an increased cardiac risk (pt requires to remain on DAPT)  No surgical plan from vascular or podiatry at this time     3. Foot ulcer/OM  IV abx  podiatry followup   ID f/u     dvt ppx

## 2018-03-01 LAB
-  AMPICILLIN/SULBACTAM: SIGNIFICANT CHANGE UP
-  CEFAZOLIN: SIGNIFICANT CHANGE UP
-  CIPROFLOXACIN: SIGNIFICANT CHANGE UP
-  CLINDAMYCIN: SIGNIFICANT CHANGE UP
-  ERYTHROMYCIN: SIGNIFICANT CHANGE UP
-  GENTAMICIN: SIGNIFICANT CHANGE UP
-  LEVOFLOXACIN: SIGNIFICANT CHANGE UP
-  MOXIFLOXACIN(AEROBIC): SIGNIFICANT CHANGE UP
-  OXACILLIN: SIGNIFICANT CHANGE UP
-  PENICILLIN: SIGNIFICANT CHANGE UP
-  RIFAMPIN: SIGNIFICANT CHANGE UP
-  TETRACYCLINE: SIGNIFICANT CHANGE UP
-  TRIMETHOPRIM/SULFAMETHOXAZOLE: SIGNIFICANT CHANGE UP
-  VANCOMYCIN: SIGNIFICANT CHANGE UP
ANION GAP SERPL CALC-SCNC: 17 MMOL/L — SIGNIFICANT CHANGE UP (ref 5–17)
BUN SERPL-MCNC: 16 MG/DL — SIGNIFICANT CHANGE UP (ref 7–23)
CALCIUM SERPL-MCNC: 9.8 MG/DL — SIGNIFICANT CHANGE UP (ref 8.4–10.5)
CHLORIDE SERPL-SCNC: 102 MMOL/L — SIGNIFICANT CHANGE UP (ref 96–108)
CO2 SERPL-SCNC: 23 MMOL/L — SIGNIFICANT CHANGE UP (ref 22–31)
CREAT SERPL-MCNC: 0.83 MG/DL — SIGNIFICANT CHANGE UP (ref 0.5–1.3)
GLUCOSE BLDC GLUCOMTR-MCNC: 128 MG/DL — HIGH (ref 70–99)
GLUCOSE BLDC GLUCOMTR-MCNC: 146 MG/DL — HIGH (ref 70–99)
GLUCOSE BLDC GLUCOMTR-MCNC: 208 MG/DL — HIGH (ref 70–99)
GLUCOSE BLDC GLUCOMTR-MCNC: 213 MG/DL — HIGH (ref 70–99)
GLUCOSE SERPL-MCNC: 64 MG/DL — LOW (ref 70–99)
HCT VFR BLD CALC: 33.9 % — LOW (ref 39–50)
HGB BLD-MCNC: 11.6 G/DL — LOW (ref 13–17)
MCHC RBC-ENTMCNC: 31 PG — SIGNIFICANT CHANGE UP (ref 27–34)
MCHC RBC-ENTMCNC: 34.2 GM/DL — SIGNIFICANT CHANGE UP (ref 32–36)
MCV RBC AUTO: 90.6 FL — SIGNIFICANT CHANGE UP (ref 80–100)
METHOD TYPE: SIGNIFICANT CHANGE UP
PLATELET # BLD AUTO: 247 K/UL — SIGNIFICANT CHANGE UP (ref 150–400)
POTASSIUM SERPL-MCNC: 3.6 MMOL/L — SIGNIFICANT CHANGE UP (ref 3.5–5.3)
POTASSIUM SERPL-SCNC: 3.6 MMOL/L — SIGNIFICANT CHANGE UP (ref 3.5–5.3)
RBC # BLD: 3.74 M/UL — LOW (ref 4.2–5.8)
RBC # FLD: 13.1 % — SIGNIFICANT CHANGE UP (ref 10.3–14.5)
SODIUM SERPL-SCNC: 142 MMOL/L — SIGNIFICANT CHANGE UP (ref 135–145)
WBC # BLD: 13.26 K/UL — HIGH (ref 3.8–10.5)
WBC # FLD AUTO: 13.26 K/UL — HIGH (ref 3.8–10.5)

## 2018-03-01 PROCEDURE — 99232 SBSQ HOSP IP/OBS MODERATE 35: CPT

## 2018-03-01 RX ORDER — CEFAZOLIN SODIUM 1 G
2000 VIAL (EA) INJECTION EVERY 8 HOURS
Qty: 0 | Refills: 0 | Status: DISCONTINUED | OUTPATIENT
Start: 2018-03-01 | End: 2018-03-06

## 2018-03-01 RX ADMIN — Medication 25 MILLIGRAM(S): at 07:08

## 2018-03-01 RX ADMIN — Medication 22 UNIT(S): at 09:06

## 2018-03-01 RX ADMIN — TICAGRELOR 90 MILLIGRAM(S): 90 TABLET ORAL at 18:32

## 2018-03-01 RX ADMIN — Medication 1 MILLIGRAM(S): at 12:17

## 2018-03-01 RX ADMIN — Medication 1 TABLET(S): at 12:17

## 2018-03-01 RX ADMIN — Medication 22 UNIT(S): at 12:51

## 2018-03-01 RX ADMIN — Medication 22 UNIT(S): at 18:32

## 2018-03-01 RX ADMIN — Medication 81 MILLIGRAM(S): at 12:17

## 2018-03-01 RX ADMIN — Medication 100 MILLIGRAM(S): at 21:04

## 2018-03-01 RX ADMIN — PREGABALIN 1000 MICROGRAM(S): 225 CAPSULE ORAL at 12:17

## 2018-03-01 RX ADMIN — HEPARIN SODIUM 5000 UNIT(S): 5000 INJECTION INTRAVENOUS; SUBCUTANEOUS at 07:08

## 2018-03-01 RX ADMIN — INSULIN GLARGINE 58 UNIT(S): 100 INJECTION, SOLUTION SUBCUTANEOUS at 22:56

## 2018-03-01 RX ADMIN — TICAGRELOR 90 MILLIGRAM(S): 90 TABLET ORAL at 07:07

## 2018-03-01 RX ADMIN — HEPARIN SODIUM 5000 UNIT(S): 5000 INJECTION INTRAVENOUS; SUBCUTANEOUS at 18:32

## 2018-03-01 RX ADMIN — Medication 250 MILLIGRAM(S): at 07:07

## 2018-03-01 RX ADMIN — INSULIN GLARGINE 23 UNIT(S): 100 INJECTION, SOLUTION SUBCUTANEOUS at 09:10

## 2018-03-01 RX ADMIN — GABAPENTIN 300 MILLIGRAM(S): 400 CAPSULE ORAL at 18:32

## 2018-03-01 RX ADMIN — Medication 2: at 12:50

## 2018-03-01 RX ADMIN — GABAPENTIN 300 MILLIGRAM(S): 400 CAPSULE ORAL at 07:08

## 2018-03-01 NOTE — PROGRESS NOTE ADULT - ATTENDING COMMENTS
Patient seen and examined.  Agree with above NP note.  cv stable  await le bypass and pod surgery   will d.w with vascular surgery regarding timing  if needed this admit for tissue salvage, patient is as cardiac optimized as can be expected and can proceed with intermediate cardiac risk   he was fully revascularized with recent multivessel pci in early 2/2018 and has normal LV function  performing surgery next week vs delaying 1-2 more weeks further will not likely change overall cardiac risk  ASA 81 will be continued without interruption  and brilinta will be held 5 days if there is a definitive plan for le bypass  will d/w further with dr ruby.

## 2018-03-01 NOTE — PROGRESS NOTE ADULT - ASSESSMENT
67 year old male with history of DM type II, etoh abuse and pancreatitis, CAD, s/p PCI, PVD admitted with worsening left foot infection     1. CAD s/p PCI  CV stable no chest pain or sob    recent echo revealing normal lv sys fx , minimal MR   recent cath performed in Feb 2018 ( CANDIDO x 1pLAD, CANDIDO x 1 dCx, CANDIDO x 1 pOM1 and RCA )  cont asa 81mg daily/ ticagrelor / BB     2. PAD  await LE bypass  from cv standpoint , given recent PCI <1mo, surgical revascularization at this time would carry an increased cardiac risk (pt requires to remain on DAPT)    3. Foot ulcer/OM  IV abx  podiatry / vascular followup   ID f/u     dvt ppx

## 2018-03-01 NOTE — PROGRESS NOTE ADULT - ASSESSMENT
Patient is a 67 year old male with a past medical history significant for DM type II, h/o EtOH abuse and  pancreatitis, Cad s/p stent,  PVD, presents with purulent drainage from the left great toe over the past week found to have dry gangrene of the distal lt great toe. + Leucocytosis trending down, no fever. No active drainage rt now. Staph aureus in wound cx.     Culture is staph aurues  Change vanco to cefazolin    Await further plan from podiatry/ vascular regarding possible revascularization/ amputation.

## 2018-03-01 NOTE — PROGRESS NOTE ADULT - ASSESSMENT
LEFT hallux osteomyelitis    Plan:  ·	Pt seen and evaluated  ·	Xrays reviewed, consistent with acute OM.  ·	Wound culture pending  ·	dressed w/ betadine and DSD  ·	per vasc patient needs cardiac risk stratification due to recent cardiac ptca/stent for upcoming LLE bypass  ·	Following bypass patient will require toe amputation.  ·	Will cont to follow.

## 2018-03-01 NOTE — PROGRESS NOTE ADULT - ASSESSMENT
Problem/Plan - 1:  ·  Problem: Diabetic foot infection.  Plan: patient seen by podiatry , s/p wound culture , will continue IV antibiotics   - Continue  antibiotics as per ID  - Podiatry consult appreciated , will likely need toe amputation  - vascular consult appreciated   - management as per daysi. will need bypass after cardio clearance    Problem/Plan - 2:  ·  Problem: Diabetes mellitus.  Plan: - Lantus bid   - Lispro w/meals   - Insulin correction scale   - consistent carb diet.    Problem/Plan - 3:  ·  Problem: CAD (coronary artery disease).  Plan: - continue ASA and Brilinta   -  continue metoprolol   -  continue statin.     Problem/Plan - 4:  ·  Problem: H/O alcohol abuse.  Plan: Per wife no longer drinking. monitor for any withdrawal    Problem/Plan - 5:  ·  Problem: Need for prophylactic measure.  Plan: subq heparin for DVT ppx.

## 2018-03-01 NOTE — PROGRESS NOTE ADULT - SUBJECTIVE AND OBJECTIVE BOX
CARDIOLOGY FOLLOW UP - Dr. Gavin    CC no chest pain or sob       PHYSICAL EXAM:  T(C): 37 (03-01-18 @ 12:00), Max: 37.8 (02-28-18 @ 20:29)  HR: 83 (03-01-18 @ 12:00) (83 - 95)  BP: 104/61 (03-01-18 @ 12:00) (104/61 - 146/66)  RR: 18 (03-01-18 @ 12:00) (18 - 18)  SpO2: 96% (03-01-18 @ 12:00) (93% - 97%)  Wt(kg): --  I&O's Summary    28 Feb 2018 07:01  -  01 Mar 2018 07:00  --------------------------------------------------------  IN: 480 mL / OUT: 0 mL / NET: 480 mL    01 Mar 2018 07:01  -  01 Mar 2018 16:01  --------------------------------------------------------  IN: 850 mL / OUT: 0 mL / NET: 850 mL        Appearance: Normal	  Cardiovascular: Normal S1 S2,RRR, No JVD, No murmurs  Respiratory: Lungs clear to auscultation	  Gastrointestinal:  Soft, Non-tender, + BS	  Extremities: Normal range of motion, No clubbing, cyanosis or edema        MEDICATIONS  (STANDING):  aspirin enteric coated 81 milliGRAM(s) Oral daily  ceFAZolin   IVPB 2000 milliGRAM(s) IV Intermittent every 8 hours  cyanocobalamin 1000 MICROGram(s) Oral daily  dextrose 5%. 1000 milliLiter(s) (50 mL/Hr) IV Continuous <Continuous>  dextrose 50% Injectable 25 Gram(s) IV Push once  folic acid 1 milliGRAM(s) Oral daily  gabapentin 300 milliGRAM(s) Oral two times a day  heparin  Injectable 5000 Unit(s) SubCutaneous every 12 hours  insulin glargine Injectable (LANTUS) 23 Unit(s) SubCutaneous every morning  insulin glargine Injectable (LANTUS) 58 Unit(s) SubCutaneous at bedtime  insulin lispro (HumaLOG) corrective regimen sliding scale   SubCutaneous three times a day before meals  insulin lispro (HumaLOG) corrective regimen sliding scale   SubCutaneous at bedtime  insulin lispro Injectable (HumaLOG) 22 Unit(s) SubCutaneous three times a day before meals  metoprolol succinate ER 25 milliGRAM(s) Oral daily  multivitamin 1 Tablet(s) Oral daily  ticagrelor 90 milliGRAM(s) Oral two times a day      TELEMETRY: 	    ECG:  	  RADIOLOGY:   DIAGNOSTIC TESTING:  [ ] Echocardiogram:  [ ]  Catheterization:  [ ] Stress Test:    OTHER: 	    LABS:	 	                                11.6   13.26 )-----------( 247      ( 01 Mar 2018 09:45 )             33.9     03-01    142  |  102  |  16  ----------------------------<  64<L>  3.6   |  23  |  0.83    Ca    9.8      01 Mar 2018 08:00

## 2018-03-01 NOTE — PROGRESS NOTE ADULT - SUBJECTIVE AND OBJECTIVE BOX
Patient is a 67y old  Male who presents with a chief complaint of Worsened left foot infection (27 Feb 2018 05:00)       INTERVAL HPI/OVERNIGHT EVENTS:  Patient seen and evaluated at bedside.  Pt is resting comfortable in NAD. Denies N/V/F/C.  Pain rated at X/10    Allergies    No Known Allergies    Intolerances        Vital Signs Last 24 Hrs  T(C): 36.7 (01 Mar 2018 05:49), Max: 37.8 (28 Feb 2018 20:29)  T(F): 98.1 (01 Mar 2018 05:49), Max: 100.1 (28 Feb 2018 20:29)  HR: 87 (01 Mar 2018 05:49) (78 - 95)  BP: 146/66 (01 Mar 2018 05:49) (122/75 - 146/66)  BP(mean): --  RR: 18 (01 Mar 2018 05:49) (18 - 18)  SpO2: 97% (01 Mar 2018 05:49) (93% - 98%)    LABS:                        11.5   11.98 )-----------( 252      ( 28 Feb 2018 07:33 )             33.9     02-28    134<L>  |  97  |  18  ----------------------------<  101<H>  3.9   |  22  |  0.83    Ca    9.6      28 Feb 2018 07:01    TPro  8.7<H>  /  Alb  4.0  /  TBili  0.5  /  DBili  x   /  AST  14  /  ALT  6<L>  /  AlkPhos  79  02-27        CAPILLARY BLOOD GLUCOSE      POCT Blood Glucose.: 163 mg/dL (28 Feb 2018 22:53)  POCT Blood Glucose.: 164 mg/dL (28 Feb 2018 22:12)  POCT Blood Glucose.: 212 mg/dL (28 Feb 2018 17:28)  POCT Blood Glucose.: 129 mg/dL (28 Feb 2018 12:25)  POCT Blood Glucose.: 125 mg/dL (28 Feb 2018 08:17)      Lower Extremity Physical Exam:  Vascular: DP/PT 0/4, B/L, Temperature gradient WNL, B/L.   Neuro: Epicritic sensation intact to the level of the foot, B/L.  Musculoskeletal/Ortho: No gross pedal deformities noted, B/L.   Skin: Dry scaly flaking skin to shins B/L.   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 scant purulence, no malodor, no periwound erythema or edema noted.   Wound #2 & 3:  L foot lateral 5th met head and shaft ulcers to subQ with a fibrotic plug, no periwound erythema or edema, no purulence, no malodor    RADIOLOGY & ADDITIONAL TESTS:

## 2018-03-01 NOTE — PROGRESS NOTE ADULT - SUBJECTIVE AND OBJECTIVE BOX
Follow Up:      Inverval History/ROS:Patient is a 67y old  Male who presents with a chief complaint of Worsened left foot infection (27 Feb 2018 05:00)    No fever. no chills.       Allergies    No Known Allergies    Intolerances        ANTIMICROBIALS:  vancomycin  IVPB 1000 every 12 hours      OTHER MEDS:  aspirin enteric coated 81 milliGRAM(s) Oral daily  cyanocobalamin 1000 MICROGram(s) Oral daily  dextrose 5%. 1000 milliLiter(s) IV Continuous <Continuous>  dextrose 50% Injectable 25 Gram(s) IV Push once  dextrose Gel 1 Dose(s) Oral once PRN  folic acid 1 milliGRAM(s) Oral daily  gabapentin 300 milliGRAM(s) Oral two times a day  glucagon  Injectable 1 milliGRAM(s) IntraMuscular once PRN  heparin  Injectable 5000 Unit(s) SubCutaneous every 12 hours  insulin glargine Injectable (LANTUS) 23 Unit(s) SubCutaneous every morning  insulin glargine Injectable (LANTUS) 58 Unit(s) SubCutaneous at bedtime  insulin lispro (HumaLOG) corrective regimen sliding scale   SubCutaneous three times a day before meals  insulin lispro (HumaLOG) corrective regimen sliding scale   SubCutaneous at bedtime  insulin lispro Injectable (HumaLOG) 22 Unit(s) SubCutaneous three times a day before meals  metoprolol succinate ER 25 milliGRAM(s) Oral daily  multivitamin 1 Tablet(s) Oral daily  ticagrelor 90 milliGRAM(s) Oral two times a day      Vital Signs Last 24 Hrs  T(C): 37 (01 Mar 2018 12:00), Max: 37.8 (28 Feb 2018 20:29)  T(F): 98.6 (01 Mar 2018 12:00), Max: 100.1 (28 Feb 2018 20:29)  HR: 83 (01 Mar 2018 12:00) (83 - 95)  BP: 104/61 (01 Mar 2018 12:00) (104/61 - 146/66)  BP(mean): --  RR: 18 (01 Mar 2018 12:00) (18 - 18)  SpO2: 96% (01 Mar 2018 12:00) (93% - 97%)    PHYSICAL EXAM:  General: [x ] non-toxic  HEAD/EYES: [ ] PERRL [x ] white sclera [ ] icterus  ENT:  [ ] normal [x ] supple [ ] thrush [ ] pharyngeal exudate  Cardiovascular:   [ ] murmur [x ] normal [ ] PPM/AICD  Respiratory:  [x ] clear to ausculation bilaterally  GI:  [x ] soft, non-tender, normal bowel sounds  :  [ ] evans [ ] no CVA tenderness   Musculoskeletal:  [ ] no synovitis  Neurologic:  [ ] non-focal exam   Skin:  [x ] left first toe ischemic changes  Lymph: [ ] no lymphadenopathy  Psychiatric:  [ ] appropriate affect [ ] alert & oriented  Lines:  [x no phlebitis [ ] central line                                11.6   13.26 )-----------( 247      ( 01 Mar 2018 09:45 )             33.9       03-01    142  |  102  |  16  ----------------------------<  64<L>  3.6   |  23  |  0.83    Ca    9.8      01 Mar 2018 08:00            MICROBIOLOGY:Culture Results:   Numerous Staphylococcus aureus Susceptibility to follow. (02-27-18 @ 06:42)  Culture Results:   No growth to date. (02-27-18 @ 04:58)  Culture Results:   No growth to date. (02-27-18 @ 04:58)      RADIOLOGY:

## 2018-03-01 NOTE — PROGRESS NOTE ADULT - ASSESSMENT
67 y old male w multilevel art insuff and  left toe 1 tip ischemic progression s/p recent card cath w ptca/stent     pt need risk stratification by cardiology  due to recent cardiac ptca/stent  for upcoming lle bypass revasc if le toe amp is needed by podiatry   ro le vein mapping  await podiatry decision re toe amp before planning lle bypas revasc

## 2018-03-01 NOTE — PROGRESS NOTE ADULT - SUBJECTIVE AND OBJECTIVE BOX
Patient is a 67y old  Male who presents with a chief complaint of Worsened left foot infection (27 Feb 2018 05:00)      Vascular Surgery Attending Progress Note    Interval HPI: pt w/o c/o    Medications:  aspirin enteric coated 81 milliGRAM(s) Oral daily  cyanocobalamin 1000 MICROGram(s) Oral daily  dextrose 5%. 1000 milliLiter(s) IV Continuous <Continuous>  dextrose 50% Injectable 25 Gram(s) IV Push once  dextrose Gel 1 Dose(s) Oral once PRN  folic acid 1 milliGRAM(s) Oral daily  gabapentin 300 milliGRAM(s) Oral two times a day  glucagon  Injectable 1 milliGRAM(s) IntraMuscular once PRN  heparin  Injectable 5000 Unit(s) SubCutaneous every 12 hours  insulin glargine Injectable (LANTUS) 23 Unit(s) SubCutaneous every morning  insulin glargine Injectable (LANTUS) 58 Unit(s) SubCutaneous at bedtime  insulin lispro (HumaLOG) corrective regimen sliding scale   SubCutaneous three times a day before meals  insulin lispro (HumaLOG) corrective regimen sliding scale   SubCutaneous at bedtime  insulin lispro Injectable (HumaLOG) 22 Unit(s) SubCutaneous three times a day before meals  metoprolol succinate ER 25 milliGRAM(s) Oral daily  multivitamin 1 Tablet(s) Oral daily  ticagrelor 90 milliGRAM(s) Oral two times a day  vancomycin  IVPB 1000 milliGRAM(s) IV Intermittent every 12 hours      Vital Signs Last 24 Hrs  T(C): 36.7 (01 Mar 2018 05:49), Max: 37.8 (28 Feb 2018 20:29)  T(F): 98.1 (01 Mar 2018 05:49), Max: 100.1 (28 Feb 2018 20:29)  HR: 87 (01 Mar 2018 05:49) (78 - 95)  BP: 146/66 (01 Mar 2018 05:49) (122/75 - 146/66)  BP(mean): --  RR: 18 (01 Mar 2018 05:49) (18 - 18)  SpO2: 97% (01 Mar 2018 05:49) (93% - 98%)  I&O's Summary    28 Feb 2018 07:01  -  01 Mar 2018 07:00  --------------------------------------------------------  IN: 480 mL / OUT: 0 mL / NET: 480 mL    01 Mar 2018 07:01  -  01 Mar 2018 08:30  --------------------------------------------------------  IN: 250 mL / OUT: 0 mL / NET: 250 mL        Physical Exam:  Neuro  A&Ox2-3 VSS  Vascular: lt toe tip gang stable    Musculoskeletal: wnl    LABS:                        11.5   11.98 )-----------( 252      ( 28 Feb 2018 07:33 )             33.9     02-28    134<L>  |  97  |  18  ----------------------------<  101<H>  3.9   |  22  |  0.83    Ca    9.6      28 Feb 2018 07:01    TPro  8.7<H>  /  Alb  4.0  /  TBili  0.5  /  DBili  x   /  AST  14  /  ALT  6<L>  /  AlkPhos  79  02-27        VIVIENNE SKAGGS MD  [phone]

## 2018-03-01 NOTE — PROGRESS NOTE ADULT - SUBJECTIVE AND OBJECTIVE BOX
Patient is a 67y old  Male who presents with a chief complaint of Worsened left foot infection (27 Feb 2018 05:00)      SUBJECTIVE / OVERNIGHT EVENTS:  covering Dr Waller for today.  No chest pain. No shortness of breath. No complaints. No events overnight.     MEDICATIONS  (STANDING):  aspirin enteric coated 81 milliGRAM(s) Oral daily  cyanocobalamin 1000 MICROGram(s) Oral daily  dextrose 5%. 1000 milliLiter(s) (50 mL/Hr) IV Continuous <Continuous>  dextrose 50% Injectable 25 Gram(s) IV Push once  folic acid 1 milliGRAM(s) Oral daily  gabapentin 300 milliGRAM(s) Oral two times a day  heparin  Injectable 5000 Unit(s) SubCutaneous every 12 hours  insulin glargine Injectable (LANTUS) 23 Unit(s) SubCutaneous every morning  insulin glargine Injectable (LANTUS) 58 Unit(s) SubCutaneous at bedtime  insulin lispro (HumaLOG) corrective regimen sliding scale   SubCutaneous three times a day before meals  insulin lispro (HumaLOG) corrective regimen sliding scale   SubCutaneous at bedtime  insulin lispro Injectable (HumaLOG) 22 Unit(s) SubCutaneous three times a day before meals  metoprolol succinate ER 25 milliGRAM(s) Oral daily  multivitamin 1 Tablet(s) Oral daily  ticagrelor 90 milliGRAM(s) Oral two times a day  vancomycin  IVPB 1000 milliGRAM(s) IV Intermittent every 12 hours    MEDICATIONS  (PRN):  dextrose Gel 1 Dose(s) Oral once PRN Blood Glucose LESS THAN 70 milliGRAM(s)/deciliter  glucagon  Injectable 1 milliGRAM(s) IntraMuscular once PRN Glucose LESS THAN 70 milligrams/deciliter      Vital Signs Last 24 Hrs  T(C): 37 (01 Mar 2018 12:00), Max: 37.8 (28 Feb 2018 20:29)  T(F): 98.6 (01 Mar 2018 12:00), Max: 100.1 (28 Feb 2018 20:29)  HR: 83 (01 Mar 2018 12:00) (83 - 95)  BP: 104/61 (01 Mar 2018 12:00) (104/61 - 146/66)  BP(mean): --  RR: 18 (01 Mar 2018 12:00) (18 - 18)  SpO2: 96% (01 Mar 2018 12:00) (93% - 97%)  CAPILLARY BLOOD GLUCOSE      POCT Blood Glucose.: 213 mg/dL (01 Mar 2018 12:50)  POCT Blood Glucose.: 128 mg/dL (01 Mar 2018 08:38)  POCT Blood Glucose.: 163 mg/dL (28 Feb 2018 22:53)  POCT Blood Glucose.: 164 mg/dL (28 Feb 2018 22:12)  POCT Blood Glucose.: 212 mg/dL (28 Feb 2018 17:28)    I&O's Summary    28 Feb 2018 07:01  -  01 Mar 2018 07:00  --------------------------------------------------------  IN: 480 mL / OUT: 0 mL / NET: 480 mL    01 Mar 2018 07:01  -  01 Mar 2018 13:29  --------------------------------------------------------  IN: 850 mL / OUT: 0 mL / NET: 850 mL        PHYSICAL EXAM:  GENERAL: NAD, well-developed  HEAD:  Atraumatic, Normocephalic  EYES: EOMI, PERRLA, conjunctiva and sclera clear  NECK: Supple, No JVD  CHEST/LUNG: Clear to auscultation bilaterally; No wheeze  HEART: Regular rate and rhythm; No murmurs, rubs, or gallops  ABDOMEN: Soft, Nontender, Nondistended; Bowel sounds present  EXTREMITIES:  2+ Peripheral Pulses, No clubbing, cyanosis, or edema, foot dressing in place C/D/I  PSYCH: AAOx3  NEUROLOGY: non-focal  SKIN: No rashes or lesions    LABS:                        11.6   13.26 )-----------( 247      ( 01 Mar 2018 09:45 )             33.9     03-01    142  |  102  |  16  ----------------------------<  64<L>  3.6   |  23  |  0.83    Ca    9.8      01 Mar 2018 08:00                RADIOLOGY & ADDITIONAL TESTS:    Imaging Personally Reviewed:    Consultant(s) Notes Reviewed:      Care Discussed with Consultants/Other Providers:

## 2018-03-02 LAB
-  AMPICILLIN/SULBACTAM: SIGNIFICANT CHANGE UP
-  CEFAZOLIN: SIGNIFICANT CHANGE UP
-  CIPROFLOXACIN: SIGNIFICANT CHANGE UP
-  CLINDAMYCIN: SIGNIFICANT CHANGE UP
-  ERYTHROMYCIN: SIGNIFICANT CHANGE UP
-  GENTAMICIN: SIGNIFICANT CHANGE UP
-  LEVOFLOXACIN: SIGNIFICANT CHANGE UP
-  MOXIFLOXACIN(AEROBIC): SIGNIFICANT CHANGE UP
-  OXACILLIN: SIGNIFICANT CHANGE UP
-  RIFAMPIN: SIGNIFICANT CHANGE UP
-  TETRACYCLINE: SIGNIFICANT CHANGE UP
-  TRIMETHOPRIM/SULFAMETHOXAZOLE: SIGNIFICANT CHANGE UP
-  VANCOMYCIN: SIGNIFICANT CHANGE UP
GLUCOSE BLDC GLUCOMTR-MCNC: 110 MG/DL — HIGH (ref 70–99)
GLUCOSE BLDC GLUCOMTR-MCNC: 125 MG/DL — HIGH (ref 70–99)
GLUCOSE BLDC GLUCOMTR-MCNC: 87 MG/DL — SIGNIFICANT CHANGE UP (ref 70–99)
GLUCOSE BLDC GLUCOMTR-MCNC: 93 MG/DL — SIGNIFICANT CHANGE UP (ref 70–99)
HCT VFR BLD CALC: 34.5 % — LOW (ref 39–50)
HGB BLD-MCNC: 11.5 G/DL — LOW (ref 13–17)
MCHC RBC-ENTMCNC: 30.1 PG — SIGNIFICANT CHANGE UP (ref 27–34)
MCHC RBC-ENTMCNC: 33.3 GM/DL — SIGNIFICANT CHANGE UP (ref 32–36)
MCV RBC AUTO: 90.3 FL — SIGNIFICANT CHANGE UP (ref 80–100)
METHOD TYPE: SIGNIFICANT CHANGE UP
PLATELET # BLD AUTO: 247 K/UL — SIGNIFICANT CHANGE UP (ref 150–400)
RBC # BLD: 3.82 M/UL — LOW (ref 4.2–5.8)
RBC # FLD: 12.8 % — SIGNIFICANT CHANGE UP (ref 10.3–14.5)
WBC # BLD: 11.62 K/UL — HIGH (ref 3.8–10.5)
WBC # FLD AUTO: 11.62 K/UL — HIGH (ref 3.8–10.5)

## 2018-03-02 PROCEDURE — 99232 SBSQ HOSP IP/OBS MODERATE 35: CPT

## 2018-03-02 PROCEDURE — 93970 EXTREMITY STUDY: CPT | Mod: 26

## 2018-03-02 RX ADMIN — GABAPENTIN 300 MILLIGRAM(S): 400 CAPSULE ORAL at 19:15

## 2018-03-02 RX ADMIN — Medication 22 UNIT(S): at 13:07

## 2018-03-02 RX ADMIN — GABAPENTIN 300 MILLIGRAM(S): 400 CAPSULE ORAL at 05:15

## 2018-03-02 RX ADMIN — Medication 1 MILLIGRAM(S): at 11:43

## 2018-03-02 RX ADMIN — Medication 100 MILLIGRAM(S): at 22:15

## 2018-03-02 RX ADMIN — HEPARIN SODIUM 5000 UNIT(S): 5000 INJECTION INTRAVENOUS; SUBCUTANEOUS at 05:15

## 2018-03-02 RX ADMIN — HEPARIN SODIUM 5000 UNIT(S): 5000 INJECTION INTRAVENOUS; SUBCUTANEOUS at 19:15

## 2018-03-02 RX ADMIN — TICAGRELOR 90 MILLIGRAM(S): 90 TABLET ORAL at 05:15

## 2018-03-02 RX ADMIN — TICAGRELOR 90 MILLIGRAM(S): 90 TABLET ORAL at 19:15

## 2018-03-02 RX ADMIN — Medication 25 MILLIGRAM(S): at 05:15

## 2018-03-02 RX ADMIN — PREGABALIN 1000 MICROGRAM(S): 225 CAPSULE ORAL at 12:18

## 2018-03-02 RX ADMIN — Medication 81 MILLIGRAM(S): at 11:43

## 2018-03-02 RX ADMIN — Medication 22 UNIT(S): at 19:16

## 2018-03-02 RX ADMIN — Medication 100 MILLIGRAM(S): at 05:16

## 2018-03-02 RX ADMIN — Medication 100 MILLIGRAM(S): at 13:47

## 2018-03-02 RX ADMIN — Medication 22 UNIT(S): at 09:12

## 2018-03-02 RX ADMIN — INSULIN GLARGINE 23 UNIT(S): 100 INJECTION, SOLUTION SUBCUTANEOUS at 08:13

## 2018-03-02 RX ADMIN — INSULIN GLARGINE 58 UNIT(S): 100 INJECTION, SOLUTION SUBCUTANEOUS at 22:15

## 2018-03-02 RX ADMIN — Medication 1 TABLET(S): at 11:43

## 2018-03-02 NOTE — PROGRESS NOTE ADULT - SUBJECTIVE AND OBJECTIVE BOX
Patient is a 67y old  Male who presents with a chief complaint of Worsened left foot infection (27 Feb 2018 05:00)       INTERVAL HPI/OVERNIGHT EVENTS:  Patient seen and evaluated at bedside.  Pt is resting comfortable in NAD. Denies N/V/F/C.  Pain rated at X/10    Allergies    No Known Allergies    Intolerances    Vital Signs Last 24 Hrs  T(C): 36.8 (02 Mar 2018 09:36), Max: 37.9 (01 Mar 2018 22:08)  T(F): 98.2 (02 Mar 2018 09:36), Max: 100.3 (01 Mar 2018 22:08)  HR: 83 (02 Mar 2018 09:36) (83 - 90)  BP: 96/62 (02 Mar 2018 09:36) (96/62 - 150/89)  BP(mean): --  RR: 17 (02 Mar 2018 09:36) (17 - 18)  SpO2: 98% (02 Mar 2018 09:36) (94% - 100%)    LABS:                        11.5   11.62 )-----------( 247      ( 02 Mar 2018 07:38 )             34.5     03-01    142  |  102  |  16  ----------------------------<  64<L>  3.6   |  23  |  0.83    Ca    9.8      01 Mar 2018 08:00    CAPILLARY BLOOD GLUCOSE    POCT Blood Glucose.: 87 mg/dL (02 Mar 2018 12:43)  POCT Blood Glucose.: 93 mg/dL (02 Mar 2018 08:28)  POCT Blood Glucose.: 208 mg/dL (01 Mar 2018 22:24)  POCT Blood Glucose.: 146 mg/dL (01 Mar 2018 18:13)    Lower Extremity Physical Exam:  Vascular: DP/PT 0/4, B/L, Temperature gradient WNL, B/L.   Neuro: Epicritic sensation intact to the level of the foot, B/L.  Musculoskeletal/Ortho: No gross pedal deformities noted, B/L.   Skin: Dry scaly flaking skin to shins B/L.   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 scant purulence, no malodor, no periwound erythema or edema noted.   Wound #2 & 3:  L foot lateral 5th met head and shaft ulcers to subQ with a fibrotic plug, no periwound erythema or edema, no purulence, no malodor    RADIOLOGY & ADDITIONAL TESTS:

## 2018-03-02 NOTE — DIETITIAN INITIAL EVALUATION ADULT. - ADHERENCE
poor/Wife states that they don't follow a specific diet at home. Wife doesn't like whole grains like brown rice she finds it hard to tolerate.

## 2018-03-02 NOTE — DIETITIAN INITIAL EVALUATION ADULT. - SIGNS/SYMPTOMS
Pt unable to identify carbohydrate foods or sources of protein Pt unable to identify carbohydrate foods or sources of protein; excessive carbohydrate intake

## 2018-03-02 NOTE — DIETITIAN INITIAL EVALUATION ADULT. - ORAL INTAKE PTA
good/Pt lives with wife and reports that she prepares his meals. Pt report good appetite PTA. Diet recall suggests diet high in carbohydrates; breakfast - rice puddings and breads, lunch - vegetables with with meat, bread and rice, dinner - Chapati bread, shultz vegetables. Pt endorses taking vitamin C, B12 and riboflavin at home. Pt has no known food allergies.

## 2018-03-02 NOTE — DIETITIAN INITIAL EVALUATION ADULT. - OTHER INFO
Patient seen for A1c of 13% on 3 Cote. Pt reports good appetite and no change in taste. Pt denies nausea, vomiting, shewing/swallowing difficulty, and diarrhea. Pt endorses constipation with last BM 2 days ago. Pt monitors blood glucoses levels at home with finger sticks 4x daily - usual readings are in the range of 180 mg/dL. Patient seen for A1c of 13% on 3 Cote. Pt reports good appetite and no change in taste, Po intake >50-90% x4 meals noted. Pt denies nausea, vomiting, shewing/swallowing difficulty, and diarrhea. Pt endorses constipation with last BM 2 days ago. Pt monitors blood glucoses levels at home with finger sticks 4x daily - usual readings are in the range of 180 mg/dL.

## 2018-03-02 NOTE — PROGRESS NOTE ADULT - SUBJECTIVE AND OBJECTIVE BOX
Follow Up:      Inverval History/ROS:Patient is a 67y old  Male who presents with a chief complaint of Worsened left foot infection (27 Feb 2018 05:00)    No fever.       Allergies    No Known Allergies    Intolerances        ANTIMICROBIALS:  ceFAZolin   IVPB 2000 every 8 hours      OTHER MEDS:  aspirin enteric coated 81 milliGRAM(s) Oral daily  cyanocobalamin 1000 MICROGram(s) Oral daily  dextrose 5%. 1000 milliLiter(s) IV Continuous <Continuous>  dextrose 50% Injectable 25 Gram(s) IV Push once  dextrose Gel 1 Dose(s) Oral once PRN  folic acid 1 milliGRAM(s) Oral daily  gabapentin 300 milliGRAM(s) Oral two times a day  glucagon  Injectable 1 milliGRAM(s) IntraMuscular once PRN  heparin  Injectable 5000 Unit(s) SubCutaneous every 12 hours  insulin glargine Injectable (LANTUS) 23 Unit(s) SubCutaneous every morning  insulin glargine Injectable (LANTUS) 58 Unit(s) SubCutaneous at bedtime  insulin lispro (HumaLOG) corrective regimen sliding scale   SubCutaneous three times a day before meals  insulin lispro (HumaLOG) corrective regimen sliding scale   SubCutaneous at bedtime  insulin lispro Injectable (HumaLOG) 22 Unit(s) SubCutaneous three times a day before meals  metoprolol succinate ER 25 milliGRAM(s) Oral daily  multivitamin 1 Tablet(s) Oral daily  ticagrelor 90 milliGRAM(s) Oral two times a day      Vital Signs Last 24 Hrs  T(C): 36.8 (02 Mar 2018 09:36), Max: 37.9 (01 Mar 2018 22:08)  T(F): 98.2 (02 Mar 2018 09:36), Max: 100.3 (01 Mar 2018 22:08)  HR: 83 (02 Mar 2018 09:36) (83 - 90)  BP: 96/62 (02 Mar 2018 09:36) (96/62 - 150/89)  BP(mean): --  RR: 17 (02 Mar 2018 09:36) (17 - 18)  SpO2: 98% (02 Mar 2018 09:36) (94% - 100%)    PHYSICAL EXAM:  General: [x ] non-toxic  HEAD/EYES: [ ] PERRL [ x] white sclera [ ] icterus  ENT:  [ ] normal [x ] supple [ ] thrush [ ] pharyngeal exudate  Cardiovascular:   [ ] murmur [x ] normal [ ] PPM/AICD  Respiratory:  [x ] clear to ausculation bilaterally  GI:  x[ ] soft, non-tender, normal bowel sounds  :  [ ] evans [ ] no CVA tenderness   Musculoskeletal:  [ ] no synovitis  Neurologic:  [ ] non-focal exam   Skin:  [x ] no rash  Lymph: [ ] no lymphadenopathy  Psychiatric:  [x ] appropriate affect [ ] alert & oriented  Lines:  [ x] no phlebitis [ ] central line                                11.5   11.62 )-----------( 247      ( 02 Mar 2018 07:38 )             34.5       03-01    142  |  102  |  16  ----------------------------<  64<L>  3.6   |  23  |  0.83    Ca    9.8      01 Mar 2018 08:00            MICROBIOLOGY:Culture Results:   Numerous Staphylococcus aureus  Numerous Staphylococcus lugdunensis  Moderate Yeast (02-27-18 @ 06:42)  Culture Results:   No growth to date. (02-27-18 @ 04:58)  Culture Results:   No growth to date. (02-27-18 @ 04:58)      RADIOLOGY:

## 2018-03-02 NOTE — PROGRESS NOTE ADULT - ASSESSMENT
67 year old male with history of DM type II, etoh abuse and pancreatitis, CAD, s/p PCI, PVD admitted with worsening left foot infection     1. CAD s/p PCI  CV stable no chest pain or sob    recent echo revealing normal lv sys fx , minimal MR   recent cath performed in Feb 2018 ( CANDIDO x 1pLAD, CANDIDO x 1 dCx, CANDIDO x 1 pOM1 and RCA )  cont asa 81mg daily/ ticagrelor / BB     2. PAD  await LE bypass  patient is as cardiac optimized as can be expected and can proceed with intermediate cardiac risk    given recent PCI , continue ASA 81  without interruption and brilinta will be held 5 days if there is a definitive plan for le bypass    3. Foot ulcer/OM  IV abx  podiatry / vascular followup   ID f/u     dvt ppx 67 year old male with history of DM type II, etoh abuse and pancreatitis, CAD, s/p PCI, PVD admitted with worsening left foot infection     1. CAD s/p PCI  CV stable no chest pain or sob    recent echo revealing normal lv sys fx , minimal MR   recent cath performed in Feb 2018 ( CANDIDO x 1pLAD, CANDIDO x 1 dCx, CANDIDO x 1 pOM1 and RCA )  cont asa 81mg daily/ ticagrelor / BB     2. PAD  await LE bypass  patient is as cardiac optimized as can be expected and can proceed with intermediate cardiac risk   given recent PCI , needs to continue ASA 81 without interruption  if definite plans are made for le bypass, will hold brilinta for 5 days preop       3. Foot ulcer/OM  IV abx  podiatry / vascular followup   ID f/u     dvt ppx

## 2018-03-02 NOTE — PROGRESS NOTE ADULT - SUBJECTIVE AND OBJECTIVE BOX
Patient is a 67y old  Male who presents with a chief complaint of Worsened left foot infection (27 Feb 2018 05:00)      INTERVAL HPI/OVERNIGHT EVENTS:  T(C): 36.8 (03-02-18 @ 09:36), Max: 37.9 (03-01-18 @ 22:08)  HR: 83 (03-02-18 @ 09:36) (83 - 90)  BP: 96/62 (03-02-18 @ 09:36) (96/62 - 150/89)  RR: 17 (03-02-18 @ 09:36) (17 - 18)  SpO2: 98% (03-02-18 @ 09:36) (94% - 100%)  Wt(kg): --  I&O's Summary    01 Mar 2018 07:01  -  02 Mar 2018 07:00  --------------------------------------------------------  IN: 1150 mL / OUT: 650 mL / NET: 500 mL    02 Mar 2018 07:01  -  02 Mar 2018 13:15  --------------------------------------------------------  IN: 360 mL / OUT: 0 mL / NET: 360 mL        LABS:                        11.5   11.62 )-----------( 247      ( 02 Mar 2018 07:38 )             34.5     03-01    142  |  102  |  16  ----------------------------<  64<L>  3.6   |  23  |  0.83    Ca    9.8      01 Mar 2018 08:00          CAPILLARY BLOOD GLUCOSE      POCT Blood Glucose.: 87 mg/dL (02 Mar 2018 12:43)  POCT Blood Glucose.: 93 mg/dL (02 Mar 2018 08:28)  POCT Blood Glucose.: 208 mg/dL (01 Mar 2018 22:24)  POCT Blood Glucose.: 146 mg/dL (01 Mar 2018 18:13)            MEDICATIONS  (STANDING):  aspirin enteric coated 81 milliGRAM(s) Oral daily  ceFAZolin   IVPB 2000 milliGRAM(s) IV Intermittent every 8 hours  cyanocobalamin 1000 MICROGram(s) Oral daily  dextrose 5%. 1000 milliLiter(s) (50 mL/Hr) IV Continuous <Continuous>  dextrose 50% Injectable 25 Gram(s) IV Push once  folic acid 1 milliGRAM(s) Oral daily  gabapentin 300 milliGRAM(s) Oral two times a day  heparin  Injectable 5000 Unit(s) SubCutaneous every 12 hours  insulin glargine Injectable (LANTUS) 23 Unit(s) SubCutaneous every morning  insulin glargine Injectable (LANTUS) 58 Unit(s) SubCutaneous at bedtime  insulin lispro (HumaLOG) corrective regimen sliding scale   SubCutaneous three times a day before meals  insulin lispro (HumaLOG) corrective regimen sliding scale   SubCutaneous at bedtime  insulin lispro Injectable (HumaLOG) 22 Unit(s) SubCutaneous three times a day before meals  metoprolol succinate ER 25 milliGRAM(s) Oral daily  multivitamin 1 Tablet(s) Oral daily  ticagrelor 90 milliGRAM(s) Oral two times a day    MEDICATIONS  (PRN):  dextrose Gel 1 Dose(s) Oral once PRN Blood Glucose LESS THAN 70 milliGRAM(s)/deciliter  glucagon  Injectable 1 milliGRAM(s) IntraMuscular once PRN Glucose LESS THAN 70 milligrams/deciliter          PHYSICAL EXAM:  GENERAL: NAD, well-groomed, well-developed  HEAD:  Atraumatic, Normocephalic  CHEST/LUNG: Clear to percussion bilaterally; No rales, rhonchi, wheezing, or rubs  HEART: Regular rate and rhythm; No murmurs, rubs, or gallops  ABDOMEN: Soft, Nontender, Nondistended; Bowel sounds present  EXTREMITIES:  2+ Peripheral Pulses, No clubbing, cyanosis, or edema  LYMPH: No lymphadenopathy noted  SKIN: No rashes or lesions    Care Discussed with Consultants/Other Providers [ ] YES  [ ] NO

## 2018-03-02 NOTE — DIETITIAN INITIAL EVALUATION ADULT. - ETIOLOGY
Limited prior knowledge of nutrition related education; excessive carbohydrate intake Limited previous exposure to knowledge of nutrition related education

## 2018-03-02 NOTE — DIETITIAN INITIAL EVALUATION ADULT. - ENERGY NEEDS
Ht: 65 inches, Wt: 139.3 pounds, UBW: 130 pounds, IBW: 136 pounds (+/- 10%),  % 102 IBW, BMI: 23.8 kg/m^2; No edema or pressure injuries; left hallux wound and infection    68 Y/O male admitted for worsening infection, foot pain with pus drainage; PMH of T2DM, recent left hallux osteomyletis S/P debridement, etoh abuse and pancreatitis, CAD, cardiac cath S/P multiple stent placements and RCA, found to have multilevel arterial disease - revascularization potponed until cardiac function optimized.

## 2018-03-02 NOTE — PROGRESS NOTE ADULT - SUBJECTIVE AND OBJECTIVE BOX
CARDIOLOGY FOLLOW UP - Dr. Gavin    CC no chest pain or sob       PHYSICAL EXAM:  T(C): 36.8 (03-02-18 @ 09:36), Max: 37.9 (03-01-18 @ 22:08)  HR: 83 (03-02-18 @ 09:36) (83 - 90)  BP: 96/62 (03-02-18 @ 09:36) (96/62 - 150/89)  RR: 17 (03-02-18 @ 09:36) (17 - 18)  SpO2: 98% (03-02-18 @ 09:36) (94% - 100%)  Wt(kg): --  I&O's Summary    01 Mar 2018 07:01  -  02 Mar 2018 07:00  --------------------------------------------------------  IN: 1150 mL / OUT: 650 mL / NET: 500 mL    02 Mar 2018 07:01  -  02 Mar 2018 13:33  --------------------------------------------------------  IN: 360 mL / OUT: 0 mL / NET: 360 mL        Appearance: Normal	  Cardiovascular: Normal S1 S2,RRR, No JVD, No murmurs  Respiratory: Lungs clear to auscultation	  Gastrointestinal:  Soft, Non-tender, + BS	  Extremities: Normal range of motion, No clubbing, cyanosis or edema        MEDICATIONS  (STANDING):  aspirin enteric coated 81 milliGRAM(s) Oral daily  ceFAZolin   IVPB 2000 milliGRAM(s) IV Intermittent every 8 hours  cyanocobalamin 1000 MICROGram(s) Oral daily  dextrose 5%. 1000 milliLiter(s) (50 mL/Hr) IV Continuous <Continuous>  dextrose 50% Injectable 25 Gram(s) IV Push once  folic acid 1 milliGRAM(s) Oral daily  gabapentin 300 milliGRAM(s) Oral two times a day  heparin  Injectable 5000 Unit(s) SubCutaneous every 12 hours  insulin glargine Injectable (LANTUS) 23 Unit(s) SubCutaneous every morning  insulin glargine Injectable (LANTUS) 58 Unit(s) SubCutaneous at bedtime  insulin lispro (HumaLOG) corrective regimen sliding scale   SubCutaneous three times a day before meals  insulin lispro (HumaLOG) corrective regimen sliding scale   SubCutaneous at bedtime  insulin lispro Injectable (HumaLOG) 22 Unit(s) SubCutaneous three times a day before meals  metoprolol succinate ER 25 milliGRAM(s) Oral daily  multivitamin 1 Tablet(s) Oral daily  ticagrelor 90 milliGRAM(s) Oral two times a day      TELEMETRY: 	    ECG:  	  RADIOLOGY:   DIAGNOSTIC TESTING:  [ ] Echocardiogram:  [ ]  Catheterization:  [ ] Stress Test:    OTHER: 	    LABS:	 	                                11.5   11.62 )-----------( 247      ( 02 Mar 2018 07:38 )             34.5     03-01    142  |  102  |  16  ----------------------------<  64<L>  3.6   |  23  |  0.83    Ca    9.8      01 Mar 2018 08:00

## 2018-03-02 NOTE — DIETITIAN INITIAL EVALUATION ADULT. - SOURCE
other (specify)/Electronic medical record reviewed; Wife present at bedside/patient/family/significant other

## 2018-03-02 NOTE — PROGRESS NOTE ADULT - ASSESSMENT
Problem/Plan - 1:  ·  Problem: Diabetic foot infection.  Plan: - Continue  antibiotics as per ID  - Podiatry and vascular fu for further management    Problem/Plan - 2:  ·  Problem: Diabetes mellitus.  Plan: - Lantus bid   - Lispro w/meals   - Insulin correction scale   - consistent carb diet.    Problem/Plan - 3:  ·  Problem: CAD (coronary artery disease).  Plan: - continue ASA and Brilinta   -  continue metoprolol   -  continue statin.     Problem/Plan - 4:  ·  Problem: H/O alcohol abuse.  Plan: Per wife no longer drinking.monitor for any withdrawal    Problem/Plan - 5:  ·  Problem: Need for prophylactic measure.  Plan: subq heparin for DVT ppx.

## 2018-03-02 NOTE — PROGRESS NOTE ADULT - ASSESSMENT
Patient is a 67 year old male with a past medical history significant for DM type II, h/o EtOH abuse and  pancreatitis, Cad s/p stent,  PVD, presents with purulent drainage from the left great toe over the past week found to have dry gangrene of the distal lt great toe. + Leucocytosis trending down, no fever. No active drainage rt now. Staph aureus in wound cx.     Culture is staph aurues/ staph lugdenensis  Change vanco to cefazolin    Await further plan from podiatry/ vascular regarding possible revascularization/ amputation.    Call the ID service with questions or concerns over the weekend.  [phone]

## 2018-03-02 NOTE — DIETITIAN INITIAL EVALUATION ADULT. - NS AS NUTRI INTERV MEALS SNACK
recommend continuing low sodium consistent carbohydrate diet Recommend changing diet to the DASH/TLC and Consistent carbohydrate diet

## 2018-03-02 NOTE — DIETITIAN INITIAL EVALUATION ADULT. - PHYSICAL APPEARANCE
Wife reports he is weight stable and UBW is ~136 pounds. Previous RD notes states weight at 134.9 pounds. His dosing weight is 142.8 (2/27) and current weight of 139.3 pounds (2/28) show a modest increase in weight within normal ranges./well nourished

## 2018-03-02 NOTE — DIETITIAN INITIAL EVALUATION ADULT. - PERTINENT LABORATORY DATA
03-01 blood glucose 64 mg/dL <L>, finger stick ranges 02-28 to 03-02: (02-28) 125-212 mg/dL, (03-01) 128-208 mg/dL, (03-02) 93 mg/dL; (01-03) A1c 13.2%

## 2018-03-02 NOTE — DIETITIAN INITIAL EVALUATION ADULT. - NS AS NUTRI INTERV ED CONTENT
Nutrition relationship to health/disease/Pt educated on consistent carbohydrate consumption related to recognizing and consuming mixed meals with standard serving sizes. Why adding protein to meals and snacks impacts BG was reviewed with the Pt. The importance of monitoring BG regularly and administering insulin only during meal times was discussed. Some techniques to lower HgA1c were discussed and explained. Nutrition relationship to health/disease/Pt educated on consistent carbohydrate consumption related to recognizing and consuming mixed meals with standard serving sizes. Why adding protein to meals and snacks impacts BG was reviewed with the Pt. The importance of monitoring BG regularly and administering insulin only during meal times was discussed. Some techniques to lower HgA1c were discussed and explained. A T2DM nutrition handout and a portion size sheet were provided. Pt with poor teach back on dietary guidelines. Reinforced education on consistent carbohydrate consumption related to recognizing and consuming mixed meals with standard serving sizes. Why adding protein to meals and snacks impacts BG was reviewed with the Pt. The importance of monitoring BG regularly and protocol for hypoglycemia was reviewed. Some techniques to lower HgA1c were discussed and explained. A T2DM nutrition handout and a portion size sheet were provided./Nutrition relationship to health/disease

## 2018-03-03 LAB
ANION GAP SERPL CALC-SCNC: 14 MMOL/L — SIGNIFICANT CHANGE UP (ref 5–17)
BUN SERPL-MCNC: 30 MG/DL — HIGH (ref 7–23)
CALCIUM SERPL-MCNC: 9.5 MG/DL — SIGNIFICANT CHANGE UP (ref 8.4–10.5)
CHLORIDE SERPL-SCNC: 99 MMOL/L — SIGNIFICANT CHANGE UP (ref 96–108)
CO2 SERPL-SCNC: 26 MMOL/L — SIGNIFICANT CHANGE UP (ref 22–31)
CREAT SERPL-MCNC: 0.66 MG/DL — SIGNIFICANT CHANGE UP (ref 0.5–1.3)
GLUCOSE BLDC GLUCOMTR-MCNC: 104 MG/DL — HIGH (ref 70–99)
GLUCOSE BLDC GLUCOMTR-MCNC: 114 MG/DL — HIGH (ref 70–99)
GLUCOSE BLDC GLUCOMTR-MCNC: 129 MG/DL — HIGH (ref 70–99)
GLUCOSE BLDC GLUCOMTR-MCNC: 148 MG/DL — HIGH (ref 70–99)
GLUCOSE BLDC GLUCOMTR-MCNC: 166 MG/DL — HIGH (ref 70–99)
GLUCOSE SERPL-MCNC: 225 MG/DL — HIGH (ref 70–99)
HCT VFR BLD CALC: 38 % — LOW (ref 39–50)
HGB BLD-MCNC: 12.5 G/DL — LOW (ref 13–17)
MCHC RBC-ENTMCNC: 30.1 PG — SIGNIFICANT CHANGE UP (ref 27–34)
MCHC RBC-ENTMCNC: 32.9 GM/DL — SIGNIFICANT CHANGE UP (ref 32–36)
MCV RBC AUTO: 91.6 FL — SIGNIFICANT CHANGE UP (ref 80–100)
PLATELET # BLD AUTO: 282 K/UL — SIGNIFICANT CHANGE UP (ref 150–400)
POTASSIUM SERPL-MCNC: 4 MMOL/L — SIGNIFICANT CHANGE UP (ref 3.5–5.3)
POTASSIUM SERPL-SCNC: 4 MMOL/L — SIGNIFICANT CHANGE UP (ref 3.5–5.3)
RBC # BLD: 4.15 M/UL — LOW (ref 4.2–5.8)
RBC # FLD: 14.2 % — SIGNIFICANT CHANGE UP (ref 10.3–14.5)
SODIUM SERPL-SCNC: 139 MMOL/L — SIGNIFICANT CHANGE UP (ref 135–145)
WBC # BLD: 7.9 K/UL — SIGNIFICANT CHANGE UP (ref 3.8–10.5)
WBC # FLD AUTO: 7.9 K/UL — SIGNIFICANT CHANGE UP (ref 3.8–10.5)

## 2018-03-03 RX ORDER — SENNA PLUS 8.6 MG/1
2 TABLET ORAL AT BEDTIME
Qty: 0 | Refills: 0 | Status: DISCONTINUED | OUTPATIENT
Start: 2018-03-03 | End: 2018-03-06

## 2018-03-03 RX ORDER — DOCUSATE SODIUM 100 MG
100 CAPSULE ORAL THREE TIMES A DAY
Qty: 0 | Refills: 0 | Status: DISCONTINUED | OUTPATIENT
Start: 2018-03-03 | End: 2018-03-06

## 2018-03-03 RX ADMIN — TICAGRELOR 90 MILLIGRAM(S): 90 TABLET ORAL at 19:09

## 2018-03-03 RX ADMIN — INSULIN GLARGINE 58 UNIT(S): 100 INJECTION, SOLUTION SUBCUTANEOUS at 22:09

## 2018-03-03 RX ADMIN — Medication 100 MILLIGRAM(S): at 05:10

## 2018-03-03 RX ADMIN — PREGABALIN 1000 MICROGRAM(S): 225 CAPSULE ORAL at 12:32

## 2018-03-03 RX ADMIN — INSULIN GLARGINE 23 UNIT(S): 100 INJECTION, SOLUTION SUBCUTANEOUS at 09:39

## 2018-03-03 RX ADMIN — Medication 100 MILLIGRAM(S): at 14:36

## 2018-03-03 RX ADMIN — SENNA PLUS 2 TABLET(S): 8.6 TABLET ORAL at 21:09

## 2018-03-03 RX ADMIN — Medication 1 TABLET(S): at 12:34

## 2018-03-03 RX ADMIN — TICAGRELOR 90 MILLIGRAM(S): 90 TABLET ORAL at 05:10

## 2018-03-03 RX ADMIN — GABAPENTIN 300 MILLIGRAM(S): 400 CAPSULE ORAL at 05:09

## 2018-03-03 RX ADMIN — Medication 100 MILLIGRAM(S): at 22:10

## 2018-03-03 RX ADMIN — Medication 81 MILLIGRAM(S): at 12:32

## 2018-03-03 RX ADMIN — GABAPENTIN 300 MILLIGRAM(S): 400 CAPSULE ORAL at 19:08

## 2018-03-03 RX ADMIN — Medication 22 UNIT(S): at 19:08

## 2018-03-03 RX ADMIN — Medication 25 MILLIGRAM(S): at 05:10

## 2018-03-03 RX ADMIN — HEPARIN SODIUM 5000 UNIT(S): 5000 INJECTION INTRAVENOUS; SUBCUTANEOUS at 05:10

## 2018-03-03 RX ADMIN — Medication 22 UNIT(S): at 13:09

## 2018-03-03 RX ADMIN — Medication 100 MILLIGRAM(S): at 21:09

## 2018-03-03 RX ADMIN — Medication 1 MILLIGRAM(S): at 12:32

## 2018-03-03 RX ADMIN — Medication 1: at 13:09

## 2018-03-03 RX ADMIN — Medication 22 UNIT(S): at 08:50

## 2018-03-03 RX ADMIN — HEPARIN SODIUM 5000 UNIT(S): 5000 INJECTION INTRAVENOUS; SUBCUTANEOUS at 19:09

## 2018-03-03 NOTE — PROGRESS NOTE ADULT - SUBJECTIVE AND OBJECTIVE BOX
pt seen at bedside in NAD. dressing to left foot c/d/i  left hallux ulceration noted necrotic dry and stable  applied betadine with DSD  spoke with daughter explained will plan Tuesday for amp and dr ruby will be doing bypass in Thursday this way all necrotic tissue is removed to prevent infection to bypass site. if the toe still necrosis we can return to OR and amputate the rest of the toe if needed and would heal due to better circulation she understood and agrees to amp on tuesday  will continue to follow

## 2018-03-03 NOTE — PROGRESS NOTE ADULT - SUBJECTIVE AND OBJECTIVE BOX
CC: left hallux pain  HPI: The patient is a 67 year old male with medical and surgical history significant for DM II, alcohol abuse, pancreatitis, CAD s/p CANDIDO x3 18 on ASA/Ticagrelor who presents with known left hallux gangrene/pain, now wet gangrene and x-ray imaging concerning for osteomyelitis.   24/Overnight events: Patient seen and examined at bedside. Will require toe amputation, thus revascularization will be needed. Pending vein mapping. Optimized from cardiac standpoint, patient carries an increased cardiac risk; needs to stay on DAPT.       Objective:  Vital Signs Last 24 Hrs  T(C): 37.3 (03 Mar 2018 04:56), Max: 37.3 (03 Mar 2018 04:56)  T(F): 99.1 (03 Mar 2018 04:56), Max: 99.1 (03 Mar 2018 04:56)  HR: 92 (03 Mar 2018 04:56) (83 - 92)  BP: 133/75 (03 Mar 2018 04:56) (96/62 - 147/72)  BP(mean): --  RR: 18 (03 Mar 2018 04:56) (17 - 18)  SpO2: 96% (03 Mar 2018 04:56) (96% - 99%)      Physical Exam: unchanged  General: appears to be in no acute distress.  Chest: lungs clear bilaterally, non-labored breathing, no wheezing or rhonci  Extremities: Warm b/l palp fem and pop pulses. R DP/PT signals. L PT signal only. L first toe with dry gangrenous tip wrapped w/pus     MEDICATIONS  (STANDING):  aspirin enteric coated 81 milliGRAM(s) Oral daily  ceFAZolin   IVPB 2000 milliGRAM(s) IV Intermittent every 8 hours  cyanocobalamin 1000 MICROGram(s) Oral daily  dextrose 5%. 1000 milliLiter(s) (50 mL/Hr) IV Continuous <Continuous>  dextrose 50% Injectable 25 Gram(s) IV Push once  folic acid 1 milliGRAM(s) Oral daily  gabapentin 300 milliGRAM(s) Oral two times a day  heparin  Injectable 5000 Unit(s) SubCutaneous every 12 hours  insulin glargine Injectable (LANTUS) 23 Unit(s) SubCutaneous every morning  insulin glargine Injectable (LANTUS) 58 Unit(s) SubCutaneous at bedtime  insulin lispro (HumaLOG) corrective regimen sliding scale   SubCutaneous three times a day before meals  insulin lispro (HumaLOG) corrective regimen sliding scale   SubCutaneous at bedtime  insulin lispro Injectable (HumaLOG) 22 Unit(s) SubCutaneous three times a day before meals  metoprolol succinate ER 25 milliGRAM(s) Oral daily  multivitamin 1 Tablet(s) Oral daily  ticagrelor 90 milliGRAM(s) Oral two times a day    MEDICATIONS  (PRN):  dextrose Gel 1 Dose(s) Oral once PRN Blood Glucose LESS THAN 70 milliGRAM(s)/deciliter  glucagon  Injectable 1 milliGRAM(s) IntraMuscular once PRN Glucose LESS THAN 70 milligrams/deciliter    I&O's Detail    02 Mar 2018 07:01  -  03 Mar 2018 07:00  --------------------------------------------------------  IN:    IV PiggyBack: 50 mL    Oral Fluid: 1200 mL  Total IN: 1250 mL    OUT:    Voided: 450 mL  Total OUT: 450 mL    Total NET: 800 mL        Daily     Daily Weight in k.1 (02 Mar 2018 11:23)    LABS:                        11.5   11.62 )-----------( 247      ( 02 Mar 2018 07:38 )             34.5     03-01    142  |  102  |  16  ----------------------------<  64<L>  3.6   |  23  |  0.83    Ca    9.8      01 Mar 2018 08:00

## 2018-03-03 NOTE — PROGRESS NOTE ADULT - ASSESSMENT
67 year old male with history of DM type II, etoh abuse and pancreatitis, CAD, s/p PCI, PVD admitted with worsening left foot infection     1. CAD s/p PCI  CV stable no chest pain or sob    recent echo revealing normal lv sys fx , minimal MR   recent cath performed in Feb 2018 ( CANDIDO x 1pLAD, CANDIDO x 1 dCx, CANDIDO x 1 pOM1 and RCA )  cont asa 81mg daily/ ticagrelor / BB     2. PAD  await LE bypass  patient is as cardiac optimized as can be expected and can proceed with intermediate cardiac risk   given recent PCI , needs to continue ASA 81 and Ticagrelor without interruption  if definite plans are made for le bypass, will hold brilinta for 5 days preop and plan for surgery at least 30 days post-PCI earliest date 3/9/18      3. Foot ulcer/OM  IV abx  podiatry / vascular followup   ID f/u     dvt ppx

## 2018-03-03 NOTE — PROGRESS NOTE ADULT - SUBJECTIVE AND OBJECTIVE BOX
Patient is a 67y old  Male who presents with a chief complaint of Worsened left foot infection (27 Feb 2018 05:00)  NAD      INTERVAL HPI/OVERNIGHT EVENTS:  T(C): 36.9 (03-03-18 @ 14:17), Max: 37.3 (03-03-18 @ 04:56)  HR: 86 (03-03-18 @ 14:17) (86 - 92)  BP: 137/74 (03-03-18 @ 14:17) (91/60 - 147/72)  RR: 18 (03-03-18 @ 14:17) (18 - 18)  SpO2: 96% (03-03-18 @ 14:17) (96% - 99%)  Wt(kg): --  I&O's Summary    02 Mar 2018 07:01  -  03 Mar 2018 07:00  --------------------------------------------------------  IN: 1250 mL / OUT: 450 mL / NET: 800 mL    03 Mar 2018 07:01  -  03 Mar 2018 17:07  --------------------------------------------------------  IN: 600 mL / OUT: 600 mL / NET: 0 mL        LABS:                        12.5   7.90  )-----------( 282      ( 03 Mar 2018 07:57 )             38.0     03-03    139  |  99  |  30<H>  ----------------------------<  225<H>  4.0   |  26  |  0.66    Ca    9.5      03 Mar 2018 07:18          CAPILLARY BLOOD GLUCOSE      POCT Blood Glucose.: 166 mg/dL (03 Mar 2018 12:46)  POCT Blood Glucose.: 129 mg/dL (03 Mar 2018 08:28)  POCT Blood Glucose.: 125 mg/dL (02 Mar 2018 22:10)  POCT Blood Glucose.: 110 mg/dL (02 Mar 2018 19:14)            MEDICATIONS  (STANDING):  aspirin enteric coated 81 milliGRAM(s) Oral daily  ceFAZolin   IVPB 2000 milliGRAM(s) IV Intermittent every 8 hours  cyanocobalamin 1000 MICROGram(s) Oral daily  dextrose 5%. 1000 milliLiter(s) (50 mL/Hr) IV Continuous <Continuous>  dextrose 50% Injectable 25 Gram(s) IV Push once  folic acid 1 milliGRAM(s) Oral daily  gabapentin 300 milliGRAM(s) Oral two times a day  heparin  Injectable 5000 Unit(s) SubCutaneous every 12 hours  insulin glargine Injectable (LANTUS) 23 Unit(s) SubCutaneous every morning  insulin glargine Injectable (LANTUS) 58 Unit(s) SubCutaneous at bedtime  insulin lispro (HumaLOG) corrective regimen sliding scale   SubCutaneous three times a day before meals  insulin lispro (HumaLOG) corrective regimen sliding scale   SubCutaneous at bedtime  insulin lispro Injectable (HumaLOG) 22 Unit(s) SubCutaneous three times a day before meals  metoprolol succinate ER 25 milliGRAM(s) Oral daily  multivitamin 1 Tablet(s) Oral daily  ticagrelor 90 milliGRAM(s) Oral two times a day    MEDICATIONS  (PRN):  dextrose Gel 1 Dose(s) Oral once PRN Blood Glucose LESS THAN 70 milliGRAM(s)/deciliter  glucagon  Injectable 1 milliGRAM(s) IntraMuscular once PRN Glucose LESS THAN 70 milligrams/deciliter          PHYSICAL EXAM:  GENERAL: NAD, well-groomed, well-developed  HEAD:  Atraumatic, Normocephalic  CHEST/LUNG: Clear to percussion bilaterally; No rales, rhonchi, wheezing, or rubs  HEART: Regular rate and rhythm; No murmurs, rubs, or gallops  ABDOMEN: Soft, Nontender, Nondistended; Bowel sounds present  EXTREMITIES:  2+ Peripheral Pulses, No clubbing, cyanosis, or edema  LYMPH: No lymphadenopathy noted  SKIN: No rashes or lesions    Care Discussed with Consultants/Other Providers [ ] YES  [ ] NO

## 2018-03-03 NOTE — PROGRESS NOTE ADULT - ASSESSMENT
Problem/Plan - 1:  ·  Problem: Diabetic foot infection.  Plan: - Continue  antibiotics as per ID  - Podiatry and vascular fu for further management  - cardiac clearance , pt with recent PCI, noted    Problem/Plan - 2:  ·  Problem: Diabetes mellitus.  Plan: - Lantus bid   - Lispro w/meals   - Insulin correction scale   - consistent carb diet.    Problem/Plan - 3:  ·  Problem: CAD (coronary artery disease).  Plan: - continue ASA and Brilinta   -  continue metoprolol   -  continue statin.     Problem/Plan - 4:  ·  Problem: H/O alcohol abuse.  Plan: Per wife no longer drinking.  monitor for any withdrawal    Problem/Plan - 5:  ·  Problem: Need for prophylactic measure.  Plan: subq heparin for DVT ppx.

## 2018-03-03 NOTE — PROGRESS NOTE ADULT - ASSESSMENT
67 y old male w multilevel art insuff and  left toe 1 tip ischemic progression s/p recent card cath with ptca/stent     - cardiology recs appreciated: patient is optimized from cardiac standpoint and patient is increased/intermediate risk from cardiac standpoint; needs to stay on DAPT  - patient will require toe amputation, thus planning for LLE bypass/revascularization is underway  - pending b/l lower extremity vein mapping  - will follow    Nick Jarrell PGY2  [phone]

## 2018-03-03 NOTE — PROGRESS NOTE ADULT - SUBJECTIVE AND OBJECTIVE BOX
CC: no cp/sob    TELEMETRY:     PHYSICAL EXAM:    T(C): 36.6 (03-03-18 @ 09:34), Max: 37.3 (03-03-18 @ 04:56)  HR: 91 (03-03-18 @ 09:34) (87 - 92)  BP: 91/60 (03-03-18 @ 09:34) (91/60 - 147/72)  RR: 18 (03-03-18 @ 09:34) (18 - 18)  SpO2: 96% (03-03-18 @ 04:56) (96% - 99%)  Wt(kg): --  I&O's Summary    02 Mar 2018 07:01  -  03 Mar 2018 07:00  --------------------------------------------------------  IN: 1250 mL / OUT: 450 mL / NET: 800 mL    03 Mar 2018 07:01  -  03 Mar 2018 11:34  --------------------------------------------------------  IN: 360 mL / OUT: 0 mL / NET: 360 mL        Appearance: Normal	  Cardiovascular: Normal S1 S2,RRR, No JVD, No murmurs  Respiratory: Lungs clear to auscultation	  Gastrointestinal:  Soft, Non-tender, + BS	  Extremities: Normal range of motion, No clubbing, cyanosis or edema  Vascular: Peripheral pulses palpable 2+ bilaterally     LABS:	 	                          12.5   7.90  )-----------( 282      ( 03 Mar 2018 07:57 )             38.0     03-03    139  |  99  |  30<H>  ----------------------------<  225<H>  4.0   |  26  |  0.66    Ca    9.5      03 Mar 2018 07:18            CARDIAC MARKERS:        MEDICATIONS  (STANDING):  aspirin enteric coated 81 milliGRAM(s) Oral daily  ceFAZolin   IVPB 2000 milliGRAM(s) IV Intermittent every 8 hours  cyanocobalamin 1000 MICROGram(s) Oral daily  dextrose 5%. 1000 milliLiter(s) (50 mL/Hr) IV Continuous <Continuous>  dextrose 50% Injectable 25 Gram(s) IV Push once  folic acid 1 milliGRAM(s) Oral daily  gabapentin 300 milliGRAM(s) Oral two times a day  heparin  Injectable 5000 Unit(s) SubCutaneous every 12 hours  insulin glargine Injectable (LANTUS) 23 Unit(s) SubCutaneous every morning  insulin glargine Injectable (LANTUS) 58 Unit(s) SubCutaneous at bedtime  insulin lispro (HumaLOG) corrective regimen sliding scale   SubCutaneous three times a day before meals  insulin lispro (HumaLOG) corrective regimen sliding scale   SubCutaneous at bedtime  insulin lispro Injectable (HumaLOG) 22 Unit(s) SubCutaneous three times a day before meals  metoprolol succinate ER 25 milliGRAM(s) Oral daily  multivitamin 1 Tablet(s) Oral daily  ticagrelor 90 milliGRAM(s) Oral two times a day

## 2018-03-04 LAB
ANION GAP SERPL CALC-SCNC: 17 MMOL/L — SIGNIFICANT CHANGE UP (ref 5–17)
BUN SERPL-MCNC: 24 MG/DL — HIGH (ref 7–23)
CALCIUM SERPL-MCNC: 9.6 MG/DL — SIGNIFICANT CHANGE UP (ref 8.4–10.5)
CHLORIDE SERPL-SCNC: 99 MMOL/L — SIGNIFICANT CHANGE UP (ref 96–108)
CO2 SERPL-SCNC: 18 MMOL/L — LOW (ref 22–31)
CREAT SERPL-MCNC: 0.71 MG/DL — SIGNIFICANT CHANGE UP (ref 0.5–1.3)
CULTURE RESULTS: SIGNIFICANT CHANGE UP
GLUCOSE BLDC GLUCOMTR-MCNC: 118 MG/DL — HIGH (ref 70–99)
GLUCOSE BLDC GLUCOMTR-MCNC: 189 MG/DL — HIGH (ref 70–99)
GLUCOSE BLDC GLUCOMTR-MCNC: 192 MG/DL — HIGH (ref 70–99)
GLUCOSE BLDC GLUCOMTR-MCNC: 199 MG/DL — HIGH (ref 70–99)
GLUCOSE SERPL-MCNC: 103 MG/DL — HIGH (ref 70–99)
HCT VFR BLD CALC: 37.1 % — LOW (ref 39–50)
HGB BLD-MCNC: 12.5 G/DL — LOW (ref 13–17)
MCHC RBC-ENTMCNC: 30.6 PG — SIGNIFICANT CHANGE UP (ref 27–34)
MCHC RBC-ENTMCNC: 33.7 GM/DL — SIGNIFICANT CHANGE UP (ref 32–36)
MCV RBC AUTO: 90.7 FL — SIGNIFICANT CHANGE UP (ref 80–100)
ORGANISM # SPEC MICROSCOPIC CNT: SIGNIFICANT CHANGE UP
PLATELET # BLD AUTO: 300 K/UL — SIGNIFICANT CHANGE UP (ref 150–400)
POTASSIUM SERPL-MCNC: 4.6 MMOL/L — SIGNIFICANT CHANGE UP (ref 3.5–5.3)
POTASSIUM SERPL-SCNC: 4.6 MMOL/L — SIGNIFICANT CHANGE UP (ref 3.5–5.3)
RBC # BLD: 4.09 M/UL — LOW (ref 4.2–5.8)
RBC # FLD: 13.1 % — SIGNIFICANT CHANGE UP (ref 10.3–14.5)
SODIUM SERPL-SCNC: 134 MMOL/L — LOW (ref 135–145)
SPECIMEN SOURCE: SIGNIFICANT CHANGE UP
WBC # BLD: 12.53 K/UL — HIGH (ref 3.8–10.5)
WBC # FLD AUTO: 12.53 K/UL — HIGH (ref 3.8–10.5)

## 2018-03-04 RX ADMIN — Medication 100 MILLIGRAM(S): at 13:39

## 2018-03-04 RX ADMIN — Medication 22 UNIT(S): at 08:30

## 2018-03-04 RX ADMIN — INSULIN GLARGINE 58 UNIT(S): 100 INJECTION, SOLUTION SUBCUTANEOUS at 22:18

## 2018-03-04 RX ADMIN — GABAPENTIN 300 MILLIGRAM(S): 400 CAPSULE ORAL at 18:43

## 2018-03-04 RX ADMIN — Medication 25 MILLIGRAM(S): at 05:29

## 2018-03-04 RX ADMIN — GABAPENTIN 300 MILLIGRAM(S): 400 CAPSULE ORAL at 05:29

## 2018-03-04 RX ADMIN — Medication 22 UNIT(S): at 18:46

## 2018-03-04 RX ADMIN — TICAGRELOR 90 MILLIGRAM(S): 90 TABLET ORAL at 05:29

## 2018-03-04 RX ADMIN — Medication 81 MILLIGRAM(S): at 12:40

## 2018-03-04 RX ADMIN — TICAGRELOR 90 MILLIGRAM(S): 90 TABLET ORAL at 18:42

## 2018-03-04 RX ADMIN — Medication 100 MILLIGRAM(S): at 05:30

## 2018-03-04 RX ADMIN — Medication 1 TABLET(S): at 12:39

## 2018-03-04 RX ADMIN — SENNA PLUS 2 TABLET(S): 8.6 TABLET ORAL at 22:18

## 2018-03-04 RX ADMIN — Medication 1: at 12:39

## 2018-03-04 RX ADMIN — Medication 1: at 18:30

## 2018-03-04 RX ADMIN — Medication 100 MILLIGRAM(S): at 22:18

## 2018-03-04 RX ADMIN — PREGABALIN 1000 MICROGRAM(S): 225 CAPSULE ORAL at 12:40

## 2018-03-04 RX ADMIN — Medication 100 MILLIGRAM(S): at 22:37

## 2018-03-04 RX ADMIN — INSULIN GLARGINE 23 UNIT(S): 100 INJECTION, SOLUTION SUBCUTANEOUS at 08:29

## 2018-03-04 RX ADMIN — Medication 22 UNIT(S): at 12:39

## 2018-03-04 RX ADMIN — HEPARIN SODIUM 5000 UNIT(S): 5000 INJECTION INTRAVENOUS; SUBCUTANEOUS at 05:29

## 2018-03-04 RX ADMIN — Medication 1 MILLIGRAM(S): at 12:40

## 2018-03-04 RX ADMIN — Medication 100 MILLIGRAM(S): at 05:29

## 2018-03-04 NOTE — PROGRESS NOTE ADULT - SUBJECTIVE AND OBJECTIVE BOX
Patient is a 67y old  Male who presents with a chief complaint of Worsened left foot infection (27 Feb 2018 05:00)  NAD      INTERVAL HPI/OVERNIGHT EVENTS:  T(C): 37 (03-04-18 @ 04:09), Max: 37.3 (03-03-18 @ 20:33)  HR: 82 (03-04-18 @ 04:09) (82 - 86)  BP: 128/70 (03-04-18 @ 04:09) (128/70 - 135/75)  RR: 18 (03-04-18 @ 04:09) (18 - 18)  SpO2: 97% (03-04-18 @ 04:09) (96% - 97%)  Wt(kg): --  I&O's Summary    03 Mar 2018 07:01  -  04 Mar 2018 07:00  --------------------------------------------------------  IN: 1240 mL / OUT: 1200 mL / NET: 40 mL    04 Mar 2018 07:01  -  04 Mar 2018 14:29  --------------------------------------------------------  IN: 840 mL / OUT: 500 mL / NET: 340 mL        LABS:                        12.5   12.53 )-----------( 300      ( 04 Mar 2018 08:29 )             37.1     03-04    134<L>  |  99  |  24<H>  ----------------------------<  103<H>  4.6   |  18<L>  |  0.71    Ca    9.6      04 Mar 2018 07:04          CAPILLARY BLOOD GLUCOSE      POCT Blood Glucose.: 199 mg/dL (04 Mar 2018 12:13)  POCT Blood Glucose.: 118 mg/dL (04 Mar 2018 08:28)  POCT Blood Glucose.: 104 mg/dL (03 Mar 2018 21:51)  POCT Blood Glucose.: 148 mg/dL (03 Mar 2018 18:59)  POCT Blood Glucose.: 114 mg/dL (03 Mar 2018 17:40)            MEDICATIONS  (STANDING):  aspirin enteric coated 81 milliGRAM(s) Oral daily  ceFAZolin   IVPB 2000 milliGRAM(s) IV Intermittent every 8 hours  cyanocobalamin 1000 MICROGram(s) Oral daily  dextrose 5%. 1000 milliLiter(s) (50 mL/Hr) IV Continuous <Continuous>  dextrose 50% Injectable 25 Gram(s) IV Push once  docusate sodium 100 milliGRAM(s) Oral three times a day  folic acid 1 milliGRAM(s) Oral daily  gabapentin 300 milliGRAM(s) Oral two times a day  heparin  Injectable 5000 Unit(s) SubCutaneous every 12 hours  insulin glargine Injectable (LANTUS) 23 Unit(s) SubCutaneous every morning  insulin glargine Injectable (LANTUS) 58 Unit(s) SubCutaneous at bedtime  insulin lispro (HumaLOG) corrective regimen sliding scale   SubCutaneous three times a day before meals  insulin lispro (HumaLOG) corrective regimen sliding scale   SubCutaneous at bedtime  insulin lispro Injectable (HumaLOG) 22 Unit(s) SubCutaneous three times a day before meals  metoprolol succinate ER 25 milliGRAM(s) Oral daily  multivitamin 1 Tablet(s) Oral daily  senna 2 Tablet(s) Oral at bedtime  ticagrelor 90 milliGRAM(s) Oral two times a day    MEDICATIONS  (PRN):  bisacodyl 5 milliGRAM(s) Oral every 12 hours PRN Constipation  dextrose Gel 1 Dose(s) Oral once PRN Blood Glucose LESS THAN 70 milliGRAM(s)/deciliter  glucagon  Injectable 1 milliGRAM(s) IntraMuscular once PRN Glucose LESS THAN 70 milligrams/deciliter          PHYSICAL EXAM:  GENERAL: NAD, well-groomed, well-developed  HEAD:  Atraumatic, Normocephalic  CHEST/LUNG: Clear to percussion bilaterally; No rales, rhonchi, wheezing, or rubs  HEART: Regular rate and rhythm; No murmurs, rubs, or gallops  ABDOMEN: Soft, Nontender, Nondistended; Bowel sounds present  EXTREMITIES:  2+ Peripheral Pulses, No clubbing, cyanosis, or edema  LYMPH: No lymphadenopathy noted  SKIN: No rashes or lesions    Care Discussed with Consultants/Other Providers [+ ] YES  [ ] NO

## 2018-03-04 NOTE — PROGRESS NOTE ADULT - SUBJECTIVE AND OBJECTIVE BOX
CC: left hallux pain  HPI: The patient is a 67 year old male with medical and surgical history significant for DM II, alcohol abuse, pancreatitis, CAD s/p CANDIDO x3 2/6/18 on ASA/Ticagrelor who presents with known left hallux gangrene/pain, now wet gangrene and x-ray imaging concerning for osteomyelitis.   24/Overnight events: Patient seen and examined at bedside. Will require toe amputation, thus revascularization will be needed. Pending vein mapping. Optimized from cardiac standpoint, patient carries an increased cardiac risk; needs to stay on DAPT.        Objective:  Vital Signs Last 24 Hrs  T(C): 37 (04 Mar 2018 04:09), Max: 37.3 (03 Mar 2018 20:33)  T(F): 98.6 (04 Mar 2018 04:09), Max: 99.2 (03 Mar 2018 20:33)  HR: 82 (04 Mar 2018 04:09) (82 - 91)  BP: 128/70 (04 Mar 2018 04:09) (91/60 - 137/74)  BP(mean): --  RR: 18 (04 Mar 2018 04:09) (18 - 18)  SpO2: 97% (04 Mar 2018 04:09) (96% - 97%)    Physical Exam: unchanged  General: appears to be in no acute distress.  Chest: lungs clear bilaterally, non-labored breathing, no wheezing or rhonci  Extremities: Warm b/l palp fem and pop pulses. R DP/PT signals. L PT signal only. L first toe with wet gangrenous tip wrapped     MEDICATIONS  (STANDING):  aspirin enteric coated 81 milliGRAM(s) Oral daily  ceFAZolin   IVPB 2000 milliGRAM(s) IV Intermittent every 8 hours  cyanocobalamin 1000 MICROGram(s) Oral daily  dextrose 5%. 1000 milliLiter(s) (50 mL/Hr) IV Continuous <Continuous>  dextrose 50% Injectable 25 Gram(s) IV Push once  docusate sodium 100 milliGRAM(s) Oral three times a day  folic acid 1 milliGRAM(s) Oral daily  gabapentin 300 milliGRAM(s) Oral two times a day  heparin  Injectable 5000 Unit(s) SubCutaneous every 12 hours  insulin glargine Injectable (LANTUS) 23 Unit(s) SubCutaneous every morning  insulin glargine Injectable (LANTUS) 58 Unit(s) SubCutaneous at bedtime  insulin lispro (HumaLOG) corrective regimen sliding scale   SubCutaneous three times a day before meals  insulin lispro (HumaLOG) corrective regimen sliding scale   SubCutaneous at bedtime  insulin lispro Injectable (HumaLOG) 22 Unit(s) SubCutaneous three times a day before meals  metoprolol succinate ER 25 milliGRAM(s) Oral daily  multivitamin 1 Tablet(s) Oral daily  senna 2 Tablet(s) Oral at bedtime  ticagrelor 90 milliGRAM(s) Oral two times a day    MEDICATIONS  (PRN):  bisacodyl 5 milliGRAM(s) Oral every 12 hours PRN Constipation  dextrose Gel 1 Dose(s) Oral once PRN Blood Glucose LESS THAN 70 milliGRAM(s)/deciliter  glucagon  Injectable 1 milliGRAM(s) IntraMuscular once PRN Glucose LESS THAN 70 milligrams/deciliter    I&O's Detail    03 Mar 2018 07:01  -  04 Mar 2018 07:00  --------------------------------------------------------  IN:    IV PiggyBack: 100 mL    Oral Fluid: 1140 mL  Total IN: 1240 mL    OUT:    Voided: 1200 mL  Total OUT: 1200 mL    Total NET: 40 mL        Daily     Daily     LABS:                        12.5   7.90  )-----------( 282      ( 03 Mar 2018 07:57 )             38.0     03-03    139  |  99  |  30<H>  ----------------------------<  225<H>  4.0   |  26  |  0.66    Ca    9.5      03 Mar 2018 07:18

## 2018-03-04 NOTE — PROGRESS NOTE ADULT - ASSESSMENT
Problem/Plan - 1:  ·  Problem: Diabetic foot infection.  Plan: - Continue  antibiotics as per ID  - Podiatry and vascular fu for further management      Problem/Plan - 2:  ·  Problem: Diabetes mellitus.  Plan: - Lantus bid   - Lispro w/meals   - Insulin correction scale   - consistent carb diet.    Problem/Plan - 3:  ·  Problem: CAD (coronary artery disease).  Plan: - continue ASA and Brilinta   -  continue metoprolol   -  continue statin.     Problem/Plan - 4:  ·  Problem: H/O alcohol abuse.  Plan: Per wife no longer drinking.  monitor for any withdrawal    Problem/Plan - 5:  ·  Problem: Need for prophylactic measure.  Plan: subq heparin for DVT ppx.

## 2018-03-04 NOTE — PROGRESS NOTE ADULT - SUBJECTIVE AND OBJECTIVE BOX
Patient is a 67y old  Male who presents with a chief complaint of Worsened left foot infection (27 Feb 2018 05:00)       INTERVAL HPI/OVERNIGHT EVENTS:  Patient seen and evaluated at bedside.  Pt is resting comfortable in NAD. Denies N/V/F/C.    Allergies    No Known Allergies    Intolerances        Vital Signs Last 24 Hrs  T(C): 37 (04 Mar 2018 04:09), Max: 37.3 (03 Mar 2018 20:33)  T(F): 98.6 (04 Mar 2018 04:09), Max: 99.2 (03 Mar 2018 20:33)  HR: 82 (04 Mar 2018 04:09) (82 - 86)  BP: 128/70 (04 Mar 2018 04:09) (128/70 - 137/74)  BP(mean): --  RR: 18 (04 Mar 2018 04:09) (18 - 18)  SpO2: 97% (04 Mar 2018 04:09) (96% - 97%)    LABS:                        12.5   12.53 )-----------( 300      ( 04 Mar 2018 08:29 )             37.1     03-04    134<L>  |  99  |  24<H>  ----------------------------<  103<H>  4.6   |  18<L>  |  0.71    Ca    9.6      04 Mar 2018 07:04          CAPILLARY BLOOD GLUCOSE      POCT Blood Glucose.: 118 mg/dL (04 Mar 2018 08:28)  POCT Blood Glucose.: 104 mg/dL (03 Mar 2018 21:51)  POCT Blood Glucose.: 148 mg/dL (03 Mar 2018 18:59)  POCT Blood Glucose.: 114 mg/dL (03 Mar 2018 17:40)  POCT Blood Glucose.: 166 mg/dL (03 Mar 2018 12:46)      Lower Extremity Physical Exam:  Vascular: DP/PT 0/4, B/L, Temperature gradient WNL, B/L.   Neuro: Epicritic sensation intact to the level of the foot, B/L.  Musculoskeletal/Ortho: No gross pedal deformities noted, B/L.   Skin: Dry scaly flaking skin to shins B/L.   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 no purulence, no malodor, no periwound erythema or edema noted.   Wound #2 & 3:  L foot lateral 5th met head and shaft ulcers to subQ with a fibrotic plug, dry, no periwound erythema or edema, no purulence, no malodor    RADIOLOGY & ADDITIONAL TESTS:

## 2018-03-04 NOTE — PROGRESS NOTE ADULT - SUBJECTIVE AND OBJECTIVE BOX
CC: no cp/sob    TELEMETRY:     PHYSICAL EXAM:    T(C): 37 (03-04-18 @ 04:09), Max: 37.3 (03-03-18 @ 20:33)  HR: 82 (03-04-18 @ 04:09) (82 - 91)  BP: 128/70 (03-04-18 @ 04:09) (91/60 - 137/74)  RR: 18 (03-04-18 @ 04:09) (18 - 18)  SpO2: 97% (03-04-18 @ 04:09) (96% - 97%)  Wt(kg): --  I&O's Summary    03 Mar 2018 07:01  -  04 Mar 2018 07:00  --------------------------------------------------------  IN: 1240 mL / OUT: 1200 mL / NET: 40 mL        Appearance: Normal	  Cardiovascular: Normal S1 S2,RRR, No JVD, No murmurs  Respiratory: Lungs clear to auscultation	  Gastrointestinal:  Soft, Non-tender, + BS	  Extremities: Normal range of motion, No clubbing, cyanosis or edema  Vascular: Peripheral pulses palpable 2+ bilaterally     LABS:	 	                          12.5   12.53 )-----------( 300      ( 04 Mar 2018 08:29 )             37.1     03-04    134<L>  |  99  |  24<H>  ----------------------------<  103<H>  4.6   |  18<L>  |  0.71    Ca    9.6      04 Mar 2018 07:04            CARDIAC MARKERS:

## 2018-03-04 NOTE — PROGRESS NOTE ADULT - ASSESSMENT
LEFT hallux osteomyelitis    Plan:  ·	Pt seen and evaluated  ·	Xrays reviewed, consistent with acute OM.  ·	Wound culture pending  ·	dressed w/ betadine and DSD  ·	per [facility] patient needs cardiac risk stratification due to recent cardiac ptca/stent for upcoming LLE bypass  ·	Going for amputation Tues 10:30 am w/ Dr Joe  ·	consented  ·	Will cont to follow.

## 2018-03-05 LAB
ANION GAP SERPL CALC-SCNC: 16 MMOL/L — SIGNIFICANT CHANGE UP (ref 5–17)
BUN SERPL-MCNC: 24 MG/DL — HIGH (ref 7–23)
CALCIUM SERPL-MCNC: 9.8 MG/DL — SIGNIFICANT CHANGE UP (ref 8.4–10.5)
CHLORIDE SERPL-SCNC: 98 MMOL/L — SIGNIFICANT CHANGE UP (ref 96–108)
CO2 SERPL-SCNC: 21 MMOL/L — LOW (ref 22–31)
CREAT SERPL-MCNC: 0.69 MG/DL — SIGNIFICANT CHANGE UP (ref 0.5–1.3)
GLUCOSE BLDC GLUCOMTR-MCNC: 129 MG/DL — HIGH (ref 70–99)
GLUCOSE BLDC GLUCOMTR-MCNC: 159 MG/DL — HIGH (ref 70–99)
GLUCOSE BLDC GLUCOMTR-MCNC: 207 MG/DL — HIGH (ref 70–99)
GLUCOSE BLDC GLUCOMTR-MCNC: 96 MG/DL — SIGNIFICANT CHANGE UP (ref 70–99)
GLUCOSE SERPL-MCNC: 143 MG/DL — HIGH (ref 70–99)
HCT VFR BLD CALC: 35.2 % — LOW (ref 39–50)
HGB BLD-MCNC: 12 G/DL — LOW (ref 13–17)
MCHC RBC-ENTMCNC: 30.6 PG — SIGNIFICANT CHANGE UP (ref 27–34)
MCHC RBC-ENTMCNC: 34.1 GM/DL — SIGNIFICANT CHANGE UP (ref 32–36)
MCV RBC AUTO: 89.8 FL — SIGNIFICANT CHANGE UP (ref 80–100)
PLATELET # BLD AUTO: 327 K/UL — SIGNIFICANT CHANGE UP (ref 150–400)
POTASSIUM SERPL-MCNC: 4.5 MMOL/L — SIGNIFICANT CHANGE UP (ref 3.5–5.3)
POTASSIUM SERPL-SCNC: 4.5 MMOL/L — SIGNIFICANT CHANGE UP (ref 3.5–5.3)
RBC # BLD: 3.92 M/UL — LOW (ref 4.2–5.8)
RBC # FLD: 13.1 % — SIGNIFICANT CHANGE UP (ref 10.3–14.5)
SODIUM SERPL-SCNC: 135 MMOL/L — SIGNIFICANT CHANGE UP (ref 135–145)
WBC # BLD: 12.58 K/UL — HIGH (ref 3.8–10.5)
WBC # FLD AUTO: 12.58 K/UL — HIGH (ref 3.8–10.5)

## 2018-03-05 PROCEDURE — 99232 SBSQ HOSP IP/OBS MODERATE 35: CPT

## 2018-03-05 RX ORDER — ACETAMINOPHEN 500 MG
650 TABLET ORAL ONCE
Qty: 0 | Refills: 0 | Status: COMPLETED | OUTPATIENT
Start: 2018-03-05 | End: 2018-03-05

## 2018-03-05 RX ADMIN — HEPARIN SODIUM 5000 UNIT(S): 5000 INJECTION INTRAVENOUS; SUBCUTANEOUS at 17:52

## 2018-03-05 RX ADMIN — TICAGRELOR 90 MILLIGRAM(S): 90 TABLET ORAL at 17:52

## 2018-03-05 RX ADMIN — Medication 650 MILLIGRAM(S): at 21:01

## 2018-03-05 RX ADMIN — PREGABALIN 1000 MICROGRAM(S): 225 CAPSULE ORAL at 12:45

## 2018-03-05 RX ADMIN — Medication 100 MILLIGRAM(S): at 14:34

## 2018-03-05 RX ADMIN — Medication 22 UNIT(S): at 08:56

## 2018-03-05 RX ADMIN — HEPARIN SODIUM 5000 UNIT(S): 5000 INJECTION INTRAVENOUS; SUBCUTANEOUS at 05:58

## 2018-03-05 RX ADMIN — SENNA PLUS 2 TABLET(S): 8.6 TABLET ORAL at 21:01

## 2018-03-05 RX ADMIN — Medication 1 TABLET(S): at 12:45

## 2018-03-05 RX ADMIN — Medication 100 MILLIGRAM(S): at 14:35

## 2018-03-05 RX ADMIN — Medication 100 MILLIGRAM(S): at 22:40

## 2018-03-05 RX ADMIN — Medication 1: at 08:56

## 2018-03-05 RX ADMIN — GABAPENTIN 300 MILLIGRAM(S): 400 CAPSULE ORAL at 17:52

## 2018-03-05 RX ADMIN — INSULIN GLARGINE 23 UNIT(S): 100 INJECTION, SOLUTION SUBCUTANEOUS at 08:56

## 2018-03-05 RX ADMIN — Medication 100 MILLIGRAM(S): at 05:58

## 2018-03-05 RX ADMIN — Medication 81 MILLIGRAM(S): at 12:45

## 2018-03-05 RX ADMIN — Medication 100 MILLIGRAM(S): at 21:01

## 2018-03-05 RX ADMIN — Medication 22 UNIT(S): at 12:45

## 2018-03-05 RX ADMIN — Medication 22 UNIT(S): at 17:52

## 2018-03-05 RX ADMIN — TICAGRELOR 90 MILLIGRAM(S): 90 TABLET ORAL at 05:59

## 2018-03-05 RX ADMIN — Medication 1 MILLIGRAM(S): at 12:45

## 2018-03-05 RX ADMIN — Medication 25 MILLIGRAM(S): at 05:58

## 2018-03-05 RX ADMIN — GABAPENTIN 300 MILLIGRAM(S): 400 CAPSULE ORAL at 05:59

## 2018-03-05 RX ADMIN — INSULIN GLARGINE 58 UNIT(S): 100 INJECTION, SOLUTION SUBCUTANEOUS at 22:40

## 2018-03-05 RX ADMIN — Medication 650 MILLIGRAM(S): at 21:31

## 2018-03-05 NOTE — PROGRESS NOTE ADULT - SUBJECTIVE AND OBJECTIVE BOX
pt seen at bedside in NAD. dressing to left foot c/d/i  left hallux ulceration noted necrotic dry and stable  applied betadine with DSD  spoke with daughter explained will plan Tuesday for amp and dr ruby will be doing bypass in Thursday this way all necrotic tissue is removed to prevent infection to bypass site. if the toe still necrosis we can return to OR and amputate the rest of the toe if needed and would heal due to better circulation she understood and agrees to amp on tuesday  follow up clearance  will continue to follow

## 2018-03-05 NOTE — PROGRESS NOTE ADULT - SUBJECTIVE AND OBJECTIVE BOX
Patient is a 67y old  Male who presents with a chief complaint of Worsened left foot infection (27 Feb 2018 05:00)  NAD    INTERVAL HPI/OVERNIGHT EVENTS:  T(C): 37.1 (03-05-18 @ 20:04), Max: 37.4 (03-04-18 @ 22:10)  HR: 96 (03-05-18 @ 20:04) (84 - 96)  BP: 163/75 (03-05-18 @ 20:04) (117/75 - 163/75)  RR: 18 (03-05-18 @ 20:04) (18 - 18)  SpO2: 95% (03-05-18 @ 20:04) (95% - 98%)  Wt(kg): --  I&O's Summary    04 Mar 2018 07:01  -  05 Mar 2018 07:00  --------------------------------------------------------  IN: 1660 mL / OUT: 1700 mL / NET: -40 mL    05 Mar 2018 07:01  -  05 Mar 2018 20:08  --------------------------------------------------------  IN: 1550 mL / OUT: 0 mL / NET: 1550 mL        LABS:                        12.0   12.58 )-----------( 327      ( 05 Mar 2018 09:28 )             35.2     03-05    135  |  98  |  24<H>  ----------------------------<  143<H>  4.5   |  21<L>  |  0.69    Ca    9.8      05 Mar 2018 07:06          CAPILLARY BLOOD GLUCOSE      POCT Blood Glucose.: 96 mg/dL (05 Mar 2018 17:44)  POCT Blood Glucose.: 129 mg/dL (05 Mar 2018 12:40)  POCT Blood Glucose.: 159 mg/dL (05 Mar 2018 08:40)  POCT Blood Glucose.: 189 mg/dL (04 Mar 2018 22:08)            MEDICATIONS  (STANDING):  aspirin enteric coated 81 milliGRAM(s) Oral daily  ceFAZolin   IVPB 2000 milliGRAM(s) IV Intermittent every 8 hours  cyanocobalamin 1000 MICROGram(s) Oral daily  dextrose 5%. 1000 milliLiter(s) (50 mL/Hr) IV Continuous <Continuous>  dextrose 50% Injectable 25 Gram(s) IV Push once  docusate sodium 100 milliGRAM(s) Oral three times a day  folic acid 1 milliGRAM(s) Oral daily  gabapentin 300 milliGRAM(s) Oral two times a day  heparin  Injectable 5000 Unit(s) SubCutaneous every 12 hours  insulin glargine Injectable (LANTUS) 23 Unit(s) SubCutaneous every morning  insulin glargine Injectable (LANTUS) 58 Unit(s) SubCutaneous at bedtime  insulin lispro (HumaLOG) corrective regimen sliding scale   SubCutaneous three times a day before meals  insulin lispro (HumaLOG) corrective regimen sliding scale   SubCutaneous at bedtime  insulin lispro Injectable (HumaLOG) 22 Unit(s) SubCutaneous three times a day before meals  metoprolol succinate ER 25 milliGRAM(s) Oral daily  multivitamin 1 Tablet(s) Oral daily  senna 2 Tablet(s) Oral at bedtime  ticagrelor 90 milliGRAM(s) Oral two times a day    MEDICATIONS  (PRN):  bisacodyl 5 milliGRAM(s) Oral every 12 hours PRN Constipation  dextrose Gel 1 Dose(s) Oral once PRN Blood Glucose LESS THAN 70 milliGRAM(s)/deciliter  glucagon  Injectable 1 milliGRAM(s) IntraMuscular once PRN Glucose LESS THAN 70 milligrams/deciliter          PHYSICAL EXAM:  GENERAL: NAD, well-groomed, well-developed  HEAD:  Atraumatic, Normocephalic  CHEST/LUNG: Clear to percussion bilaterally; No rales, rhonchi, wheezing, or rubs  HEART: Regular rate and rhythm; No murmurs, rubs, or gallops  ABDOMEN: Soft, Nontender, Nondistended; Bowel sounds present  EXTREMITIES:  2+ Peripheral Pulses, No clubbing, cyanosis, or edema  LYMPH: No lymphadenopathy noted  SKIN: No rashes or lesions    Care Discussed with Consultants/Other Providers [ ] YES  [ ] NO

## 2018-03-05 NOTE — PROGRESS NOTE ADULT - ASSESSMENT
67 y old male w multilevel art insufficiency and left hallux tip ischemic progression s/p recent card cath with pca/stent     - cardiology recs appreciated: patient is optimized from cardiac standpoint and patient is increased/intermediate risk from cardiac standpoint; needs to stay on DAPT  - patient will require toe amputation, thus planning for LLE bypass/revascularization is underway  - pending b/l lower extremity vein mapping  - will follow    Nick Jarrell PGY2  [phone] 67 y old male w multilevel art insufficiency and left hallux tip ischemic progression s/p recent card cath with pca/stent     - cardiology recs appreciated: patient is optimized from cardiac standpoint and patient is increased/intermediate risk from cardiac standpoint; needs to stay on DAPT  - patient will require toe amputation, thus planning for LLE bypass/revascularization is underway  - pending b/l lower extremity vein mapping  - will follow  tent lle byp on thurs

## 2018-03-05 NOTE — PROGRESS NOTE ADULT - SUBJECTIVE AND OBJECTIVE BOX
CARDIOLOGY FOLLOW UP - Dr. Gavin    CC no chest pain or sob        PHYSICAL EXAM:  T(C): 37.1 (03-05-18 @ 13:46), Max: 37.4 (03-04-18 @ 22:10)  HR: 84 (03-05-18 @ 13:46) (83 - 91)  BP: 117/75 (03-05-18 @ 13:46) (117/75 - 147/79)  RR: 18 (03-05-18 @ 13:46) (18 - 18)  SpO2: 97% (03-05-18 @ 13:46) (95% - 98%)  Wt(kg): --  I&O's Summary    04 Mar 2018 07:01  -  05 Mar 2018 07:00  --------------------------------------------------------  IN: 1660 mL / OUT: 1700 mL / NET: -40 mL    05 Mar 2018 07:01  -  05 Mar 2018 14:01  --------------------------------------------------------  IN: 720 mL / OUT: 0 mL / NET: 720 mL        Appearance: Normal	  Cardiovascular: Normal S1 S2,RRR, No JVD, No murmurs  Respiratory: Lungs clear to auscultation	  Gastrointestinal:  Soft, Non-tender, + BS	  Extremities: Normal range of motion, No clubbing, cyanosis or edema        MEDICATIONS  (STANDING):  aspirin enteric coated 81 milliGRAM(s) Oral daily  ceFAZolin   IVPB 2000 milliGRAM(s) IV Intermittent every 8 hours  cyanocobalamin 1000 MICROGram(s) Oral daily  dextrose 5%. 1000 milliLiter(s) (50 mL/Hr) IV Continuous <Continuous>  dextrose 50% Injectable 25 Gram(s) IV Push once  docusate sodium 100 milliGRAM(s) Oral three times a day  folic acid 1 milliGRAM(s) Oral daily  gabapentin 300 milliGRAM(s) Oral two times a day  heparin  Injectable 5000 Unit(s) SubCutaneous every 12 hours  insulin glargine Injectable (LANTUS) 23 Unit(s) SubCutaneous every morning  insulin glargine Injectable (LANTUS) 58 Unit(s) SubCutaneous at bedtime  insulin lispro (HumaLOG) corrective regimen sliding scale   SubCutaneous three times a day before meals  insulin lispro (HumaLOG) corrective regimen sliding scale   SubCutaneous at bedtime  insulin lispro Injectable (HumaLOG) 22 Unit(s) SubCutaneous three times a day before meals  metoprolol succinate ER 25 milliGRAM(s) Oral daily  multivitamin 1 Tablet(s) Oral daily  senna 2 Tablet(s) Oral at bedtime  ticagrelor 90 milliGRAM(s) Oral two times a day      TELEMETRY: 	    ECG:  	  RADIOLOGY:   DIAGNOSTIC TESTING:  [ ] Echocardiogram:  [ ]  Catheterization:  [ ] Stress Test:    OTHER: 	    LABS:	 	                                12.0   12.58 )-----------( 327      ( 05 Mar 2018 09:28 )             35.2     03-05    135  |  98  |  24<H>  ----------------------------<  143<H>  4.5   |  21<L>  |  0.69    Ca    9.8      05 Mar 2018 07:06

## 2018-03-05 NOTE — PROGRESS NOTE ADULT - SUBJECTIVE AND OBJECTIVE BOX
CC: left hallux pain  HPI: The patient is a 67 year old male with medical and surgical history significant for DM II, alcohol abuse, pancreatitis, CAD s/p CANDIDO x3 2/6/18 on ASA/Ticagrelor who presents with known left hallux gangrene/pain, now wet gangrene and x-ray imaging concerning for osteomyelitis.   24/Overnight events: Patient seen and examined at bedside. Will require toe amputation, thus revascularization will be needed. Pending vein mapping. Optimized from cardiac standpoint, patient carries an increased cardiac risk; needs to stay on DAPT.        Objective:  Vital Signs Last 24 Hrs  T(C): 36.9 (05 Mar 2018 05:55), Max: 37.4 (04 Mar 2018 22:10)  T(F): 98.4 (05 Mar 2018 05:55), Max: 99.3 (04 Mar 2018 22:10)  HR: 90 (05 Mar 2018 05:55) (83 - 91)  BP: 147/79 (05 Mar 2018 05:55) (134/69 - 147/79)  BP(mean): --  RR: 18 (05 Mar 2018 05:55) (18 - 18)  SpO2: 98% (05 Mar 2018 05:55) (95% - 98%)     Physical Exam: unchanged  General: appears to be in no acute distress.  Chest: lungs clear bilaterally, non-labored breathing, no wheezing or rhonci  Extremities: Warm b/l palp fem and pop pulses. R DP/PT signals. L PT signal only. L first toe with wet gangrenous tip wrapped    MEDICATIONS  (STANDING):  aspirin enteric coated 81 milliGRAM(s) Oral daily  ceFAZolin   IVPB 2000 milliGRAM(s) IV Intermittent every 8 hours  cyanocobalamin 1000 MICROGram(s) Oral daily  dextrose 5%. 1000 milliLiter(s) (50 mL/Hr) IV Continuous <Continuous>  dextrose 50% Injectable 25 Gram(s) IV Push once  docusate sodium 100 milliGRAM(s) Oral three times a day  folic acid 1 milliGRAM(s) Oral daily  gabapentin 300 milliGRAM(s) Oral two times a day  heparin  Injectable 5000 Unit(s) SubCutaneous every 12 hours  insulin glargine Injectable (LANTUS) 23 Unit(s) SubCutaneous every morning  insulin glargine Injectable (LANTUS) 58 Unit(s) SubCutaneous at bedtime  insulin lispro (HumaLOG) corrective regimen sliding scale   SubCutaneous three times a day before meals  insulin lispro (HumaLOG) corrective regimen sliding scale   SubCutaneous at bedtime  insulin lispro Injectable (HumaLOG) 22 Unit(s) SubCutaneous three times a day before meals  metoprolol succinate ER 25 milliGRAM(s) Oral daily  multivitamin 1 Tablet(s) Oral daily  senna 2 Tablet(s) Oral at bedtime  ticagrelor 90 milliGRAM(s) Oral two times a day    MEDICATIONS  (PRN):  bisacodyl 5 milliGRAM(s) Oral every 12 hours PRN Constipation  dextrose Gel 1 Dose(s) Oral once PRN Blood Glucose LESS THAN 70 milliGRAM(s)/deciliter  glucagon  Injectable 1 milliGRAM(s) IntraMuscular once PRN Glucose LESS THAN 70 milligrams/deciliter    I&O's Detail    03 Mar 2018 07:01  -  04 Mar 2018 07:00  --------------------------------------------------------  IN:    IV PiggyBack: 100 mL    Oral Fluid: 1140 mL  Total IN: 1240 mL    OUT:    Voided: 1200 mL  Total OUT: 1200 mL    Total NET: 40 mL      04 Mar 2018 07:01  -  05 Mar 2018 06:00  --------------------------------------------------------  IN:    IV PiggyBack: 50 mL    Oral Fluid: 1440 mL  Total IN: 1490 mL    OUT:    Voided: 800 mL  Total OUT: 800 mL    Total NET: 690 mL        Daily     Daily     LABS:                        12.5   12.53 )-----------( 300      ( 04 Mar 2018 08:29 )             37.1     03-04    134<L>  |  99  |  24<H>  ----------------------------<  103<H>  4.6   |  18<L>  |  0.71    Ca    9.6      04 Mar 2018 07:04 CC: left hallux pain  HPI: The patient is a 67 year old male with medical and surgical history significant for DM II, alcohol abuse, pancreatitis, CAD s/p CANDIDO x3 2/6/18 on ASA/Ticagrelor who presents with known left hallux gangrene/pain, now wet gangrene and x-ray imaging concerning for osteomyelitis.   24/Overnight events: Patient seen and examined at bedside. Will require toe amputation, thus revascularization will be needed. Pending vein mapping. Optimized from cardiac standpoint, patient carries an increased cardiac risk; needs to stay on DAPT.        Objective:  Vital Signs Last 24 Hrs  T(C): 36.9 (05 Mar 2018 05:55), Max: 37.4 (04 Mar 2018 22:10)  T(F): 98.4 (05 Mar 2018 05:55), Max: 99.3 (04 Mar 2018 22:10)  HR: 90 (05 Mar 2018 05:55) (83 - 91)  BP: 147/79 (05 Mar 2018 05:55) (134/69 - 147/79)  BP(mean): --  RR: 18 (05 Mar 2018 05:55) (18 - 18)  SpO2: 98% (05 Mar 2018 05:55) (95% - 98%)     Physical Exam: unchanged  General: appears to be in no acute distress.  Chest: lungs clear bilaterally, non-labored breathing, no wheezing or rhonci  Extremities: Warm b/l palp fem and pop pulses. R DP/PT signals. L PT signal only. L first toe with wet gangrenous tip wrapped    MEDICATIONS  (STANDING):  aspirin enteric coated 81 milliGRAM(s) Oral daily  ceFAZolin   IVPB 2000 milliGRAM(s) IV Intermittent every 8 hours  cyanocobalamin 1000 MICROGram(s) Oral daily  dextrose 5%. 1000 milliLiter(s) (50 mL/Hr) IV Continuous <Continuous>  dextrose 50% Injectable 25 Gram(s) IV Push once  docusate sodium 100 milliGRAM(s) Oral three times a day  folic acid 1 milliGRAM(s) Oral daily  gabapentin 300 milliGRAM(s) Oral two times a day  heparin  Injectable 5000 Unit(s) SubCutaneous every 12 hours  insulin glargine Injectable (LANTUS) 23 Unit(s) SubCutaneous every morning  insulin glargine Injectable (LANTUS) 58 Unit(s) SubCutaneous at bedtime  insulin lispro (HumaLOG) corrective regimen sliding scale   SubCutaneous three times a day before meals  insulin lispro (HumaLOG) corrective regimen sliding scale   SubCutaneous at bedtime  insulin lispro Injectable (HumaLOG) 22 Unit(s) SubCutaneous three times a day before meals  metoprolol succinate ER 25 milliGRAM(s) Oral daily  multivitamin 1 Tablet(s) Oral daily  senna 2 Tablet(s) Oral at bedtime  ticagrelor 90 milliGRAM(s) Oral two times a day    MEDICATIONS  (PRN):  bisacodyl 5 milliGRAM(s) Oral every 12 hours PRN Constipation  dextrose Gel 1 Dose(s) Oral once PRN Blood Glucose LESS THAN 70 milliGRAM(s)/deciliter  glucagon  Injectable 1 milliGRAM(s) IntraMuscular once PRN Glucose LESS THAN 70 milligrams/deciliter    I&O's Detail    03 Mar 2018 07:01  -  04 Mar 2018 07:00  --------------------------------------------------------  IN:    IV PiggyBack: 100 mL    Oral Fluid: 1140 mL  Total IN: 1240 mL    OUT:    Voided: 1200 mL  Total OUT: 1200 mL    Total NET: 40 mL      04 Mar 2018 07:01  -  05 Mar 2018 06:00  --------------------------------------------------------  IN:    IV PiggyBack: 50 mL    Oral Fluid: 1440 mL  Total IN: 1490 mL    OUT:    Voided: 800 mL  Total OUT: 800 mL    Total NET: 690 mL    LABS:                        12.5   12.53 )-----------( 300      ( 04 Mar 2018 08:29 )             37.1     03-04    134<L>  |  99  |  24<H>  ----------------------------<  103<H>  4.6   |  18<L>  |  0.71    Ca    9.6      04 Mar 2018 07:04

## 2018-03-05 NOTE — PROGRESS NOTE ADULT - ATTENDING COMMENTS
Patient seen and examined.  Agree with above NP note.  remains cv stable and optimized  d/w podiatry  await toe amp brigida  pt can proceed for surgery with acceptable risk   cont asa/brilinta  anesthesia will be local, not general   post podiatry surgery, pt will need le bypass surgery per vascular  if vascular is concerned with bleeding risk, brilinta can be held for 5 days prior to surgery with ASA being conitnued without interruption

## 2018-03-05 NOTE — PROGRESS NOTE ADULT - ASSESSMENT
Patient is a 67 year old male with a past medical history significant for DM type II, h/o EtOH abuse and  pancreatitis, Cad s/p stent,  PVD, presents with purulent drainage from the left great toe over the past week found to have dry gangrene of the distal lt great toe. + Leucocytosis trending down, no fever. No active drainage rt now. Staph aureus in wound cx.     Culture is staph aurues/ staph lugdenensis  Continue cefazolin    Await further plan from podiatry/ vascular regarding possible revascularization/ amputation.

## 2018-03-05 NOTE — PROGRESS NOTE ADULT - SUBJECTIVE AND OBJECTIVE BOX
Follow Up:      Inverval History/ROS:Patient is a 67y old  Male who presents with a chief complaint of Worsened left foot infection (27 Feb 2018 05:00)    No fever.    Vacular eval prior to revascularization in progress.  Awaitng toe amputation.      Allergies    No Known Allergies    Intolerances        ANTIMICROBIALS:  ceFAZolin   IVPB 2000 every 8 hours      OTHER MEDS:  aspirin enteric coated 81 milliGRAM(s) Oral daily  bisacodyl 5 milliGRAM(s) Oral every 12 hours PRN  cyanocobalamin 1000 MICROGram(s) Oral daily  dextrose 5%. 1000 milliLiter(s) IV Continuous <Continuous>  dextrose 50% Injectable 25 Gram(s) IV Push once  dextrose Gel 1 Dose(s) Oral once PRN  docusate sodium 100 milliGRAM(s) Oral three times a day  folic acid 1 milliGRAM(s) Oral daily  gabapentin 300 milliGRAM(s) Oral two times a day  glucagon  Injectable 1 milliGRAM(s) IntraMuscular once PRN  heparin  Injectable 5000 Unit(s) SubCutaneous every 12 hours  insulin glargine Injectable (LANTUS) 23 Unit(s) SubCutaneous every morning  insulin glargine Injectable (LANTUS) 58 Unit(s) SubCutaneous at bedtime  insulin lispro (HumaLOG) corrective regimen sliding scale   SubCutaneous three times a day before meals  insulin lispro (HumaLOG) corrective regimen sliding scale   SubCutaneous at bedtime  insulin lispro Injectable (HumaLOG) 22 Unit(s) SubCutaneous three times a day before meals  metoprolol succinate ER 25 milliGRAM(s) Oral daily  multivitamin 1 Tablet(s) Oral daily  senna 2 Tablet(s) Oral at bedtime  ticagrelor 90 milliGRAM(s) Oral two times a day      Vital Signs Last 24 Hrs  T(C): 36.9 (05 Mar 2018 05:55), Max: 37.4 (04 Mar 2018 22:10)  T(F): 98.4 (05 Mar 2018 05:55), Max: 99.3 (04 Mar 2018 22:10)  HR: 90 (05 Mar 2018 05:55) (83 - 91)  BP: 147/79 (05 Mar 2018 05:55) (134/69 - 147/79)  BP(mean): --  RR: 18 (05 Mar 2018 05:55) (18 - 18)  SpO2: 98% (05 Mar 2018 05:55) (95% - 98%)    PHYSICAL EXAM:  General: [x ] non-toxic  HEAD/EYES: [ ] PERRL [x ] white sclera [ ] icterus  ENT:  [ ] normal [x ] supple [ ] thrush [ ] pharyngeal exudate  Cardiovascular:   [ ] murmur [x ] normal [ ] PPM/AICD  Respiratory:  [ x] clear to ausculation bilaterally  GI:  [ ] soft, non-tender, normal bowel sounds  :  [ ] evans [ ] no CVA tenderness   Musculoskeletal:  [ x] no synovitis  Neurologic:  [ ] non-focal exam   Skin:  [ ] no rash  Lymph: [ ] no lymphadenopathy  Psychiatric:  [ x] appropriate affect [ ] alert & oriented  Lines:  [ x] no phlebitis [ ] central line                                12.0   12.58 )-----------( 327      ( 05 Mar 2018 09:28 )             35.2       03-05    135  |  98  |  24<H>  ----------------------------<  143<H>  4.5   |  21<L>  |  0.69    Ca    9.8      05 Mar 2018 07:06            MICROBIOLOGY:Culture Results:   Numerous Staphylococcus aureus  Numerous Staphylococcus lugdunensis  Growth in fluid media only Growth in fluid media only  ***********Note************  This isolate demonstrates inducible  Moderate Candida tropicalis  Growth in fluid media only Streptococcus agalactiae (Group B)  Group B streptococci are susceptible to ampicillin,  penicillin and cefazolin, but may be resistant to  erythromycin and clindamycin.  Recommendations for intrapartum prophylaxis for Group B  streptococci are penicillin or ampicillin. (02-27-18 @ 06:42)  Culture Results:   No growth at 5 days. (02-27-18 @ 04:58)  Culture Results:   No growth at 5 days. (02-27-18 @ 04:58)      RADIOLOGY:

## 2018-03-06 ENCOUNTER — RESULT REVIEW (OUTPATIENT)
Age: 68
End: 2018-03-06

## 2018-03-06 LAB
ANION GAP SERPL CALC-SCNC: 16 MMOL/L — SIGNIFICANT CHANGE UP (ref 5–17)
APTT BLD: 30.9 SEC — SIGNIFICANT CHANGE UP (ref 27.5–37.4)
BLD GP AB SCN SERPL QL: NEGATIVE — SIGNIFICANT CHANGE UP
BUN SERPL-MCNC: 24 MG/DL — HIGH (ref 7–23)
CALCIUM SERPL-MCNC: 10.1 MG/DL — SIGNIFICANT CHANGE UP (ref 8.4–10.5)
CHLORIDE SERPL-SCNC: 99 MMOL/L — SIGNIFICANT CHANGE UP (ref 96–108)
CO2 SERPL-SCNC: 23 MMOL/L — SIGNIFICANT CHANGE UP (ref 22–31)
CREAT SERPL-MCNC: 0.69 MG/DL — SIGNIFICANT CHANGE UP (ref 0.5–1.3)
GLUCOSE BLDC GLUCOMTR-MCNC: 127 MG/DL — HIGH (ref 70–99)
GLUCOSE BLDC GLUCOMTR-MCNC: 135 MG/DL — HIGH (ref 70–99)
GLUCOSE BLDC GLUCOMTR-MCNC: 185 MG/DL — HIGH (ref 70–99)
GLUCOSE BLDC GLUCOMTR-MCNC: 246 MG/DL — HIGH (ref 70–99)
GLUCOSE BLDC GLUCOMTR-MCNC: 259 MG/DL — HIGH (ref 70–99)
GLUCOSE BLDC GLUCOMTR-MCNC: 95 MG/DL — SIGNIFICANT CHANGE UP (ref 70–99)
GLUCOSE SERPL-MCNC: 145 MG/DL — HIGH (ref 70–99)
GRAM STN FLD: SIGNIFICANT CHANGE UP
HCT VFR BLD CALC: 37.3 % — LOW (ref 39–50)
HGB BLD-MCNC: 12.7 G/DL — LOW (ref 13–17)
INR BLD: 1.03 RATIO — SIGNIFICANT CHANGE UP (ref 0.88–1.16)
MCHC RBC-ENTMCNC: 31.5 PG — SIGNIFICANT CHANGE UP (ref 27–34)
MCHC RBC-ENTMCNC: 34.2 GM/DL — SIGNIFICANT CHANGE UP (ref 32–36)
MCV RBC AUTO: 92.2 FL — SIGNIFICANT CHANGE UP (ref 80–100)
PLATELET # BLD AUTO: 334 K/UL — SIGNIFICANT CHANGE UP (ref 150–400)
POTASSIUM SERPL-MCNC: 4.7 MMOL/L — SIGNIFICANT CHANGE UP (ref 3.5–5.3)
POTASSIUM SERPL-SCNC: 4.7 MMOL/L — SIGNIFICANT CHANGE UP (ref 3.5–5.3)
PROTHROM AB SERPL-ACNC: 11.1 SEC — SIGNIFICANT CHANGE UP (ref 9.8–12.7)
RBC # BLD: 4.04 M/UL — LOW (ref 4.2–5.8)
RBC # FLD: 12 % — SIGNIFICANT CHANGE UP (ref 10.3–14.5)
RH IG SCN BLD-IMP: POSITIVE — SIGNIFICANT CHANGE UP
SODIUM SERPL-SCNC: 138 MMOL/L — SIGNIFICANT CHANGE UP (ref 135–145)
SPECIMEN SOURCE: SIGNIFICANT CHANGE UP
WBC # BLD: 11.7 K/UL — HIGH (ref 3.8–10.5)
WBC # FLD AUTO: 11.7 K/UL — HIGH (ref 3.8–10.5)

## 2018-03-06 PROCEDURE — 88311 DECALCIFY TISSUE: CPT | Mod: 26

## 2018-03-06 PROCEDURE — 73630 X-RAY EXAM OF FOOT: CPT | Mod: 26,LT

## 2018-03-06 PROCEDURE — 99232 SBSQ HOSP IP/OBS MODERATE 35: CPT | Mod: GC

## 2018-03-06 PROCEDURE — 99232 SBSQ HOSP IP/OBS MODERATE 35: CPT

## 2018-03-06 PROCEDURE — 88305 TISSUE EXAM BY PATHOLOGIST: CPT | Mod: 26

## 2018-03-06 RX ORDER — SODIUM CHLORIDE 9 MG/ML
1000 INJECTION, SOLUTION INTRAVENOUS
Qty: 0 | Refills: 0 | Status: DISCONTINUED | OUTPATIENT
Start: 2018-03-06 | End: 2018-03-06

## 2018-03-06 RX ORDER — SODIUM CHLORIDE 9 MG/ML
1000 INJECTION, SOLUTION INTRAVENOUS
Qty: 0 | Refills: 0 | Status: DISCONTINUED | OUTPATIENT
Start: 2018-03-06 | End: 2018-03-08

## 2018-03-06 RX ORDER — DEXTROSE 50 % IN WATER 50 %
25 SYRINGE (ML) INTRAVENOUS ONCE
Qty: 0 | Refills: 0 | Status: DISCONTINUED | OUTPATIENT
Start: 2018-03-06 | End: 2018-03-08

## 2018-03-06 RX ORDER — INSULIN LISPRO 100/ML
VIAL (ML) SUBCUTANEOUS EVERY 6 HOURS
Qty: 0 | Refills: 0 | Status: DISCONTINUED | OUTPATIENT
Start: 2018-03-06 | End: 2018-03-06

## 2018-03-06 RX ORDER — INSULIN LISPRO 100/ML
VIAL (ML) SUBCUTANEOUS AT BEDTIME
Qty: 0 | Refills: 0 | Status: DISCONTINUED | OUTPATIENT
Start: 2018-03-06 | End: 2018-03-08

## 2018-03-06 RX ORDER — INSULIN GLARGINE 100 [IU]/ML
23 INJECTION, SOLUTION SUBCUTANEOUS EVERY MORNING
Qty: 0 | Refills: 0 | Status: DISCONTINUED | OUTPATIENT
Start: 2018-03-06 | End: 2018-03-08

## 2018-03-06 RX ORDER — FOLIC ACID 0.8 MG
1 TABLET ORAL DAILY
Qty: 0 | Refills: 0 | Status: DISCONTINUED | OUTPATIENT
Start: 2018-03-06 | End: 2018-03-08

## 2018-03-06 RX ORDER — INSULIN LISPRO 100/ML
VIAL (ML) SUBCUTANEOUS
Qty: 0 | Refills: 0 | Status: DISCONTINUED | OUTPATIENT
Start: 2018-03-06 | End: 2018-03-08

## 2018-03-06 RX ORDER — DOCUSATE SODIUM 100 MG
100 CAPSULE ORAL THREE TIMES A DAY
Qty: 0 | Refills: 0 | Status: DISCONTINUED | OUTPATIENT
Start: 2018-03-06 | End: 2018-03-08

## 2018-03-06 RX ORDER — CEFAZOLIN SODIUM 1 G
2000 VIAL (EA) INJECTION EVERY 8 HOURS
Qty: 0 | Refills: 0 | Status: DISCONTINUED | OUTPATIENT
Start: 2018-03-06 | End: 2018-03-08

## 2018-03-06 RX ORDER — METOPROLOL TARTRATE 50 MG
25 TABLET ORAL DAILY
Qty: 0 | Refills: 0 | Status: DISCONTINUED | OUTPATIENT
Start: 2018-03-06 | End: 2018-03-08

## 2018-03-06 RX ORDER — ASPIRIN/CALCIUM CARB/MAGNESIUM 324 MG
81 TABLET ORAL DAILY
Qty: 0 | Refills: 0 | Status: DISCONTINUED | OUTPATIENT
Start: 2018-03-06 | End: 2018-03-08

## 2018-03-06 RX ORDER — HEPARIN SODIUM 5000 [USP'U]/ML
5000 INJECTION INTRAVENOUS; SUBCUTANEOUS EVERY 12 HOURS
Qty: 0 | Refills: 0 | Status: DISCONTINUED | OUTPATIENT
Start: 2018-03-06 | End: 2018-03-08

## 2018-03-06 RX ORDER — TICAGRELOR 90 MG/1
90 TABLET ORAL
Qty: 0 | Refills: 0 | Status: DISCONTINUED | OUTPATIENT
Start: 2018-03-06 | End: 2018-03-08

## 2018-03-06 RX ORDER — SENNA PLUS 8.6 MG/1
2 TABLET ORAL AT BEDTIME
Qty: 0 | Refills: 0 | Status: DISCONTINUED | OUTPATIENT
Start: 2018-03-06 | End: 2018-03-08

## 2018-03-06 RX ORDER — GABAPENTIN 400 MG/1
300 CAPSULE ORAL
Qty: 0 | Refills: 0 | Status: DISCONTINUED | OUTPATIENT
Start: 2018-03-06 | End: 2018-03-08

## 2018-03-06 RX ORDER — INSULIN GLARGINE 100 [IU]/ML
10 INJECTION, SOLUTION SUBCUTANEOUS ONCE
Qty: 0 | Refills: 0 | Status: COMPLETED | OUTPATIENT
Start: 2018-03-06 | End: 2018-03-06

## 2018-03-06 RX ORDER — DEXTROSE 50 % IN WATER 50 %
1 SYRINGE (ML) INTRAVENOUS ONCE
Qty: 0 | Refills: 0 | Status: DISCONTINUED | OUTPATIENT
Start: 2018-03-06 | End: 2018-03-08

## 2018-03-06 RX ORDER — GLUCAGON INJECTION, SOLUTION 0.5 MG/.1ML
1 INJECTION, SOLUTION SUBCUTANEOUS ONCE
Qty: 0 | Refills: 0 | Status: DISCONTINUED | OUTPATIENT
Start: 2018-03-06 | End: 2018-03-08

## 2018-03-06 RX ORDER — ACETAMINOPHEN 500 MG
650 TABLET ORAL EVERY 6 HOURS
Qty: 0 | Refills: 0 | Status: DISCONTINUED | OUTPATIENT
Start: 2018-03-06 | End: 2018-03-08

## 2018-03-06 RX ORDER — INSULIN GLARGINE 100 [IU]/ML
58 INJECTION, SOLUTION SUBCUTANEOUS AT BEDTIME
Qty: 0 | Refills: 0 | Status: DISCONTINUED | OUTPATIENT
Start: 2018-03-06 | End: 2018-03-08

## 2018-03-06 RX ADMIN — GABAPENTIN 300 MILLIGRAM(S): 400 CAPSULE ORAL at 05:52

## 2018-03-06 RX ADMIN — Medication 100 MILLIGRAM(S): at 05:52

## 2018-03-06 RX ADMIN — Medication 100 MILLIGRAM(S): at 14:39

## 2018-03-06 RX ADMIN — GABAPENTIN 300 MILLIGRAM(S): 400 CAPSULE ORAL at 17:17

## 2018-03-06 RX ADMIN — TICAGRELOR 90 MILLIGRAM(S): 90 TABLET ORAL at 17:17

## 2018-03-06 RX ADMIN — HEPARIN SODIUM 5000 UNIT(S): 5000 INJECTION INTRAVENOUS; SUBCUTANEOUS at 17:17

## 2018-03-06 RX ADMIN — Medication 3: at 17:58

## 2018-03-06 RX ADMIN — SODIUM CHLORIDE 50 MILLILITER(S): 9 INJECTION, SOLUTION INTRAVENOUS at 00:17

## 2018-03-06 RX ADMIN — SENNA PLUS 2 TABLET(S): 8.6 TABLET ORAL at 22:06

## 2018-03-06 RX ADMIN — SODIUM CHLORIDE 50 MILLILITER(S): 9 INJECTION, SOLUTION INTRAVENOUS at 12:20

## 2018-03-06 RX ADMIN — INSULIN GLARGINE 58 UNIT(S): 100 INJECTION, SOLUTION SUBCUTANEOUS at 22:05

## 2018-03-06 RX ADMIN — Medication 100 MILLIGRAM(S): at 22:24

## 2018-03-06 RX ADMIN — TICAGRELOR 90 MILLIGRAM(S): 90 TABLET ORAL at 06:01

## 2018-03-06 RX ADMIN — INSULIN GLARGINE 10 UNIT(S): 100 INJECTION, SOLUTION SUBCUTANEOUS at 10:01

## 2018-03-06 RX ADMIN — Medication 25 MILLIGRAM(S): at 05:52

## 2018-03-06 NOTE — PROGRESS NOTE ADULT - ASSESSMENT
Patient is a 67 year old male with a past medical history significant for DM type II, h/o EtOH abuse and  pancreatitis, Cad s/p stent,  PVD, presents with purulent drainage from the left great toe over the past week found to have dry gangrene of the distal lt great toe. + Leucocytosis trending down, no fever. No active drainage rt now. Staph aureus in wound cx.     Culture is staph aurues/ staph lugdenensis  Continue cefazolin    Await revascularization.

## 2018-03-06 NOTE — PROGRESS NOTE ADULT - ATTENDING COMMENTS
agree with above  family is aware of all risks benefits and alternative treatments aware he may require further debridement or amputation all questions answered no quarantees given or implied

## 2018-03-06 NOTE — PROGRESS NOTE ADULT - SUBJECTIVE AND OBJECTIVE BOX
Patient is a 67y old  Male who presents with a chief complaint of Worsened left foot infection (27 Feb 2018 05:00)      INTERVAL HPI/OVERNIGHT EVENTS:   Pt is scheduled for L foot partial hallux amp with Dr. joe  at 10:30 am. Patient is aware of procedure and is NPO since midnight.    MEDICATIONS  (STANDING):  aspirin enteric coated 81 milliGRAM(s) Oral daily  ceFAZolin   IVPB 2000 milliGRAM(s) IV Intermittent every 8 hours  cyanocobalamin 1000 MICROGram(s) Oral daily  dextrose 5% + sodium chloride 0.9%. 1000 milliLiter(s) (50 mL/Hr) IV Continuous <Continuous>  dextrose 5%. 1000 milliLiter(s) (50 mL/Hr) IV Continuous <Continuous>  dextrose 50% Injectable 25 Gram(s) IV Push once  docusate sodium 100 milliGRAM(s) Oral three times a day  folic acid 1 milliGRAM(s) Oral daily  gabapentin 300 milliGRAM(s) Oral two times a day  heparin  Injectable 5000 Unit(s) SubCutaneous every 12 hours  insulin glargine Injectable (LANTUS) 23 Unit(s) SubCutaneous every morning  insulin glargine Injectable (LANTUS) 58 Unit(s) SubCutaneous at bedtime  insulin lispro (HumaLOG) corrective regimen sliding scale   SubCutaneous at bedtime  insulin lispro (HumaLOG) corrective regimen sliding scale   SubCutaneous every 6 hours  insulin lispro Injectable (HumaLOG) 22 Unit(s) SubCutaneous three times a day before meals  metoprolol succinate ER 25 milliGRAM(s) Oral daily  multivitamin 1 Tablet(s) Oral daily  senna 2 Tablet(s) Oral at bedtime  ticagrelor 90 milliGRAM(s) Oral two times a day    MEDICATIONS  (PRN):  bisacodyl 5 milliGRAM(s) Oral every 12 hours PRN Constipation  dextrose Gel 1 Dose(s) Oral once PRN Blood Glucose LESS THAN 70 milliGRAM(s)/deciliter  glucagon  Injectable 1 milliGRAM(s) IntraMuscular once PRN Glucose LESS THAN 70 milligrams/deciliter      Allergies    No Known Allergies    Intolerances        Vital Signs Last 24 Hrs  T(C): 36.5 (06 Mar 2018 05:46), Max: 37.1 (05 Mar 2018 13:46)  T(F): 97.7 (06 Mar 2018 05:46), Max: 98.8 (05 Mar 2018 20:04)  HR: 82 (06 Mar 2018 05:46) (82 - 96)  BP: 160/88 (06 Mar 2018 05:46) (117/75 - 163/75)  BP(mean): --  RR: 18 (06 Mar 2018 05:46) (18 - 18)  SpO2: 98% (06 Mar 2018 05:46) (95% - 98%)    LABS:                        12.7   11.7  )-----------( 334      ( 06 Mar 2018 05:57 )             37.3     03-06    138  |  99  |  24<H>  ----------------------------<  145<H>  4.7   |  23  |  0.69    Ca    10.1      06 Mar 2018 05:43      PT/INR - ( 06 Mar 2018 05:43 )   PT: 11.1 sec;   INR: 1.03 ratio         PTT - ( 06 Mar 2018 05:43 )  PTT:30.9 sec    CAPILLARY BLOOD GLUCOSE      POCT Blood Glucose.: 135 mg/dL (06 Mar 2018 05:50)  POCT Blood Glucose.: 185 mg/dL (06 Mar 2018 00:20)  POCT Blood Glucose.: 207 mg/dL (05 Mar 2018 22:04)  POCT Blood Glucose.: 96 mg/dL (05 Mar 2018 17:44)  POCT Blood Glucose.: 129 mg/dL (05 Mar 2018 12:40)  POCT Blood Glucose.: 159 mg/dL (05 Mar 2018 08:40)      RADIOLOGY & ADDITIONAL TESTS:    Plan:   To OR today at 10:30 with Dr. Joe for left foot hallux amp.   CXR on sunrise.  EKG on sunrise.  Medical/Cardiac clearance since 3/6 and documented in chart.  Consent signed and in chart.  Procedure was explained to patient in detail. All alternatives, risks and complications were discussed. All questions answered.

## 2018-03-06 NOTE — PROGRESS NOTE ADULT - ASSESSMENT
Problem/Plan - 1:  ·  Problem: Diabetic foot infection.  Plan: - Continue  antibiotics as per ID  - Podiatry and vascular fu for further management  - OR today    Problem/Plan - 2:  ·  Problem: Diabetes mellitus.  Plan: - Lantus bid   - Lispro w/meals   - Insulin correction scale   - consistent carb diet.    Problem/Plan - 3:  ·  Problem: CAD (coronary artery disease).  Plan: - continue ASA and Brilinta   -  continue metoprolol   -  continue statin.     Problem/Plan - 4:  ·  Problem: H/O alcohol abuse.  Plan: Per wife no longer drinking.  monitor for any withdrawal    Problem/Plan - 5:  ·  Problem: Need for prophylactic measure.  Plan: subq heparin for DVT ppx.         Electronic Signatures:

## 2018-03-06 NOTE — PROGRESS NOTE ADULT - SUBJECTIVE AND OBJECTIVE BOX
Podiatry post op  pt s/p left hallux partial amp dur to gangrene and OM. pt had minimal bleeding. cultures taken follow up results  pt tolerated procedure and anesthesia well pt transferred to PACU with VSS and NVS unchange to left foot  will need further amputation likely after bypass

## 2018-03-06 NOTE — BRIEF OPERATIVE NOTE - PROCEDURE
<<-----Click on this checkbox to enter Procedure Amputation  03/06/2018  Left partial hallux amputation  Active  OALLORE

## 2018-03-06 NOTE — PROGRESS NOTE ADULT - SUBJECTIVE AND OBJECTIVE BOX
CC: left hallux pain  HPI: The patient is a 67 year old male with medical and surgical history significant for DM II, alcohol abuse, pancreatitis, CAD s/p CANDIDO x3 2/6/18 on ASA/Ticagrelor who presents with known left hallux gangrene/pain, now wet gangrene and x-ray imaging concerning for osteomyelitis.   24/Overnight events: Patient seen and examined at bedside. Will require toe amputation, thus revascularization will be needed. Vein mapping aprpeciated Optimized from cardiac standpoint, patient carries an increased cardiac risk; needs to stay on DAPT. Tentatively OR Thursday for left lower extremity bypass.      Objective:  Vital Signs Last 24 Hrs  T(C): 36.6 (06 Mar 2018 00:28), Max: 37.1 (05 Mar 2018 13:46)  T(F): 97.9 (06 Mar 2018 00:28), Max: 98.8 (05 Mar 2018 20:04)  HR: 89 (06 Mar 2018 00:28) (84 - 96)  BP: 138/84 (06 Mar 2018 00:28) (117/75 - 163/75)  BP(mean): --  RR: 18 (06 Mar 2018 00:28) (18 - 18)  SpO2: 96% (06 Mar 2018 00:28) (95% - 98%)    Physical Exam: unchanged  General: appears to be in no acute distress.  Chest: lungs clear bilaterally, non-labored breathing, no wheezing or rhonci  Extremities: Warm b/l palp fem and pop pulses. R DP/PT signals. L PT signal only. L first toe with wet gangrenous tip wrapped    MEDICATIONS  (STANDING):  aspirin enteric coated 81 milliGRAM(s) Oral daily  ceFAZolin   IVPB 2000 milliGRAM(s) IV Intermittent every 8 hours  cyanocobalamin 1000 MICROGram(s) Oral daily  dextrose 5% + sodium chloride 0.9%. 1000 milliLiter(s) (50 mL/Hr) IV Continuous <Continuous>  dextrose 5%. 1000 milliLiter(s) (50 mL/Hr) IV Continuous <Continuous>  dextrose 50% Injectable 25 Gram(s) IV Push once  docusate sodium 100 milliGRAM(s) Oral three times a day  folic acid 1 milliGRAM(s) Oral daily  gabapentin 300 milliGRAM(s) Oral two times a day  heparin  Injectable 5000 Unit(s) SubCutaneous every 12 hours  insulin glargine Injectable (LANTUS) 23 Unit(s) SubCutaneous every morning  insulin glargine Injectable (LANTUS) 58 Unit(s) SubCutaneous at bedtime  insulin lispro (HumaLOG) corrective regimen sliding scale   SubCutaneous at bedtime  insulin lispro (HumaLOG) corrective regimen sliding scale   SubCutaneous every 6 hours  insulin lispro Injectable (HumaLOG) 22 Unit(s) SubCutaneous three times a day before meals  metoprolol succinate ER 25 milliGRAM(s) Oral daily  multivitamin 1 Tablet(s) Oral daily  senna 2 Tablet(s) Oral at bedtime  ticagrelor 90 milliGRAM(s) Oral two times a day    MEDICATIONS  (PRN):  bisacodyl 5 milliGRAM(s) Oral every 12 hours PRN Constipation  dextrose Gel 1 Dose(s) Oral once PRN Blood Glucose LESS THAN 70 milliGRAM(s)/deciliter  glucagon  Injectable 1 milliGRAM(s) IntraMuscular once PRN Glucose LESS THAN 70 milligrams/deciliter    I&O's Detail    04 Mar 2018 07:01  -  05 Mar 2018 07:00  --------------------------------------------------------  IN:    IV PiggyBack: 100 mL    Oral Fluid: 1560 mL  Total IN: 1660 mL    OUT:    Voided: 1700 mL  Total OUT: 1700 mL    Total NET: -40 mL      05 Mar 2018 07:01  -  06 Mar 2018 05:44  --------------------------------------------------------  IN:    dextrose 5% + sodium chloride 0.9%.: 350 mL    IV PiggyBack: 100 mL    Oral Fluid: 1740 mL  Total IN: 2190 mL    OUT:  Total OUT: 0 mL    Total NET: 2190 mL        Daily     Daily     LABS:                        12.0   12.58 )-----------( 327      ( 05 Mar 2018 09:28 )             35.2     03-05    135  |  98  |  24<H>  ----------------------------<  143<H>  4.5   |  21<L>  |  0.69    Ca    9.8      05 Mar 2018 07:06 CC: left hallux pain     Objective:  Vital Signs Last 24 Hrs  T(C): 36.6 (06 Mar 2018 00:28), Max: 37.1 (05 Mar 2018 13:46)  T(F): 97.9 (06 Mar 2018 00:28), Max: 98.8 (05 Mar 2018 20:04)  HR: 89 (06 Mar 2018 00:28) (84 - 96)  BP: 138/84 (06 Mar 2018 00:28) (117/75 - 163/75)  BP(mean): --  RR: 18 (06 Mar 2018 00:28) (18 - 18)  SpO2: 96% (06 Mar 2018 00:28) (95% - 98%)    Physical Exam: unchanged  General: appears to be in no acute distress.  Chest: lungs clear bilaterally, non-labored breathing, no wheezing or rhonci  Extremities: Warm b/l palp fem and pop pulses. R DP/PT signals. L PT signal only. L first toe with wet gangrenous tip wrapped    MEDICATIONS  (STANDING):  aspirin enteric coated 81 milliGRAM(s) Oral daily  ceFAZolin   IVPB 2000 milliGRAM(s) IV Intermittent every 8 hours  cyanocobalamin 1000 MICROGram(s) Oral daily  dextrose 5% + sodium chloride 0.9%. 1000 milliLiter(s) (50 mL/Hr) IV Continuous <Continuous>  dextrose 5%. 1000 milliLiter(s) (50 mL/Hr) IV Continuous <Continuous>  dextrose 50% Injectable 25 Gram(s) IV Push once  docusate sodium 100 milliGRAM(s) Oral three times a day  folic acid 1 milliGRAM(s) Oral daily  gabapentin 300 milliGRAM(s) Oral two times a day  heparin  Injectable 5000 Unit(s) SubCutaneous every 12 hours  insulin glargine Injectable (LANTUS) 23 Unit(s) SubCutaneous every morning  insulin glargine Injectable (LANTUS) 58 Unit(s) SubCutaneous at bedtime  insulin lispro (HumaLOG) corrective regimen sliding scale   SubCutaneous at bedtime  insulin lispro (HumaLOG) corrective regimen sliding scale   SubCutaneous every 6 hours  insulin lispro Injectable (HumaLOG) 22 Unit(s) SubCutaneous three times a day before meals  metoprolol succinate ER 25 milliGRAM(s) Oral daily  multivitamin 1 Tablet(s) Oral daily  senna 2 Tablet(s) Oral at bedtime  ticagrelor 90 milliGRAM(s) Oral two times a day    MEDICATIONS  (PRN):  bisacodyl 5 milliGRAM(s) Oral every 12 hours PRN Constipation  dextrose Gel 1 Dose(s) Oral once PRN Blood Glucose LESS THAN 70 milliGRAM(s)/deciliter  glucagon  Injectable 1 milliGRAM(s) IntraMuscular once PRN Glucose LESS THAN 70 milligrams/deciliter    I&O's Detail    04 Mar 2018 07:01  -  05 Mar 2018 07:00  --------------------------------------------------------  IN:    IV PiggyBack: 100 mL    Oral Fluid: 1560 mL  Total IN: 1660 mL    OUT:    Voided: 1700 mL  Total OUT: 1700 mL    Total NET: -40 mL      05 Mar 2018 07:01  -  06 Mar 2018 05:44  --------------------------------------------------------  IN:    dextrose 5% + sodium chloride 0.9%.: 350 mL    IV PiggyBack: 100 mL    Oral Fluid: 1740 mL  Total IN: 2190 mL    OUT:  Total OUT: 0 mL    Total NET: 2190 mL     LABS:                        12.0   12.58 )-----------( 327      ( 05 Mar 2018 09:28 )             35.2     03-05    135  |  98  |  24<H>  ----------------------------<  143<H>  4.5   |  21<L>  |  0.69    Ca    9.8      05 Mar 2018 07:06

## 2018-03-06 NOTE — PROGRESS NOTE ADULT - ASSESSMENT
67 y old male w multilevel art insufficiency and left hallux tip ischemic progression s/p recent card cath with pca/stent     - cardiology recs appreciated: patient is optimized from cardiac standpoint and patient is increased/intermediate risk from cardiac standpoint; needs to stay on DAPT  - patient will require toe amputation, thus planning for LLE bypass/revascularization is underway  - pending b/l lower extremity vein mapping  - tentatively scheduled for OR on Thursday; LLE bypass    - will follow    Nick Jarrell PGY2  [phone] 67 y old male w multilevel art insufficiency and left hallux tip ischemic progression s/p recent card cath with pca/stent     - cardiology recs appreciated: patient is optimized from cardiac standpoint and patient is increased/intermediate risk from cardiac standpoint; needs to stay on DAPT  - patient will require toe amputation, thus planning for LLE bypass/revascularization   - pending b/l lower extremity vein mapping  - tentatively scheduled for OR on Thursday; LLE bypass    - will follow

## 2018-03-06 NOTE — CHART NOTE - NSCHARTNOTEFT_GEN_A_CORE
Documentation from cardiology note  "2. PAD  await LE bypass  patient is as cardiac optimized as can be expected and can proceed with intermediate cardiac risk   given recent PCI , needs to continue ASA 81 and Ticagrelor without interruption  if definite plans are made for le bypass, will hold brilinta for 5 days preop and plan for surgery at least 30 days post-PCI earliest date 3/9/18"    Patient scheduled for left hallux amputation with podiatry this morning. Patient at increased cardiac risk, Brilinta given this morning per cardiology recommendations to continue.     Maria E Mclaughlin PA-C  [phone]

## 2018-03-06 NOTE — PROGRESS NOTE ADULT - SUBJECTIVE AND OBJECTIVE BOX
CARDIOLOGY FOLLOW UP - Dr. Gavin    CC no chest pain or sob       PHYSICAL EXAM:  T(C): 36.5 (03-06-18 @ 05:46), Max: 37.1 (03-05-18 @ 13:46)  HR: 82 (03-06-18 @ 05:46) (82 - 96)  BP: 160/88 (03-06-18 @ 05:46) (117/75 - 163/75)  RR: 18 (03-06-18 @ 05:46) (18 - 18)  SpO2: 98% (03-06-18 @ 05:46) (95% - 98%)  Wt(kg): --  I&O's Summary    05 Mar 2018 07:01  -  06 Mar 2018 07:00  --------------------------------------------------------  IN: 2310 mL / OUT: 400 mL / NET: 1910 mL        Appearance: Normal	  Cardiovascular: Normal S1 S2,RRR, No JVD, No murmurs  Respiratory: Lungs clear to auscultation	  Gastrointestinal:  Soft, Non-tender, + BS	  Extremities: Normal range of motion, No clubbing, cyanosis or edema        MEDICATIONS  (STANDING):  aspirin enteric coated 81 milliGRAM(s) Oral daily  ceFAZolin   IVPB 2000 milliGRAM(s) IV Intermittent every 8 hours  cyanocobalamin 1000 MICROGram(s) Oral daily  dextrose 5% + sodium chloride 0.9%. 1000 milliLiter(s) (50 mL/Hr) IV Continuous <Continuous>  dextrose 5%. 1000 milliLiter(s) (50 mL/Hr) IV Continuous <Continuous>  dextrose 50% Injectable 25 Gram(s) IV Push once  docusate sodium 100 milliGRAM(s) Oral three times a day  folic acid 1 milliGRAM(s) Oral daily  gabapentin 300 milliGRAM(s) Oral two times a day  heparin  Injectable 5000 Unit(s) SubCutaneous every 12 hours  insulin glargine Injectable (LANTUS) 23 Unit(s) SubCutaneous every morning  insulin glargine Injectable (LANTUS) 58 Unit(s) SubCutaneous at bedtime  insulin glargine Injectable (LANTUS) 10 Unit(s) SubCutaneous once  insulin lispro (HumaLOG) corrective regimen sliding scale   SubCutaneous every 6 hours  insulin lispro (HumaLOG) corrective regimen sliding scale   SubCutaneous at bedtime  insulin lispro Injectable (HumaLOG) 22 Unit(s) SubCutaneous three times a day before meals  metoprolol succinate ER 25 milliGRAM(s) Oral daily  multivitamin 1 Tablet(s) Oral daily  senna 2 Tablet(s) Oral at bedtime  ticagrelor 90 milliGRAM(s) Oral two times a day      TELEMETRY: 	    ECG:  	  RADIOLOGY:   DIAGNOSTIC TESTING:  [ ] Echocardiogram:  [ ]  Catheterization:  [ ] Stress Test:    OTHER: 	    LABS:	 	                                12.7   11.7  )-----------( 334      ( 06 Mar 2018 05:57 )             37.3     03-06    138  |  99  |  24<H>  ----------------------------<  145<H>  4.7   |  23  |  0.69    Ca    10.1      06 Mar 2018 05:43      PT/INR - ( 06 Mar 2018 05:43 )   PT: 11.1 sec;   INR: 1.03 ratio         PTT - ( 06 Mar 2018 05:43 )  PTT:30.9 sec

## 2018-03-06 NOTE — PROGRESS NOTE ADULT - SUBJECTIVE AND OBJECTIVE BOX
Follow Up:      Inverval History/ROS:Patient is a 67y old  Male who presents with a chief complaint of Worsened left foot infection (27 Feb 2018 05:00)    S/p partial toe amputation.    Awaiting revascularization (Plan for thursday)    No fever.       Allergies    No Known Allergies    Intolerances        ANTIMICROBIALS:  ceFAZolin   IVPB 2000 every 8 hours      OTHER MEDS:  acetaminophen   Tablet. 650 milliGRAM(s) Oral every 6 hours PRN  aspirin enteric coated 81 milliGRAM(s) Oral daily  bisacodyl 5 milliGRAM(s) Oral every 12 hours PRN  dextrose 5% + sodium chloride 0.9%. 1000 milliLiter(s) IV Continuous <Continuous>  dextrose 5%. 1000 milliLiter(s) IV Continuous <Continuous>  dextrose 50% Injectable 25 Gram(s) IV Push once  dextrose Gel 1 Dose(s) Oral once PRN  docusate sodium 100 milliGRAM(s) Oral three times a day  folic acid 1 milliGRAM(s) Oral daily  gabapentin 300 milliGRAM(s) Oral two times a day  glucagon  Injectable 1 milliGRAM(s) IntraMuscular once PRN  heparin  Injectable 5000 Unit(s) SubCutaneous every 12 hours  insulin glargine Injectable (LANTUS) 23 Unit(s) SubCutaneous every morning  insulin glargine Injectable (LANTUS) 58 Unit(s) SubCutaneous at bedtime  insulin lispro (HumaLOG) corrective regimen sliding scale   SubCutaneous three times a day before meals  insulin lispro (HumaLOG) corrective regimen sliding scale   SubCutaneous at bedtime  metoprolol succinate ER 25 milliGRAM(s) Oral daily  multivitamin 1 Tablet(s) Oral daily  senna 2 Tablet(s) Oral at bedtime  ticagrelor 90 milliGRAM(s) Oral two times a day      Vital Signs Last 24 Hrs  T(C): 36.9 (06 Mar 2018 14:02), Max: 37.1 (05 Mar 2018 20:04)  T(F): 98.5 (06 Mar 2018 14:02), Max: 98.8 (05 Mar 2018 20:04)  HR: 84 (06 Mar 2018 14:37) (69 - 96)  BP: 146/80 (06 Mar 2018 14:37) (110/66 - 163/75)  BP(mean): 101 (06 Mar 2018 12:30) (85 - 111)  RR: 18 (06 Mar 2018 14:37) (14 - 18)  SpO2: 98% (06 Mar 2018 14:37) (95% - 100%)    PHYSICAL EXAM:  General: [x ] non-toxic  HEAD/EYES: [ ] PERRL [x ] white sclera [ ] icterus  ENT:  [ ] normal [ x] supple [ ] thrush [ ] pharyngeal exudate  Cardiovascular:   [ ] murmur [x ] normal [ ] PPM/AICD  Respiratory:  [x ] clear to ausculation bilaterally  GI:  [x ] soft, non-tender, normal bowel sounds  :  [ ] evans [ ] no CVA tenderness   Musculoskeletal:  [ ] no synovitis  Neurologic:  [ x] non-focal exam   Skin:  [ ] no rash  Lymph: [ ] no lymphadenopathy  Psychiatric:  [x ] appropriate affect [ ] alert & oriented  Lines:  [x ] no phlebitis [ ] central line                                12.7   11.7  )-----------( 334      ( 06 Mar 2018 05:57 )             37.3       03-06    138  |  99  |  24<H>  ----------------------------<  145<H>  4.7   |  23  |  0.69    Ca    10.1      06 Mar 2018 05:43            MICROBIOLOGY:    RADIOLOGY:

## 2018-03-06 NOTE — PROGRESS NOTE ADULT - SUBJECTIVE AND OBJECTIVE BOX
Patient is a 67y old  Male who presents with a chief complaint of Worsened left foot infection (27 Feb 2018 05:00)      INTERVAL HPI/OVERNIGHT EVENTS:  T(C): 36.4 (03-06-18 @ 16:38), Max: 37.1 (03-05-18 @ 20:04)  HR: 74 (03-06-18 @ 16:38) (69 - 96)  BP: 140/78 (03-06-18 @ 16:38) (110/66 - 163/75)  RR: 18 (03-06-18 @ 16:38) (14 - 18)  SpO2: 96% (03-06-18 @ 16:38) (95% - 100%)  Wt(kg): --  I&O's Summary    05 Mar 2018 07:01  -  06 Mar 2018 07:00  --------------------------------------------------------  IN: 2310 mL / OUT: 400 mL / NET: 1910 mL    06 Mar 2018 07:01  -  06 Mar 2018 18:41  --------------------------------------------------------  IN: 640 mL / OUT: 1050 mL / NET: -410 mL        LABS:                        12.7   11.7  )-----------( 334      ( 06 Mar 2018 05:57 )             37.3     03-06    138  |  99  |  24<H>  ----------------------------<  145<H>  4.7   |  23  |  0.69    Ca    10.1      06 Mar 2018 05:43      PT/INR - ( 06 Mar 2018 05:43 )   PT: 11.1 sec;   INR: 1.03 ratio         PTT - ( 06 Mar 2018 05:43 )  PTT:30.9 sec    CAPILLARY BLOOD GLUCOSE      POCT Blood Glucose.: 259 mg/dL (06 Mar 2018 17:38)  POCT Blood Glucose.: 95 mg/dL (06 Mar 2018 13:35)  POCT Blood Glucose.: 127 mg/dL (06 Mar 2018 09:56)  POCT Blood Glucose.: 135 mg/dL (06 Mar 2018 05:50)  POCT Blood Glucose.: 185 mg/dL (06 Mar 2018 00:20)  POCT Blood Glucose.: 207 mg/dL (05 Mar 2018 22:04)            MEDICATIONS  (STANDING):  aspirin enteric coated 81 milliGRAM(s) Oral daily  ceFAZolin   IVPB 2000 milliGRAM(s) IV Intermittent every 8 hours  dextrose 5% + sodium chloride 0.9%. 1000 milliLiter(s) (50 mL/Hr) IV Continuous <Continuous>  dextrose 5%. 1000 milliLiter(s) (50 mL/Hr) IV Continuous <Continuous>  dextrose 50% Injectable 25 Gram(s) IV Push once  docusate sodium 100 milliGRAM(s) Oral three times a day  folic acid 1 milliGRAM(s) Oral daily  gabapentin 300 milliGRAM(s) Oral two times a day  heparin  Injectable 5000 Unit(s) SubCutaneous every 12 hours  insulin glargine Injectable (LANTUS) 23 Unit(s) SubCutaneous every morning  insulin glargine Injectable (LANTUS) 58 Unit(s) SubCutaneous at bedtime  insulin lispro (HumaLOG) corrective regimen sliding scale   SubCutaneous three times a day before meals  insulin lispro (HumaLOG) corrective regimen sliding scale   SubCutaneous at bedtime  metoprolol succinate ER 25 milliGRAM(s) Oral daily  multivitamin 1 Tablet(s) Oral daily  senna 2 Tablet(s) Oral at bedtime  ticagrelor 90 milliGRAM(s) Oral two times a day    MEDICATIONS  (PRN):  acetaminophen   Tablet. 650 milliGRAM(s) Oral every 6 hours PRN Mild Pain (1 - 3)  bisacodyl 5 milliGRAM(s) Oral every 12 hours PRN Constipation  dextrose Gel 1 Dose(s) Oral once PRN Blood Glucose LESS THAN 70 milliGRAM(s)/deciliter  glucagon  Injectable 1 milliGRAM(s) IntraMuscular once PRN Glucose LESS THAN 70 milligrams/deciliter          PHYSICAL EXAM:  GENERAL: NAD, well-groomed, well-developed  HEAD:  Atraumatic, Normocephalic  CHEST/LUNG: Clear to percussion bilaterally; No rales, rhonchi, wheezing, or rubs  HEART: Regular rate and rhythm; No murmurs, rubs, or gallops  ABDOMEN: Soft, Nontender, Nondistended; Bowel sounds present  EXTREMITIES:  2+ Peripheral Pulses, No clubbing, cyanosis, or edema  LYMPH: No lymphadenopathy noted  SKIN: No rashes or lesions    Care Discussed with Consultants/Other Providers [+ ] YES  [ ] NO

## 2018-03-06 NOTE — PROGRESS NOTE ADULT - ATTENDING COMMENTS
Patient seen and examined.  Agree with above NP note.  remains cv stable and optimized  d/w podiatry  await toe amp today  pt can proceed for surgery with acceptable risk   cont asa/brilinta  anesthesia will be local, not general   post podiatry surgery, pt will need le bypass surgery per vascular  if vascular is concerned with bleeding risk, brilinta can be held for 5 days prior to surgery with ASA being conitnued without interruption

## 2018-03-06 NOTE — PROGRESS NOTE ADULT - ASSESSMENT
67 year old male with history of DM type II, etoh abuse and pancreatitis, CAD, s/p PCI, PVD admitted with worsening left foot infection     1. CAD s/p PCI  CV stable no chest pain or sob    recent echo revealing normal lv sys fx , minimal MR   recent cath performed in Feb 2018 ( CANDIDO x 1pLAD, CANDIDO x 1 dCx, CANDIDO x 1 pOM1 and RCA )  cont asa 81mg daily/ ticagrelor / BB     2. PAD  await LE bypass  patient is as cardiac optimized as can be expected and can proceed with intermediate cardiac risk   given recent PCI , needs to continue ASA 81 and Ticagrelor without interruption  plans  le bypass, can hold brilinta for 5 days preop and plan for surgery at least 30 days post-PCI earliest date 3/9/18      3. Foot ulcer/OM  IV abx  podiatry f/u , plan for OR today   patient is as cardiac optimized as can be expected and can proceed with intermediate cardiac risk   ID f/u     dvt ppx

## 2018-03-07 ENCOUNTER — TRANSCRIPTION ENCOUNTER (OUTPATIENT)
Age: 68
End: 2018-03-07

## 2018-03-07 LAB
GLUCOSE BLDC GLUCOMTR-MCNC: 140 MG/DL — HIGH (ref 70–99)
GLUCOSE BLDC GLUCOMTR-MCNC: 193 MG/DL — HIGH (ref 70–99)
GLUCOSE BLDC GLUCOMTR-MCNC: 249 MG/DL — HIGH (ref 70–99)
GLUCOSE BLDC GLUCOMTR-MCNC: 254 MG/DL — HIGH (ref 70–99)
GLUCOSE BLDC GLUCOMTR-MCNC: 260 MG/DL — HIGH (ref 70–99)
NIGHT BLUE STAIN TISS: SIGNIFICANT CHANGE UP
SPECIMEN SOURCE: SIGNIFICANT CHANGE UP

## 2018-03-07 PROCEDURE — 99232 SBSQ HOSP IP/OBS MODERATE 35: CPT

## 2018-03-07 PROCEDURE — 71045 X-RAY EXAM CHEST 1 VIEW: CPT | Mod: 26

## 2018-03-07 RX ORDER — TAMSULOSIN HYDROCHLORIDE 0.4 MG/1
0.4 CAPSULE ORAL AT BEDTIME
Qty: 0 | Refills: 0 | Status: DISCONTINUED | OUTPATIENT
Start: 2018-03-07 | End: 2018-03-08

## 2018-03-07 RX ORDER — SODIUM CHLORIDE 9 MG/ML
1000 INJECTION INTRAMUSCULAR; INTRAVENOUS; SUBCUTANEOUS
Qty: 0 | Refills: 0 | Status: DISCONTINUED | OUTPATIENT
Start: 2018-03-07 | End: 2018-03-08

## 2018-03-07 RX ADMIN — Medication 100 MILLIGRAM(S): at 13:09

## 2018-03-07 RX ADMIN — Medication 100 MILLIGRAM(S): at 05:52

## 2018-03-07 RX ADMIN — Medication 650 MILLIGRAM(S): at 08:09

## 2018-03-07 RX ADMIN — Medication 100 MILLIGRAM(S): at 13:13

## 2018-03-07 RX ADMIN — Medication 3: at 18:27

## 2018-03-07 RX ADMIN — GABAPENTIN 300 MILLIGRAM(S): 400 CAPSULE ORAL at 17:35

## 2018-03-07 RX ADMIN — TICAGRELOR 90 MILLIGRAM(S): 90 TABLET ORAL at 05:52

## 2018-03-07 RX ADMIN — HEPARIN SODIUM 5000 UNIT(S): 5000 INJECTION INTRAVENOUS; SUBCUTANEOUS at 05:52

## 2018-03-07 RX ADMIN — Medication 650 MILLIGRAM(S): at 09:05

## 2018-03-07 RX ADMIN — Medication 100 MILLIGRAM(S): at 22:01

## 2018-03-07 RX ADMIN — Medication 81 MILLIGRAM(S): at 11:37

## 2018-03-07 RX ADMIN — GABAPENTIN 300 MILLIGRAM(S): 400 CAPSULE ORAL at 05:52

## 2018-03-07 RX ADMIN — Medication 1 MILLIGRAM(S): at 11:37

## 2018-03-07 RX ADMIN — Medication 25 MILLIGRAM(S): at 05:51

## 2018-03-07 RX ADMIN — Medication 650 MILLIGRAM(S): at 00:54

## 2018-03-07 RX ADMIN — TICAGRELOR 90 MILLIGRAM(S): 90 TABLET ORAL at 17:35

## 2018-03-07 RX ADMIN — HEPARIN SODIUM 5000 UNIT(S): 5000 INJECTION INTRAVENOUS; SUBCUTANEOUS at 17:35

## 2018-03-07 RX ADMIN — Medication 650 MILLIGRAM(S): at 01:24

## 2018-03-07 RX ADMIN — Medication 1 TABLET(S): at 11:37

## 2018-03-07 RX ADMIN — Medication 2: at 13:09

## 2018-03-07 RX ADMIN — TAMSULOSIN HYDROCHLORIDE 0.4 MILLIGRAM(S): 0.4 CAPSULE ORAL at 22:01

## 2018-03-07 RX ADMIN — INSULIN GLARGINE 23 UNIT(S): 100 INJECTION, SOLUTION SUBCUTANEOUS at 10:25

## 2018-03-07 RX ADMIN — INSULIN GLARGINE 58 UNIT(S): 100 INJECTION, SOLUTION SUBCUTANEOUS at 22:01

## 2018-03-07 RX ADMIN — SENNA PLUS 2 TABLET(S): 8.6 TABLET ORAL at 22:01

## 2018-03-07 NOTE — PROGRESS NOTE ADULT - ATTENDING COMMENTS
Patient seen and examined.  Agree with above NP note.  remains cv stable s/p pod sx   hed remains optimized for vascualr surgery  pt can proceed for surgery with acceptable risk   cont asa/brilinta  anesthesia will be local, not general   if vascular is concerned with bleeding risk, brilinta can be held for 5 days prior to surgery with ASA being continued without interruption Patient seen and examined.  Agree with above NP note.  remains cv stable s/p pod sx   he remains optimized for vascular surgery, please plan for 3/9/18  pt can proceed for surgery with acceptable risk   cont asa/brilinta  if vascular is concerned with bleeding risk, brilinta can be held for 5 days prior to surgery with ASA being continued without interruption

## 2018-03-07 NOTE — PROGRESS NOTE ADULT - SUBJECTIVE AND OBJECTIVE BOX
CARDIOLOGY FOLLOW UP - Dr. Gavin    CC no chest pain or sob        PHYSICAL EXAM:  T(C): 37.1 (03-07-18 @ 05:44), Max: 37.1 (03-06-18 @ 22:15)  HR: 81 (03-07-18 @ 05:44) (69 - 94)  BP: 153/88 (03-07-18 @ 05:44) (110/66 - 155/80)  RR: 18 (03-07-18 @ 05:44) (14 - 18)  SpO2: 100% (03-07-18 @ 05:44) (96% - 100%)  Wt(kg): --  I&O's Summary    06 Mar 2018 07:01  -  07 Mar 2018 07:00  --------------------------------------------------------  IN: 1240 mL / OUT: 2200 mL / NET: -960 mL    07 Mar 2018 07:01  -  07 Mar 2018 09:32  --------------------------------------------------------  IN: 0 mL / OUT: 250 mL / NET: -250 mL        Appearance: Normal	  Cardiovascular: Normal S1 S2,RRR, No JVD, No murmurs  Respiratory: Lungs clear to auscultation	  Gastrointestinal:  Soft, Non-tender, + BS	  Extremities: Normal range of motion, No clubbing, cyanosis or edema  left foot dressing CDI       MEDICATIONS  (STANDING):  aspirin enteric coated 81 milliGRAM(s) Oral daily  ceFAZolin   IVPB 2000 milliGRAM(s) IV Intermittent every 8 hours  dextrose 5% + sodium chloride 0.9%. 1000 milliLiter(s) (50 mL/Hr) IV Continuous <Continuous>  dextrose 5%. 1000 milliLiter(s) (50 mL/Hr) IV Continuous <Continuous>  dextrose 50% Injectable 25 Gram(s) IV Push once  docusate sodium 100 milliGRAM(s) Oral three times a day  folic acid 1 milliGRAM(s) Oral daily  gabapentin 300 milliGRAM(s) Oral two times a day  heparin  Injectable 5000 Unit(s) SubCutaneous every 12 hours  insulin glargine Injectable (LANTUS) 23 Unit(s) SubCutaneous every morning  insulin glargine Injectable (LANTUS) 58 Unit(s) SubCutaneous at bedtime  insulin lispro (HumaLOG) corrective regimen sliding scale   SubCutaneous three times a day before meals  insulin lispro (HumaLOG) corrective regimen sliding scale   SubCutaneous at bedtime  metoprolol succinate ER 25 milliGRAM(s) Oral daily  multivitamin 1 Tablet(s) Oral daily  senna 2 Tablet(s) Oral at bedtime  sodium chloride 0.9%. 1000 milliLiter(s) (50 mL/Hr) IV Continuous <Continuous>  ticagrelor 90 milliGRAM(s) Oral two times a day      TELEMETRY: 	    ECG:  	  RADIOLOGY:   DIAGNOSTIC TESTING:  [ ] Echocardiogram:  [ ]  Catheterization:  [ ] Stress Test:    OTHER: 	    LABS:	 	                                12.7   11.7  )-----------( 334      ( 06 Mar 2018 05:57 )             37.3     03-07    136  |  99  |  23  ----------------------------<  122<H>  4.3   |  26  |  0.66    Ca    9.7      07 Mar 2018 07:52      PT/INR - ( 06 Mar 2018 05:43 )   PT: 11.1 sec;   INR: 1.03 ratio         PTT - ( 06 Mar 2018 05:43 )  PTT:30.9 sec

## 2018-03-07 NOTE — PROGRESS NOTE ADULT - SUBJECTIVE AND OBJECTIVE BOX
Patient is a 67y old  Male who presents with a chief complaint of Worsened left foot infection (27 Feb 2018 05:00)      INTERVAL HPI/OVERNIGHT EVENTS:  T(C): 36.9 (03-07-18 @ 17:31), Max: 37.2 (03-07-18 @ 14:43)  HR: 83 (03-07-18 @ 17:31) (76 - 94)  BP: 144/85 (03-07-18 @ 17:31) (123/78 - 153/88)  RR: 18 (03-07-18 @ 17:31) (18 - 18)  SpO2: 98% (03-07-18 @ 17:31) (96% - 100%)  Wt(kg): --  I&O's Summary    06 Mar 2018 07:01  -  07 Mar 2018 07:00  --------------------------------------------------------  IN: 1240 mL / OUT: 2200 mL / NET: -960 mL    07 Mar 2018 07:01  -  07 Mar 2018 17:49  --------------------------------------------------------  IN: 610 mL / OUT: 650 mL / NET: -40 mL        LABS:                        12.2   10.01 )-----------( 339      ( 07 Mar 2018 10:38 )             37.3     03-07    136  |  99  |  23  ----------------------------<  122<H>  4.3   |  26  |  0.66    Ca    9.7      07 Mar 2018 07:52      PT/INR - ( 06 Mar 2018 05:43 )   PT: 11.1 sec;   INR: 1.03 ratio         PTT - ( 06 Mar 2018 05:43 )  PTT:30.9 sec    CAPILLARY BLOOD GLUCOSE      POCT Blood Glucose.: 260 mg/dL (07 Mar 2018 17:43)  POCT Blood Glucose.: 249 mg/dL (07 Mar 2018 12:35)  POCT Blood Glucose.: 254 mg/dL (07 Mar 2018 10:12)  POCT Blood Glucose.: 140 mg/dL (07 Mar 2018 08:51)  POCT Blood Glucose.: 246 mg/dL (06 Mar 2018 21:10)            MEDICATIONS  (STANDING):  aspirin enteric coated 81 milliGRAM(s) Oral daily  ceFAZolin   IVPB 2000 milliGRAM(s) IV Intermittent every 8 hours  dextrose 5% + sodium chloride 0.9%. 1000 milliLiter(s) (50 mL/Hr) IV Continuous <Continuous>  dextrose 5%. 1000 milliLiter(s) (50 mL/Hr) IV Continuous <Continuous>  dextrose 50% Injectable 25 Gram(s) IV Push once  docusate sodium 100 milliGRAM(s) Oral three times a day  folic acid 1 milliGRAM(s) Oral daily  gabapentin 300 milliGRAM(s) Oral two times a day  heparin  Injectable 5000 Unit(s) SubCutaneous every 12 hours  insulin glargine Injectable (LANTUS) 23 Unit(s) SubCutaneous every morning  insulin glargine Injectable (LANTUS) 58 Unit(s) SubCutaneous at bedtime  insulin lispro (HumaLOG) corrective regimen sliding scale   SubCutaneous three times a day before meals  insulin lispro (HumaLOG) corrective regimen sliding scale   SubCutaneous at bedtime  metoprolol succinate ER 25 milliGRAM(s) Oral daily  multivitamin 1 Tablet(s) Oral daily  senna 2 Tablet(s) Oral at bedtime  sodium chloride 0.9%. 1000 milliLiter(s) (50 mL/Hr) IV Continuous <Continuous>  ticagrelor 90 milliGRAM(s) Oral two times a day    MEDICATIONS  (PRN):  acetaminophen   Tablet. 650 milliGRAM(s) Oral every 6 hours PRN Mild Pain (1 - 3)  bisacodyl 5 milliGRAM(s) Oral every 12 hours PRN Constipation  dextrose Gel 1 Dose(s) Oral once PRN Blood Glucose LESS THAN 70 milliGRAM(s)/deciliter  glucagon  Injectable 1 milliGRAM(s) IntraMuscular once PRN Glucose LESS THAN 70 milligrams/deciliter          PHYSICAL EXAM:  GENERAL: NAD, well-groomed, well-developed  HEAD:  Atraumatic, Normocephalic  CHEST/LUNG: Clear to percussion bilaterally; No rales, rhonchi, wheezing, or rubs  HEART: Regular rate and rhythm; No murmurs, rubs, or gallops  ABDOMEN: Soft, Nontender, Nondistended; Bowel sounds present  EXTREMITIES:  2+ Peripheral Pulses, No clubbing, cyanosis, or edema  LYMPH: No lymphadenopathy noted  SKIN: No rashes or lesions    Care Discussed with Consultants/Other Providers [ ] YES  [ ] NO

## 2018-03-07 NOTE — PROGRESS NOTE ADULT - ASSESSMENT
Problem/Plan - 1:  ·  Problem: Diabetic foot infection.  Plan: - Continue  antibiotics as per ID  - Podiatry and vascular fu for further management  - OR today    Problem/Plan - 2:  ·  Problem: Diabetes mellitus.  Plan: - Lantus bid   - Lispro w/meals   - Insulin correction scale   - consistent carb diet.    Problem/Plan - 3:  ·  Problem: CAD (coronary artery disease).  Plan: - continue ASA and Brilinta   -  continue metoprolol   -  continue statin.     Problem/Plan - 4:  ·  Problem: H/O alcohol abuse.  Plan: Per wife no longer drinking.  monitor for any withdrawal    Problem/Plan - 5:  ·  Problem: Need for prophylactic measure.  Plan: subq heparin for DVT ppx.

## 2018-03-07 NOTE — PROGRESS NOTE ADULT - SUBJECTIVE AND OBJECTIVE BOX
Follow Up:      Inverval History/ROS: Patient is a 67y old  Male who presents with a chief complaint of Worsened left foot infection (27 Feb 2018 05:00)    No fever    Allergies    No Known Allergies    Intolerances        ANTIMICROBIALS:  ceFAZolin   IVPB 2000 every 8 hours      OTHER MEDS:  acetaminophen   Tablet. 650 milliGRAM(s) Oral every 6 hours PRN  aspirin enteric coated 81 milliGRAM(s) Oral daily  bisacodyl 5 milliGRAM(s) Oral every 12 hours PRN  dextrose 5% + sodium chloride 0.9%. 1000 milliLiter(s) IV Continuous <Continuous>  dextrose 5%. 1000 milliLiter(s) IV Continuous <Continuous>  dextrose 50% Injectable 25 Gram(s) IV Push once  dextrose Gel 1 Dose(s) Oral once PRN  docusate sodium 100 milliGRAM(s) Oral three times a day  folic acid 1 milliGRAM(s) Oral daily  gabapentin 300 milliGRAM(s) Oral two times a day  glucagon  Injectable 1 milliGRAM(s) IntraMuscular once PRN  heparin  Injectable 5000 Unit(s) SubCutaneous every 12 hours  insulin glargine Injectable (LANTUS) 23 Unit(s) SubCutaneous every morning  insulin glargine Injectable (LANTUS) 58 Unit(s) SubCutaneous at bedtime  insulin lispro (HumaLOG) corrective regimen sliding scale   SubCutaneous three times a day before meals  insulin lispro (HumaLOG) corrective regimen sliding scale   SubCutaneous at bedtime  metoprolol succinate ER 25 milliGRAM(s) Oral daily  multivitamin 1 Tablet(s) Oral daily  senna 2 Tablet(s) Oral at bedtime  sodium chloride 0.9%. 1000 milliLiter(s) IV Continuous <Continuous>  ticagrelor 90 milliGRAM(s) Oral two times a day      Vital Signs Last 24 Hrs  T(C): 37.1 (07 Mar 2018 10:31), Max: 37.1 (06 Mar 2018 22:15)  T(F): 98.7 (07 Mar 2018 10:31), Max: 98.8 (07 Mar 2018 05:44)  HR: 81 (07 Mar 2018 10:31) (74 - 94)  BP: 125/76 (07 Mar 2018 10:31) (123/78 - 153/88)  BP(mean): 101 (06 Mar 2018 12:30) (101 - 101)  RR: 18 (07 Mar 2018 10:31) (17 - 18)  SpO2: 98% (07 Mar 2018 10:31) (96% - 100%)    PHYSICAL EXAM:  General: [x ] non-toxic  HEAD/EYES: [x ] PERRL [ ] white sclera [ ] icterus  ENT:  [ ] normal [x ] supple [ ] thrush [ ] pharyngeal exudate  Cardiovascular:   [ ] murmur [ x] normal [ ] PPM/AICD  Respiratory:  [ x] clear to ausculation bilaterally  GI:  [x ] soft, non-tender, normal bowel sounds  :  [ ] evans [ ] no CVA tenderness   Musculoskeletal:  [ ] no synovitis  Neurologic:  [ ] non-focal exam   Skin:  [x ] no rash  Lymph: [ ] no lymphadenopathy  Psychiatric:  [ ] appropriate affect [ ] alert & oriented  Lines:  x] no phlebitis [ ] central line                                12.2   10.01 )-----------( 339      ( 07 Mar 2018 10:38 )             37.3       03-07    136  |  99  |  23  ----------------------------<  122<H>  4.3   |  26  |  0.66    Ca    9.7      07 Mar 2018 07:52            MICROBIOLOGY:Culture Results:   Testing in progress (03-06-18 @ 17:12)  Culture Results:   Testing in progress (03-06-18 @ 17:05)      RADIOLOGY:

## 2018-03-07 NOTE — PROGRESS NOTE ADULT - SUBJECTIVE AND OBJECTIVE BOX
pt seen at bedside in NAD. s/p left partial hallux amp yesterday. dressing has some bloody drainage noted  sutures intact no active bleeding, pt had no bleeding in OR despite the blood on the dressing  applied xeroform with DSD  will need to monitor for healing  follow up OR cultures   follow up plan for bypass. pt may require further amputation if he should necrosis  continue local care

## 2018-03-07 NOTE — PROGRESS NOTE ADULT - ASSESSMENT
67 y old male w multilevel art insufficiency and left hallux tip ischemic progression s/p recent card cath with pca/stent     - cardiology recs appreciated: patient is optimized from cardiac standpoint and patient is increased/intermediate risk from cardiac standpoint; needs to stay on DAPT  - patient will require toe amputation, thus planning for LLE bypass/revascularization   - b/l lower extremity vein mapping appreciated  - tentatively scheduled for OR on Thursday; LLE bypass    - NPOpmn  - type and screen x 2 pending  - EKG in chart  - will follow      Nick Jarrell PGY2  [phone] 67 y old male w multilevel art insufficiency and left hallux tip ischemic progression s/p recent card cath with pca/stent     - cardiology recs appreciated: patient is optimized from cardiac standpoint and patient is increased/intermediate risk from cardiac standpoint; needs to stay on DAPT  - patient will require toe amputation, thus planning for LLE bypass/revascularization   - b/l lower extremity vein mapping appreciated  - tentatively scheduled for OR on Thursday; LLE bypass    - NPOpmn  - type and screen x 2; resulted  - 4units PRBCs on hold for OR  - EKG in chart  - will follow      Nick Jarrell PGY2  [phone] 67 y old male w multilevel art insufficiency and left hallux tip ischemic progression s/p recent card cath with pca/stent     - cardiology recs appreciated: patient is optimized from cardiac standpoint and patient is increased/intermediate risk from cardiac standpoint; needs to stay on DAPT  - patient will require toe amputation, thus planning for LLE bypass/revascularization   - b/l lower extremity vein mapping appreciated  - tentatively scheduled for OR on Thursday; LLE fem diatal bypass   - NPOpmn  - type and screen x 2; resulted  - 4units PRBCs on hold for OR  - EKG in chart  - will follow      Nick Jarrell PGY2  [phone]

## 2018-03-07 NOTE — PROGRESS NOTE ADULT - ASSESSMENT
67 year old male with history of DM type II, etoh abuse and pancreatitis, CAD, s/p PCI, PVD admitted with worsening left foot infection     1. CAD s/p PCI  CV stable no chest pain or sob    recent echo revealing normal lv sys fx , minimal MR   recent cath performed in Feb 2018 ( CANDIDO x 1pLAD, CANDIDO x 1 dCx, CANDIDO x 1 pOM1 and RCA )  cont asa 81mg daily/ brilinta / BB     2. PAD  await LE bypass  patient is as cardiac optimized as can be expected and can proceed with intermediate cardiac risk   plans for  le bypass,  given recent PCI needs to continue with ASA without interruption and can hold brilinta if concern for bleeding risk 5 days preop and plan for surgery at least 30 days post-PCI earliest date 3/9/18      3. Foot ulcer/OM  IV abx  s/p left partial hallux amp   pod f/u     dvt ppx

## 2018-03-07 NOTE — PROGRESS NOTE ADULT - SUBJECTIVE AND OBJECTIVE BOX
CC: left hallux pain  HPI: The patient is a 67 year old male with medical and surgical history significant for DM II, alcohol abuse, pancreatitis, CAD s/p CANDIDO x3 2/6/18 on ASA/Ticagrelor who presents with known left hallux gangrene/pain, now wet gangrene and x-ray imaging concerning for osteomyelitis.   24/Overnight events: Patient seen and examined at bedside. Will require toe amputation, thus revascularization will be needed. Vein mapping appreciate. Optimized from cardiac standpoint, patient carries an increased cardiac risk; needs to stay on DAPT. Tentatively OR tomorrow 3/8. NPOpmn          Objective:  Vital Signs Last 24 Hrs  T(C): 36.7 (07 Mar 2018 01:05), Max: 37.1 (06 Mar 2018 22:15)  T(F): 98 (07 Mar 2018 01:05), Max: 98.7 (06 Mar 2018 22:15)  HR: 94 (07 Mar 2018 01:05) (69 - 94)  BP: 145/81 (07 Mar 2018 01:05) (110/66 - 160/88)  BP(mean): 101 (06 Mar 2018 12:30) (85 - 111)  RR: 18 (07 Mar 2018 01:05) (14 - 18)  SpO2: 97% (07 Mar 2018 01:05) (96% - 100%)    Physical Exam: unchanged  General: appears to be in no acute distress.  Chest: lungs clear bilaterally, non-labored breathing, no wheezing or rhonci  Extremities: Warm b/l palp fem and pop pulses. R DP/PT signals. L PT signal only. L first toe with wet gangrenous tip wrapped    MEDICATIONS  (STANDING):  aspirin enteric coated 81 milliGRAM(s) Oral daily  ceFAZolin   IVPB 2000 milliGRAM(s) IV Intermittent every 8 hours  dextrose 5% + sodium chloride 0.9%. 1000 milliLiter(s) (50 mL/Hr) IV Continuous <Continuous>  dextrose 5%. 1000 milliLiter(s) (50 mL/Hr) IV Continuous <Continuous>  dextrose 50% Injectable 25 Gram(s) IV Push once  docusate sodium 100 milliGRAM(s) Oral three times a day  folic acid 1 milliGRAM(s) Oral daily  gabapentin 300 milliGRAM(s) Oral two times a day  heparin  Injectable 5000 Unit(s) SubCutaneous every 12 hours  insulin glargine Injectable (LANTUS) 23 Unit(s) SubCutaneous every morning  insulin glargine Injectable (LANTUS) 58 Unit(s) SubCutaneous at bedtime  insulin lispro (HumaLOG) corrective regimen sliding scale   SubCutaneous three times a day before meals  insulin lispro (HumaLOG) corrective regimen sliding scale   SubCutaneous at bedtime  metoprolol succinate ER 25 milliGRAM(s) Oral daily  multivitamin 1 Tablet(s) Oral daily  senna 2 Tablet(s) Oral at bedtime  ticagrelor 90 milliGRAM(s) Oral two times a day    MEDICATIONS  (PRN):  acetaminophen   Tablet. 650 milliGRAM(s) Oral every 6 hours PRN Mild Pain (1 - 3)  bisacodyl 5 milliGRAM(s) Oral every 12 hours PRN Constipation  dextrose Gel 1 Dose(s) Oral once PRN Blood Glucose LESS THAN 70 milliGRAM(s)/deciliter  glucagon  Injectable 1 milliGRAM(s) IntraMuscular once PRN Glucose LESS THAN 70 milligrams/deciliter    I&O's Detail    05 Mar 2018 07:01  -  06 Mar 2018 07:00  --------------------------------------------------------  IN:    dextrose 5% + sodium chloride 0.9%: 350 mL    IV PiggyBack: 100 mL    Oral Fluid: 1860 mL  Total IN: 2310 mL    OUT:    Voided: 400 mL  Total OUT: 400 mL    Total NET: 1910 mL      06 Mar 2018 07:01  -  07 Mar 2018 05:01  --------------------------------------------------------  IN:    dextrose 5% + sodium chloride 0.9%.: 350 mL    IV PiggyBack: 50 mL    Oral Fluid: 720 mL  Total IN: 1120 mL    OUT:    Voided: 1900 mL  Total OUT: 1900 mL    Total NET: -780 mL        Daily Height in cm: 165.1 (06 Mar 2018 07:32)    Daily     LABS:                        12.7   11.7  )-----------( 334      ( 06 Mar 2018 05:57 )             37.3     03-06    138  |  99  |  24<H>  ----------------------------<  145<H>  4.7   |  23  |  0.69    Ca    10.1      06 Mar 2018 05:43      PT/INR - ( 06 Mar 2018 05:43 )   PT: 11.1 sec;   INR: 1.03 ratio         PTT - ( 06 Mar 2018 05:43 )  PTT:30.9 sec CC: left hallux pain       Objective:  Vital Signs Last 24 Hrs  T(C): 36.7 (07 Mar 2018 01:05), Max: 37.1 (06 Mar 2018 22:15)  T(F): 98 (07 Mar 2018 01:05), Max: 98.7 (06 Mar 2018 22:15)  HR: 94 (07 Mar 2018 01:05) (69 - 94)  BP: 145/81 (07 Mar 2018 01:05) (110/66 - 160/88)  BP(mean): 101 (06 Mar 2018 12:30) (85 - 111)  RR: 18 (07 Mar 2018 01:05) (14 - 18)  SpO2: 97% (07 Mar 2018 01:05) (96% - 100%)    Physical Exam: unchanged  General: appears to be in no acute distress.  Chest: lungs clear bilaterally, non-labored breathing, no wheezing or rhonci  Extremities: Warm b/l palp fem and pop pulses. R DP/PT signals. L PT signal only. L first toe with wet gangrenous tip wrapped    MEDICATIONS  (STANDING):  aspirin enteric coated 81 milliGRAM(s) Oral daily  ceFAZolin   IVPB 2000 milliGRAM(s) IV Intermittent every 8 hours  dextrose 5% + sodium chloride 0.9%. 1000 milliLiter(s) (50 mL/Hr) IV Continuous <Continuous>  dextrose 5%. 1000 milliLiter(s) (50 mL/Hr) IV Continuous <Continuous>  dextrose 50% Injectable 25 Gram(s) IV Push once  docusate sodium 100 milliGRAM(s) Oral three times a day  folic acid 1 milliGRAM(s) Oral daily  gabapentin 300 milliGRAM(s) Oral two times a day  heparin  Injectable 5000 Unit(s) SubCutaneous every 12 hours  insulin glargine Injectable (LANTUS) 23 Unit(s) SubCutaneous every morning  insulin glargine Injectable (LANTUS) 58 Unit(s) SubCutaneous at bedtime  insulin lispro (HumaLOG) corrective regimen sliding scale   SubCutaneous three times a day before meals  insulin lispro (HumaLOG) corrective regimen sliding scale   SubCutaneous at bedtime  metoprolol succinate ER 25 milliGRAM(s) Oral daily  multivitamin 1 Tablet(s) Oral daily  senna 2 Tablet(s) Oral at bedtime  ticagrelor 90 milliGRAM(s) Oral two times a day    MEDICATIONS  (PRN):  acetaminophen   Tablet. 650 milliGRAM(s) Oral every 6 hours PRN Mild Pain (1 - 3)  bisacodyl 5 milliGRAM(s) Oral every 12 hours PRN Constipation  dextrose Gel 1 Dose(s) Oral once PRN Blood Glucose LESS THAN 70 milliGRAM(s)/deciliter  glucagon  Injectable 1 milliGRAM(s) IntraMuscular once PRN Glucose LESS THAN 70 milligrams/deciliter    I&O's Detail    05 Mar 2018 07:01  -  06 Mar 2018 07:00  --------------------------------------------------------  IN:    dextrose 5% + sodium chloride 0.9%: 350 mL    IV PiggyBack: 100 mL    Oral Fluid: 1860 mL  Total IN: 2310 mL    OUT:    Voided: 400 mL  Total OUT: 400 mL    Total NET: 1910 mL      06 Mar 2018 07:01  -  07 Mar 2018 05:01  --------------------------------------------------------  IN:    dextrose 5% + sodium chloride 0.9%.: 350 mL    IV PiggyBack: 50 mL    Oral Fluid: 720 mL  Total IN: 1120 mL    OUT:    Voided: 1900 mL  Total OUT: 1900 mL    Total NET: -780 mL        Daily Height in cm: 165.1 (06 Mar 2018 07:32)      LABS:                        12.7   11.7  )-----------( 334      ( 06 Mar 2018 05:57 )             37.3     03-06    138  |  99  |  24<H>  ----------------------------<  145<H>  4.7   |  23  |  0.69    Ca    10.1      06 Mar 2018 05:43      PT/INR - ( 06 Mar 2018 05:43 )   PT: 11.1 sec;   INR: 1.03 ratio         PTT - ( 06 Mar 2018 05:43 )  PTT:30.9 sec

## 2018-03-08 LAB
-  AMPICILLIN/SULBACTAM: SIGNIFICANT CHANGE UP
-  CEFAZOLIN: SIGNIFICANT CHANGE UP
-  CIPROFLOXACIN: SIGNIFICANT CHANGE UP
-  CLINDAMYCIN: SIGNIFICANT CHANGE UP
-  ERYTHROMYCIN: SIGNIFICANT CHANGE UP
-  GENTAMICIN: SIGNIFICANT CHANGE UP
-  LEVOFLOXACIN: SIGNIFICANT CHANGE UP
-  MOXIFLOXACIN(AEROBIC): SIGNIFICANT CHANGE UP
-  OXACILLIN: SIGNIFICANT CHANGE UP
-  PENICILLIN: SIGNIFICANT CHANGE UP
-  RIFAMPIN: SIGNIFICANT CHANGE UP
-  TETRACYCLINE: SIGNIFICANT CHANGE UP
-  TRIMETHOPRIM/SULFAMETHOXAZOLE: SIGNIFICANT CHANGE UP
-  VANCOMYCIN: SIGNIFICANT CHANGE UP
ANION GAP SERPL CALC-SCNC: 12 MMOL/L — SIGNIFICANT CHANGE UP (ref 5–17)
ANION GAP SERPL CALC-SCNC: 13 MMOL/L — SIGNIFICANT CHANGE UP (ref 5–17)
APTT BLD: 31.6 SEC — SIGNIFICANT CHANGE UP (ref 27.5–37.4)
BASOPHILS # BLD AUTO: 0 K/UL — SIGNIFICANT CHANGE UP (ref 0–0.2)
BASOPHILS NFR BLD AUTO: 0.1 % — SIGNIFICANT CHANGE UP (ref 0–2)
BUN SERPL-MCNC: 16 MG/DL — SIGNIFICANT CHANGE UP (ref 7–23)
BUN SERPL-MCNC: 19 MG/DL — SIGNIFICANT CHANGE UP (ref 7–23)
CALCIUM SERPL-MCNC: 8.5 MG/DL — SIGNIFICANT CHANGE UP (ref 8.4–10.5)
CALCIUM SERPL-MCNC: 9.4 MG/DL — SIGNIFICANT CHANGE UP (ref 8.4–10.5)
CHLORIDE SERPL-SCNC: 101 MMOL/L — SIGNIFICANT CHANGE UP (ref 96–108)
CHLORIDE SERPL-SCNC: 102 MMOL/L — SIGNIFICANT CHANGE UP (ref 96–108)
CK MB BLD-MCNC: 1 % — SIGNIFICANT CHANGE UP (ref 0–3.5)
CK MB CFR SERPL CALC: 1.4 NG/ML — SIGNIFICANT CHANGE UP (ref 0–6.7)
CK SERPL-CCNC: 145 U/L — SIGNIFICANT CHANGE UP (ref 30–200)
CO2 SERPL-SCNC: 23 MMOL/L — SIGNIFICANT CHANGE UP (ref 22–31)
CO2 SERPL-SCNC: 24 MMOL/L — SIGNIFICANT CHANGE UP (ref 22–31)
CREAT SERPL-MCNC: 0.65 MG/DL — SIGNIFICANT CHANGE UP (ref 0.5–1.3)
CREAT SERPL-MCNC: 0.73 MG/DL — SIGNIFICANT CHANGE UP (ref 0.5–1.3)
EOSINOPHIL # BLD AUTO: 0.2 K/UL — SIGNIFICANT CHANGE UP (ref 0–0.5)
EOSINOPHIL NFR BLD AUTO: 1 % — SIGNIFICANT CHANGE UP (ref 0–6)
GAS PNL BLDA: SIGNIFICANT CHANGE UP
GAS PNL BLDA: SIGNIFICANT CHANGE UP
GLUCOSE BLDC GLUCOMTR-MCNC: 100 MG/DL — HIGH (ref 70–99)
GLUCOSE BLDC GLUCOMTR-MCNC: 126 MG/DL — HIGH (ref 70–99)
GLUCOSE BLDC GLUCOMTR-MCNC: 201 MG/DL — HIGH (ref 70–99)
GLUCOSE SERPL-MCNC: 120 MG/DL — HIGH (ref 70–99)
GLUCOSE SERPL-MCNC: 152 MG/DL — HIGH (ref 70–99)
HCT VFR BLD CALC: 27.1 % — LOW (ref 39–50)
HCT VFR BLD CALC: 35.4 % — LOW (ref 39–50)
HGB BLD-MCNC: 11.6 G/DL — LOW (ref 13–17)
HGB BLD-MCNC: 9.7 G/DL — LOW (ref 13–17)
INR BLD: 1.09 RATIO — SIGNIFICANT CHANGE UP (ref 0.88–1.16)
LYMPHOCYTES # BLD AUTO: 24.6 % — SIGNIFICANT CHANGE UP (ref 13–44)
LYMPHOCYTES # BLD AUTO: 3.7 K/UL — HIGH (ref 1–3.3)
MAGNESIUM SERPL-MCNC: 1.9 MG/DL — SIGNIFICANT CHANGE UP (ref 1.6–2.6)
MCHC RBC-ENTMCNC: 30.4 PG — SIGNIFICANT CHANGE UP (ref 27–34)
MCHC RBC-ENTMCNC: 32.6 PG — SIGNIFICANT CHANGE UP (ref 27–34)
MCHC RBC-ENTMCNC: 32.8 GM/DL — SIGNIFICANT CHANGE UP (ref 32–36)
MCHC RBC-ENTMCNC: 35.7 GM/DL — SIGNIFICANT CHANGE UP (ref 32–36)
MCV RBC AUTO: 91.1 FL — SIGNIFICANT CHANGE UP (ref 80–100)
MCV RBC AUTO: 92.9 FL — SIGNIFICANT CHANGE UP (ref 80–100)
METHOD TYPE: SIGNIFICANT CHANGE UP
MONOCYTES # BLD AUTO: 1 K/UL — HIGH (ref 0–0.9)
MONOCYTES NFR BLD AUTO: 7 % — SIGNIFICANT CHANGE UP (ref 2–14)
NEUTROPHILS # BLD AUTO: 10.1 K/UL — HIGH (ref 1.8–7.4)
NEUTROPHILS NFR BLD AUTO: 67.3 % — SIGNIFICANT CHANGE UP (ref 43–77)
NT-PROBNP SERPL-SCNC: 144 PG/ML — SIGNIFICANT CHANGE UP (ref 0–300)
PHOSPHATE SERPL-MCNC: 3.7 MG/DL — SIGNIFICANT CHANGE UP (ref 2.5–4.5)
PLATELET # BLD AUTO: 301 K/UL — SIGNIFICANT CHANGE UP (ref 150–400)
PLATELET # BLD AUTO: 339 K/UL — SIGNIFICANT CHANGE UP (ref 150–400)
POTASSIUM SERPL-MCNC: 3.8 MMOL/L — SIGNIFICANT CHANGE UP (ref 3.5–5.3)
POTASSIUM SERPL-MCNC: 4.2 MMOL/L — SIGNIFICANT CHANGE UP (ref 3.5–5.3)
POTASSIUM SERPL-SCNC: 3.8 MMOL/L — SIGNIFICANT CHANGE UP (ref 3.5–5.3)
POTASSIUM SERPL-SCNC: 4.2 MMOL/L — SIGNIFICANT CHANGE UP (ref 3.5–5.3)
PROTHROM AB SERPL-ACNC: 11.9 SEC — SIGNIFICANT CHANGE UP (ref 9.8–12.7)
RBC # BLD: 2.97 M/UL — LOW (ref 4.2–5.8)
RBC # BLD: 3.81 M/UL — LOW (ref 4.2–5.8)
RBC # FLD: 12.1 % — SIGNIFICANT CHANGE UP (ref 10.3–14.5)
RBC # FLD: 13.2 % — SIGNIFICANT CHANGE UP (ref 10.3–14.5)
SODIUM SERPL-SCNC: 137 MMOL/L — SIGNIFICANT CHANGE UP (ref 135–145)
SODIUM SERPL-SCNC: 138 MMOL/L — SIGNIFICANT CHANGE UP (ref 135–145)
TROPONIN T SERPL-MCNC: <0.01 NG/ML — SIGNIFICANT CHANGE UP (ref 0–0.06)
WBC # BLD: 15 K/UL — HIGH (ref 3.8–10.5)
WBC # BLD: 9.84 K/UL — SIGNIFICANT CHANGE UP (ref 3.8–10.5)
WBC # FLD AUTO: 15 K/UL — HIGH (ref 3.8–10.5)
WBC # FLD AUTO: 9.84 K/UL — SIGNIFICANT CHANGE UP (ref 3.8–10.5)

## 2018-03-08 PROCEDURE — 99232 SBSQ HOSP IP/OBS MODERATE 35: CPT

## 2018-03-08 PROCEDURE — 93010 ELECTROCARDIOGRAM REPORT: CPT

## 2018-03-08 PROCEDURE — 35656 BPG FEMORAL-POPLITEAL: CPT | Mod: LT

## 2018-03-08 PROCEDURE — 99291 CRITICAL CARE FIRST HOUR: CPT

## 2018-03-08 RX ORDER — METOPROLOL TARTRATE 50 MG
5 TABLET ORAL
Qty: 0 | Refills: 0 | Status: DISCONTINUED | OUTPATIENT
Start: 2018-03-08 | End: 2018-03-08

## 2018-03-08 RX ORDER — SENNA PLUS 8.6 MG/1
2 TABLET ORAL AT BEDTIME
Qty: 0 | Refills: 0 | Status: DISCONTINUED | OUTPATIENT
Start: 2018-03-08 | End: 2018-03-14

## 2018-03-08 RX ORDER — HYDRALAZINE HCL 50 MG
10 TABLET ORAL
Qty: 0 | Refills: 0 | Status: DISCONTINUED | OUTPATIENT
Start: 2018-03-08 | End: 2018-03-08

## 2018-03-08 RX ORDER — DOCUSATE SODIUM 100 MG
100 CAPSULE ORAL THREE TIMES A DAY
Qty: 0 | Refills: 0 | Status: DISCONTINUED | OUTPATIENT
Start: 2018-03-08 | End: 2018-03-08

## 2018-03-08 RX ORDER — INSULIN LISPRO 100/ML
VIAL (ML) SUBCUTANEOUS EVERY 4 HOURS
Qty: 0 | Refills: 0 | Status: DISCONTINUED | OUTPATIENT
Start: 2018-03-08 | End: 2018-03-09

## 2018-03-08 RX ORDER — ACETAMINOPHEN 500 MG
650 TABLET ORAL EVERY 6 HOURS
Qty: 0 | Refills: 0 | Status: DISCONTINUED | OUTPATIENT
Start: 2018-03-08 | End: 2018-03-14

## 2018-03-08 RX ORDER — OXYCODONE HYDROCHLORIDE 5 MG/1
10 TABLET ORAL EVERY 6 HOURS
Qty: 0 | Refills: 0 | Status: DISCONTINUED | OUTPATIENT
Start: 2018-03-08 | End: 2018-03-12

## 2018-03-08 RX ORDER — HYDROMORPHONE HYDROCHLORIDE 2 MG/ML
0.5 INJECTION INTRAMUSCULAR; INTRAVENOUS; SUBCUTANEOUS
Qty: 0 | Refills: 0 | Status: DISCONTINUED | OUTPATIENT
Start: 2018-03-08 | End: 2018-03-08

## 2018-03-08 RX ORDER — PHENYLEPHRINE HYDROCHLORIDE 10 MG/ML
0.5 INJECTION INTRAVENOUS
Qty: 80 | Refills: 0 | Status: DISCONTINUED | OUTPATIENT
Start: 2018-03-08 | End: 2018-03-08

## 2018-03-08 RX ORDER — SODIUM CHLORIDE 9 MG/ML
1000 INJECTION, SOLUTION INTRAVENOUS
Qty: 0 | Refills: 0 | Status: DISCONTINUED | OUTPATIENT
Start: 2018-03-08 | End: 2018-03-09

## 2018-03-08 RX ORDER — ENOXAPARIN SODIUM 100 MG/ML
40 INJECTION SUBCUTANEOUS EVERY 24 HOURS
Qty: 0 | Refills: 0 | Status: DISCONTINUED | OUTPATIENT
Start: 2018-03-08 | End: 2018-03-14

## 2018-03-08 RX ORDER — OXYCODONE HYDROCHLORIDE 5 MG/1
5 TABLET ORAL EVERY 6 HOURS
Qty: 0 | Refills: 0 | Status: DISCONTINUED | OUTPATIENT
Start: 2018-03-08 | End: 2018-03-12

## 2018-03-08 RX ORDER — MAGNESIUM SULFATE 500 MG/ML
2 VIAL (ML) INJECTION ONCE
Qty: 0 | Refills: 0 | Status: COMPLETED | OUTPATIENT
Start: 2018-03-08 | End: 2018-03-08

## 2018-03-08 RX ORDER — CEFAZOLIN SODIUM 1 G
2000 VIAL (EA) INJECTION EVERY 8 HOURS
Qty: 0 | Refills: 0 | Status: DISCONTINUED | OUTPATIENT
Start: 2018-03-08 | End: 2018-03-14

## 2018-03-08 RX ORDER — ALBUMIN HUMAN 25 %
250 VIAL (ML) INTRAVENOUS
Qty: 0 | Refills: 0 | Status: DISCONTINUED | OUTPATIENT
Start: 2018-03-08 | End: 2018-03-08

## 2018-03-08 RX ORDER — CALCIUM GLUCONATE 100 MG/ML
2 VIAL (ML) INTRAVENOUS ONCE
Qty: 0 | Refills: 0 | Status: COMPLETED | OUTPATIENT
Start: 2018-03-08 | End: 2018-03-08

## 2018-03-08 RX ORDER — POTASSIUM CHLORIDE 20 MEQ
20 PACKET (EA) ORAL
Qty: 0 | Refills: 0 | Status: COMPLETED | OUTPATIENT
Start: 2018-03-08 | End: 2018-03-09

## 2018-03-08 RX ORDER — OXYCODONE AND ACETAMINOPHEN 5; 325 MG/1; MG/1
1 TABLET ORAL EVERY 4 HOURS
Qty: 0 | Refills: 0 | Status: DISCONTINUED | OUTPATIENT
Start: 2018-03-08 | End: 2018-03-08

## 2018-03-08 RX ORDER — GABAPENTIN 400 MG/1
300 CAPSULE ORAL
Qty: 0 | Refills: 0 | Status: DISCONTINUED | OUTPATIENT
Start: 2018-03-08 | End: 2018-03-14

## 2018-03-08 RX ORDER — DOCUSATE SODIUM 100 MG
100 CAPSULE ORAL THREE TIMES A DAY
Qty: 0 | Refills: 0 | Status: DISCONTINUED | OUTPATIENT
Start: 2018-03-08 | End: 2018-03-14

## 2018-03-08 RX ORDER — FOLIC ACID 0.8 MG
1 TABLET ORAL DAILY
Qty: 0 | Refills: 0 | Status: DISCONTINUED | OUTPATIENT
Start: 2018-03-08 | End: 2018-03-14

## 2018-03-08 RX ORDER — PHENYLEPHRINE HYDROCHLORIDE 10 MG/ML
0.7 INJECTION INTRAVENOUS
Qty: 80 | Refills: 0 | Status: DISCONTINUED | OUTPATIENT
Start: 2018-03-08 | End: 2018-03-09

## 2018-03-08 RX ORDER — HYDROMORPHONE HYDROCHLORIDE 2 MG/ML
1 INJECTION INTRAMUSCULAR; INTRAVENOUS; SUBCUTANEOUS EVERY 4 HOURS
Qty: 0 | Refills: 0 | Status: DISCONTINUED | OUTPATIENT
Start: 2018-03-08 | End: 2018-03-08

## 2018-03-08 RX ORDER — TICAGRELOR 90 MG/1
90 TABLET ORAL
Qty: 0 | Refills: 0 | Status: DISCONTINUED | OUTPATIENT
Start: 2018-03-08 | End: 2018-03-14

## 2018-03-08 RX ORDER — SODIUM CHLORIDE 9 MG/ML
500 INJECTION, SOLUTION INTRAVENOUS ONCE
Qty: 0 | Refills: 0 | Status: COMPLETED | OUTPATIENT
Start: 2018-03-08 | End: 2018-03-08

## 2018-03-08 RX ORDER — ASPIRIN/CALCIUM CARB/MAGNESIUM 324 MG
81 TABLET ORAL DAILY
Qty: 0 | Refills: 0 | Status: DISCONTINUED | OUTPATIENT
Start: 2018-03-08 | End: 2018-03-14

## 2018-03-08 RX ORDER — HYDROMORPHONE HYDROCHLORIDE 2 MG/ML
0.5 INJECTION INTRAMUSCULAR; INTRAVENOUS; SUBCUTANEOUS
Qty: 0 | Refills: 0 | Status: DISCONTINUED | OUTPATIENT
Start: 2018-03-08 | End: 2018-03-12

## 2018-03-08 RX ADMIN — Medication 2: at 07:54

## 2018-03-08 RX ADMIN — Medication 1 MILLIGRAM(S): at 11:35

## 2018-03-08 RX ADMIN — Medication 50 GRAM(S): at 22:02

## 2018-03-08 RX ADMIN — Medication 25 MILLIGRAM(S): at 05:26

## 2018-03-08 RX ADMIN — Medication 1 MILLIGRAM(S): at 22:47

## 2018-03-08 RX ADMIN — SENNA PLUS 2 TABLET(S): 8.6 TABLET ORAL at 22:05

## 2018-03-08 RX ADMIN — Medication 1 TABLET(S): at 11:35

## 2018-03-08 RX ADMIN — HEPARIN SODIUM 5000 UNIT(S): 5000 INJECTION INTRAVENOUS; SUBCUTANEOUS at 05:26

## 2018-03-08 RX ADMIN — Medication 100 MILLIGRAM(S): at 05:26

## 2018-03-08 RX ADMIN — SODIUM CHLORIDE 75 MILLILITER(S): 9 INJECTION, SOLUTION INTRAVENOUS at 22:05

## 2018-03-08 RX ADMIN — SODIUM CHLORIDE 2000 MILLILITER(S): 9 INJECTION, SOLUTION INTRAVENOUS at 22:50

## 2018-03-08 RX ADMIN — Medication 20 MILLIEQUIVALENT(S): at 22:03

## 2018-03-08 RX ADMIN — Medication 400 GRAM(S): at 22:50

## 2018-03-08 RX ADMIN — GABAPENTIN 300 MILLIGRAM(S): 400 CAPSULE ORAL at 05:26

## 2018-03-08 RX ADMIN — Medication 100 MILLIGRAM(S): at 22:48

## 2018-03-08 RX ADMIN — Medication 81 MILLIGRAM(S): at 22:04

## 2018-03-08 RX ADMIN — Medication 100 MILLIGRAM(S): at 22:03

## 2018-03-08 RX ADMIN — Medication 1 TABLET(S): at 22:02

## 2018-03-08 RX ADMIN — Medication 100 MILLIGRAM(S): at 05:27

## 2018-03-08 RX ADMIN — SODIUM CHLORIDE 50 MILLILITER(S): 9 INJECTION, SOLUTION INTRAVENOUS at 05:27

## 2018-03-08 RX ADMIN — Medication 125 MILLILITER(S): at 19:56

## 2018-03-08 RX ADMIN — TICAGRELOR 90 MILLIGRAM(S): 90 TABLET ORAL at 22:49

## 2018-03-08 RX ADMIN — GABAPENTIN 300 MILLIGRAM(S): 400 CAPSULE ORAL at 22:04

## 2018-03-08 RX ADMIN — ENOXAPARIN SODIUM 40 MILLIGRAM(S): 100 INJECTION SUBCUTANEOUS at 22:04

## 2018-03-08 RX ADMIN — TICAGRELOR 90 MILLIGRAM(S): 90 TABLET ORAL at 05:26

## 2018-03-08 NOTE — CONSULT NOTE ADULT - CONSULT REASON
Lt toe OM, abscess and cellulitis.
cad
left hallux wet gangrene
gangrene
Hypotension s/p Left Fem to Popiteal Bypass with PTFE

## 2018-03-08 NOTE — BRIEF OPERATIVE NOTE - SPECIMENS
none
hallux for pathology, clean bone margin pathology. clean bone margin for micro, dirty wound swab for micro

## 2018-03-08 NOTE — PROGRESS NOTE ADULT - SUBJECTIVE AND OBJECTIVE BOX
CARDIOLOGY FOLLOW UP - Dr. Gavin    CC no chest pain or sob        PHYSICAL EXAM:  T(C): 36.6 (03-08-18 @ 04:16), Max: 37.2 (03-07-18 @ 14:43)  HR: 90 (03-08-18 @ 04:16) (80 - 90)  BP: 138/79 (03-08-18 @ 04:16) (125/76 - 155/82)  RR: 18 (03-08-18 @ 04:16) (18 - 18)  SpO2: 98% (03-08-18 @ 04:16) (98% - 99%)  Wt(kg): --  I&O's Summary    07 Mar 2018 07:01  -  08 Mar 2018 07:00  --------------------------------------------------------  IN: 1170 mL / OUT: 1850 mL / NET: -680 mL        Appearance: Normal	  Cardiovascular: Normal S1 S2,RRR, No JVD, No murmurs  Respiratory: Lungs clear to auscultation	  Gastrointestinal:  Soft, Non-tender, + BS	  Extremities: Normal range of motion, No clubbing, cyanosis or edema  dressing to Left foot CDI       MEDICATIONS  (STANDING):  aspirin enteric coated 81 milliGRAM(s) Oral daily  ceFAZolin   IVPB 2000 milliGRAM(s) IV Intermittent every 8 hours  dextrose 5% + sodium chloride 0.9%. 1000 milliLiter(s) (50 mL/Hr) IV Continuous <Continuous>  dextrose 5%. 1000 milliLiter(s) (50 mL/Hr) IV Continuous <Continuous>  dextrose 50% Injectable 25 Gram(s) IV Push once  docusate sodium 100 milliGRAM(s) Oral three times a day  folic acid 1 milliGRAM(s) Oral daily  gabapentin 300 milliGRAM(s) Oral two times a day  heparin  Injectable 5000 Unit(s) SubCutaneous every 12 hours  insulin glargine Injectable (LANTUS) 23 Unit(s) SubCutaneous every morning  insulin glargine Injectable (LANTUS) 58 Unit(s) SubCutaneous at bedtime  insulin lispro (HumaLOG) corrective regimen sliding scale   SubCutaneous three times a day before meals  insulin lispro (HumaLOG) corrective regimen sliding scale   SubCutaneous at bedtime  metoprolol succinate ER 25 milliGRAM(s) Oral daily  multivitamin 1 Tablet(s) Oral daily  senna 2 Tablet(s) Oral at bedtime  sodium chloride 0.9%. 1000 milliLiter(s) (50 mL/Hr) IV Continuous <Continuous>  tamsulosin 0.4 milliGRAM(s) Oral at bedtime  ticagrelor 90 milliGRAM(s) Oral two times a day      TELEMETRY: 	    ECG:  	  RADIOLOGY:   DIAGNOSTIC TESTING:  [ ] Echocardiogram:  [ ]  Catheterization:  [ ] Stress Test:    OTHER: 	    LABS:	 	                                12.2   10.01 )-----------( 339      ( 07 Mar 2018 10:38 )             37.3     03-07    136  |  99  |  23  ----------------------------<  122<H>  4.3   |  26  |  0.66    Ca    9.7      07 Mar 2018 07:52

## 2018-03-08 NOTE — PROGRESS NOTE ADULT - SUBJECTIVE AND OBJECTIVE BOX
Patient is a 67y old  Male who presents with a chief complaint of Worsened left foot infection (27 Feb 2018 05:00)      INTERVAL HPI/OVERNIGHT EVENTS:  T(C): 36.7 (03-08-18 @ 12:22), Max: 36.9 (03-07-18 @ 17:31)  HR: 79 (03-08-18 @ 12:22) (79 - 90)  BP: 142/83 (03-08-18 @ 12:22) (138/79 - 155/82)  RR: 18 (03-08-18 @ 12:22) (18 - 18)  SpO2: 98% (03-08-18 @ 12:22) (98% - 99%)  Wt(kg): --  I&O's Summary    07 Mar 2018 07:01  -  08 Mar 2018 07:00  --------------------------------------------------------  IN: 1170 mL / OUT: 1850 mL / NET: -680 mL        LABS:                        11.6   9.84  )-----------( 339      ( 08 Mar 2018 13:57 )             35.4     03-08    137  |  101  |  19  ----------------------------<  152<H>  4.2   |  24  |  0.73    Ca    9.4      08 Mar 2018 10:40          CAPILLARY BLOOD GLUCOSE      POCT Blood Glucose.: 126 mg/dL (08 Mar 2018 11:34)  POCT Blood Glucose.: 201 mg/dL (08 Mar 2018 07:39)  POCT Blood Glucose.: 193 mg/dL (07 Mar 2018 21:55)  POCT Blood Glucose.: 260 mg/dL (07 Mar 2018 17:43)    ABG - ( 08 Mar 2018 15:12 )  pH: 7.45  /  pCO2: 34    /  pO2: 174   / HCO3: 23    / Base Excess: .1    /  SaO2: 100                     MEDICATIONS  (STANDING):    MEDICATIONS  (PRN):          PHYSICAL EXAM:  GENERAL: NAD, well-groomed, well-developed  HEAD:  Atraumatic, Normocephalic  CHEST/LUNG: Clear to percussion bilaterally; No rales, rhonchi, wheezing, or rubs  HEART: Regular rate and rhythm; No murmurs, rubs, or gallops  ABDOMEN: Soft, Nontender, Nondistended; Bowel sounds present  EXTREMITIES:  2+ Peripheral Pulses, No clubbing, cyanosis, or edema  LYMPH: No lymphadenopathy noted  SKIN: No rashes or lesions    Care Discussed with Consultants/Other Providers [+ ] YES  [ ] NO

## 2018-03-08 NOTE — PROGRESS NOTE ADULT - ASSESSMENT
Patient is a 67 year old male with a past medical history significant for DM type II, h/o EtOH abuse and  pancreatitis, Cad s/p stent,  PVD, presents with purulent drainage from the left great toe over the past week found to have dry gangrene of the distal lt great toe. + Leucocytosis trending down, no fever. No active drainage rt now. Staph aureus in wound cx.     Culture is staph aurues/ staph lugdenensis  Continue cefazolin

## 2018-03-08 NOTE — PROGRESS NOTE ADULT - ASSESSMENT
67 year old male with history of DM type II, etoh abuse and pancreatitis, CAD, s/p PCI, PVD admitted with worsening left foot infection     1. CAD s/p PCI  CV stable no chest pain or sob    recent echo revealing normal lv sys fx , minimal MR   recent cath performed in Feb 2018 ( CANDIDO x 1pLAD, CANDIDO x 1 dCx, CANDIDO x 1 pOM1 and RCA )  cont asa 81mg daily/ brilinta / BB     2. PAD  await LE bypass  patient is as cardiac optimized and can proceed with intermediate cardiac risk   plans for  le bypass,  given recent PCI needs to continue with ASA without interruption and can hold brilinta if concern for bleeding risk 5 days preop and plan for surgery at least 30 days post-PCI earliest date 3/9/18      3. Foot ulcer/OM  IV abx  s/p left partial hallux amp   pod f/u     dvt ppx

## 2018-03-08 NOTE — CONSULT NOTE ADULT - ATTENDING COMMENTS
Pt seen and examined.  Chart reviewed.  Resident note confirmed.  Pt is a 67 year old male with a medical history significant for CAD (s/p stents), PVD, osteomyelitis, DM 2. Patient was treated with antibiotics for osteomyelitis, was evaluated by vascular surgery and had a LLE angiogram.  Work-up revealed multilevel arterial disease.  When the patient returned on February 27th, a worsening infection of the left Hallux was noted. Patient was placed on IV Ancef and underwent left  partial Hallux amputation on 3/6.  Today, he underwent left Femoral to Below Knee Popiteal Bypass with PTFE Graft.  EBL 150ml.  IVF 1L Crystalloid. UO 500ml. Patient experienced intraoperative hypotension, requring phenylephrine throughout the case. Pt admitted to the SICU for monitoring.    PMH/PSH/MEDS/ALL/SH/FH/ROS:  Unchanged from H&P  Vitals/PE/Labs/Radiographs:  Reviewed    A/P  Neuro:	Post op pain  	Continue pain control    CVS:	CAD/HTN/PVD  	s/p fem-pop bypass  	Intraoperative hypotension  	Continue Joao  	Continue cardiac monitoring  	R/o MI    Pulm:	Post op atelectasis  	Continue ISP    GI:	No active issues  	NPO for now    :	No active issues  	Monitor I's and O's    Heme:	Anemia  	Monitor H/h    ID:	Osteomyelitis  	Continue IV abx    Endo:	DM2  	Continue glycemic control  	  Proph:	SQ heparin
Zachary Howell  Pager: [phone]. If no response or past 5 pm call [phone].
agree with above
will d/w podiatry and cardiology plan if pt needs toe amp at this time due to art insuff ischemia progression

## 2018-03-08 NOTE — CHART NOTE - NSCHARTNOTEFT_GEN_A_CORE
Bedside Point of Care Ultrasound    Technically difficult study, Poor Parasternal windows, with decent Apical 4/5 Chamber, Subcostal View    LV: Good LV Systolic function/contractility, without any segmental wall abnormalities. Appears slightly hyperdynamic with near complete effacement of LV walls with systole. Mild LV Hypertrophy. EF roughly >55% by visual estimation.   VTI: 19cm, 19cm  SV: 61ml  HR: 89  CO: 5.5L/min  CI: 3.18 L/min/m2  RV roughly 2/3 size LV, with good Tricuspid Annulus Systolic Movement  IVC: 0.82cm to 0.47cm with sniffing  IVC Collapsibility index: 53%, (0.82-0.47/0.65)x100  Lungs: Good pleural sliding bilaterally, with predominant A-lines throughout all lung fields    Patient does not demonstrate any signs of Obstructive or Cardiogenic Shock, as stated above. Patient likely hypovolemic, in the setting of recent operative procedure, and Hyperdynamic LV with IVC collapsibility, 53%, with clear lungs on room air, 100%SpO2  - Will administer 500ml Plasmalyte, and continue to reassess  - Will check CBC in 6hours, to assess for possibility of worsening anemia, although unlikely Hemorrhagic Shock.    Buddy Santos PA-C

## 2018-03-08 NOTE — PROGRESS NOTE ADULT - SUBJECTIVE AND OBJECTIVE BOX
Follow Up:      Inverval History/ROS:Patient is a 67y old  Male who presents with a chief complaint of Worsened left foot infection (27 Feb 2018 05:00)    No fever  No events    Allergies    No Known Allergies    Intolerances        ANTIMICROBIALS:  ceFAZolin   IVPB 2000 every 8 hours      OTHER MEDS:  acetaminophen   Tablet. 650 milliGRAM(s) Oral every 6 hours PRN  aspirin enteric coated 81 milliGRAM(s) Oral daily  bisacodyl 5 milliGRAM(s) Oral every 12 hours PRN  dextrose 5% + sodium chloride 0.9%. 1000 milliLiter(s) IV Continuous <Continuous>  dextrose 5%. 1000 milliLiter(s) IV Continuous <Continuous>  dextrose 50% Injectable 25 Gram(s) IV Push once  dextrose Gel 1 Dose(s) Oral once PRN  docusate sodium 100 milliGRAM(s) Oral three times a day  folic acid 1 milliGRAM(s) Oral daily  gabapentin 300 milliGRAM(s) Oral two times a day  glucagon  Injectable 1 milliGRAM(s) IntraMuscular once PRN  heparin  Injectable 5000 Unit(s) SubCutaneous every 12 hours  insulin glargine Injectable (LANTUS) 23 Unit(s) SubCutaneous every morning  insulin glargine Injectable (LANTUS) 58 Unit(s) SubCutaneous at bedtime  insulin lispro (HumaLOG) corrective regimen sliding scale   SubCutaneous three times a day before meals  insulin lispro (HumaLOG) corrective regimen sliding scale   SubCutaneous at bedtime  metoprolol succinate ER 25 milliGRAM(s) Oral daily  multivitamin 1 Tablet(s) Oral daily  senna 2 Tablet(s) Oral at bedtime  sodium chloride 0.9%. 1000 milliLiter(s) IV Continuous <Continuous>  tamsulosin 0.4 milliGRAM(s) Oral at bedtime  ticagrelor 90 milliGRAM(s) Oral two times a day      Vital Signs Last 24 Hrs  T(C): 36.7 (08 Mar 2018 12:22), Max: 37.2 (07 Mar 2018 14:43)  T(F): 98 (08 Mar 2018 12:22), Max: 98.9 (07 Mar 2018 14:43)  HR: 79 (08 Mar 2018 12:22) (79 - 90)  BP: 142/83 (08 Mar 2018 12:22) (138/79 - 155/82)  BP(mean): --  RR: 18 (08 Mar 2018 12:22) (18 - 18)  SpO2: 98% (08 Mar 2018 12:22) (98% - 99%)    PHYSICAL EXAM:  General: [ x] non-toxic  HEAD/EYES: [ ] PERRL [x ] white sclera [ ] icterus  ENT:  [ ] normal [x ] supple [ ] thrush [ ] pharyngeal exudate  Cardiovascular:   [ ] murmur [x ] normal [ ] PPM/AICD  Respiratory:  [x ] clear to ausculation bilaterally  GI:  [ x] soft, non-tender, normal bowel sounds  :  [ ] evans [ ] no CVA tenderness   Musculoskeletal:  [x ] no synovitis  Neurologic:  [ ] non-focal exam   Skin:  [ ] no rash  Lymph: [ ] no lymphadenopathy  Psychiatric:  [x ] appropriate affect [ ] alert & oriented  Lines:  [x ] no phlebitis [ ] central line                                12.2   10.01 )-----------( 339      ( 07 Mar 2018 10:38 )             37.3       03-08    137  |  101  |  19  ----------------------------<  152<H>  4.2   |  24  |  0.73    Ca    9.4      08 Mar 2018 10:40            MICROBIOLOGY:Culture Results:   No growth to date. (03-06-18 @ 17:12)  Culture Results:   Testing in progress (03-06-18 @ 17:12)  Culture Results:   Rare Yeast (03-06-18 @ 17:05)  Culture Results:   Few Staphylococcus aureus (03-06-18 @ 17:05)      RADIOLOGY:

## 2018-03-08 NOTE — CONSULT NOTE ADULT - SUBJECTIVE AND OBJECTIVE BOX
HISTORY OF PRESENT ILLNESS:  CHAPIS BALLESTEROS is a 67year old Male with a past medical history significant for CAD s/p cardiac cath CANDIDO x 1 pLAD, CANDIDO x 1 dCx, CANDIDO x 1 pOM1 (02/06) via right radial and RCA (2/6) , DM type II, EtOH abuse c/b pancreatitis, PVD, recent admission for (1/30-2/9) for  left hallux osteomyelitis s/p debridement  , during course patient underwent  cardiac cath CANDIDO x 1pLAD, CANDIDO x 1 dCx, CANDIDO x 1 pOM1 (02/05) and RCA (2/6), after positive stress test during pre-operative work up. Patient was additionally treated with antibiotics for osteomyelitis, was evaluated by vascular surgery and had a LLE angiogram  with multilevel arterial disease, however revascularization postponed until cardiac function optimized ; patient now presented on February 27th for worsened infection of Left Hallux, with purulent drainage. Patient was placed on IV Ancef, s/p Left Partial Hallux Amputation on 3/6. Patient is now s/p Left Femoral to Below Knee Popiteal Bypass with PTFE Graft.  OR Enter: 14:26, Incision: 15:22, End: 17:15, EBL 150ml, 1L Crystalloid, 500mlUO. Patient induced with Etomidate, Fentanyl, Rocuronium, became acutely hypotensive, requring phenylephrine 0.5 - 0.9mcg/kg/min, throughout the case. Class 2 Airway, Easy intubation with MAC 3, 7.5mm ET tube. ABG intra-op 7.45/34/23, Lact 1.1, Hct 35. Review of systems is negative    PAST MEDICAL HISTORY:   CAD (coronary artery disease)  Diabetes mellitus  Diabetes mellitus  Hypertension  Alcohol Dependency  Diabetes Mellitus  Pancreatitis      PAST SURGICAL HISTORY: Stented coronary artery      FAMILY HISTORY: Family history of diabetes mellitus      SOCIAL HISTORY: Former smoker x "many years", quit 1 year ago. Denies Illicit drug use. History of ETOH abuse,quit years ago as per wife.    CODE STATUS: Full Code    HOME MEDICATIONS:  Ultra-Thin II Ins Pen Needles (pen needle, diabetic) : 1 each subcutaneous 2 times a day MDD:2   Lantus Solostar Pen 100 units/mL subcutaneous solution: 41 unit(s) subcutaneous 2 times a day  Take 58 units in bedtime, and 23 units in am MDD:2  aspirin 81 mg oral tablet: 1 tab(s) orally once a day MDD:1  Brilinta (ticagrelor) 90 mg oral tablet: 1 tab(s) orally 2 times a day MDD:2  cyanocobalamin 1000 mcg oral tablet: 1 tab(s) orally once a day MDD:1  folic acid 1 mg oral tablet: 1 tab(s) orally once a day MDD:1  Multiple Vitamins oral tablet: 1 tab(s) orally once a day MDD:1  amoxicillin-clavulanate 875 mg-125 mg oral tablet: 1 tab(s) orally 2 times a day MDD:2  metoprolol succinate 25 mg oral tablet, extended release: 1 tab(s) orally once a day  Ultra-Thin II Ins Pen Needles (pen needle, diabetic) : 1 each subcutaneous 3 times a day MDD:3  NovoLOG FlexPen 100 units/mL injectable solution: 23 unit(s) injectable 3 times a day MDD:69 units per   gabapentin 300 mg oral capsule: 1 cap(s) orally 2 times a day  Vitamin D3: 1 cap(s) orally once a day      ALLERGIES: No Known Allergies      VITAL SIGNS:  ICU Vital Signs Last 24 Hrs  T(C): 36.4 (08 Mar 2018 17:45), Max: 36.9 (07 Mar 2018 20:55)  T(F): 97.5 (08 Mar 2018 17:45), Max: 98.4 (07 Mar 2018 20:55)  HR: 80 (08 Mar 2018 19:45) (75 - 90)  BP: 141/73 (08 Mar 2018 19:45) (117/67 - 155/82)  BP(mean): 99 (08 Mar 2018 19:45) (84 - 100)  ABP: 111/43 (08 Mar 2018 19:45) (111/43 - 154/65)  ABP(mean): 63 (08 Mar 2018 19:45) (63 - 93)  RR: 16 (08 Mar 2018 19:45) (16 - 18)  SpO2: 100% (08 Mar 2018 19:45) (97% - 100%)      NEURO  Exam: Alert, Orientated x 4, NAD, Well nourished, Denies pain currently  acetaminophen   Tablet. 650 milliGRAM(s) Oral every 6 hours PRN Mild Pain (1 - 3)  HYDROmorphone  Injectable 0.5 milliGRAM(s) IV Push every 3 hours PRN Severe Breakthrough Pain  oxyCODONE    IR 5 milliGRAM(s) Oral every 6 hours PRN Moderate Pain (4 - 6)  oxyCODONE    IR 10 milliGRAM(s) Oral every 6 hours PRN Severe Pain (7 - 10)      RESPIRATORY  Mechanical Ventilation:   ABG - ( 08 Mar 2018 19:50 )  pH: 7.42  /  pCO2: 37    /  pO2: 140   / HCO3: 23    / Base Excess: -.5   /  SaO2: 100     Lactate: x      Exam: CTA bilaterally, No wheeze, no rhonchi      CARDIOVASCULAR  Exam: Appears well perfused with normal mentation, warm extremities  Cardiac Rhythm: Sinus Rhythm   phenylephrine    Infusion 0.7 MICROgram(s)/kG/Min IV Continuous <Continuous>      GI/NUTRITION  Exam: Softly Distended, Non tender  Diet: NPO  docusate sodium 100 milliGRAM(s) Oral three times a day  senna 2 Tablet(s) Oral at bedtime      GENITOURINARY/RENAL  albumin human  5% IVPB 250 milliLiter(s) IV Intermittent every 1 hour  multiple electrolytes Injection Type 1 1000 milliLiter(s) IV Continuous <Continuous>      03-07 @ 07:01  -  03-08 @ 07:00  --------------------------------------------------------  IN:    dextrose 5% + sodium chloride 0.9%: 300 mL    IV PiggyBack: 250 mL    Oral Fluid: 620 mL  Total IN: 1170 mL    OUT:    Voided: 1850 mL  Total OUT: 1850 mL    Total NET: -680 mL          03-08    138  |  102  |  16  ----------------------------<  120<H>  3.8   |  23  |  0.65    Ca    8.5      08 Mar 2018 19:46  Phos  3.7     03-08  Mg     1.9     03-08      [x ] Dominique catheter, indication: urine output monitoring in critically ill patient    HEMATOLOGIC  [x ] VTE Prophylaxis:  aspirin enteric coated 81 milliGRAM(s) Oral daily  ticagrelor 90 milliGRAM(s) Oral two times a day                          9.7    15.0  )-----------( 301      ( 08 Mar 2018 19:46 )             27.1     PT/INR - ( 08 Mar 2018 19:46 )   PT: 11.9 sec;   INR: 1.09 ratio         PTT - ( 08 Mar 2018 19:46 )  PTT:31.6 sec  Transfusion: [ ] PRBC	[ ] Platelets	[ ] FFP	[ ] Cryoprecipitate      INFECTIOUS DISEASES  ceFAZolin   IVPB 2000 milliGRAM(s) IV Intermittent every 8 hours    RECENT CULTURES:  Specimen Source: .Tissue Other, left great toe clean bone  Date/Time: 03-06 @ 17:12  Culture Results:   No growth to date.  Gram Stain:   No polymorphonuclear cells seen per low power field  No organisms seen per oil power field  Organism: --  Specimen Source: .Other Other, left great toe  Date/Time: 03-06 @ 17:05  Culture Results:   Rare Yeast  Gram Stain: --  Organism: Staphylococcus aureus    VASC  Warm left foot, with palpable AT. Left forfoot, toes for dressed and wrapped with curlex, C/D/I. I did not take the dressing down.    ENDOCRINE  insulin lispro (HumaLOG) corrective regimen sliding scale   SubCutaneous every 4 hours    CAPILLARY BLOOD GLUCOSE      POCT Blood Glucose.: 126 mg/dL (08 Mar 2018 11:34)  POCT Blood Glucose.: 201 mg/dL (08 Mar 2018 07:39)  POCT Blood Glucose.: 193 mg/dL (07 Mar 2018 21:55)      PATIENT CARE ACCESS DEVICES:  [ ] Peripheral IV  [ ] Central Venous Line	[ ] R	[ ] L	[ ] IJ	[ ] Fem	[ ] SC	Placed:   [x ] Arterial Line		[ ] R	[x ] L	[ ] Fem	[ x] Rad	[ ] Ax	Placed: 3/8  [ ] PICC:					[ ] Mediport  [x ] Urinary Catheter, Date Placed: 3/8  [x] Necessity of urinary, arterial, and venous catheters discussed    OTHER MEDICATIONS:     IMAGING STUDIES:

## 2018-03-08 NOTE — CONSULT NOTE ADULT - ASSESSMENT
67year old Male with a past medical history significant for CAD s/p cardiac cath CANDIDO x 1 pLAD, CANDIDO x 1 dCx, ACNDIDO x 1 pOM1 (02/06) via right radial and RCA (2/6) , DM type II, Left Hallux Osteomyelitis s/p previous debridement, now presents with Hypotension, POD #0 s/p Left Femoral to Below Knee Popiteal Bypass with PTFE Graft, for worsening Left Hallux Osteomyelitis s/p Left Partial Hallux Amputation (3/6)    NEURO: Acute Post-Operative Pain, neuropathic Pain  ·	Would administer Acetaminophen, 5mg and 10mg Oxycodone IR PO q 6hours, and 0.5mg IV Dilaudid q 3hours for acute pain  ·	Will resume 300mg Gabapentin q 12hours for known history of Neuropathic pain in the setting of PVD    RESP: No active issues  ·	Will obtain ABG, CXR  ·	Continue O2 nasal canula with goal SpO2> 90%    CV: Hypotension, more likely hypovolemic in nature, ?Hemorrhagic, although unlikely; Rule out Cardiogenic  ·	Would administer Plasmalyte @ 75ml/hr, Finished administer 500ml 5% Albumnin as ordered by Vascular surgery  ·	Will obtain ABG with Lactate, CBC, BMP, Cardiac Enzymes and BNP  ·	Please obtain EKG  ·	Will trend Cardaic enzymes  ·	Will perform BEdside TTE to assess intravascular volume status vs possible cardiogenic source of hypotension on arrival to SICU  ·	Will continue ASA/Brilinta in the setting of CAD  ·	q 1 hour vascular checks    GI: No active Issues  ·	Will start on CLD if hemodynamics improve  ·	Will administer bowel regimen including Colace, Senna with Narcotic use for acute post-operative pain    RENAL: No active Issues  ·	Will administer Plasmalyte @ 75ml/hr  ·	Strict Intake/Outputs    HEME: No active Issues  ·	Will continue ASA/ Brilinta to prevent ischemia in the setting of CAD  ·	Will recheck CBC in AM, Goal HgB > 8, unless active signs of ischemia  ·	Lovenox for Chemical VTE Prophylaxis    ID: Left Hallux Osteomyelitis  ·	Will continue Ancef for MSSA Osteomyelitis of Left Hallux    ENDO: DMII  ·	Will administer Moderate ISS q 4hours  ·	Will restart Lantus BID, Pre-meal Humalog when advancing diet    Will admit patient to SICU for hemodyanmic monitoring in the setting of hypotension  Case was discussed with Dr. Roberson.    Buddy Santos PA-C  [facility] [id number]

## 2018-03-09 ENCOUNTER — TRANSCRIPTION ENCOUNTER (OUTPATIENT)
Age: 68
End: 2018-03-09

## 2018-03-09 LAB
-  AMPICILLIN/SULBACTAM: SIGNIFICANT CHANGE UP
-  CEFAZOLIN: SIGNIFICANT CHANGE UP
-  CIPROFLOXACIN: SIGNIFICANT CHANGE UP
-  CLINDAMYCIN: SIGNIFICANT CHANGE UP
-  ERYTHROMYCIN: SIGNIFICANT CHANGE UP
-  GENTAMICIN: SIGNIFICANT CHANGE UP
-  LEVOFLOXACIN: SIGNIFICANT CHANGE UP
-  MOXIFLOXACIN(AEROBIC): SIGNIFICANT CHANGE UP
-  OXACILLIN: SIGNIFICANT CHANGE UP
-  RIFAMPIN: SIGNIFICANT CHANGE UP
-  TETRACYCLINE: SIGNIFICANT CHANGE UP
-  TRIMETHOPRIM/SULFAMETHOXAZOLE: SIGNIFICANT CHANGE UP
-  VANCOMYCIN: SIGNIFICANT CHANGE UP
ANION GAP SERPL CALC-SCNC: 15 MMOL/L — SIGNIFICANT CHANGE UP (ref 5–17)
APTT BLD: 29.5 SEC — SIGNIFICANT CHANGE UP (ref 27.5–37.4)
BLD GP AB SCN SERPL QL: NEGATIVE — SIGNIFICANT CHANGE UP
BUN SERPL-MCNC: 14 MG/DL — SIGNIFICANT CHANGE UP (ref 7–23)
CALCIUM SERPL-MCNC: 8.7 MG/DL — SIGNIFICANT CHANGE UP (ref 8.4–10.5)
CHLORIDE SERPL-SCNC: 97 MMOL/L — SIGNIFICANT CHANGE UP (ref 96–108)
CK MB BLD-MCNC: 0.7 % — SIGNIFICANT CHANGE UP (ref 0–3.5)
CK MB BLD-MCNC: 1 % — SIGNIFICANT CHANGE UP (ref 0–3.5)
CK MB CFR SERPL CALC: 1.6 NG/ML — SIGNIFICANT CHANGE UP (ref 0–6.7)
CK MB CFR SERPL CALC: 4.5 NG/ML — SIGNIFICANT CHANGE UP (ref 0–6.7)
CK SERPL-CCNC: 238 U/L — HIGH (ref 30–200)
CK SERPL-CCNC: 465 U/L — HIGH (ref 30–200)
CO2 SERPL-SCNC: 22 MMOL/L — SIGNIFICANT CHANGE UP (ref 22–31)
CREAT SERPL-MCNC: 0.61 MG/DL — SIGNIFICANT CHANGE UP (ref 0.5–1.3)
CULTURE RESULTS: SIGNIFICANT CHANGE UP
GLUCOSE BLDC GLUCOMTR-MCNC: 153 MG/DL — HIGH (ref 70–99)
GLUCOSE BLDC GLUCOMTR-MCNC: 168 MG/DL — HIGH (ref 70–99)
GLUCOSE BLDC GLUCOMTR-MCNC: 176 MG/DL — HIGH (ref 70–99)
GLUCOSE BLDC GLUCOMTR-MCNC: 187 MG/DL — HIGH (ref 70–99)
GLUCOSE BLDC GLUCOMTR-MCNC: 234 MG/DL — HIGH (ref 70–99)
GLUCOSE BLDC GLUCOMTR-MCNC: 235 MG/DL — HIGH (ref 70–99)
GLUCOSE SERPL-MCNC: 172 MG/DL — HIGH (ref 70–99)
HCT VFR BLD CALC: 22.1 % — LOW (ref 39–50)
HCT VFR BLD CALC: 23.8 % — LOW (ref 39–50)
HCT VFR BLD CALC: 24.3 % — LOW (ref 39–50)
HCT VFR BLD CALC: 24.5 % — LOW (ref 39–50)
HGB BLD-MCNC: 7.9 G/DL — LOW (ref 13–17)
HGB BLD-MCNC: 8.4 G/DL — LOW (ref 13–17)
HGB BLD-MCNC: 8.6 G/DL — LOW (ref 13–17)
HGB BLD-MCNC: 8.9 G/DL — LOW (ref 13–17)
INR BLD: 1.08 RATIO — SIGNIFICANT CHANGE UP (ref 0.88–1.16)
MAGNESIUM SERPL-MCNC: 2.1 MG/DL — SIGNIFICANT CHANGE UP (ref 1.6–2.6)
MCHC RBC-ENTMCNC: 31.8 PG — SIGNIFICANT CHANGE UP (ref 27–34)
MCHC RBC-ENTMCNC: 32.3 PG — SIGNIFICANT CHANGE UP (ref 27–34)
MCHC RBC-ENTMCNC: 32.6 PG — SIGNIFICANT CHANGE UP (ref 27–34)
MCHC RBC-ENTMCNC: 32.8 PG — SIGNIFICANT CHANGE UP (ref 27–34)
MCHC RBC-ENTMCNC: 35.2 GM/DL — SIGNIFICANT CHANGE UP (ref 32–36)
MCHC RBC-ENTMCNC: 35.6 GM/DL — SIGNIFICANT CHANGE UP (ref 32–36)
MCHC RBC-ENTMCNC: 35.9 GM/DL — SIGNIFICANT CHANGE UP (ref 32–36)
MCHC RBC-ENTMCNC: 36.3 GM/DL — HIGH (ref 32–36)
MCV RBC AUTO: 90.5 FL — SIGNIFICANT CHANGE UP (ref 80–100)
MCV RBC AUTO: 90.5 FL — SIGNIFICANT CHANGE UP (ref 80–100)
MCV RBC AUTO: 90.8 FL — SIGNIFICANT CHANGE UP (ref 80–100)
MCV RBC AUTO: 90.9 FL — SIGNIFICANT CHANGE UP (ref 80–100)
METHOD TYPE: SIGNIFICANT CHANGE UP
NT-PROBNP SERPL-SCNC: 119 PG/ML — SIGNIFICANT CHANGE UP (ref 0–300)
PHOSPHATE SERPL-MCNC: 2.5 MG/DL — SIGNIFICANT CHANGE UP (ref 2.5–4.5)
PLATELET # BLD AUTO: 211 K/UL — SIGNIFICANT CHANGE UP (ref 150–400)
PLATELET # BLD AUTO: 217 K/UL — SIGNIFICANT CHANGE UP (ref 150–400)
PLATELET # BLD AUTO: 238 K/UL — SIGNIFICANT CHANGE UP (ref 150–400)
PLATELET # BLD AUTO: 241 K/UL — SIGNIFICANT CHANGE UP (ref 150–400)
POTASSIUM SERPL-MCNC: 4.1 MMOL/L — SIGNIFICANT CHANGE UP (ref 3.5–5.3)
POTASSIUM SERPL-SCNC: 4.1 MMOL/L — SIGNIFICANT CHANGE UP (ref 3.5–5.3)
PROTHROM AB SERPL-ACNC: 11.8 SEC — SIGNIFICANT CHANGE UP (ref 9.8–12.7)
RBC # BLD: 2.43 M/UL — LOW (ref 4.2–5.8)
RBC # BLD: 2.62 M/UL — LOW (ref 4.2–5.8)
RBC # BLD: 2.69 M/UL — LOW (ref 4.2–5.8)
RBC # BLD: 2.7 M/UL — LOW (ref 4.2–5.8)
RBC # FLD: 11.8 % — SIGNIFICANT CHANGE UP (ref 10.3–14.5)
RBC # FLD: 11.9 % — SIGNIFICANT CHANGE UP (ref 10.3–14.5)
RBC # FLD: 11.9 % — SIGNIFICANT CHANGE UP (ref 10.3–14.5)
RBC # FLD: 12 % — SIGNIFICANT CHANGE UP (ref 10.3–14.5)
RH IG SCN BLD-IMP: POSITIVE — SIGNIFICANT CHANGE UP
SODIUM SERPL-SCNC: 134 MMOL/L — LOW (ref 135–145)
SURGICAL PATHOLOGY STUDY: SIGNIFICANT CHANGE UP
TROPONIN T SERPL-MCNC: 0.02 NG/ML — SIGNIFICANT CHANGE UP (ref 0–0.06)
TROPONIN T SERPL-MCNC: <0.01 NG/ML — SIGNIFICANT CHANGE UP (ref 0–0.06)
WBC # BLD: 10.9 K/UL — HIGH (ref 3.8–10.5)
WBC # BLD: 10.9 K/UL — HIGH (ref 3.8–10.5)
WBC # BLD: 11.8 K/UL — HIGH (ref 3.8–10.5)
WBC # BLD: 9.4 K/UL — SIGNIFICANT CHANGE UP (ref 3.8–10.5)
WBC # FLD AUTO: 10.9 K/UL — HIGH (ref 3.8–10.5)
WBC # FLD AUTO: 10.9 K/UL — HIGH (ref 3.8–10.5)
WBC # FLD AUTO: 11.8 K/UL — HIGH (ref 3.8–10.5)
WBC # FLD AUTO: 9.4 K/UL — SIGNIFICANT CHANGE UP (ref 3.8–10.5)

## 2018-03-09 PROCEDURE — 99291 CRITICAL CARE FIRST HOUR: CPT

## 2018-03-09 PROCEDURE — 71045 X-RAY EXAM CHEST 1 VIEW: CPT | Mod: 26

## 2018-03-09 PROCEDURE — 99232 SBSQ HOSP IP/OBS MODERATE 35: CPT

## 2018-03-09 RX ORDER — METOPROLOL TARTRATE 50 MG
12.5 TABLET ORAL EVERY 12 HOURS
Qty: 0 | Refills: 0 | Status: DISCONTINUED | OUTPATIENT
Start: 2018-03-09 | End: 2018-03-10

## 2018-03-09 RX ORDER — INSULIN LISPRO 100/ML
VIAL (ML) SUBCUTANEOUS
Qty: 0 | Refills: 0 | Status: DISCONTINUED | OUTPATIENT
Start: 2018-03-09 | End: 2018-03-14

## 2018-03-09 RX ORDER — INSULIN LISPRO 100/ML
VIAL (ML) SUBCUTANEOUS AT BEDTIME
Qty: 0 | Refills: 0 | Status: DISCONTINUED | OUTPATIENT
Start: 2018-03-09 | End: 2018-03-14

## 2018-03-09 RX ORDER — INSULIN GLARGINE 100 [IU]/ML
30 INJECTION, SOLUTION SUBCUTANEOUS AT BEDTIME
Qty: 0 | Refills: 0 | Status: DISCONTINUED | OUTPATIENT
Start: 2018-03-09 | End: 2018-03-14

## 2018-03-09 RX ORDER — SODIUM CHLORIDE 9 MG/ML
1000 INJECTION, SOLUTION INTRAVENOUS
Qty: 0 | Refills: 0 | Status: DISCONTINUED | OUTPATIENT
Start: 2018-03-09 | End: 2018-03-10

## 2018-03-09 RX ORDER — INSULIN GLARGINE 100 [IU]/ML
23 INJECTION, SOLUTION SUBCUTANEOUS EVERY MORNING
Qty: 0 | Refills: 0 | Status: DISCONTINUED | OUTPATIENT
Start: 2018-03-09 | End: 2018-03-14

## 2018-03-09 RX ADMIN — Medication 4: at 21:41

## 2018-03-09 RX ADMIN — Medication 20 MILLIEQUIVALENT(S): at 00:12

## 2018-03-09 RX ADMIN — Medication 100 MILLIGRAM(S): at 21:42

## 2018-03-09 RX ADMIN — Medication 2: at 17:33

## 2018-03-09 RX ADMIN — Medication 100 MILLIGRAM(S): at 14:03

## 2018-03-09 RX ADMIN — SENNA PLUS 2 TABLET(S): 8.6 TABLET ORAL at 21:43

## 2018-03-09 RX ADMIN — Medication 650 MILLIGRAM(S): at 22:13

## 2018-03-09 RX ADMIN — Medication 1 TABLET(S): at 14:03

## 2018-03-09 RX ADMIN — Medication 2: at 06:02

## 2018-03-09 RX ADMIN — Medication 100 MILLIGRAM(S): at 21:43

## 2018-03-09 RX ADMIN — Medication 2: at 01:46

## 2018-03-09 RX ADMIN — Medication 100 MILLIGRAM(S): at 14:04

## 2018-03-09 RX ADMIN — INSULIN GLARGINE 30 UNIT(S): 100 INJECTION, SOLUTION SUBCUTANEOUS at 23:26

## 2018-03-09 RX ADMIN — Medication 1 MILLIGRAM(S): at 14:05

## 2018-03-09 RX ADMIN — TICAGRELOR 90 MILLIGRAM(S): 90 TABLET ORAL at 05:53

## 2018-03-09 RX ADMIN — Medication 650 MILLIGRAM(S): at 05:56

## 2018-03-09 RX ADMIN — OXYCODONE HYDROCHLORIDE 5 MILLIGRAM(S): 5 TABLET ORAL at 23:46

## 2018-03-09 RX ADMIN — TICAGRELOR 90 MILLIGRAM(S): 90 TABLET ORAL at 18:02

## 2018-03-09 RX ADMIN — Medication 650 MILLIGRAM(S): at 06:26

## 2018-03-09 RX ADMIN — Medication 12.5 MILLIGRAM(S): at 18:01

## 2018-03-09 RX ADMIN — SODIUM CHLORIDE 50 MILLILITER(S): 9 INJECTION, SOLUTION INTRAVENOUS at 21:42

## 2018-03-09 RX ADMIN — GABAPENTIN 300 MILLIGRAM(S): 400 CAPSULE ORAL at 18:02

## 2018-03-09 RX ADMIN — Medication 650 MILLIGRAM(S): at 21:43

## 2018-03-09 RX ADMIN — Medication 62.5 MILLIMOLE(S): at 04:10

## 2018-03-09 RX ADMIN — Medication 100 MILLIGRAM(S): at 05:53

## 2018-03-09 RX ADMIN — GABAPENTIN 300 MILLIGRAM(S): 400 CAPSULE ORAL at 05:53

## 2018-03-09 RX ADMIN — ENOXAPARIN SODIUM 40 MILLIGRAM(S): 100 INJECTION SUBCUTANEOUS at 21:43

## 2018-03-09 RX ADMIN — Medication 100 MILLIGRAM(S): at 05:56

## 2018-03-09 RX ADMIN — Medication 81 MILLIGRAM(S): at 14:03

## 2018-03-09 NOTE — PROGRESS NOTE ADULT - ASSESSMENT
Patient is a 67 year old male with a past medical history significant for DM type II, h/o EtOH abuse and  pancreatitis, Cad s/p stent,  PVD, presents with purulent drainage from the left great toe over the past week found to have dry gangrene of the distal lt great toe.     Cultures grew staph aurues/ staph lugdenensis    S/p revascularization/ partial amputation for OM.     Continue cefazolin- duration depends on concern for residual OM.     Call the ID service with questions or concerns over the weekend.  [phone]

## 2018-03-09 NOTE — PROGRESS NOTE ADULT - ASSESSMENT
67 year old male with history of DM type II, etoh abuse and pancreatitis, CAD, s/p PCI, PVD admitted with worsening left foot infection . Now s /p Left Partial Hallux Amputation on 3/6 and s/p Left Femoral to Below Knee Popiteal Bypass with PTFE Graft. Currently in SICU for post op hypotension     1. CAD s/p PCI  CV stable post op no chest pain or sob    EKG no evidence of ACS   no evidence of CHF on exam   recent echo revealing normal lv sys fx , minimal MR   recent cath performed in Feb 2018 ( CANDIDO x 1pLAD, CANDIDO x 1 dCx, CANDIDO x 1 pOM1 and RCA )  beside echo noted   cont asa 81mg daily/ brilinta / BB     2. PAD  s/p  LE bypass  vascular f/u     3. Foot ulcer/OM  IV abx  s/p left partial hallux amp   pod f/u     4. Hypotension   post op , likely hypovolemic in the setting of recent surgery   bp now improved   s/p 1 unit PRBC, IVF, plasma and albumin   ekg no evidence of MI   bedside echo  noted   on asa/ brilinta in light of recent PCI    monitor CBC   care per SICU     dvt ppx

## 2018-03-09 NOTE — PROGRESS NOTE ADULT - SUBJECTIVE AND OBJECTIVE BOX
HISTORY  67year old Male with a past medical history significant for CAD s/p cardiac cath CANDIDO x 1 pLAD, CANDIDO x 1 dCx, CANDIDO x 1 pOM1 (02/06) via right radial and RCA (2/6) , DM type II, EtOH abuse c/b pancreatitis, PVD, recent admission for (1/30-2/9) for  left hallux osteomyelitis s/p debridement  , during course patient underwent  cardiac cath CANDIDO x 1pLAD, CANDIDO x 1 dCx, CANDIDO x 1 pOM1 (02/05) and RCA (2/6), after positive stress test during pre-operative work up. Patient was additionally treated with antibiotics for osteomyelitis, was evaluated by vascular surgery and had a LLE angiogram  with multilevel arterial disease, however revascularization postponed until cardiac function optimized ; patient now presented on February 27th for worsened infection of Left Hallux, with purulent drainage. Patient was placed on IV Ancef, s/p Left Partial Hallux Amputation on 3/6. Patient is now s/p Left Femoral to Below Knee Popiteal Bypass with PTFE Graft.  OR Enter: 14:26, Incision: 15:22, End: 17:15, EBL 150ml, 1L Crystalloid, 500mlUO. Patient induced with Etomidate, Fentanyl, Rocuronium, became acutely hypotensive, requring phenylephrine 0.5 - 0.9mcg/kg/min, throughout the case. Class 2 Airway, Easy intubation with MAC 3, 7.5mm ET tube. ABG intra-op 7.45/34/23, Lact 1.1, Hct 35.    24 HOUR EVENTS: Pt was brought up to the SICU and had ongoing resuscitation. He was bolused 500cc of Plasmalyte after a point of care ECHO was significant for an IVC w/ respiratory variation. He was successfully weaned from the Joao gtt.    SUBJECTIVE/ROS:  [x] A ten-point review of systems was otherwise negative except as noted.  [ ] Due to altered mental status/intubation, subjective information were not able to be obtained from the patient. History was obtained, to the extent possible, from review of the chart and collateral sources of information.      NEURO  RASS: 0    GCS: 15  Exam: awake, alert, oriented  Meds: acetaminophen   Tablet. 650 milliGRAM(s) Oral every 6 hours PRN Mild Pain (1 - 3)  gabapentin 300 milliGRAM(s) Oral two times a day  HYDROmorphone  Injectable 0.5 milliGRAM(s) IV Push every 3 hours PRN Severe Breakthrough Pain  oxyCODONE    IR 5 milliGRAM(s) Oral every 6 hours PRN Moderate Pain (4 - 6)  oxyCODONE    IR 10 milliGRAM(s) Oral every 6 hours PRN Severe Pain (7 - 10)    [x] Adequacy of sedation and pain control has been assessed and adjusted      RESPIRATORY  RR: 19 (03-09-18 @ 00:00) (14 - 26)  SpO2: 100% (03-09-18 @ 00:00) (95% - 100%)  Wt(kg): --  Exam: unlabored, clear to auscultation bilaterally  ABG - ( 08 Mar 2018 19:50 )  pH: 7.42  /  pCO2: 37    /  pO2: 140   / HCO3: 23    / Base Excess: -.5   /  SaO2: 100     Lactate: x          Meds:       CARDIOVASCULAR  HR: 110 (03-09-18 @ 00:00) (75 - 110)  BP: 147/76 (03-08-18 @ 21:00) (117/67 - 147/76)  BP(mean): 105 (03-08-18 @ 21:00) (84 - 105)  ABP: 171/64 (03-09-18 @ 00:00) (98/58 - 171/64)  ABP(mean): 105 (03-09-18 @ 00:00) (63 - 105)  Wt(kg): --  CVP(cm H2O): --      Exam: regular rate and rhythm  Cardiac Rhythm: sinus  Perfusion     [x]Adequate   [ ]Inadequate  Mentation   [x]Normal       [ ]Reduced  Extremities  [x]Warm         [ ]Cool  Volume Status [ ]Hypervolemic [x]Euvolemic [ ]Hypovolemic  Meds: phenylephrine    Infusion 0.7 MICROgram(s)/kG/Min IV Continuous <Continuous>        GI/NUTRITION  Exam: soft, nontender, nondistended  Diet: NPO  Meds: docusate sodium 100 milliGRAM(s) Oral three times a day  senna 2 Tablet(s) Oral at bedtime      GENITOURINARY  I&O's Detail    03-07 @ 07:01  -  03-08 @ 07:00  --------------------------------------------------------  IN:    dextrose 5% + sodium chloride 0.9%: 300 mL    IV PiggyBack: 250 mL    Oral Fluid: 620 mL  Total IN: 1170 mL    OUT:    Voided: 1850 mL  Total OUT: 1850 mL    Total NET: -680 mL      03-08 @ 07:01  -  03-09 @ 01:04  --------------------------------------------------------  IN:    0.9% NaCl: 500 mL    IV PiggyBack: 500 mL    multiple electrolytes Injection Type 1: 500 mL    phenylephrine   Infusion: 43.4 mL    Solution: 50 mL    Solution: 50 mL    Solution: 200 mL  Total IN: 1843.4 mL    OUT:    Indwelling Catheter - Urethral: 475 mL  Total OUT: 475 mL    Total NET: 1368.4 mL          03-08    138  |  102  |  16  ----------------------------<  120<H>  3.8   |  23  |  0.65    Ca    8.5      08 Mar 2018 19:46  Phos  3.7     03-08  Mg     1.9     03-08      [x] Dominique catheter, indication: UOP monitoring in critically ill Pt  Meds: folic acid 1 milliGRAM(s) Oral daily  multiple electrolytes Injection Type 1 1000 milliLiter(s) IV Continuous <Continuous>  multivitamin 1 Tablet(s) Oral daily        HEMATOLOGIC  Meds: aspirin enteric coated 81 milliGRAM(s) Oral daily  enoxaparin Injectable 40 milliGRAM(s) SubCutaneous every 24 hours  ticagrelor 90 milliGRAM(s) Oral two times a day    [x] VTE Prophylaxis                        9.7    15.0  )-----------( 301      ( 08 Mar 2018 19:46 )             27.1     PT/INR - ( 08 Mar 2018 19:46 )   PT: 11.9 sec;   INR: 1.09 ratio         PTT - ( 08 Mar 2018 19:46 )  PTT:31.6 sec  Transfusion     [ ] PRBC   [ ] Platelets   [ ] FFP   [ ] Cryoprecipitate      INFECTIOUS DISEASES  WBC Count: 15.0 K/uL (03-08 @ 19:46)  WBC Count: 9.84 K/uL (03-08 @ 13:57)    RECENT CULTURES:  Specimen Source: .Tissue Other, left great toe clean bone  Date/Time: 03-06 @ 17:12  Culture Results:   No growth to date.  Gram Stain:   No polymorphonuclear cells seen per low power field  No organisms seen per oil power field  Organism: --  Specimen Source: .Other Other, left great toe  Date/Time: 03-06 @ 17:05  Culture Results:   Rare Yeast  Gram Stain: --  Organism: Staphylococcus aureus    Meds: ceFAZolin   IVPB 2000 milliGRAM(s) IV Intermittent every 8 hours        ENDOCRINE  CAPILLARY BLOOD GLUCOSE      POCT Blood Glucose.: 100 mg/dL (08 Mar 2018 22:28)  POCT Blood Glucose.: 126 mg/dL (08 Mar 2018 11:34)  POCT Blood Glucose.: 201 mg/dL (08 Mar 2018 07:39)    Meds: insulin lispro (HumaLOG) corrective regimen sliding scale   SubCutaneous every 4 hours        ACCESS DEVICES:  [x] Peripheral IV  [ ] Central Venous Line	[ ] R	[ ] L	[ ] IJ	[ ] Fem	[ ] SC	Placed:   [x] Arterial Line		[ ] R	[ ] L	[ ] Fem	[ ] Rad	[ ] Ax	Placed:   [ ] PICC:					[ ] Mediport  [x] Urinary Catheter, Date Placed:   [x] Necessity of urinary, arterial, and venous catheters discussed    OTHER MEDICATIONS:      CODE STATUS:  Full code    IMAGING:

## 2018-03-09 NOTE — PHYSICAL THERAPY INITIAL EVALUATION ADULT - TRANSFER SAFETY CONCERNS NOTED: SIT/STAND, REHAB EVAL
decreased weight-shifting ability
losing balance/decreased step length/decreased balance during turns/decreased safety awareness/decreased weight-shifting ability/decreased sequencing ability

## 2018-03-09 NOTE — PHYSICAL THERAPY INITIAL EVALUATION ADULT - GENERAL OBSERVATIONS, REHAB EVAL
pt found lying supine in bed.
pt received seated in chair, NAD, agreeable to PT session, +ICU monitoring, L darco shoe donned at beginning of session

## 2018-03-09 NOTE — PROGRESS NOTE ADULT - SUBJECTIVE AND OBJECTIVE BOX
24 HOUR EVENTS: Pt was brought up to the SICU and had ongoing resuscitation. He was bolused 500cc of Plasmalyte after a point of care ECHO was significant for an IVC w/ respiratory variation. He was successfully weaned from the Joao gt    STATUS POST:      POST OPERATIVE DAY #:     SUBJECTIVE: Pt seen, no c.os, no ON events    Vital Signs Last 24 Hrs  T(C): 37.1 (09 Mar 2018 11:00), Max: 37.2 (09 Mar 2018 03:00)  T(F): 98.8 (09 Mar 2018 11:00), Max: 99 (09 Mar 2018 03:00)  HR: 98 (09 Mar 2018 11:00) (75 - 117)  BP: 130/61 (09 Mar 2018 11:00) (95/52 - 147/76)  BP(mean): 88 (09 Mar 2018 11:00) (69 - 105)  RR: 18 (09 Mar 2018 11:00) (14 - 26)  SpO2: 99% (09 Mar 2018 11:00) (95% - 100%)    General: appears to be in no acute distress.  Chest: non-labored breathing, no wheezing  Extremities: dressings changes at bedside, left groin saturated.  redressed with gauze, chani and ace  Warm b/l palp fem and pop pulses.   R DP/PT signals. L PT signal only. L first toe with wet gangrenous tip wrapped    I&O's Summary    08 Mar 2018 07:01  -  09 Mar 2018 07:00  --------------------------------------------------------  IN: 2668.4 mL / OUT: 1230 mL / NET: 1438.4 mL    09 Mar 2018 07:01  -  09 Mar 2018 11:53  --------------------------------------------------------  IN: 425 mL / OUT: 535 mL / NET: -110 mL      I&O's Detail    08 Mar 2018 07:01  -  09 Mar 2018 07:00  --------------------------------------------------------  IN:    0.9% NaCl: 500 mL    IV PiggyBack: 500 mL    multiple electrolytes Injection Type 1multiple electrolytes Injection Type 1: 1025 mL    phenylephrine   Infusion: 43.4 mL    Solution: 250 mL    Solution: 100 mL    Solution: 50 mL    Solution: 200 mL  Total IN: 2668.4 mL    OUT:    Indwelling Catheter - Urethral: 1230 mL  Total OUT: 1230 mL    Total NET: 1438.4 mL      09 Mar 2018 07:01  -  09 Mar 2018 11:53  --------------------------------------------------------  IN:    multiple electrolytes Injection Type 1: 50 mL    multiple electrolytes Injection Type 1multiple electrolytes Injection Type 1: 375 mL  Total IN: 425 mL    OUT:    Indwelling Catheter - Urethral: 535 mL  Total OUT: 535 mL    Total NET: -110 mL          MEDICATIONS  (STANDING):  aspirin enteric coated 81 milliGRAM(s) Oral daily  ceFAZolin   IVPB 2000 milliGRAM(s) IV Intermittent every 8 hours  docusate sodium 100 milliGRAM(s) Oral three times a day  enoxaparin Injectable 40 milliGRAM(s) SubCutaneous every 24 hours  folic acid 1 milliGRAM(s) Oral daily  gabapentin 300 milliGRAM(s) Oral two times a day  insulin lispro (HumaLOG) corrective regimen sliding scale   SubCutaneous three times a day before meals  insulin lispro (HumaLOG) corrective regimen sliding scale   SubCutaneous at bedtime  metoprolol     tartrate 12.5 milliGRAM(s) Oral every 12 hours  multiple electrolytes Injection Type 1 1000 milliLiter(s) (50 mL/Hr) IV Continuous <Continuous>  multivitamin 1 Tablet(s) Oral daily  senna 2 Tablet(s) Oral at bedtime  ticagrelor 90 milliGRAM(s) Oral two times a day    MEDICATIONS  (PRN):  acetaminophen   Tablet. 650 milliGRAM(s) Oral every 6 hours PRN Mild Pain (1 - 3)  HYDROmorphone  Injectable 0.5 milliGRAM(s) IV Push every 3 hours PRN Severe Breakthrough Pain  oxyCODONE    IR 5 milliGRAM(s) Oral every 6 hours PRN Moderate Pain (4 - 6)  oxyCODONE    IR 10 milliGRAM(s) Oral every 6 hours PRN Severe Pain (7 - 10)      LABS:                        7.9    9.4   )-----------( 241      ( 09 Mar 2018 05:58 )             22.1     03-09    134<L>  |  97  |  14  ----------------------------<  172<H>  4.1   |  22  |  0.61    Ca    8.7      09 Mar 2018 02:30  Phos  2.5     03-09  Mg     2.1     03-09      PT/INR - ( 09 Mar 2018 02:30 )   PT: 11.8 sec;   INR: 1.08 ratio         PTT - ( 09 Mar 2018 02:30 )  PTT:29.5 sec pt states good pain control    SUBJECTIVE: Pt seen, no c.os, no ON events    Vital Signs Last 24 Hrs  T(C): 37.1 (09 Mar 2018 11:00), Max: 37.2 (09 Mar 2018 03:00)  T(F): 98.8 (09 Mar 2018 11:00), Max: 99 (09 Mar 2018 03:00)  HR: 98 (09 Mar 2018 11:00) (75 - 117)  BP: 130/61 (09 Mar 2018 11:00) (95/52 - 147/76)  BP(mean): 88 (09 Mar 2018 11:00) (69 - 105)  RR: 18 (09 Mar 2018 11:00) (14 - 26)  SpO2: 99% (09 Mar 2018 11:00) (95% - 100%)    General: appears to be in no acute distress.  Chest: non-labored breathing, no wheezing  Extremities: dressings changes at bedside, left groin saturated.  redressed with gauze, chani and ace  Warm b/l palp fem and pop pulses. lle w mild to mod reperf edema   R DP/PT signals. L PT signal only. L first toe with wet gangrenous tip wrapped    I&O's Summary    08 Mar 2018 07:01  -  09 Mar 2018 07:00  --------------------------------------------------------  IN: 2668.4 mL / OUT: 1230 mL / NET: 1438.4 mL    09 Mar 2018 07:01  -  09 Mar 2018 11:53  --------------------------------------------------------  IN: 425 mL / OUT: 535 mL / NET: -110 mL      I&O's Detail    08 Mar 2018 07:01  -  09 Mar 2018 07:00  --------------------------------------------------------  IN:    0.9% NaCl: 500 mL    IV PiggyBack: 500 mL    multiple electrolytes Injection Type 1multiple electrolytes Injection Type 1: 1025 mL    phenylephrine   Infusion: 43.4 mL    Solution: 250 mL    Solution: 100 mL    Solution: 50 mL    Solution: 200 mL  Total IN: 2668.4 mL    OUT:    Indwelling Catheter - Urethral: 1230 mL  Total OUT: 1230 mL    Total NET: 1438.4 mL      09 Mar 2018 07:01  -  09 Mar 2018 11:53  --------------------------------------------------------  IN:    multiple electrolytes Injection Type 1: 50 mL    multiple electrolytes Injection Type 1multiple electrolytes Injection Type 1: 375 mL  Total IN: 425 mL    OUT:    Indwelling Catheter - Urethral: 535 mL  Total OUT: 535 mL    Total NET: -110 mL          MEDICATIONS  (STANDING):  aspirin enteric coated 81 milliGRAM(s) Oral daily  ceFAZolin   IVPB 2000 milliGRAM(s) IV Intermittent every 8 hours  docusate sodium 100 milliGRAM(s) Oral three times a day  enoxaparin Injectable 40 milliGRAM(s) SubCutaneous every 24 hours  folic acid 1 milliGRAM(s) Oral daily  gabapentin 300 milliGRAM(s) Oral two times a day  insulin lispro (HumaLOG) corrective regimen sliding scale   SubCutaneous three times a day before meals  insulin lispro (HumaLOG) corrective regimen sliding scale   SubCutaneous at bedtime  metoprolol     tartrate 12.5 milliGRAM(s) Oral every 12 hours  multiple electrolytes Injection Type 1 1000 milliLiter(s) (50 mL/Hr) IV Continuous <Continuous>  multivitamin 1 Tablet(s) Oral daily  senna 2 Tablet(s) Oral at bedtime  ticagrelor 90 milliGRAM(s) Oral two times a day    MEDICATIONS  (PRN):  acetaminophen   Tablet. 650 milliGRAM(s) Oral every 6 hours PRN Mild Pain (1 - 3)  HYDROmorphone  Injectable 0.5 milliGRAM(s) IV Push every 3 hours PRN Severe Breakthrough Pain  oxyCODONE    IR 5 milliGRAM(s) Oral every 6 hours PRN Moderate Pain (4 - 6)  oxyCODONE    IR 10 milliGRAM(s) Oral every 6 hours PRN Severe Pain (7 - 10)      LABS:                        7.9    9.4   )-----------( 241      ( 09 Mar 2018 05:58 )             22.1     03-09    134<L>  |  97  |  14  ----------------------------<  172<H>  4.1   |  22  |  0.61    Ca    8.7      09 Mar 2018 02:30  Phos  2.5     03-09  Mg     2.1     03-09      PT/INR - ( 09 Mar 2018 02:30 )   PT: 11.8 sec;   INR: 1.08 ratio         PTT - ( 09 Mar 2018 02:30 )  PTT:29.5 sec

## 2018-03-09 NOTE — PHYSICAL THERAPY INITIAL EVALUATION ADULT - GAIT DEVIATIONS NOTED, PT EVAL
decreased nel/decreased step length/decreased velocity of limb motion/decreased stride length/wide TOÑA, stepping far from RW, dec BLE advancement, flexed posture, very unsteady gait/decreased weight-shifting ability
decreased nel/decreased weight-shifting ability

## 2018-03-09 NOTE — PROGRESS NOTE ADULT - SUBJECTIVE AND OBJECTIVE BOX
CARDIOLOGY FOLLOW UP - Dr. Gavin    CC no chest pain or sob        PHYSICAL EXAM:  T(C): 37.1 (03-09-18 @ 11:00), Max: 37.2 (03-09-18 @ 03:00)  HR: 98 (03-09-18 @ 11:00) (75 - 117)  BP: 130/61 (03-09-18 @ 11:00) (95/52 - 147/76)  RR: 18 (03-09-18 @ 11:00) (14 - 26)  SpO2: 99% (03-09-18 @ 11:00) (95% - 100%)  Wt(kg): --  I&O's Summary    08 Mar 2018 07:01  -  09 Mar 2018 07:00  --------------------------------------------------------  IN: 2668.4 mL / OUT: 1230 mL / NET: 1438.4 mL    09 Mar 2018 07:01  -  09 Mar 2018 12:06  --------------------------------------------------------  IN: 425 mL / OUT: 535 mL / NET: -110 mL        Appearance: Normal	  Cardiovascular: Normal S1 S2,RRR, No JVD, No murmurs  Respiratory: Lungs clear to auscultation	  Gastrointestinal:  Soft, Non-tender, + BS	  Extremities: Normal range of motion, No clubbing, cyanosis or edema  left groin + dressing , slight oozing noted       MEDICATIONS  (STANDING):  aspirin enteric coated 81 milliGRAM(s) Oral daily  ceFAZolin   IVPB 2000 milliGRAM(s) IV Intermittent every 8 hours  docusate sodium 100 milliGRAM(s) Oral three times a day  enoxaparin Injectable 40 milliGRAM(s) SubCutaneous every 24 hours  folic acid 1 milliGRAM(s) Oral daily  gabapentin 300 milliGRAM(s) Oral two times a day  insulin lispro (HumaLOG) corrective regimen sliding scale   SubCutaneous three times a day before meals  insulin lispro (HumaLOG) corrective regimen sliding scale   SubCutaneous at bedtime  metoprolol     tartrate 12.5 milliGRAM(s) Oral every 12 hours  multiple electrolytes Injection Type 1 1000 milliLiter(s) (50 mL/Hr) IV Continuous <Continuous>  multivitamin 1 Tablet(s) Oral daily  senna 2 Tablet(s) Oral at bedtime  ticagrelor 90 milliGRAM(s) Oral two times a day      TELEMETRY: NSR     ECG:  	  RADIOLOGY:   DIAGNOSTIC TESTING:  [ ] Echocardiogram:  [ ]  Catheterization:  [ ] Stress Test:    OTHER: 	    LABS:	 	                                7.9    9.4   )-----------( 241      ( 09 Mar 2018 05:58 )             22.1     03-09    134<L>  |  97  |  14  ----------------------------<  172<H>  4.1   |  22  |  0.61    Ca    8.7      09 Mar 2018 02:30  Phos  2.5     03-09  Mg     2.1     03-09      PT/INR - ( 09 Mar 2018 02:30 )   PT: 11.8 sec;   INR: 1.08 ratio         PTT - ( 09 Mar 2018 02:30 )  PTT:29.5 sec

## 2018-03-09 NOTE — PROGRESS NOTE ADULT - ASSESSMENT
a/p:  67M s/p Left Partial Hallux Amputation on 3/6. Patient is now s/p Left Femoral to Below Knee Popiteal Bypass with PTFE Graft.     drop in HCT s/p transfusion,   serial h/h  Hemodynamically improved after transfusion  Continue cardiac meds for stent  Abx Cefazolin for MSSA in wound  ISS/glycemic control  Mobilization .     [id number]

## 2018-03-09 NOTE — PROGRESS NOTE ADULT - SUBJECTIVE AND OBJECTIVE BOX
Follow Up:      Inverval History/ROS:Patient is a 67y old  Male who presents with a chief complaint of Worsened left foot infection (27 Feb 2018 05:00)    S/p left fem pop bypass with graft.  In [place]    No fever.   Doing well.      Allergies    No Known Allergies    Intolerances        ANTIMICROBIALS:  ceFAZolin   IVPB 2000 every 8 hours      OTHER MEDS:  acetaminophen   Tablet. 650 milliGRAM(s) Oral every 6 hours PRN  aspirin enteric coated 81 milliGRAM(s) Oral daily  docusate sodium 100 milliGRAM(s) Oral three times a day  enoxaparin Injectable 40 milliGRAM(s) SubCutaneous every 24 hours  folic acid 1 milliGRAM(s) Oral daily  gabapentin 300 milliGRAM(s) Oral two times a day  HYDROmorphone  Injectable 0.5 milliGRAM(s) IV Push every 3 hours PRN  insulin lispro (HumaLOG) corrective regimen sliding scale   SubCutaneous three times a day before meals  insulin lispro (HumaLOG) corrective regimen sliding scale   SubCutaneous at bedtime  metoprolol     tartrate 12.5 milliGRAM(s) Oral every 12 hours  multiple electrolytes Injection Type 1 1000 milliLiter(s) IV Continuous <Continuous>  multivitamin 1 Tablet(s) Oral daily  oxyCODONE    IR 5 milliGRAM(s) Oral every 6 hours PRN  oxyCODONE    IR 10 milliGRAM(s) Oral every 6 hours PRN  senna 2 Tablet(s) Oral at bedtime  ticagrelor 90 milliGRAM(s) Oral two times a day      Vital Signs Last 24 Hrs  T(C): 36.7 (09 Mar 2018 15:00), Max: 37.2 (09 Mar 2018 03:00)  T(F): 98.1 (09 Mar 2018 15:00), Max: 99 (09 Mar 2018 03:00)  HR: 114 (09 Mar 2018 16:00) (75 - 117)  BP: 148/65 (09 Mar 2018 15:00) (95/52 - 154/64)  BP(mean): 94 (09 Mar 2018 15:00) (69 - 105)  RR: 22 (09 Mar 2018 16:00) (14 - 26)  SpO2: 98% (09 Mar 2018 16:00) (95% - 100%)    PHYSICAL EXAM:  General: [x ] non-toxic  HEAD/EYES: [ ] PERRL x[ ] white sclera [ ] icterus  ENT:  [ ] normal [ x] supple [ ] thrush [ ] pharyngeal exudate  Cardiovascular:   [ ] murmur [x ] normal [ ] PPM/AICD  Respiratory:  [ x] clear to ausculation bilaterally  GI:  [ ] soft, non-tender, normal bowel sounds  :  [ ] evans [ ] no CVA tenderness   Musculoskeletal:  [ ] no synovitis  Neurologic:  [ ] non-focal exam   Skin:  [ ] no rash  Lymph: [ ] no lymphadenopathy  Psychiatric:  [x ] appropriate affect [ ] alert & oriented  Lines:  [ x] no phlebitis [ ] central line                                8.9    10.9  )-----------( 217      ( 09 Mar 2018 12:14 )             24.5       03-09    134<L>  |  97  |  14  ----------------------------<  172<H>  4.1   |  22  |  0.61    Ca    8.7      09 Mar 2018 02:30  Phos  2.5     03-09  Mg     2.1     03-09            MICROBIOLOGY:Culture Results:   No growth to date. (03-06-18 @ 17:12)  Culture Results:   Testing in progress (03-06-18 @ 17:12)  Culture Results:   Rare Candida tropicalis (03-06-18 @ 17:05)  Culture Results:   Few Staphylococcus aureus (03-06-18 @ 17:05)      RADIOLOGY:

## 2018-03-09 NOTE — PROVIDER CONTACT NOTE (OTHER) - ASSESSMENT
Sanguinous oozing from left groin site, forming a bubble beneath tegaderm. Abdominal distention with increased firmness on right side. Pt. -120, normotensive recently off quique gtt, afebrile, SaO2 <94% on room air.

## 2018-03-09 NOTE — PROGRESS NOTE ADULT - ATTENDING COMMENTS
Drop in HCT s/p transfusion, continue to monitor  Hemodynamically improved after transfusion  Extremities warm  Continue cardiac meds for stent  Abx Cefazolin for MSSA in wound  ISS/glycemic control  Mobilization

## 2018-03-09 NOTE — PROVIDER CONTACT NOTE (OTHER) - ACTION/TREATMENT ORDERED:
MICHEL Perales aware of above, 23 units lantus to be admin. SS coverage held.
MICHEL Perales aware of above, to order flomax.
Per MICHEL Perales, humalog to be held. lantus dose to be adjusted.
"ok to give insulin lantus due at bedtime per EMAR order" per Jovanna KIRKLAND
Provider at bedside assessing pt, CBC sent during AM labs - repeat CBC ordered for 0800 d/t drop in H&H. Groin incision dressing to be untouched as per provider in order to provide hemostasis.

## 2018-03-09 NOTE — PROVIDER CONTACT NOTE (OTHER) - SITUATION
RN asking for clarification on insulin and lantus dosing for this am as pt NPO for OR today,
pts am  SS insulin held dose for parameter. standing 23 units lantus was only received from pharmacy after pt had eaten. prior to admin computer requested a second FS =254.
pts wife verbalized that she feels her  is having some difficulty urinating. Per wife, pt takes flomax at home.
Sanguinous oozing from left groin site; abdominal distention

## 2018-03-09 NOTE — PROGRESS NOTE ADULT - ASSESSMENT
Problem/Plan - 1:  ·  Problem: Diabetic foot infection.  Plan: - Continue antibiotics as per ID  - Podiatry and vascular fu for further management  -  SP OR  - CARDS FU FOR HYPOTENSION  - SP TRANSFUSION POSTOP   Problem/Plan - 2:  ·  Problem: Diabetes mellitus.  Plan: - monitor FS  - Insulin correction scale   - consistent carb diet.    Problem/Plan - 3:  ·  Problem: CAD (coronary artery disease).  Plan: - continue ASA and Brilinta   -  continue metoprolol   -  continue statin.     Problem/Plan - 4:  ·  Problem: H/O alcohol abuse.  Plan: Per wife no longer drinking.  monitor for any withdrawal

## 2018-03-09 NOTE — PHYSICAL THERAPY INITIAL EVALUATION ADULT - PLANNED THERAPY INTERVENTIONS, PT EVAL
gait training/strengthening/balance training/bed mobility training/transfer training
balance training/gait training/strengthening/transfer training

## 2018-03-09 NOTE — PHYSICAL THERAPY INITIAL EVALUATION ADULT - DIAGNOSIS, PT EVAL
generalized weakness, loss of  balance, decreased functional mobility.
decreased balance, strength, safety awareness, transfers, ambulation

## 2018-03-09 NOTE — PHYSICAL THERAPY INITIAL EVALUATION ADULT - PRECAUTIONS/LIMITATIONS, REHAB EVAL
HX CONT: Pt now s/p left partial hallux amputation (3/6) and left Femoral to Below Knee Popiteal Bypass with PTFE Graft (3/8). Pt became hypotensive, remained intubated, ECHO was significant for an IVC w/ respiratory variation. Now off quique. HX CONT: Pt now s/p left partial hallux amputation (3/6) and left Femoral to Below Knee Popiteal Bypass with PTFE Graft (3/8). Pt became hypotensive, remained intubated, ECHO was significant for an IVC w/ respiratory variation. Now off quique./fall precautions

## 2018-03-09 NOTE — PROGRESS NOTE ADULT - SUBJECTIVE AND OBJECTIVE BOX
Patient is a 67y old  Male who presents with a chief complaint of Worsened left foot infection (27 Feb 2018 05:00)      INTERVAL HPI/OVERNIGHT EVENTS:  T(C): 36.7 (03-09-18 @ 15:00), Max: 37.2 (03-09-18 @ 03:00)  HR: 114 (03-09-18 @ 16:00) (75 - 117)  BP: 148/65 (03-09-18 @ 15:00) (95/52 - 154/64)  RR: 22 (03-09-18 @ 16:00) (14 - 26)  SpO2: 98% (03-09-18 @ 16:00) (95% - 100%)  Wt(kg): --  I&O's Summary    08 Mar 2018 07:01  -  09 Mar 2018 07:00  --------------------------------------------------------  IN: 2668.4 mL / OUT: 1230 mL / NET: 1438.4 mL    09 Mar 2018 07:01  -  09 Mar 2018 18:49  --------------------------------------------------------  IN: 825 mL / OUT: 670 mL / NET: 155 mL        LABS:                        8.9    10.9  )-----------( 217      ( 09 Mar 2018 12:14 )             24.5     03-09    134<L>  |  97  |  14  ----------------------------<  172<H>  4.1   |  22  |  0.61    Ca    8.7      09 Mar 2018 02:30  Phos  2.5     03-09  Mg     2.1     03-09      PT/INR - ( 09 Mar 2018 02:30 )   PT: 11.8 sec;   INR: 1.08 ratio         PTT - ( 09 Mar 2018 02:30 )  PTT:29.5 sec    CAPILLARY BLOOD GLUCOSE      POCT Blood Glucose.: 153 mg/dL (09 Mar 2018 17:29)  POCT Blood Glucose.: 187 mg/dL (09 Mar 2018 10:55)  POCT Blood Glucose.: 168 mg/dL (09 Mar 2018 05:59)  POCT Blood Glucose.: 176 mg/dL (09 Mar 2018 01:26)  POCT Blood Glucose.: 100 mg/dL (08 Mar 2018 22:28)    ABG - ( 08 Mar 2018 19:50 )  pH: 7.42  /  pCO2: 37    /  pO2: 140   / HCO3: 23    / Base Excess: -.5   /  SaO2: 100                     MEDICATIONS  (STANDING):  aspirin enteric coated 81 milliGRAM(s) Oral daily  ceFAZolin   IVPB 2000 milliGRAM(s) IV Intermittent every 8 hours  docusate sodium 100 milliGRAM(s) Oral three times a day  enoxaparin Injectable 40 milliGRAM(s) SubCutaneous every 24 hours  folic acid 1 milliGRAM(s) Oral daily  gabapentin 300 milliGRAM(s) Oral two times a day  insulin lispro (HumaLOG) corrective regimen sliding scale   SubCutaneous three times a day before meals  insulin lispro (HumaLOG) corrective regimen sliding scale   SubCutaneous at bedtime  metoprolol     tartrate 12.5 milliGRAM(s) Oral every 12 hours  multiple electrolytes Injection Type 1 1000 milliLiter(s) (50 mL/Hr) IV Continuous <Continuous>  multivitamin 1 Tablet(s) Oral daily  senna 2 Tablet(s) Oral at bedtime  ticagrelor 90 milliGRAM(s) Oral two times a day    MEDICATIONS  (PRN):  acetaminophen   Tablet. 650 milliGRAM(s) Oral every 6 hours PRN Mild Pain (1 - 3)  HYDROmorphone  Injectable 0.5 milliGRAM(s) IV Push every 3 hours PRN Severe Breakthrough Pain  oxyCODONE    IR 5 milliGRAM(s) Oral every 6 hours PRN Moderate Pain (4 - 6)  oxyCODONE    IR 10 milliGRAM(s) Oral every 6 hours PRN Severe Pain (7 - 10)          PHYSICAL EXAM:  GENERAL: frail  CHEST/LUNG: Clear to percussion bilaterally; No rales, rhonchi, wheezing, or rubs  HEART: Regular rate and rhythm; No murmurs, rubs, or gallops  ABDOMEN: Soft, Nontender, Nondistended; Bowel sounds present  EXTREMITIES:  2+ Peripheral Pulses, No clubbing, cyanosis, or edema  LYMPH: No lymphadenopathy noted  SKIN: No rashes or lesions    Care Discussed with Consultants/Other Providers [ +] YES  [ ] NO

## 2018-03-09 NOTE — PROGRESS NOTE ADULT - ASSESSMENT
67year old Male with a past medical history significant for CAD s/p cardiac cath CANDIDO x 1 pLAD, CANDIDO x 1 dCx, CANDIDO x 1 pOM1 (02/06) via right radial and RCA (2/6) , DM type II, Left Hallux Osteomyelitis s/p previous debridement, now presents with Hypotension, POD #0 s/p Left Femoral to Below Knee Popiteal Bypass with PTFE Graft, for worsening Left Hallux Osteomyelitis s/p Left Partial Hallux Amputation (3/6)    NEURO: Acute Post-Operative Pain, neuropathic Pain  - PO Tylenol and Oxy PRN for pain w/ Dilaudid breakthrough  - Gabapentin 300mg BID for neuropathic pain    RESP: No active issues  - Monitor resp status  - Continue O2 nasal canula with goal SpO2> 90%    CV: Hypotension, more likely hypovolemic in nature, ?Hemorrhagic, although unlikely; Rule out Cardiogenic  - Will trend Cardiac enzymes  - Will continue ASA/Brilinta in the setting of CAD  - q 1 hour vascular checks  - Off Joao    GI: No active Issues  - Will start on CLD in AM  - Will administer bowel regimen including Colace, Senna with Narcotic use for acute post-operative pain    RENAL: No active Issues  - Plasmalyte @ 75ml/hr  - Monitor I&O  - Replete lytes PRN    HEME: No active Issues  - Will continue ASA/ Brilinta to prevent ischemia in the setting of CAD  - Will recheck CBC in AM, Goal HgB > 8, unless active signs of ischemia  - Lovenox for Chemical VTE Prophylaxis    ID: Left Hallux Osteomyelitis  - Will continue Ancef for MSSA Osteomyelitis of Left Hallux    ENDO: DMII  - Will administer Moderate ISS q 4hours  - Will restart Lantus BID, Pre-meal Humalog when advancing diet

## 2018-03-09 NOTE — PHYSICAL THERAPY INITIAL EVALUATION ADULT - IMPAIRMENTS FOUND, PT EVAL
muscle strength/gait, locomotion, and balance/poor safety awareness
muscle strength/gait, locomotion, and balance

## 2018-03-09 NOTE — PHYSICAL THERAPY INITIAL EVALUATION ADULT - ADDITIONAL COMMENTS
pt lives in a house w/ 7 steps/rail to enter.  Pt is primarily a household ambulator.
pt lives in a house w/ 7 steps/rail to enter.  Pt is primarily a household ambulator.

## 2018-03-10 LAB
ANION GAP SERPL CALC-SCNC: 10 MMOL/L — SIGNIFICANT CHANGE UP (ref 5–17)
APPEARANCE UR: CLEAR — SIGNIFICANT CHANGE UP
BASOPHILS # BLD AUTO: 0 K/UL — SIGNIFICANT CHANGE UP (ref 0–0.2)
BASOPHILS NFR BLD AUTO: 0.2 % — SIGNIFICANT CHANGE UP (ref 0–2)
BILIRUB UR-MCNC: NEGATIVE — SIGNIFICANT CHANGE UP
BUN SERPL-MCNC: 12 MG/DL — SIGNIFICANT CHANGE UP (ref 7–23)
CA-I BLD-SCNC: 1.1 MMOL/L — LOW (ref 1.12–1.3)
CALCIUM SERPL-MCNC: 8 MG/DL — LOW (ref 8.4–10.5)
CHLORIDE SERPL-SCNC: 98 MMOL/L — SIGNIFICANT CHANGE UP (ref 96–108)
CO2 SERPL-SCNC: 22 MMOL/L — SIGNIFICANT CHANGE UP (ref 22–31)
COLOR SPEC: YELLOW — SIGNIFICANT CHANGE UP
CREAT SERPL-MCNC: 0.56 MG/DL — SIGNIFICANT CHANGE UP (ref 0.5–1.3)
DIFF PNL FLD: NEGATIVE — SIGNIFICANT CHANGE UP
EOSINOPHIL # BLD AUTO: 0 K/UL — SIGNIFICANT CHANGE UP (ref 0–0.5)
EOSINOPHIL NFR BLD AUTO: 0.3 % — SIGNIFICANT CHANGE UP (ref 0–6)
GLUCOSE BLDC GLUCOMTR-MCNC: 205 MG/DL — HIGH (ref 70–99)
GLUCOSE BLDC GLUCOMTR-MCNC: 242 MG/DL — HIGH (ref 70–99)
GLUCOSE BLDC GLUCOMTR-MCNC: 263 MG/DL — HIGH (ref 70–99)
GLUCOSE BLDC GLUCOMTR-MCNC: 325 MG/DL — HIGH (ref 70–99)
GLUCOSE SERPL-MCNC: 189 MG/DL — HIGH (ref 70–99)
GLUCOSE UR QL: >1000 MG/DL
HCT VFR BLD CALC: 23.2 % — LOW (ref 39–50)
HCT VFR BLD CALC: 23.3 % — LOW (ref 39–50)
HGB BLD-MCNC: 8.2 G/DL — LOW (ref 13–17)
HGB BLD-MCNC: 8.3 G/DL — LOW (ref 13–17)
KETONES UR-MCNC: NEGATIVE — SIGNIFICANT CHANGE UP
LEUKOCYTE ESTERASE UR-ACNC: NEGATIVE — SIGNIFICANT CHANGE UP
LYMPHOCYTES # BLD AUTO: 1.9 K/UL — SIGNIFICANT CHANGE UP (ref 1–3.3)
LYMPHOCYTES # BLD AUTO: 13.9 % — SIGNIFICANT CHANGE UP (ref 13–44)
MAGNESIUM SERPL-MCNC: 1.8 MG/DL — SIGNIFICANT CHANGE UP (ref 1.6–2.6)
MCHC RBC-ENTMCNC: 32.4 PG — SIGNIFICANT CHANGE UP (ref 27–34)
MCHC RBC-ENTMCNC: 32.4 PG — SIGNIFICANT CHANGE UP (ref 27–34)
MCHC RBC-ENTMCNC: 35.5 GM/DL — SIGNIFICANT CHANGE UP (ref 32–36)
MCHC RBC-ENTMCNC: 35.7 GM/DL — SIGNIFICANT CHANGE UP (ref 32–36)
MCV RBC AUTO: 90.7 FL — SIGNIFICANT CHANGE UP (ref 80–100)
MCV RBC AUTO: 91 FL — SIGNIFICANT CHANGE UP (ref 80–100)
MONOCYTES # BLD AUTO: 1 K/UL — HIGH (ref 0–0.9)
MONOCYTES NFR BLD AUTO: 7.1 % — SIGNIFICANT CHANGE UP (ref 2–14)
NEUTROPHILS # BLD AUTO: 10.7 K/UL — HIGH (ref 1.8–7.4)
NEUTROPHILS NFR BLD AUTO: 78.6 % — HIGH (ref 43–77)
NITRITE UR-MCNC: NEGATIVE — SIGNIFICANT CHANGE UP
PH UR: 6 — SIGNIFICANT CHANGE UP (ref 5–8)
PHOSPHATE SERPL-MCNC: 2.5 MG/DL — SIGNIFICANT CHANGE UP (ref 2.5–4.5)
PLATELET # BLD AUTO: 216 K/UL — SIGNIFICANT CHANGE UP (ref 150–400)
PLATELET # BLD AUTO: 243 K/UL — SIGNIFICANT CHANGE UP (ref 150–400)
POTASSIUM SERPL-MCNC: 3.6 MMOL/L — SIGNIFICANT CHANGE UP (ref 3.5–5.3)
POTASSIUM SERPL-SCNC: 3.6 MMOL/L — SIGNIFICANT CHANGE UP (ref 3.5–5.3)
PROT UR-MCNC: SIGNIFICANT CHANGE UP
RBC # BLD: 2.55 M/UL — LOW (ref 4.2–5.8)
RBC # BLD: 2.56 M/UL — LOW (ref 4.2–5.8)
RBC # FLD: 11.9 % — SIGNIFICANT CHANGE UP (ref 10.3–14.5)
RBC # FLD: 11.9 % — SIGNIFICANT CHANGE UP (ref 10.3–14.5)
SODIUM SERPL-SCNC: 130 MMOL/L — LOW (ref 135–145)
SP GR SPEC: 1.02 — SIGNIFICANT CHANGE UP (ref 1.01–1.02)
UROBILINOGEN FLD QL: NEGATIVE — SIGNIFICANT CHANGE UP
WBC # BLD: 12 K/UL — HIGH (ref 3.8–10.5)
WBC # BLD: 13.6 K/UL — HIGH (ref 3.8–10.5)
WBC # FLD AUTO: 12 K/UL — HIGH (ref 3.8–10.5)
WBC # FLD AUTO: 13.6 K/UL — HIGH (ref 3.8–10.5)

## 2018-03-10 PROCEDURE — 99233 SBSQ HOSP IP/OBS HIGH 50: CPT

## 2018-03-10 PROCEDURE — 71045 X-RAY EXAM CHEST 1 VIEW: CPT | Mod: 26

## 2018-03-10 RX ORDER — METOPROLOL TARTRATE 50 MG
25 TABLET ORAL DAILY
Qty: 0 | Refills: 0 | Status: DISCONTINUED | OUTPATIENT
Start: 2018-03-11 | End: 2018-03-14

## 2018-03-10 RX ORDER — METOPROLOL TARTRATE 50 MG
12.5 TABLET ORAL EVERY 12 HOURS
Qty: 0 | Refills: 0 | Status: COMPLETED | OUTPATIENT
Start: 2018-03-10 | End: 2018-03-10

## 2018-03-10 RX ORDER — ACETAMINOPHEN 500 MG
1000 TABLET ORAL ONCE
Qty: 0 | Refills: 0 | Status: DISCONTINUED | OUTPATIENT
Start: 2018-03-10 | End: 2018-03-14

## 2018-03-10 RX ORDER — MAGNESIUM SULFATE 500 MG/ML
2 VIAL (ML) INJECTION ONCE
Qty: 0 | Refills: 0 | Status: COMPLETED | OUTPATIENT
Start: 2018-03-10 | End: 2018-03-10

## 2018-03-10 RX ORDER — TAMSULOSIN HYDROCHLORIDE 0.4 MG/1
0.4 CAPSULE ORAL AT BEDTIME
Qty: 0 | Refills: 0 | Status: DISCONTINUED | OUTPATIENT
Start: 2018-03-10 | End: 2018-03-14

## 2018-03-10 RX ADMIN — TICAGRELOR 90 MILLIGRAM(S): 90 TABLET ORAL at 05:04

## 2018-03-10 RX ADMIN — Medication 12.5 MILLIGRAM(S): at 05:04

## 2018-03-10 RX ADMIN — TAMSULOSIN HYDROCHLORIDE 0.4 MILLIGRAM(S): 0.4 CAPSULE ORAL at 21:22

## 2018-03-10 RX ADMIN — OXYCODONE HYDROCHLORIDE 5 MILLIGRAM(S): 5 TABLET ORAL at 00:16

## 2018-03-10 RX ADMIN — Medication 100 MILLIGRAM(S): at 21:23

## 2018-03-10 RX ADMIN — Medication 4: at 21:47

## 2018-03-10 RX ADMIN — Medication 100 MILLIGRAM(S): at 05:04

## 2018-03-10 RX ADMIN — INSULIN GLARGINE 23 UNIT(S): 100 INJECTION, SOLUTION SUBCUTANEOUS at 07:23

## 2018-03-10 RX ADMIN — GABAPENTIN 300 MILLIGRAM(S): 400 CAPSULE ORAL at 05:04

## 2018-03-10 RX ADMIN — Medication 6: at 17:27

## 2018-03-10 RX ADMIN — GABAPENTIN 300 MILLIGRAM(S): 400 CAPSULE ORAL at 17:27

## 2018-03-10 RX ADMIN — Medication 8: at 12:46

## 2018-03-10 RX ADMIN — Medication 1 TABLET(S): at 12:46

## 2018-03-10 RX ADMIN — Medication 1 MILLIGRAM(S): at 12:46

## 2018-03-10 RX ADMIN — Medication 81 MILLIGRAM(S): at 12:46

## 2018-03-10 RX ADMIN — Medication 100 MILLIGRAM(S): at 15:32

## 2018-03-10 RX ADMIN — SENNA PLUS 2 TABLET(S): 8.6 TABLET ORAL at 21:22

## 2018-03-10 RX ADMIN — Medication 50 GRAM(S): at 03:56

## 2018-03-10 RX ADMIN — ENOXAPARIN SODIUM 40 MILLIGRAM(S): 100 INJECTION SUBCUTANEOUS at 21:23

## 2018-03-10 RX ADMIN — INSULIN GLARGINE 30 UNIT(S): 100 INJECTION, SOLUTION SUBCUTANEOUS at 21:47

## 2018-03-10 RX ADMIN — Medication 650 MILLIGRAM(S): at 19:30

## 2018-03-10 RX ADMIN — Medication 62.5 MILLIMOLE(S): at 03:56

## 2018-03-10 RX ADMIN — Medication 650 MILLIGRAM(S): at 18:46

## 2018-03-10 RX ADMIN — TICAGRELOR 90 MILLIGRAM(S): 90 TABLET ORAL at 17:27

## 2018-03-10 RX ADMIN — Medication 4: at 07:24

## 2018-03-10 RX ADMIN — Medication 12.5 MILLIGRAM(S): at 17:27

## 2018-03-10 NOTE — PROGRESS NOTE ADULT - ASSESSMENT
67M HD 12 POD 2 s/p Left Femoral to Below Knee Popiteal Bypass with PTFE Graft, and Left Partial Hallux Amputation on 3/6 by Podiatry team, doing well; avss but still has elevated wbc (12 today, 3/10/18 up from 11.8 yesterday)    -Continue cardiac meds for stent (lopressor and toprol)  -Started 23 units Lantus insulin this AM, and 30 units Lantus at bedtime  -Continue Abx Cefazolin for MSSA in wound; f/u WBC  -Mobilization  -Transfer from SICU to floor 67M HD 12 POD 2 s/p Left Femoral to Below Knee Popiteal Bypass with PTFE Graft, and Left Partial Hallux Amputation on 3/6 by Podiatry team, doing well; avss but still has elevated wbc (12 today, 3/10/18 up from 11.8 yesterday)    -Continue cardiac meds for stent (lopressor and toprol)  -Started 23 units Lantus insulin this AM, and 30 units Lantus at bedtime  -Continue Abx Cefazolin for MSSA in wound; f/u WBC  -Mobilization  -Transfer from SICU to floor  -Start flomax 0.4mg at bedtime (Home medication)

## 2018-03-10 NOTE — PROGRESS NOTE ADULT - SUBJECTIVE AND OBJECTIVE BOX
Vascular Surgery Progress Note and Transfer Note from SICU to Surgical wards:    Hospital Day: 12  Post-Operative Day: 2 s/p Left Femoral to Below Knee Popiteal Bypass with PTFE Graft, s/p Left Partial Hallux Amputation by Podiatry on 3/6.    Subjective:  No acute overnight events. Patient was examined at bedside.  Surgical dressings were changed by Vascular Team this AM.        Objective:  T(C): 37.9 (03-10-18 @ 13:00), Max: 38.3 (03-10-18 @ 11:00)  HR: 111 (03-10-18 @ 13:00) (85 - 114)  BP: 113/69 (03-10-18 @ 13:00) (92/59 - 151/69)  RR: 17 (03-10-18 @ 13:00) (16 - 24)  SpO2: 93% (03-10-18 @ 13:00) (93% - 100%)    Labs:  03-09-18 @ 07:01  -  03-10-18 @ 07:00  --------------------------------------------------------  IN: 2125 mL / OUT: 2170 mL / NET: -45 mL    03-10-18 @ 06:01  -  03-10-18 @ 14:40  --------------------------------------------------------  IN: 490 mL / OUT: 60 mL / NET: 430 mL        Focused Physical Exam:  General: NAD  Respiratory: Nonlabored breathing  Extremities: dressings changes at bedside by Vascular Fellow, Warm b/l palp fem and pop pulses. R AT/DP/PT signals. L AT/DP/PT signals. L first toe with wet gangrenous tip s/p partial amputation, wrapped (no strikethrough, managed by Podiatry team)    Medications:  acetaminophen   Tablet. 650 milliGRAM(s) Oral every 6 hours PRN  aspirin enteric coated 81 milliGRAM(s) Oral daily  ceFAZolin   IVPB 2000 milliGRAM(s) IV Intermittent every 8 hours  docusate sodium 100 milliGRAM(s) Oral three times a day  enoxaparin Injectable 40 milliGRAM(s) SubCutaneous every 24 hours  folic acid 1 milliGRAM(s) Oral daily  gabapentin 300 milliGRAM(s) Oral two times a day  HYDROmorphone  Injectable 0.5 milliGRAM(s) IV Push every 3 hours PRN  insulin glargine Injectable (LANTUS) 30 Unit(s) SubCutaneous at bedtime  insulin glargine Injectable (LANTUS) 23 Unit(s) SubCutaneous every morning  insulin lispro (HumaLOG) corrective regimen sliding scale   SubCutaneous three times a day before meals  insulin lispro (HumaLOG) corrective regimen sliding scale   SubCutaneous at bedtime  metoprolol     tartrate 12.5 milliGRAM(s) Oral every 12 hours  multivitamin 1 Tablet(s) Oral daily  oxyCODONE    IR 5 milliGRAM(s) Oral every 6 hours PRN  oxyCODONE    IR 10 milliGRAM(s) Oral every 6 hours PRN  senna 2 Tablet(s) Oral at bedtime  ticagrelor 90 milliGRAM(s) Oral two times a day Vascular Surgery Progress Note and Transfer Note from SICU to Surgical wards:    Hospital Day: 12  Post-Operative Day: 2 s/p Left Femoral to Below Knee Popiteal Bypass with PTFE Graft, s/p Left Partial Hallux Amputation by Podiatry on 3/6.    24 hour events per SICU Team: Patient with stable CBCs x 2, advanced to regular diet and tolerated. Lantus 30 qhs started and pt's home lantus started 23 qd.    Subjective:  No acute overnight events. Patient was examined at bedside.  Surgical dressings were changed by Vascular Team this AM.        Objective:  T(C): 37.9 (03-10-18 @ 13:00), Max: 38.3 (03-10-18 @ 11:00)  HR: 111 (03-10-18 @ 13:00) (85 - 114)  BP: 113/69 (03-10-18 @ 13:00) (92/59 - 151/69)  RR: 17 (03-10-18 @ 13:00) (16 - 24)  SpO2: 93% (03-10-18 @ 13:00) (93% - 100%)    Labs:  03-09-18 @ 07:01  -  03-10-18 @ 07:00  --------------------------------------------------------  IN: 2125 mL / OUT: 2170 mL / NET: -45 mL    03-10-18 @ 06:01  -  03-10-18 @ 14:40  --------------------------------------------------------  IN: 490 mL / OUT: 60 mL / NET: 430 mL        Focused Physical Exam:  General: NAD  Respiratory: Nonlabored breathing  Extremities: dressings changes at bedside by Vascular Fellow, Warm b/l palp fem and pop pulses. R AT/DP/PT signals. L AT/DP/PT signals. L first toe with wet gangrenous tip s/p partial amputation, wrapped (no strikethrough, managed by Podiatry team)    Medications:  acetaminophen   Tablet. 650 milliGRAM(s) Oral every 6 hours PRN  aspirin enteric coated 81 milliGRAM(s) Oral daily  ceFAZolin   IVPB 2000 milliGRAM(s) IV Intermittent every 8 hours  docusate sodium 100 milliGRAM(s) Oral three times a day  enoxaparin Injectable 40 milliGRAM(s) SubCutaneous every 24 hours  folic acid 1 milliGRAM(s) Oral daily  gabapentin 300 milliGRAM(s) Oral two times a day  HYDROmorphone  Injectable 0.5 milliGRAM(s) IV Push every 3 hours PRN  insulin glargine Injectable (LANTUS) 30 Unit(s) SubCutaneous at bedtime  insulin glargine Injectable (LANTUS) 23 Unit(s) SubCutaneous every morning  insulin lispro (HumaLOG) corrective regimen sliding scale   SubCutaneous three times a day before meals  insulin lispro (HumaLOG) corrective regimen sliding scale   SubCutaneous at bedtime  metoprolol     tartrate 12.5 milliGRAM(s) Oral every 12 hours  multivitamin 1 Tablet(s) Oral daily  oxyCODONE    IR 5 milliGRAM(s) Oral every 6 hours PRN  oxyCODONE    IR 10 milliGRAM(s) Oral every 6 hours PRN  senna 2 Tablet(s) Oral at bedtime  ticagrelor 90 milliGRAM(s) Oral two times a day

## 2018-03-10 NOTE — PROGRESS NOTE ADULT - SUBJECTIVE AND OBJECTIVE BOX
CARDIOLOGY FOLLOW UP - Dr. Gavin    CC no chest pain or sob        PHYSICAL EXAM:  T(C): 37 (03-10-18 @ 07:00), Max: 37.4 (03-09-18 @ 19:00)  HR: 98 (03-10-18 @ 10:00) (85 - 114)  BP: 126/58 (03-10-18 @ 10:00) (98/55 - 154/64)  RR: 21 (03-10-18 @ 10:00) (16 - 24)  SpO2: 100% (03-10-18 @ 10:00) (96% - 100%)  Wt(kg): --  I&O's Summary    09 Mar 2018 07:01  -  10 Mar 2018 07:00  --------------------------------------------------------  IN: 2125 mL / OUT: 2170 mL / NET: -45 mL    10 Mar 2018 06:01  -  10 Mar 2018 10:56  --------------------------------------------------------  IN: 250 mL / OUT: 60 mL / NET: 190 mL        Appearance: Normal	  Cardiovascular: Normal S1 S2,RRR   Respiratory: Lungs clear to auscultation	  Gastrointestinal:  Soft, Non-tender, + BS	  Extremities: Normal range of motion, No clubbing, cyanosis or edema  staples to Left groin - VICTOR M       MEDICATIONS  (STANDING):  aspirin enteric coated 81 milliGRAM(s) Oral daily  ceFAZolin   IVPB 2000 milliGRAM(s) IV Intermittent every 8 hours  docusate sodium 100 milliGRAM(s) Oral three times a day  enoxaparin Injectable 40 milliGRAM(s) SubCutaneous every 24 hours  folic acid 1 milliGRAM(s) Oral daily  gabapentin 300 milliGRAM(s) Oral two times a day  insulin glargine Injectable (LANTUS) 30 Unit(s) SubCutaneous at bedtime  insulin glargine Injectable (LANTUS) 23 Unit(s) SubCutaneous every morning  insulin lispro (HumaLOG) corrective regimen sliding scale   SubCutaneous three times a day before meals  insulin lispro (HumaLOG) corrective regimen sliding scale   SubCutaneous at bedtime  metoprolol     tartrate 12.5 milliGRAM(s) Oral every 12 hours  multivitamin 1 Tablet(s) Oral daily  senna 2 Tablet(s) Oral at bedtime  ticagrelor 90 milliGRAM(s) Oral two times a day      TELEMETRY: 	    ECG:  	  RADIOLOGY:   DIAGNOSTIC TESTING:  [ ] Echocardiogram:  [ ]  Catheterization:  [ ] Stress Test:    OTHER: 	  < from: Xray Chest 1 View- PORTABLE-Routine (03.09.18 @ 06:53) >  Impression:    The heart is enlarged. The lungs are clear. No radiographic evidence for   congestive heart failure. Calcified aortic knob.      < end of copied text >    LABS:	 	                                8.3    12.0  )-----------( 216      ( 10 Mar 2018 02:22 )             23.3     03-10    130<L>  |  98  |  12  ----------------------------<  189<H>  3.6   |  22  |  0.56    Ca    8.0<L>      10 Mar 2018 02:22  Phos  2.5     03-10  Mg     1.8     03-10      PT/INR - ( 09 Mar 2018 02:30 )   PT: 11.8 sec;   INR: 1.08 ratio         PTT - ( 09 Mar 2018 02:30 )  PTT:29.5 sec

## 2018-03-10 NOTE — PROGRESS NOTE ADULT - ASSESSMENT
67M s/p L partial hallux amp  - Seen and evaluated  - LF surgical site stable  - Low concern for residual infxn, prognosis of toe still guarded  - F/u OR data, no growth on Cx yet  - Abx per ID  - Seen w/ attending 67M s/p L partial hallux amp    Plan:  - Seen and evaluated  - LF surgical site stable  - Low concern for residual infxn, prognosis of toe still guarded  - F/u OR data, no growth on Cx yet  - Abx per ID  - Seen w/ attending

## 2018-03-10 NOTE — PROGRESS NOTE ADULT - ASSESSMENT
67year old Male with a past medical history significant for CAD s/p cardiac cath CANDIDO x 1 pLAD, CANDIDO x 1 dCx, CANDIDO x 1 pOM1 (02/06) via right radial and RCA (2/6) , DM type II, Left Hallux Osteomyelitis s/p previous debridement, now presents with Hypotension, POD #0 s/p Left Femoral to Below Knee Popiteal Bypass with PTFE Graft, for worsening Left Hallux Osteomyelitis s/p Left Partial Hallux Amputation (3/6)    NEURO: Acute Post-Operative Pain, neuropathic Pain  - PO Tylenol and Oxy PRN for pain w/ Dilaudid breakthrough  - Gabapentin 300mg BID for neuropathic pain    RESP: No active issues  - Monitor resp status  - Continue O2 nasal canula with goal SpO2> 90%    CV: Hypotension, now resolved  - Will continue ASA/Brilinta in the setting of CAD  - q 1 hour vascular checks  - Off Joao    GI: No active Issues  - Reg diet  - Will administer bowel regimen including Colace, Senna with Narcotic use for acute post-operative pain    RENAL: No active Issues  - Monitor I&O  - Replete lytes PRN    HEME: No active Issues  - Will continue ASA/ Brilinta to prevent ischemia in the setting of CAD  - Lovenox for Chemical VTE Prophylaxis    ID: Left Hallux Osteomyelitis  - Will continue Ancef for MSSA Osteomyelitis of Left Hallux    ENDO: DMII  - Will administer Moderate ISS q 4hours  - Increase lantus to home dose    Dispo:  - Deline and transfer to floor    DIMITRIOS Wilder MD PGY 2   Spectra: [id number]

## 2018-03-10 NOTE — PROGRESS NOTE ADULT - ASSESSMENT
67 year old male with history of DM type II, etoh abuse and pancreatitis, CAD, s/p PCI, PVD admitted with worsening left foot infection . Now s /p Left Partial Hallux Amputation on 3/6 and s/p Left Femoral to Below Knee Popiteal Bypass with PTFE Graft. Currently in SICU for post op hypotension     1. CAD s/p PCI  CV stable post op no chest pain or sob    EKG no evidence of ACS   no evidence of CHF on exam   recent echo revealing normal lv sys fx , minimal MR   recent cath performed in Feb 2018 ( CANDIDO x 1pLAD, CANDIDO x 1 dCx, CANDIDO x 1 pOM1 and RCA )  beside echo noted   cont asa 81mg daily/ brilinta / BB     2. PAD  s/p  LE bypass  vascular f/u     3. Foot ulcer/OM  IV abx  s/p left partial hallux amp   pod f/u     4. Hypotension   post op , likely hypovolemic in the setting of recent surgery   bp overall improved   s/p 1 unit PRBC, IVF, plasma and albumin   ekg no evidence of MI   bedside echo  noted   on asa/ brilinta in light of recent PCI    monitor CBC       dvt ppx

## 2018-03-10 NOTE — PROGRESS NOTE ADULT - ATTENDING COMMENTS
Hemodynamically stable  Continue ASA BB Brilinta for multiple stents  Start his home dose BID Lantus for uncontrolled BS  HCT stable  Afebrile on long term Cefazolin for MSSA in wound  PT  Transfer

## 2018-03-10 NOTE — PROGRESS NOTE ADULT - SUBJECTIVE AND OBJECTIVE BOX
Patient is a 67y old  Male who presents with a chief complaint of Worsened left foot infection (27 Feb 2018 05:00)      INTERVAL HPI/OVERNIGHT EVENTS:  T(C): 37.7 (03-10-18 @ 18:45), Max: 38.3 (03-10-18 @ 11:00)  HR: 99 (03-10-18 @ 18:45) (85 - 114)  BP: 120/67 (03-10-18 @ 18:45) (92/59 - 140/70)  RR: 16 (03-10-18 @ 18:45) (16 - 24)  SpO2: 100% (03-10-18 @ 18:45) (93% - 100%)  Wt(kg): --  I&O's Summary    09 Mar 2018 07:01  -  10 Mar 2018 07:00  --------------------------------------------------------  IN: 2125 mL / OUT: 2170 mL / NET: -45 mL    10 Mar 2018 06:01  -  10 Mar 2018 19:28  --------------------------------------------------------  IN: 780 mL / OUT: 185 mL / NET: 595 mL        LABS:                        8.2    13.6  )-----------( 243      ( 10 Mar 2018 19:18 )             23.2     03-10    130<L>  |  98  |  12  ----------------------------<  189<H>  3.6   |  22  |  0.56    Ca    8.0<L>      10 Mar 2018 02:22  Phos  2.5     03-10  Mg     1.8     03-10      PT/INR - ( 09 Mar 2018 02:30 )   PT: 11.8 sec;   INR: 1.08 ratio         PTT - ( 09 Mar 2018 02:30 )  PTT:29.5 sec    CAPILLARY BLOOD GLUCOSE      POCT Blood Glucose.: 263 mg/dL (10 Mar 2018 16:47)  POCT Blood Glucose.: 325 mg/dL (10 Mar 2018 12:08)  POCT Blood Glucose.: 242 mg/dL (10 Mar 2018 07:21)  POCT Blood Glucose.: 234 mg/dL (09 Mar 2018 23:28)  POCT Blood Glucose.: 235 mg/dL (09 Mar 2018 21:39)    ABG - ( 08 Mar 2018 19:50 )  pH: 7.42  /  pCO2: 37    /  pO2: 140   / HCO3: 23    / Base Excess: -.5   /  SaO2: 100                     MEDICATIONS  (STANDING):  aspirin enteric coated 81 milliGRAM(s) Oral daily  ceFAZolin   IVPB 2000 milliGRAM(s) IV Intermittent every 8 hours  docusate sodium 100 milliGRAM(s) Oral three times a day  enoxaparin Injectable 40 milliGRAM(s) SubCutaneous every 24 hours  folic acid 1 milliGRAM(s) Oral daily  gabapentin 300 milliGRAM(s) Oral two times a day  insulin glargine Injectable (LANTUS) 30 Unit(s) SubCutaneous at bedtime  insulin glargine Injectable (LANTUS) 23 Unit(s) SubCutaneous every morning  insulin lispro (HumaLOG) corrective regimen sliding scale   SubCutaneous three times a day before meals  insulin lispro (HumaLOG) corrective regimen sliding scale   SubCutaneous at bedtime  multivitamin 1 Tablet(s) Oral daily  senna 2 Tablet(s) Oral at bedtime  tamsulosin 0.4 milliGRAM(s) Oral at bedtime  ticagrelor 90 milliGRAM(s) Oral two times a day    MEDICATIONS  (PRN):  acetaminophen   Tablet. 650 milliGRAM(s) Oral every 6 hours PRN Mild Pain (1 - 3)  acetaminophen  IVPB 1000 milliGRAM(s) IV Intermittent once PRN For Temp greater than 38 C (100.4 F)  HYDROmorphone  Injectable 0.5 milliGRAM(s) IV Push every 3 hours PRN Severe Breakthrough Pain  oxyCODONE    IR 5 milliGRAM(s) Oral every 6 hours PRN Moderate Pain (4 - 6)  oxyCODONE    IR 10 milliGRAM(s) Oral every 6 hours PRN Severe Pain (7 - 10)          PHYSICAL EXAM:  GENERAL: NAD, well-groomed, well-developed  HEAD:  Atraumatic, Normocephalic  CHEST/LUNG: Clear to percussion bilaterally; No rales, rhonchi, wheezing, or rubs  HEART: Regular rate and rhythm; No murmurs, rubs, or gallops  ABDOMEN: Soft, Nontender, Nondistended; Bowel sounds present  EXTREMITIES: dressing present  LYMPH: No lymphadenopathy noted  SKIN: No rashes or lesions    Care Discussed with Consultants/Other Providers [ ] YES  [ ] NO

## 2018-03-10 NOTE — PROGRESS NOTE ADULT - SUBJECTIVE AND OBJECTIVE BOX
HISTORY  67y Male    24 HOUR EVENTS: Patient with stable CBCs x 2, advanced to regular diet and tolerated. Lantus 30 qhs started and pt's home lantus started 23 qd.    SUBJECTIVE/ROS:  [x ] A ten-point review of systems was otherwise negative except as noted.  [ ] Due to altered mental status/intubation, subjective information were not able to be obtained from the patient. History was obtained, to the extent possible, from review of the chart and collateral sources of information.      NEURO  Exam: AOx4   Meds: acetaminophen   Tablet. 650 milliGRAM(s) Oral every 6 hours PRN Mild Pain (1 - 3)  gabapentin 300 milliGRAM(s) Oral two times a day  HYDROmorphone  Injectable 0.5 milliGRAM(s) IV Push every 3 hours PRN Severe Breakthrough Pain  oxyCODONE    IR 5 milliGRAM(s) Oral every 6 hours PRN Moderate Pain (4 - 6)  oxyCODONE    IR 10 milliGRAM(s) Oral every 6 hours PRN Severe Pain (7 - 10)    [x] Adequacy of sedation and pain control has been assessed and adjusted      RESPIRATORY  RR: 21 (03-10-18 @ 09:00) (16 - 24)  SpO2: 100% (03-10-18 @ 08:00) (96% - 100%)  Wt(kg): --  Exam: unlabored, clear to auscultation bilaterally  Mechanical Ventilation:   ABG - ( 08 Mar 2018 19:50 )  pH: 7.42  /  pCO2: 37    /  pO2: 140   / HCO3: 23    / Base Excess: -.5   /  SaO2: 100     Lactate: x                [x] Extubation Readiness Assessed  Meds:       CARDIOVASCULAR  HR: 95 (03-10-18 @ 09:00) (85 - 114)  BP: 105/60 (03-10-18 @ 09:00) (105/60 - 154/64)  BP(mean): 76 (03-10-18 @ 09:00) (76 - 99)  ABP: 144/70 (03-10-18 @ 08:00) (118/50 - 168/67)  ABP(mean): 94 (03-10-18 @ 08:00) (73 - 105)  Wt(kg): --  CVP(cm H2O): --      Exam: RRR  Cardiac Rhythm: NS  Perfusion     [x ]Adequate   [ ]Inadequate  Mentation   [x ]Normal       [ ]Reduced  Extremities  [x ]Warm         [ ]Cool  Volume Status [ ]Hypervolemic [ x]Euvolemic [ ]Hypovolemic  Meds: metoprolol     tartrate 12.5 milliGRAM(s) Oral every 12 hours        GI/NUTRITION  Exam: soft nt nd  Diet: Reg  Meds: docusate sodium 100 milliGRAM(s) Oral three times a day  senna 2 Tablet(s) Oral at bedtime      GENITOURINARY  I&O's Detail    03-09 @ 07:01  -  03-10 @ 07:00  --------------------------------------------------------  IN:    multiple electrolytes Injection Type 1multiple electrolytes Injection Type 1: 375 mL    multiple electrolytes Injection Type 1multiple electrolytes Injection Type 1: 800 mL    Oral Fluid: 200 mL    Packed Red Blood Cells: 300 mL    Solution: 50 mL    Solution: 250 mL    Solution: 150 mL  Total IN: 2125 mL    OUT:    Indwelling Catheter - Urethral: 2170 mL  Total OUT: 2170 mL    Total NET: -45 mL      03-10 @ 06:01  -  03-10 @ 09:32  --------------------------------------------------------  IN:    Oral Fluid: 250 mL  Total IN: 250 mL    OUT:    Indwelling Catheter - Urethral: 30 mL  Total OUT: 30 mL    Total NET: 220 mL          03-10    130<L>  |  98  |  12  ----------------------------<  189<H>  3.6   |  22  |  0.56    Ca    8.0<L>      10 Mar 2018 02:22  Phos  2.5     03-10  Mg     1.8     03-10      [ ] Dominique catheter, indication: N/A  Meds: folic acid 1 milliGRAM(s) Oral daily  multivitamin 1 Tablet(s) Oral daily        HEMATOLOGIC  Meds: aspirin enteric coated 81 milliGRAM(s) Oral daily  enoxaparin Injectable 40 milliGRAM(s) SubCutaneous every 24 hours  ticagrelor 90 milliGRAM(s) Oral two times a day    [x] VTE Prophylaxis                        8.3    12.0  )-----------( 216      ( 10 Mar 2018 02:22 )             23.3     PT/INR - ( 09 Mar 2018 02:30 )   PT: 11.8 sec;   INR: 1.08 ratio         PTT - ( 09 Mar 2018 02:30 )  PTT:29.5 sec  Transfusion     [ ] PRBC   [ ] Platelets   [ ] FFP   [ ] Cryoprecipitate      INFECTIOUS DISEASES  T(C): 37 (03-10-18 @ 07:00), Max: 37.4 (03-09-18 @ 19:00)  Wt(kg): --  WBC Count: 12.0 K/uL (03-10 @ 02:22)  WBC Count: 11.8 K/uL (03-09 @ 19:26)  WBC Count: 10.9 K/uL (03-09 @ 12:14)    Recent Cultures:  Specimen Source: .Tissue Other, left great toe clean bone, 03-06 @ 17:12; Results   No growth to date.; Gram Stain:   No polymorphonuclear cells seen per low power field  No organisms seen per oil power field; Organism: --  Specimen Source: .Other Other, left great toe, 03-06 @ 17:05; Results   Rare Candida tropicalis; Gram Stain: --; Organism: Staphylococcus aureus  Staphylococcus lugdunensis    Meds: ceFAZolin   IVPB 2000 milliGRAM(s) IV Intermittent every 8 hours        ENDOCRINE  Capillary Blood Glucose    Meds: insulin glargine Injectable (LANTUS) 30 Unit(s) SubCutaneous at bedtime  insulin glargine Injectable (LANTUS) 23 Unit(s) SubCutaneous every morning  insulin lispro (HumaLOG) corrective regimen sliding scale   SubCutaneous three times a day before meals  insulin lispro (HumaLOG) corrective regimen sliding scale   SubCutaneous at bedtime        ACCESS DEVICES:  [x ] Peripheral IV  [ ] Central Venous Line	[ ] R	[ ] L	[ ] IJ	[ ] Fem	[ ] SC	Placed:   [ ] Arterial Line		[ ] R	[ ] L	[ ] Fem	[ ] Rad	[ ] Ax	Placed:   [ ] PICC:					[ ] Mediport  [ ] Urinary Catheter, Date Placed:   [x ] Necessity of urinary, arterial, and venous catheters discussed    OTHER MEDICATIONS:      CODE STATUS: Full    IMAGING:

## 2018-03-10 NOTE — PROGRESS NOTE ADULT - SUBJECTIVE AND OBJECTIVE BOX
Patient is a 67y old  Male who presents with a chief complaint of Worsened left foot infection (27 Feb 2018 05:00)       INTERVAL HPI/OVERNIGHT EVENTS:  Patient seen and evaluated at bedside.  Pt is resting comfortable in NAD. Denies N/V/F/C.  Pain rated at X/10    Allergies    No Known Allergies    Intolerances        Vital Signs Last 24 Hrs  T(C): 37.7 (10 Mar 2018 12:10), Max: 38.3 (10 Mar 2018 11:00)  T(F): 99.8 (10 Mar 2018 12:10), Max: 100.9 (10 Mar 2018 11:00)  HR: 105 (10 Mar 2018 11:00) (85 - 114)  BP: 92/59 (10 Mar 2018 11:00) (92/59 - 154/64)  BP(mean): 84 (10 Mar 2018 10:00) (72 - 99)  RR: 18 (10 Mar 2018 11:00) (16 - 24)  SpO2: 100% (10 Mar 2018 11:00) (96% - 100%)    LABS:                        8.3    12.0  )-----------( 216      ( 10 Mar 2018 02:22 )             23.3     03-10    130<L>  |  98  |  12  ----------------------------<  189<H>  3.6   |  22  |  0.56    Ca    8.0<L>      10 Mar 2018 02:22  Phos  2.5     03-10  Mg     1.8     03-10      PT/INR - ( 09 Mar 2018 02:30 )   PT: 11.8 sec;   INR: 1.08 ratio         PTT - ( 09 Mar 2018 02:30 )  PTT:29.5 sec    CAPILLARY BLOOD GLUCOSE      POCT Blood Glucose.: 325 mg/dL (10 Mar 2018 12:08)  POCT Blood Glucose.: 242 mg/dL (10 Mar 2018 07:21)  POCT Blood Glucose.: 234 mg/dL (09 Mar 2018 23:28)  POCT Blood Glucose.: 235 mg/dL (09 Mar 2018 21:39)  POCT Blood Glucose.: 153 mg/dL (09 Mar 2018 17:29)      Lower Extremity Physical Exam:  left foot surgical site sutures intact, no dehiscence, no signs of ischemia, CFT to flaps normal, no active bleeding, pt had no bleeding in OR despite the blood on the dressing  applied xeroform with DSD    RADIOLOGY & ADDITIONAL TESTS:

## 2018-03-11 ENCOUNTER — TRANSCRIPTION ENCOUNTER (OUTPATIENT)
Age: 68
End: 2018-03-11

## 2018-03-11 LAB
ANION GAP SERPL CALC-SCNC: 11 MMOL/L — SIGNIFICANT CHANGE UP (ref 5–17)
BLD GP AB SCN SERPL QL: NEGATIVE — SIGNIFICANT CHANGE UP
BUN SERPL-MCNC: 11 MG/DL — SIGNIFICANT CHANGE UP (ref 7–23)
CA-I BLD-SCNC: 1.13 MMOL/L — SIGNIFICANT CHANGE UP (ref 1.12–1.3)
CALCIUM SERPL-MCNC: 8.4 MG/DL — SIGNIFICANT CHANGE UP (ref 8.4–10.5)
CHLORIDE SERPL-SCNC: 98 MMOL/L — SIGNIFICANT CHANGE UP (ref 96–108)
CK MB BLD-MCNC: 1.1 % — SIGNIFICANT CHANGE UP (ref 0–3.5)
CK MB CFR SERPL CALC: 1.5 NG/ML — SIGNIFICANT CHANGE UP (ref 0–6.7)
CK SERPL-CCNC: 138 U/L — SIGNIFICANT CHANGE UP (ref 30–200)
CO2 SERPL-SCNC: 24 MMOL/L — SIGNIFICANT CHANGE UP (ref 22–31)
CREAT SERPL-MCNC: 0.56 MG/DL — SIGNIFICANT CHANGE UP (ref 0.5–1.3)
CULTURE RESULTS: NO GROWTH — SIGNIFICANT CHANGE UP
CULTURE RESULTS: SIGNIFICANT CHANGE UP
CULTURE RESULTS: SIGNIFICANT CHANGE UP
GLUCOSE BLDC GLUCOMTR-MCNC: 155 MG/DL — HIGH (ref 70–99)
GLUCOSE BLDC GLUCOMTR-MCNC: 182 MG/DL — HIGH (ref 70–99)
GLUCOSE BLDC GLUCOMTR-MCNC: 243 MG/DL — HIGH (ref 70–99)
GLUCOSE BLDC GLUCOMTR-MCNC: 262 MG/DL — HIGH (ref 70–99)
GLUCOSE SERPL-MCNC: 133 MG/DL — HIGH (ref 70–99)
HCT VFR BLD CALC: 23.3 % — LOW (ref 39–50)
HGB BLD-MCNC: 8.4 G/DL — LOW (ref 13–17)
MAGNESIUM SERPL-MCNC: 1.9 MG/DL — SIGNIFICANT CHANGE UP (ref 1.6–2.6)
MCHC RBC-ENTMCNC: 32.9 PG — SIGNIFICANT CHANGE UP (ref 27–34)
MCHC RBC-ENTMCNC: 35.9 GM/DL — SIGNIFICANT CHANGE UP (ref 32–36)
MCV RBC AUTO: 91.6 FL — SIGNIFICANT CHANGE UP (ref 80–100)
ORGANISM # SPEC MICROSCOPIC CNT: SIGNIFICANT CHANGE UP
PHOSPHATE SERPL-MCNC: 2.1 MG/DL — LOW (ref 2.5–4.5)
PLATELET # BLD AUTO: 243 K/UL — SIGNIFICANT CHANGE UP (ref 150–400)
POTASSIUM SERPL-MCNC: 3.6 MMOL/L — SIGNIFICANT CHANGE UP (ref 3.5–5.3)
POTASSIUM SERPL-SCNC: 3.6 MMOL/L — SIGNIFICANT CHANGE UP (ref 3.5–5.3)
RBC # BLD: 2.54 M/UL — LOW (ref 4.2–5.8)
RBC # FLD: 11.9 % — SIGNIFICANT CHANGE UP (ref 10.3–14.5)
RH IG SCN BLD-IMP: POSITIVE — SIGNIFICANT CHANGE UP
SODIUM SERPL-SCNC: 133 MMOL/L — LOW (ref 135–145)
SPECIMEN SOURCE: SIGNIFICANT CHANGE UP
TROPONIN T SERPL-MCNC: 0.03 NG/ML — SIGNIFICANT CHANGE UP (ref 0–0.06)
WBC # BLD: 12.4 K/UL — HIGH (ref 3.8–10.5)
WBC # FLD AUTO: 12.4 K/UL — HIGH (ref 3.8–10.5)

## 2018-03-11 RX ADMIN — Medication 100 MILLIGRAM(S): at 05:48

## 2018-03-11 RX ADMIN — Medication 650 MILLIGRAM(S): at 05:53

## 2018-03-11 RX ADMIN — INSULIN GLARGINE 30 UNIT(S): 100 INJECTION, SOLUTION SUBCUTANEOUS at 21:14

## 2018-03-11 RX ADMIN — TAMSULOSIN HYDROCHLORIDE 0.4 MILLIGRAM(S): 0.4 CAPSULE ORAL at 21:10

## 2018-03-11 RX ADMIN — Medication 100 MILLIGRAM(S): at 21:10

## 2018-03-11 RX ADMIN — TICAGRELOR 90 MILLIGRAM(S): 90 TABLET ORAL at 17:13

## 2018-03-11 RX ADMIN — GABAPENTIN 300 MILLIGRAM(S): 400 CAPSULE ORAL at 17:13

## 2018-03-11 RX ADMIN — Medication 4: at 21:09

## 2018-03-11 RX ADMIN — Medication 2: at 08:06

## 2018-03-11 RX ADMIN — Medication 100 MILLIGRAM(S): at 21:09

## 2018-03-11 RX ADMIN — TICAGRELOR 90 MILLIGRAM(S): 90 TABLET ORAL at 05:49

## 2018-03-11 RX ADMIN — Medication 2: at 11:57

## 2018-03-11 RX ADMIN — INSULIN GLARGINE 23 UNIT(S): 100 INJECTION, SOLUTION SUBCUTANEOUS at 08:06

## 2018-03-11 RX ADMIN — Medication 6: at 17:12

## 2018-03-11 RX ADMIN — GABAPENTIN 300 MILLIGRAM(S): 400 CAPSULE ORAL at 05:49

## 2018-03-11 RX ADMIN — Medication 100 MILLIGRAM(S): at 14:53

## 2018-03-11 RX ADMIN — ENOXAPARIN SODIUM 40 MILLIGRAM(S): 100 INJECTION SUBCUTANEOUS at 21:09

## 2018-03-11 RX ADMIN — Medication 1 TABLET(S): at 11:57

## 2018-03-11 RX ADMIN — Medication 81 MILLIGRAM(S): at 11:57

## 2018-03-11 RX ADMIN — Medication 100 MILLIGRAM(S): at 14:54

## 2018-03-11 RX ADMIN — Medication 650 MILLIGRAM(S): at 06:38

## 2018-03-11 RX ADMIN — Medication 100 MILLIGRAM(S): at 05:49

## 2018-03-11 RX ADMIN — Medication 1 MILLIGRAM(S): at 11:57

## 2018-03-11 RX ADMIN — Medication 25 MILLIGRAM(S): at 05:49

## 2018-03-11 RX ADMIN — SENNA PLUS 2 TABLET(S): 8.6 TABLET ORAL at 21:10

## 2018-03-11 NOTE — DISCHARGE NOTE ADULT - PROVIDER TOKENS
LANRE:'[id number]' TOKEN:'[id number]:'[id number]:'[id number]' TOKEN:'[id number]:'[id number]:'[id number]'

## 2018-03-11 NOTE — DISCHARGE NOTE ADULT - HOSPITAL COURSE
Patient is a 67 year old male with a past medical history significant for DM type II, h/o  etoh abuse and  pancreatitis, Cad s/p stent,  PVD, recent admission for (1/30-2/9) for  left hallux osteomyelitis s/p debridement  , during course patient underwent  cardiac cath CANDIDO x 1pLAD, CANDIDO x 1 dCx, CANDIDO x 1 pOM1 (02/05) and RCA (2/6), was treated with antibiotics for osteomyelitis, was evaluated by vascular surgery and had a LLE angiogram  with multilevel arterial disease, however revascularization postponed until cardiac function optimized ; patient now presents for worsened infection. He was seen by podiatry today who recommended IV antibiotics. Patient has been complaining of pain in left foot for 2-3 days. also reports pus draining from wound .  no fevers or chills . Able to ambulate.  Pt admitted under vascular surgery, started on IV zosyn.  Will need L toe amputation.  < from: Xray Foot AP + Lateral + Oblique, Left (02.27.18 @ 02:13) > IMPRESSION: Again noted is soft tissue swelling of the distal aspect of the hallux with adjacent cortical irregularity and lucency, suspicious for osteomyelitis. No definite subcutaneous gas tracking. Joint spaces are maintained. Vascular calcifications are noted. Chronic fracture deformity of the distal fifth metatarsal. Plantar calcaneal enthesophyte.> Cards, ID, and podiatry consulted.  Xray revealed acute OM.  wound cx + for staph aureus. Abx changed from Zosyn to Vanco to Cefazolin. Cards following for clearance for bypass, clearance given for 30days s/p PCI and with brilinta on hold for 5 days while pt remains on DAPT. Pt underwent L partial hallux amputation on 3/6/18.  Pt tolerated procedure well, sent to pacu.  Pt remained on Ancef IV. Pt underwent left femoral to below knee popliteal artery bypass with 6mm ringed PTFE on 3/8/18.  Pt tolerated procedure well, was extubated, and sent to pacu.  Pt hypotensive POD 0 and sent to SICU for monitoring.  Beside echo performed, Patient does not demonstrate any signs of Obstructive or Cardiogenic Shock, as stated above. Patient likely hypovolemic, in the setting of recent operative procedure, and Hyperdynamic LV with IVC collapsibility, 53%, with clear lungs on room air, 100%SpO2 given 500ml Plasmalyte and weaned from Joao gtt. Pt hemodynamically stable, improved. Pt diet advanced POD2 and pt was transferred from SICU to [facility]. Patient is a 67 year old male with a past medical history significant for DM type II, h/o  etoh abuse and  pancreatitis, Cad s/p stent,  PVD, recent admission for (1/30-2/9) for  left hallux osteomyelitis s/p debridement  , during course patient underwent  cardiac cath CANDIDO x 1pLAD, CANDIDO x 1 dCx, CANDIDO x 1 pOM1 (02/05) and RCA (2/6), was treated with antibiotics for osteomyelitis, was evaluated by vascular surgery and had a LLE angiogram  with multilevel arterial disease, however revascularization postponed until cardiac function optimized ; patient now presents for worsened infection. He was seen by podiatry today who recommended IV antibiotics. Patient has been complaining of pain in left foot for 2-3 days. also reports pus draining from wound .  no fevers or chills . Able to ambulate.  Pt admitted under vascular surgery, started on IV zosyn.  Will need L toe amputation.  < from: Xray Foot AP + Lateral + Oblique, Left (02.27.18 @ 02:13) > IMPRESSION: Again noted is soft tissue swelling of the distal aspect of the hallux with adjacent cortical irregularity and lucency, suspicious for osteomyelitis. No definite subcutaneous gas tracking. Joint spaces are maintained. Vascular calcifications are noted. Chronic fracture deformity of the distal fifth metatarsal. Plantar calcaneal enthesophyte.> Cards, ID, and podiatry consulted.  Xray revealed acute OM.  wound cx + for staph aureus. Abx changed from Zosyn to Vanco to Cefazolin. Cards following for clearance for bypass, clearance given for 30days s/p PCI and with brilinta on hold for 5 days while pt remains on DAPT. Pt underwent L partial hallux amputation on 3/6/18.  Pt tolerated procedure well, sent to pacu.  Pt remained on Ancef IV. Pt underwent left femoral to below knee popliteal artery bypass with 6mm ringed PTFE on 3/8/18.  Pt tolerated procedure well, was extubated, and sent to pacu.  Pt hypotensive POD 0 and sent to SICU for monitoring.  Beside echo performed, Patient does not demonstrate any signs of Obstructive or Cardiogenic Shock, as stated above. Patient likely hypovolemic, in the setting of recent operative procedure, and Hyperdynamic LV with IVC collapsibility, 53%, with clear lungs on room air, 100%SpO2 given 500ml Plasmalyte and weaned from Joao gtt. Pt hemodynamically stable, improved. Pt diet advanced POD2 and pt was transferred from SICU to [facility]. Pt febrile to 38 on 3/12/18 POD4, afebrile overnight.  No workup performed, continue on IV Abx.  Continued on Ancef for + MSSA wound. Pt eval revealed MARY.  On POD 5, bed @ [place] available.  At the time of discharge, the patient was hemodynamically stable, was tolerating PO diet, was voiding urine and passing stool, was ambulating, and was comfortable with adequate pain control. The patient was instructed to follow up with Dr. Fuentes within 1-2 weeks after discharge from the hospital. Pt will also f/u with Cardiology, ID, and podiatry within 1-2 weeks upon discharge.  Pt d/c ancef and changed to augmentin 875 mg po 12 to finish course.  The patient felt comfortable with discharge. The patient was discharged to subacute rehab. The patient had no other issues. Patient is a 67 year old male with a past medical history significant for DM type II, h/o  etoh abuse and  pancreatitis, Cad s/p stent,  PVD, recent admission for (1/30-2/9) for  left hallux osteomyelitis s/p debridement  , during course patient underwent  cardiac cath CANDIDO x 1pLAD, CANDIDO x 1 dCx, CANDIDO x 1 pOM1 (02/05) and RCA (2/6), was treated with antibiotics for osteomyelitis, was evaluated by vascular surgery and had a LLE angiogram  with multilevel arterial disease, however revascularization postponed until cardiac function optimized ; patient now presents for worsened infection. He was seen by podiatry today who recommended IV antibiotics. Patient has been complaining of pain in left foot for 2-3 days. also reports pus draining from wound .  no fevers or chills . Able to ambulate.  Pt admitted under vascular surgery, started on IV zosyn.  Will need L toe amputation.  < from: Xray Foot AP + Lateral + Oblique, Left (02.27.18 @ 02:13) > IMPRESSION: Again noted is soft tissue swelling of the distal aspect of the hallux with adjacent cortical irregularity and lucency, suspicious for osteomyelitis. No definite subcutaneous gas tracking. Joint spaces are maintained. Vascular calcifications are noted. Chronic fracture deformity of the distal fifth metatarsal. Plantar calcaneal enthesophyte.> Cards, ID, and podiatry consulted.  Xray revealed acute OM.  wound cx + for staph aureus. Abx changed from Zosyn to Vanco to Cefazolin. Cards following for clearance for bypass, clearance given for 30days s/p PCI and with brilinta on hold for 5 days while pt remains on DAPT. Pt underwent L partial hallux amputation on 3/6/18.  Pt tolerated procedure well, sent to pacu.  Pt remained on Ancef IV. Pt underwent left femoral to below knee popliteal artery bypass with 6mm ringed PTFE on 3/8/18.  Pt tolerated procedure well, was extubated, and sent to pacu.  Pt hypotensive POD 0 and sent to SICU for monitoring.  Beside echo performed, Patient does not demonstrate any signs of Obstructive or Cardiogenic Shock, as stated above. Patient likely hypovolemic, in the setting of recent operative procedure, and Hyperdynamic LV with IVC collapsibility, 53%, with clear lungs on room air, 100%SpO2 given 500ml Plasmalyte and weaned from Joao gtt. Pt hemodynamically stable, improved. Pt diet advanced POD2 and pt was transferred from SICU to [facility]. Pt febrile to 38 on 3/12/18 POD4, afebrile overnight.  No workup performed, continue on IV Abx.  Continued on Ancef for + MSSA wound. Pt eval revealed MARY.  On POD 5, bed @ [place] available.  At the time of discharge, the patient was hemodynamically stable, was tolerating PO diet, was voiding urine and passing stool, was ambulating, and was comfortable with adequate pain control. The patient was instructed to follow up with Dr. Fuentes within 1-2 weeks after discharge from the hospital. Pt will also f/u with Cardiology, ID, and podiatry within 1-2 weeks upon discharge.  Pt d/c ancef and changed to augmentin 875 mg po 12 to finish course through 3/20/18.  The patient felt comfortable with discharge. The patient was discharged to subacute rehab. The patient had no other issues.

## 2018-03-11 NOTE — PROGRESS NOTE ADULT - ASSESSMENT
67M s/p L partial hallux amp    Plan:  - POD #5  - LF surgical site stable  - Low concern for residual infxn, prognosis of toe still guarded  - F/u OR data, no growth on Cx yet  - Abx per ID  - No further pod sx intervention  - F/u out pt for post-op care with Dr. Joe within 1 wk of discharge  - Seen w/ attending 67M s/p LEFT partial hallux amp    Plan:  - POD #5  - LEFT foot surgical site stable  - Low concern for residual infxn, prognosis of toe still guarded  - F/u OR data, no growth on Cx yet  - Abx per ID  - No further pod sx intervention  - F/u out pt for post-op care with Dr. Joe within 1 wk of discharge  - Seen w/ attending

## 2018-03-11 NOTE — DISCHARGE NOTE ADULT - PATIENT PORTAL LINK FT
You can access the [facility], offered by [facility], by registering with the following website: [email or web address]

## 2018-03-11 NOTE — PROGRESS NOTE ADULT - SUBJECTIVE AND OBJECTIVE BOX
Vascular Surgery Progress Note:    Hospital Day: 13  Post-Operative Day: 3 s/p Left Femoral to Below Knee Popiteal Bypass with PTFE Graft, s/p Left Partial Hallux Amputation by Podiatry on 3/6.    Subjective:  No acute overnight events. Patient was examined at bedside.  Surgical dressings were changed by Vascular Team this AM.      Objective:  T(C): 36.9 (03-11-18 @ 09:15), Max: 38.2 (03-10-18 @ 16:41)  HR: 105 (03-11-18 @ 09:15) (99 - 114)  BP: 109/70 (03-11-18 @ 09:15) (104/65 - 126/73)  RR: 17 (03-11-18 @ 09:15) (16 - 19)  SpO2: 97% (03-11-18 @ 09:15) (93% - 100%)    Labs:  03-10-18 @ 06:01  -  03-11-18 @ 07:00  --------------------------------------------------------  IN: 1140 mL / OUT: 835 mL / NET: 305 mL    03-11-18 @ 07:01  -  03-11-18 @ 11:57  --------------------------------------------------------  IN: 240 mL / OUT: 0 mL / NET: 240 mL        Focused Physical Exam:  General: NAD  Respiratory: Nonlabored breathing  Extremities: R AT/DP/PT signals. L AT/DP/PT signals. L first toe with wet gangrenous tip s/p partial amputation, wrapped (no strikethrough, managed by Podiatry team)      Medications:  acetaminophen   Tablet. 650 milliGRAM(s) Oral every 6 hours PRN  acetaminophen  IVPB 1000 milliGRAM(s) IV Intermittent once PRN  aspirin enteric coated 81 milliGRAM(s) Oral daily  ceFAZolin   IVPB 2000 milliGRAM(s) IV Intermittent every 8 hours  docusate sodium 100 milliGRAM(s) Oral three times a day  enoxaparin Injectable 40 milliGRAM(s) SubCutaneous every 24 hours  folic acid 1 milliGRAM(s) Oral daily  gabapentin 300 milliGRAM(s) Oral two times a day  HYDROmorphone  Injectable 0.5 milliGRAM(s) IV Push every 3 hours PRN  insulin glargine Injectable (LANTUS) 30 Unit(s) SubCutaneous at bedtime  insulin glargine Injectable (LANTUS) 23 Unit(s) SubCutaneous every morning  insulin lispro (HumaLOG) corrective regimen sliding scale   SubCutaneous three times a day before meals  insulin lispro (HumaLOG) corrective regimen sliding scale   SubCutaneous at bedtime  metoprolol succinate ER 25 milliGRAM(s) Oral daily  multivitamin 1 Tablet(s) Oral daily  oxyCODONE    IR 5 milliGRAM(s) Oral every 6 hours PRN  oxyCODONE    IR 10 milliGRAM(s) Oral every 6 hours PRN  senna 2 Tablet(s) Oral at bedtime  tamsulosin 0.4 milliGRAM(s) Oral at bedtime  ticagrelor 90 milliGRAM(s) Oral two times a day

## 2018-03-11 NOTE — DISCHARGE NOTE ADULT - MEDICATION SUMMARY - MEDICATIONS TO TAKE
I will START or STAY ON the medications listed below when I get home from the hospital:    Ultra-Thin II Ins Pen Needles (pen needle, diabetic)   -- 1 each subcutaneous 3 times a day MDD:3  -- Indication: For Diabetes    Ultra-Thin II Ins Pen Needles (pen needle, diabetic)   -- 1 each subcutaneous 2 times a day MDD:2   -- Indication: For Diabetes    acetaminophen 325 mg oral tablet  -- 2 tab(s) by mouth every 6 hours, As needed, Mild Pain (1 - 3)  -- Indication: For pain    aspirin 81 mg oral tablet  -- 1 tab(s) by mouth once a day MDD:1  -- Indication: For CAD    tamsulosin 0.4 mg oral capsule  -- 1 cap(s) by mouth once a day (at bedtime)  -- Indication: For BPH    gabapentin 300 mg oral capsule  -- 1 cap(s) by mouth 2 times a day  -- Indication: For neuropathy    NovoLOG FlexPen 100 units/mL injectable solution  -- 23 unit(s) injectable 3 times a day MDD:69 units per day  -- Check with your doctor before becoming pregnant.  Do not drink alcoholic beverages when taking this medication.  Keep in refrigerator.  Do not freeze.  Obtain medical advice before taking any non-prescription drugs as some may affect the action of this medication.    -- Indication: For Diabetes    Lantus Solostar Pen 100 units/mL subcutaneous solution  -- 41 unit(s) subcutaneous 2 times a day  Take 58 units in bedtime, and 23 units in am MDD:2  -- Do not drink alcoholic beverages when taking this medication.  It is very important that you take or use this exactly as directed.  Do not skip doses or discontinue unless directed by your doctor.  Keep in refrigerator.  Do not freeze.    -- Indication: For Diabetes    Brilinta (ticagrelor) 90 mg oral tablet  -- 1 tab(s) by mouth 2 times a day MDD:2  -- It is very important that you take or use this exactly as directed.  Do not skip doses or discontinue unless directed by your doctor.  Obtain medical advice before taking any non-prescription drugs as some may affect the action of this medication.    -- Indication: For CAD    metoprolol succinate 25 mg oral tablet, extended release  -- 1 tab(s) by mouth once a day  -- Indication: For HTN    senna oral tablet  -- 2 tab(s) by mouth once a day (at bedtime)  -- Indication: For constipation    docusate sodium 100 mg oral capsule  -- 1 cap(s) by mouth 3 times a day  -- Indication: For constipation    Multiple Vitamins oral tablet  -- 1 tab(s) by mouth once a day MDD:1  -- Indication: For vitamin    folic acid 1 mg oral tablet  -- 1 tab(s) by mouth once a day MDD:1  -- Indication: For vitamin    cyanocobalamin 1000 mcg oral tablet  -- 1 tab(s) by mouth once a day MDD:1  -- Indication: For vitamin    Vitamin D3  -- 1 cap(s) by mouth once a day  -- Indication: For vitamin I will START or STAY ON the medications listed below when I get home from the hospital:    Ultra-Thin II Ins Pen Needles (pen needle, diabetic)   -- 1 each subcutaneous 3 times a day MDD:3  -- Indication: For Diabetes    Ultra-Thin II Ins Pen Needles (pen needle, diabetic)   -- 1 each subcutaneous 2 times a day MDD:2   -- Indication: For Diabetes    acetaminophen 325 mg oral tablet  -- 2 tab(s) by mouth every 6 hours, As needed, Mild Pain (1 - 3)  -- Indication: For pain    aspirin 81 mg oral tablet  -- 1 tab(s) by mouth once a day MDD:1  -- Indication: For CAD (coronary artery disease)    tamsulosin 0.4 mg oral capsule  -- 1 cap(s) by mouth once a day (at bedtime)  -- Indication: For HTN    gabapentin 300 mg oral capsule  -- 1 cap(s) by mouth 2 times a day  -- Indication: For neuropathy    NovoLOG FlexPen 100 units/mL injectable solution  -- 23 unit(s) injectable 3 times a day MDD:69 units per day  -- Check with your doctor before becoming pregnant.  Do not drink alcoholic beverages when taking this medication.  Keep in refrigerator.  Do not freeze.  Obtain medical advice before taking any non-prescription drugs as some may affect the action of this medication.    -- Indication: For Diabetes    Lantus Solostar Pen 100 units/mL subcutaneous solution  -- 41 unit(s) subcutaneous 2 times a day  Take 58 units in bedtime, and 23 units in am MDD:2  -- Do not drink alcoholic beverages when taking this medication.  It is very important that you take or use this exactly as directed.  Do not skip doses or discontinue unless directed by your doctor.  Keep in refrigerator.  Do not freeze.    -- Indication: For Diabetes    Brilinta (ticagrelor) 90 mg oral tablet  -- 1 tab(s) by mouth 2 times a day MDD:2  -- It is very important that you take or use this exactly as directed.  Do not skip doses or discontinue unless directed by your doctor.  Obtain medical advice before taking any non-prescription drugs as some may affect the action of this medication.    -- Indication: For CAD (coronary artery disease)    metoprolol succinate 25 mg oral tablet, extended release  -- 1 tab(s) by mouth once a day  -- Indication: For HTN    metoprolol succinate 50 mg oral tablet, extended release  -- 1 tab(s) by mouth once a day  -- Indication: For HTN    senna oral tablet  -- 2 tab(s) by mouth once a day (at bedtime)  -- Indication: For Constipation    docusate sodium 100 mg oral capsule  -- 1 cap(s) by mouth 3 times a day  -- Indication: For Constipation    amoxicillin-clavulanate 875 mg-125 mg oral tablet  -- 1 tab(s) by mouth 2 times a day  -- Indication: For Left toe osteomyelitis    Multiple Vitamins oral tablet  -- 1 tab(s) by mouth once a day MDD:1  -- Indication: For vitamin    folic acid 1 mg oral tablet  -- 1 tab(s) by mouth once a day MDD:1  -- Indication: For vitamin    cyanocobalamin 1000 mcg oral tablet  -- 1 tab(s) by mouth once a day MDD:1  -- Indication: For vitamin    Vitamin D3  -- 1 cap(s) by mouth once a day  -- Indication: For vitamin I will START or STAY ON the medications listed below when I get home from the hospital:    Ultra-Thin II Ins Pen Needles (pen needle, diabetic)   -- 1 each subcutaneous 3 times a day MDD:3  -- Indication: For Diabetes    Ultra-Thin II Ins Pen Needles (pen needle, diabetic)   -- 1 each subcutaneous 2 times a day MDD:2   -- Indication: For Diabetes    acetaminophen 325 mg oral tablet  -- 2 tab(s) by mouth every 6 hours, As needed, Mild Pain (1 - 3)  -- Indication: For pain    aspirin 81 mg oral tablet  -- 1 tab(s) by mouth once a day MDD:1  -- Indication: For CAD (coronary artery disease)    tamsulosin 0.4 mg oral capsule  -- 1 cap(s) by mouth once a day (at bedtime)  -- Indication: For HTN    gabapentin 300 mg oral capsule  -- 1 cap(s) by mouth 2 times a day  -- Indication: For neuropathy    NovoLOG FlexPen 100 units/mL injectable solution  -- 23 unit(s) injectable 3 times a day MDD:69 units per day  -- Check with your doctor before becoming pregnant.  Do not drink alcoholic beverages when taking this medication.  Keep in refrigerator.  Do not freeze.  Obtain medical advice before taking any non-prescription drugs as some may affect the action of this medication.    -- Indication: For Diabetes    Lantus Solostar Pen 100 units/mL subcutaneous solution  -- 41 unit(s) subcutaneous 2 times a day  Take 58 units in bedtime, and 23 units in am MDD:2  -- Do not drink alcoholic beverages when taking this medication.  It is very important that you take or use this exactly as directed.  Do not skip doses or discontinue unless directed by your doctor.  Keep in refrigerator.  Do not freeze.    -- Indication: For Diabetes    Brilinta (ticagrelor) 90 mg oral tablet  -- 1 tab(s) by mouth 2 times a day MDD:2  -- It is very important that you take or use this exactly as directed.  Do not skip doses or discontinue unless directed by your doctor.  Obtain medical advice before taking any non-prescription drugs as some may affect the action of this medication.    -- Indication: For CAD (coronary artery disease)    metoprolol succinate 50 mg oral tablet, extended release  -- 1 tab(s) by mouth once a day  -- Indication: For HTN    senna oral tablet  -- 2 tab(s) by mouth once a day (at bedtime)  -- Indication: For Constipation    docusate sodium 100 mg oral capsule  -- 1 cap(s) by mouth 3 times a day  -- Indication: For Constipation    amoxicillin-clavulanate 875 mg-125 mg oral tablet  -- 1 tab(s) by mouth 2 times a day  -- Indication: For Left toe osteomyelitis    Multiple Vitamins oral tablet  -- 1 tab(s) by mouth once a day MDD:1  -- Indication: For vitamin    folic acid 1 mg oral tablet  -- 1 tab(s) by mouth once a day MDD:1  -- Indication: For vitamin    cyanocobalamin 1000 mcg oral tablet  -- 1 tab(s) by mouth once a day MDD:1  -- Indication: For vitamin    Vitamin D3  -- 1 cap(s) by mouth once a day  -- Indication: For vitamin

## 2018-03-11 NOTE — PROGRESS NOTE ADULT - ASSESSMENT
67 year old male with history of DM type II, etoh abuse and pancreatitis, CAD, s/p PCI, PVD admitted with worsening left foot infection . Now s /p Left Partial Hallux Amputation on 3/6 and s/p Left Femoral to Below Knee Popiteal Bypass with PTFE Graft. Currently in SICU for post op hypotension     1. CAD s/p PCI  CV stable post op no chest pain or sob    EKG no evidence of ACS   no evidence of CHF on exam   recent echo revealing normal lv sys fx , minimal MR   recent cath performed in Feb 2018 ( CANDIDO x 1pLAD, CANDIDO x 1 dCx, CANDIDO x 1 pOM1 and RCA )  beside echo noted   cont asa 81mg daily/ brilinta / BB     2. PAD  s/p  LE bypass  vascular f/u     3. Foot ulcer/OM  IV abx  s/p left partial hallux amp   pod f/u     4. Hypotension   post op , likely hypovolemic in the setting of recent surgery   ekg no evidence of MI   bedside echo  noted   s/p 1 unit PRBC, IVF, plasma and albumin in SICU   bp low -normal / with tachycardia / low grade temp noted  + leukocystosis   pending BC results   monitor CBC   pod f/u     dvt ppx

## 2018-03-11 NOTE — DISCHARGE NOTE ADULT - NS AS ACTIVITY OBS
Do not make important decisions/No Heavy lifting/straining/Showering allowed/Do not drive or operate machinery/when taking pain medication

## 2018-03-11 NOTE — PROGRESS NOTE ADULT - SUBJECTIVE AND OBJECTIVE BOX
CARDIOLOGY FOLLOW UP - Dr. Gavin    CC no chest pain or sob        PHYSICAL EXAM:  T(C): 36.9 (03-11-18 @ 09:15), Max: 38.3 (03-10-18 @ 11:00)  HR: 105 (03-11-18 @ 09:15) (99 - 114)  BP: 109/70 (03-11-18 @ 09:15) (92/59 - 126/73)  RR: 17 (03-11-18 @ 09:15) (16 - 19)  SpO2: 97% (03-11-18 @ 09:15) (93% - 100%)  Wt(kg): --  I&O's Summary    10 Mar 2018 06:01  -  11 Mar 2018 07:00  --------------------------------------------------------  IN: 1140 mL / OUT: 835 mL / NET: 305 mL    11 Mar 2018 07:01  -  11 Mar 2018 10:29  --------------------------------------------------------  IN: 240 mL / OUT: 0 mL / NET: 240 mL        Appearance: Normal	  Cardiovascular: Normal S1 S2,Rhythm reg/ tachycardia   Respiratory: Lungs clear to auscultation	  Gastrointestinal:  Soft, Non-tender, + BS	  Extremities: Normal range of motion, No clubbing, cyanosis or edema        MEDICATIONS  (STANDING):  aspirin enteric coated 81 milliGRAM(s) Oral daily  ceFAZolin   IVPB 2000 milliGRAM(s) IV Intermittent every 8 hours  docusate sodium 100 milliGRAM(s) Oral three times a day  enoxaparin Injectable 40 milliGRAM(s) SubCutaneous every 24 hours  folic acid 1 milliGRAM(s) Oral daily  gabapentin 300 milliGRAM(s) Oral two times a day  insulin glargine Injectable (LANTUS) 30 Unit(s) SubCutaneous at bedtime  insulin glargine Injectable (LANTUS) 23 Unit(s) SubCutaneous every morning  insulin lispro (HumaLOG) corrective regimen sliding scale   SubCutaneous three times a day before meals  insulin lispro (HumaLOG) corrective regimen sliding scale   SubCutaneous at bedtime  metoprolol succinate ER 25 milliGRAM(s) Oral daily  multivitamin 1 Tablet(s) Oral daily  senna 2 Tablet(s) Oral at bedtime  tamsulosin 0.4 milliGRAM(s) Oral at bedtime  ticagrelor 90 milliGRAM(s) Oral two times a day      TELEMETRY: 	    ECG:  	  RADIOLOGY:   DIAGNOSTIC TESTING:  [ ] Echocardiogram:  [ ]  Catheterization:  [ ] Stress Test:    OTHER: 	    LABS:	 	                                8.4    12.4  )-----------( 243      ( 11 Mar 2018 06:21 )             23.3     03-11    133<L>  |  98  |  11  ----------------------------<  133<H>  3.6   |  24  |  0.56    Ca    8.4      11 Mar 2018 06:21  Phos  2.1     03-11  Mg     1.9     03-11

## 2018-03-11 NOTE — PROGRESS NOTE ADULT - ASSESSMENT
67M HD 13 POD 3 s/p Left Femoral to Below Knee Popiteal Bypass with PTFE Graft, and Left Partial Hallux Amputation on 3/6 by Podiatry team, doing well; avss but still has elevated wbc (12.4 today, 3/11/18 down from 13.6 yesterday evening)    -Continue cardiac meds for stent (Toprol); (Cardiac and Hospital recs appreciated)  -Continue 23 units Lantus insulin this AM, and 30 units Lantus at bedtime  -Continue Abx Cefazolin for MSSA in wound; f/u WBC  -Mobilization  -Continue Flomax 0.4mg at bedtime (Home medication)  -F/u Podiatry recs; OR cultures still pending  -H/H stable at 8.x/low 20s; consider transfusion (will monitor for now)

## 2018-03-11 NOTE — PROGRESS NOTE ADULT - SUBJECTIVE AND OBJECTIVE BOX
Patient is a 67y old  Male who presents with a chief complaint of Worsened left foot infection (2018 05:00)       INTERVAL HPI/OVERNIGHT EVENTS:  Patient seen and evaluated at bedside.  Pt is resting comfortable in NAD. Denies N/V/F/C.  Pain rated at X/10    Allergies    No Known Allergies    Intolerances        Vital Signs Last 24 Hrs  T(C): 36.9 (11 Mar 2018 09:15), Max: 38.3 (10 Mar 2018 11:00)  T(F): 98.4 (11 Mar 2018 09:15), Max: 100.9 (10 Mar 2018 11:00)  HR: 105 (11 Mar 2018 09:15) (99 - 114)  BP: 109/70 (11 Mar 2018 09:15) (92/59 - 126/73)  BP(mean): --  RR: 17 (11 Mar 2018 09:15) (16 - 19)  SpO2: 97% (11 Mar 2018 09:15) (93% - 100%)    LABS:                        8.4    12.4  )-----------( 243      ( 11 Mar 2018 06:21 )             23.3     03-11    133<L>  |  98  |  11  ----------------------------<  133<H>  3.6   |  24  |  0.56    Ca    8.4      11 Mar 2018 06:21  Phos  2.1     03-11  Mg     1.9     03-11        Urinalysis Basic - ( 10 Mar 2018 20:39 )    Color: Yellow / Appearance: Clear / S.021 / pH: x  Gluc: x / Ketone: Negative  / Bili: Negative / Urobili: Negative   Blood: x / Protein: Trace / Nitrite: Negative   Leuk Esterase: Negative / RBC: x / WBC x   Sq Epi: x / Non Sq Epi: x / Bacteria: x      CAPILLARY BLOOD GLUCOSE      POCT Blood Glucose.: 155 mg/dL (11 Mar 2018 07:41)  POCT Blood Glucose.: 205 mg/dL (10 Mar 2018 21:36)  POCT Blood Glucose.: 263 mg/dL (10 Mar 2018 16:47)  POCT Blood Glucose.: 325 mg/dL (10 Mar 2018 12:08)      Lower Extremity Physical Exam:  left foot surgical site sutures intact, no dehiscence, no signs of ischemia, CFT to flaps normal, no active bleeding, pt had no bleeding in OR despite the blood on the dressing  applied DSD    RADIOLOGY & ADDITIONAL TESTS:

## 2018-03-11 NOTE — DISCHARGE NOTE ADULT - OTHER SIGNIFICANT FINDINGS
Surgical Final Report  Final Diagnosis  1. Foot, left, great toe, amptuation  - Ulcerated skin and soft tissue with marked acute  inflammation  - Acute osteomyelitis  - Margins appear viable    2. Foot, left, bone biopsy  - Benign bone, no evidence of acute osteomyelitis    Verified by: Claudy Hammond MD  (Electronic Signature)  Reported on: 03/09/18 17:12 EST,[address] [place] [address]  _________________________________________________________________    EXAM:  FOOT COMPLETE LEFT (MIN 3 VIEWS)                        PROCEDURE DATE:  03/06/2018  INTERPRETATION:  3 views of the left foot.    Clinical indications: Partial hallux amputation. Foot pain.    IMPRESSION: The patient is status post amputation of the great toe at the   distal aspect of the proximal phalanx. There is adjacent soft tissue   swelling and soft tissue gas is postsurgical in nature. The surgical   margin is smooth.      Clinical History  Left great toe osteomyelitis    Specimen(s) Submitted  1     Left great toe  2     Clean margin bone left great toe    Gross Description  1. The specimen is received in formalin and the specimen  container is labeled: Left great toe.  It consists of a 3.4 x 3.0  x 2.5 cm amputated toe, consistent with great toe disarticulated  proximal to the distal phalanx.  Also present is a 2.0 x 1.0 x  1.0 cm portion of smoothly transected proximal phalanx and a 2.0  x 2.0 x 0.5 cm fragment of soft tissue.  The skin is tan-gray and  sloughing and displays a 2.4 x 1.6 x 1.0 cm, dark brown, hard,  encrusted, ulcerative lesion; located on the distal dorsal  surface corresponding to the nail bed.  The lesion lies 1.0 cm  from the skin and soft tissue resection margin.  The skin and  soft tissue resection margin is inked black. The toenail is  absent. Representative tissue, after fixation and  decalcification, is submitted in two cassettes:  A = lesion to  closest margin with underlying bone; B = bony margin, en face;    2. The specimen is received in formalin and the specimen  container is labeled: Clean margin bone left great toe.  It  consists of 2, 0.3 cm in greatest dimension fragments of tan  bone.  Entirely submitted following decalcification.  One  cassette.

## 2018-03-11 NOTE — DISCHARGE NOTE ADULT - FINDINGS/TREATMENT
hallux for pathology, clean bone margin pathology. clean bone margin for micro, dirty wound swab for micro

## 2018-03-11 NOTE — DISCHARGE NOTE ADULT - CARE PROVIDER_API CALL
Jose Fuentes), Vascular Surgery  [address] [facility]  [address]  [place] [address]  Phone: [phone]  Fax: [phone] Jose Fuentes), Vascular Surgery  [address] [facility]  [address]  [place] [address]  Phone: [phone]  Fax: [phone]    Lulu Michele (KIRSTIN), Surgery  Dept Director  [address] [place] [address]  Phone: [phone]  Fax: [phone]    Praneeth Chacko (MD), Infectious Disease  [address] [place] [address]  Phone: [phone]  Fax: [phone]    Rufus Gavin), Cardiology; Internal Medicine  [address] [facility]  [address]  [place] [address]  Phone: [phone]  Fax: [phone] Jose Fuentes), Vascular Surgery  [address] [facility]  [address]  [place] [address]  Phone: [phone]  Fax: [phone]    Lulu Michele (KIRSTIN), Surgery  Dept Director  [address] [place] [address]  Phone: [phone]  Fax: [phone]    Rufus Gavin), Cardiology; Internal Medicine  [address] [facility]  [address]  [place] [address]  Phone: [phone]  Fax: [phone]

## 2018-03-11 NOTE — DISCHARGE NOTE ADULT - CARE PROVIDERS DIRECT ADDRESSES
,[email or web address],[email or web address],DirectAddress_Unknown ,[email or web address],DirectAddress_Unknown,DirectAddress_Unknown

## 2018-03-12 LAB
ANION GAP SERPL CALC-SCNC: 13 MMOL/L — SIGNIFICANT CHANGE UP (ref 5–17)
BUN SERPL-MCNC: 12 MG/DL — SIGNIFICANT CHANGE UP (ref 7–23)
CALCIUM SERPL-MCNC: 8.2 MG/DL — LOW (ref 8.4–10.5)
CHLORIDE SERPL-SCNC: 98 MMOL/L — SIGNIFICANT CHANGE UP (ref 96–108)
CO2 SERPL-SCNC: 22 MMOL/L — SIGNIFICANT CHANGE UP (ref 22–31)
CREAT SERPL-MCNC: 0.59 MG/DL — SIGNIFICANT CHANGE UP (ref 0.5–1.3)
GLUCOSE BLDC GLUCOMTR-MCNC: 137 MG/DL — HIGH (ref 70–99)
GLUCOSE BLDC GLUCOMTR-MCNC: 199 MG/DL — HIGH (ref 70–99)
GLUCOSE BLDC GLUCOMTR-MCNC: 206 MG/DL — HIGH (ref 70–99)
GLUCOSE BLDC GLUCOMTR-MCNC: 249 MG/DL — HIGH (ref 70–99)
GLUCOSE SERPL-MCNC: 129 MG/DL — HIGH (ref 70–99)
HCT VFR BLD CALC: 23.4 % — LOW (ref 39–50)
HGB BLD-MCNC: 8.3 G/DL — LOW (ref 13–17)
MCHC RBC-ENTMCNC: 32.6 PG — SIGNIFICANT CHANGE UP (ref 27–34)
MCHC RBC-ENTMCNC: 35.6 GM/DL — SIGNIFICANT CHANGE UP (ref 32–36)
MCV RBC AUTO: 91.4 FL — SIGNIFICANT CHANGE UP (ref 80–100)
PLATELET # BLD AUTO: 277 K/UL — SIGNIFICANT CHANGE UP (ref 150–400)
POTASSIUM SERPL-MCNC: 3.7 MMOL/L — SIGNIFICANT CHANGE UP (ref 3.5–5.3)
POTASSIUM SERPL-SCNC: 3.7 MMOL/L — SIGNIFICANT CHANGE UP (ref 3.5–5.3)
RBC # BLD: 2.56 M/UL — LOW (ref 4.2–5.8)
RBC # FLD: 11.9 % — SIGNIFICANT CHANGE UP (ref 10.3–14.5)
SODIUM SERPL-SCNC: 133 MMOL/L — LOW (ref 135–145)
WBC # BLD: 9.1 K/UL — SIGNIFICANT CHANGE UP (ref 3.8–10.5)
WBC # FLD AUTO: 9.1 K/UL — SIGNIFICANT CHANGE UP (ref 3.8–10.5)

## 2018-03-12 PROCEDURE — 99232 SBSQ HOSP IP/OBS MODERATE 35: CPT

## 2018-03-12 RX ADMIN — Medication 650 MILLIGRAM(S): at 00:49

## 2018-03-12 RX ADMIN — Medication 4: at 21:41

## 2018-03-12 RX ADMIN — Medication 1 TABLET(S): at 11:39

## 2018-03-12 RX ADMIN — Medication 100 MILLIGRAM(S): at 16:12

## 2018-03-12 RX ADMIN — Medication 4: at 17:03

## 2018-03-12 RX ADMIN — Medication 81 MILLIGRAM(S): at 11:39

## 2018-03-12 RX ADMIN — INSULIN GLARGINE 30 UNIT(S): 100 INJECTION, SOLUTION SUBCUTANEOUS at 21:41

## 2018-03-12 RX ADMIN — TICAGRELOR 90 MILLIGRAM(S): 90 TABLET ORAL at 05:09

## 2018-03-12 RX ADMIN — Medication 2: at 11:40

## 2018-03-12 RX ADMIN — GABAPENTIN 300 MILLIGRAM(S): 400 CAPSULE ORAL at 17:04

## 2018-03-12 RX ADMIN — Medication 100 MILLIGRAM(S): at 05:10

## 2018-03-12 RX ADMIN — Medication 100 MILLIGRAM(S): at 05:09

## 2018-03-12 RX ADMIN — TICAGRELOR 90 MILLIGRAM(S): 90 TABLET ORAL at 17:04

## 2018-03-12 RX ADMIN — GABAPENTIN 300 MILLIGRAM(S): 400 CAPSULE ORAL at 05:10

## 2018-03-12 RX ADMIN — INSULIN GLARGINE 23 UNIT(S): 100 INJECTION, SOLUTION SUBCUTANEOUS at 08:10

## 2018-03-12 RX ADMIN — TAMSULOSIN HYDROCHLORIDE 0.4 MILLIGRAM(S): 0.4 CAPSULE ORAL at 21:41

## 2018-03-12 RX ADMIN — SENNA PLUS 2 TABLET(S): 8.6 TABLET ORAL at 21:41

## 2018-03-12 RX ADMIN — ENOXAPARIN SODIUM 40 MILLIGRAM(S): 100 INJECTION SUBCUTANEOUS at 21:41

## 2018-03-12 RX ADMIN — Medication 650 MILLIGRAM(S): at 01:20

## 2018-03-12 RX ADMIN — Medication 25 MILLIGRAM(S): at 05:09

## 2018-03-12 RX ADMIN — Medication 100 MILLIGRAM(S): at 21:41

## 2018-03-12 RX ADMIN — Medication 100 MILLIGRAM(S): at 21:40

## 2018-03-12 RX ADMIN — Medication 1 MILLIGRAM(S): at 11:39

## 2018-03-12 RX ADMIN — Medication 100 MILLIGRAM(S): at 16:11

## 2018-03-12 NOTE — PROGRESS NOTE ADULT - ASSESSMENT
Patient is a 67 year old male with a past medical history significant for DM type II, h/o EtOH abuse and  pancreatitis, Cad s/p stent,  PVD, presents with purulent drainage from the left great toe over the past week found to have dry gangrene of the distal lt great toe. + Leucocytosis trending down, no fever. No active drainage rt now. Staph aureus in wound cx.       S/p revascularization  Low suspicion for residual OM at time of toe amputation    Monitor for fever    Continue antibiotics through 3/20/2018    When stable for discharge change to augmentin 875 mg po 12 to finish course.

## 2018-03-12 NOTE — PROGRESS NOTE ADULT - ASSESSMENT
67 year old male with history of DM type II, etoh abuse and pancreatitis, CAD, s/p PCI, PVD admitted with worsening left foot infection . Now s /p Left Partial Hallux Amputation on 3/6 and s/p Left Femoral to Below Knee Popiteal Bypass with PTFE Graft. Currently in SICU for post op hypotension     1. CAD s/p PCI  CV stable post op no chest pain or sob    EKG no evidence of ACS   no evidence of CHF on exam   recent echo revealing normal lv sys fx , minimal MR   recent cath performed in Feb 2018 ( CANDIDO x 1pLAD, CANDIDO x 1 dCx, CANDIDO x 1 pOM1 and RCA )  beside echo noted   cont asa 81mg daily/ brilinta / BB     2. PAD  s/p  LE bypass  vascular f/u     3. Foot ulcer/OM  IV abx  s/p left partial hallux amp   pod f/u   BC/ UC neg     4. Hypotension   post op , likely hypovolemic in the setting of recent surgery   ekg no evidence of MI   bedside echo  noted   s/p 1 unit PRBC, IVF, plasma and albumin in SICU   bp / hr improving  plan for 1 unit PRBC per surgery  monitor CBC   pod f/u     dvt ppx

## 2018-03-12 NOTE — PROGRESS NOTE ADULT - SUBJECTIVE AND OBJECTIVE BOX
CARDIOLOGY FOLLOW UP - Dr. Gavin    CC no chest pain or sob       PHYSICAL EXAM:  T(C): 36.5 (03-12-18 @ 09:29), Max: 38 (03-11-18 @ 16:54)  HR: 86 (03-12-18 @ 09:29) (63 - 103)  BP: 128/78 (03-12-18 @ 09:29) (105/65 - 154/80)  RR: 16 (03-12-18 @ 09:29) (16 - 18)  SpO2: 97% (03-12-18 @ 09:29) (97% - 100%)  Wt(kg): --  I&O's Summary    11 Mar 2018 07:01  -  12 Mar 2018 07:00  --------------------------------------------------------  IN: 1590 mL / OUT: 1200 mL / NET: 390 mL    12 Mar 2018 07:01  -  12 Mar 2018 10:29  --------------------------------------------------------  IN: 0 mL / OUT: 225 mL / NET: -225 mL        Appearance: Normal	  Cardiovascular: Normal S1 S2,RRR, No JVD, No murmurs  Respiratory: Lungs clear to auscultation	  Gastrointestinal:  Soft, Non-tender, + BS	  Extremities: Normal range of motion, No clubbing, cyanosis or edema  left foot dressing       MEDICATIONS  (STANDING):  aspirin enteric coated 81 milliGRAM(s) Oral daily  ceFAZolin   IVPB 2000 milliGRAM(s) IV Intermittent every 8 hours  docusate sodium 100 milliGRAM(s) Oral three times a day  enoxaparin Injectable 40 milliGRAM(s) SubCutaneous every 24 hours  folic acid 1 milliGRAM(s) Oral daily  gabapentin 300 milliGRAM(s) Oral two times a day  insulin glargine Injectable (LANTUS) 30 Unit(s) SubCutaneous at bedtime  insulin glargine Injectable (LANTUS) 23 Unit(s) SubCutaneous every morning  insulin lispro (HumaLOG) corrective regimen sliding scale   SubCutaneous three times a day before meals  insulin lispro (HumaLOG) corrective regimen sliding scale   SubCutaneous at bedtime  metoprolol succinate ER 25 milliGRAM(s) Oral daily  multivitamin 1 Tablet(s) Oral daily  senna 2 Tablet(s) Oral at bedtime  tamsulosin 0.4 milliGRAM(s) Oral at bedtime  ticagrelor 90 milliGRAM(s) Oral two times a day      TELEMETRY: 	    ECG:  	  RADIOLOGY:   DIAGNOSTIC TESTING:  [ ] Echocardiogram:  [ ]  Catheterization:  [ ] Stress Test:    OTHER: 	    LABS:	 	                                8.3    9.1   )-----------( 277      ( 12 Mar 2018 06:47 )             23.4     03-12    133<L>  |  98  |  12  ----------------------------<  129<H>  3.7   |  22  |  0.59    Ca    8.2<L>      12 Mar 2018 06:47  Phos  2.1     03-11  Mg     1.9     03-11

## 2018-03-12 NOTE — PROGRESS NOTE ADULT - SUBJECTIVE AND OBJECTIVE BOX
Vascular Surgery Progress Note:    Post-Operative Day: 4 s/p Left Femoral to Below Knee Popiteal Bypass with PTFE Graft, s/p Left Partial Hallux Amputation by Podiatry on 3/6.    Subjective:  Patient was examined at bedside. Febrile to 38 yesterday, afebrile overnight.  Surgical dressings were changed by Vascular Team this AM.      Objective:  T(C): 36.5 (18 @ 09:29), Max: 38 (18 @ 16:54)  HR: 86 (18 @ 09:29) (63 - 103)  BP: 128/78 (18 @ 09:29) (105/65 - 154/80)  RR: 16 (18 @ 09:29) (16 - 18)  SpO2: 97% (18 @ 09:29) (97% - 100%)  Wt(kg): --     @ 07:01  -   @ 07:00  --------------------------------------------------------  IN:    Oral Fluid: 1440 mL    Solution: 150 mL  Total IN: 1590 mL    OUT:    Voided: 1200 mL  Total OUT: 1200 mL    Total NET: 390 mL       @ 07:01  -   @ 10:08  --------------------------------------------------------  IN:  Total IN: 0 mL    OUT:    Voided: 225 mL  Total OUT: 225 mL    Total NET: -225 mL        Physical Exam:  General: NAD  Respiratory: Nonlabored breathing  Extremities: R AT/DP/PT signals. L AT/DP/PT signals. L first toe with wet gangrenous tip s/p partial amputation, wrapped (no strikethrough, managed by Podiatry team)                            8.3    9.1   )-----------( 277      ( 12 Mar 2018 06:47 )             23.4         133<L>  |  98  |  12  ----------------------------<  129<H>  3.7   |  22  |  0.59    Ca    8.2<L>      12 Mar 2018 06:47  Phos  2.1     03-11  Mg     1.9     03-11          Urinalysis Basic - ( 10 Mar 2018 20:39 )    Color: Yellow / Appearance: Clear / S.021 / pH: x  Gluc: x / Ketone: Negative  / Bili: Negative / Urobili: Negative   Blood: x / Protein: Trace / Nitrite: Negative   Leuk Esterase: Negative / RBC: x / WBC x   Sq Epi: x / Non Sq Epi: x / Bacteria: x

## 2018-03-12 NOTE — PROGRESS NOTE ADULT - SUBJECTIVE AND OBJECTIVE BOX
pt seen at bedside in NAD. s/p left partial hallux amp. dressing c/d/i, s/p LLE bypass  sutures intact no active bleeding,   applied  DSD  will need to monitor for healing  may ambulate in sx shoe and pending vasc clearance  follow up OR cultures  are no growth in clean bone  continue local care

## 2018-03-12 NOTE — PROGRESS NOTE ADULT - SUBJECTIVE AND OBJECTIVE BOX
Follow Up:      Inverval History/ROS:Patient is a 67y old  Male who presents with a chief complaint of Worsened left foot infection (2018 05:00)  No fever.    No events      Allergies    No Known Allergies    Intolerances        ANTIMICROBIALS:  ceFAZolin   IVPB 2000 every 8 hours      OTHER MEDS:  acetaminophen   Tablet. 650 milliGRAM(s) Oral every 6 hours PRN  acetaminophen  IVPB 1000 milliGRAM(s) IV Intermittent once PRN  aspirin enteric coated 81 milliGRAM(s) Oral daily  docusate sodium 100 milliGRAM(s) Oral three times a day  enoxaparin Injectable 40 milliGRAM(s) SubCutaneous every 24 hours  folic acid 1 milliGRAM(s) Oral daily  gabapentin 300 milliGRAM(s) Oral two times a day  insulin glargine Injectable (LANTUS) 30 Unit(s) SubCutaneous at bedtime  insulin glargine Injectable (LANTUS) 23 Unit(s) SubCutaneous every morning  insulin lispro (HumaLOG) corrective regimen sliding scale   SubCutaneous three times a day before meals  insulin lispro (HumaLOG) corrective regimen sliding scale   SubCutaneous at bedtime  metoprolol succinate ER 25 milliGRAM(s) Oral daily  multivitamin 1 Tablet(s) Oral daily  senna 2 Tablet(s) Oral at bedtime  tamsulosin 0.4 milliGRAM(s) Oral at bedtime  ticagrelor 90 milliGRAM(s) Oral two times a day      Vital Signs Last 24 Hrs  T(C): 37.8 (12 Mar 2018 13:51), Max: 38 (11 Mar 2018 16:54)  T(F): 100 (12 Mar 2018 13:51), Max: 100.4 (11 Mar 2018 16:54)  HR: 94 (12 Mar 2018 13:51) (86 - 103)  BP: 151/84 (12 Mar 2018 13:51) (113/72 - 154/80)  BP(mean): --  RR: 17 (12 Mar 2018 13:51) (16 - 18)  SpO2: 99% (12 Mar 2018 13:51) (97% - 100%)    PHYSICAL EXAM:  General: [x ] non-toxic  HEAD/EYES: [ ] PERRL [x ] white sclera [ ] icterus  ENT:  [ ] normal [ ] supple [ ] thrush [ ] pharyngeal exudate  Cardiovascular:   [ ] murmur [x ] normal [ ] PPM/AICD  Respiratory:  [x clear to ausculation bilaterally  GI:  [x ] soft, non-tender, normal bowel sounds  :  [ ] evans [ ] no CVA tenderness   Musculoskeletal:  [ ] no synovitis  Neurologic:  [ ] non-focal exam   Skin:  [x] no rash  Lymph: xx[ ] no lymphadenopathy  Psychiatric:  [x ] appropriate affect [ ] alert & oriented  Lines:  [x ] no phlebitis [ ] central line                                8.3    9.1   )-----------( 277      ( 12 Mar 2018 06:47 )             23.4       03-12    133<L>  |  98  |  12  ----------------------------<  129<H>  3.7   |  22  |  0.59    Ca    8.2<L>      12 Mar 2018 06:47  Phos  2.1     03-11  Mg     1.9     03-11        Urinalysis Basic - ( 10 Mar 2018 20:39 )    Color: Yellow / Appearance: Clear / S.021 / pH: x  Gluc: x / Ketone: Negative  / Bili: Negative / Urobili: Negative   Blood: x / Protein: Trace / Nitrite: Negative   Leuk Esterase: Negative / RBC: x / WBC x   Sq Epi: x / Non Sq Epi: x / Bacteria: x        MICROBIOLOGY:Culture Results:   No growth (03-10-18 @ 22:52)  Culture Results:   No growth to date. (03-10-18 @ 22:19)  Culture Results:   No growth to date. (03-10-18 @ 22:19)  Culture Results:   No growth at 5 days (18 @ 17:12)  Culture Results:   Testing in progress (18 @ 17:12)  Culture Results:   Rare Candida tropicalis (18 @ 17:05)  Culture Results:   Few Staphylococcus aureus  Few Staphylococcus lugdunensis ***********Note************  This isolate demonstrates inducible  clindamycin resistance.  Clindamycin may still be effective in some patients.  Rare Streptococcus agalactiae (Group B)  Group B streptococci are susceptible to ampicillin,  penicillin and cefazolin, but may be resistant to  erythromycin and clindamycin.  Recommendations for intrapartum prophylaxis for Group B  streptococci are penicillin or ampicillin. (18 @ 17:05)      RADIOLOGY:

## 2018-03-12 NOTE — PROGRESS NOTE ADULT - ASSESSMENT
67M s/p Left Femoral to Below Knee Popiteal Bypass with PTFE Graft, and Left Partial Hallux Amputation on 3/6 by Podiatry team,     - H/H stable but low, will give 1U PRBC today  -Continue cardiac meds for stent (Toprol); (Cardiac and Hospital recs appreciated)  -Continue ancef  MSSA in wound  -F/u Podiatry recs; OR cultures still pending  - trend fevers

## 2018-03-13 ENCOUNTER — APPOINTMENT (OUTPATIENT)
Dept: VASCULAR SURGERY | Facility: CLINIC | Age: 68
End: 2018-03-13

## 2018-03-13 LAB
ANION GAP SERPL CALC-SCNC: 11 MMOL/L — SIGNIFICANT CHANGE UP (ref 5–17)
BUN SERPL-MCNC: 14 MG/DL — SIGNIFICANT CHANGE UP (ref 7–23)
CALCIUM SERPL-MCNC: 8.6 MG/DL — SIGNIFICANT CHANGE UP (ref 8.4–10.5)
CHLORIDE SERPL-SCNC: 99 MMOL/L — SIGNIFICANT CHANGE UP (ref 96–108)
CO2 SERPL-SCNC: 24 MMOL/L — SIGNIFICANT CHANGE UP (ref 22–31)
CREAT SERPL-MCNC: 0.57 MG/DL — SIGNIFICANT CHANGE UP (ref 0.5–1.3)
GLUCOSE BLDC GLUCOMTR-MCNC: 176 MG/DL — HIGH (ref 70–99)
GLUCOSE BLDC GLUCOMTR-MCNC: 195 MG/DL — HIGH (ref 70–99)
GLUCOSE BLDC GLUCOMTR-MCNC: 252 MG/DL — HIGH (ref 70–99)
GLUCOSE BLDC GLUCOMTR-MCNC: 272 MG/DL — HIGH (ref 70–99)
GLUCOSE SERPL-MCNC: 186 MG/DL — HIGH (ref 70–99)
HCT VFR BLD CALC: 27.7 % — LOW (ref 39–50)
HGB BLD-MCNC: 10.1 G/DL — LOW (ref 13–17)
MAGNESIUM SERPL-MCNC: 1.7 MG/DL — SIGNIFICANT CHANGE UP (ref 1.6–2.6)
MCHC RBC-ENTMCNC: 32.8 PG — SIGNIFICANT CHANGE UP (ref 27–34)
MCHC RBC-ENTMCNC: 36.4 GM/DL — HIGH (ref 32–36)
MCV RBC AUTO: 90 FL — SIGNIFICANT CHANGE UP (ref 80–100)
PHOSPHATE SERPL-MCNC: 2.8 MG/DL — SIGNIFICANT CHANGE UP (ref 2.5–4.5)
PLATELET # BLD AUTO: 318 K/UL — SIGNIFICANT CHANGE UP (ref 150–400)
POTASSIUM SERPL-MCNC: 3.9 MMOL/L — SIGNIFICANT CHANGE UP (ref 3.5–5.3)
POTASSIUM SERPL-SCNC: 3.9 MMOL/L — SIGNIFICANT CHANGE UP (ref 3.5–5.3)
RBC # BLD: 3.08 M/UL — LOW (ref 4.2–5.8)
RBC # FLD: 12.7 % — SIGNIFICANT CHANGE UP (ref 10.3–14.5)
SODIUM SERPL-SCNC: 134 MMOL/L — LOW (ref 135–145)
WBC # BLD: 8.6 K/UL — SIGNIFICANT CHANGE UP (ref 3.8–10.5)
WBC # FLD AUTO: 8.6 K/UL — SIGNIFICANT CHANGE UP (ref 3.8–10.5)

## 2018-03-13 PROCEDURE — 99232 SBSQ HOSP IP/OBS MODERATE 35: CPT

## 2018-03-13 RX ORDER — POTASSIUM PHOSPHATE, MONOBASIC POTASSIUM PHOSPHATE, DIBASIC 236; 224 MG/ML; MG/ML
15 INJECTION, SOLUTION INTRAVENOUS ONCE
Qty: 0 | Refills: 0 | Status: COMPLETED | OUTPATIENT
Start: 2018-03-13 | End: 2018-03-13

## 2018-03-13 RX ORDER — MAGNESIUM SULFATE 500 MG/ML
2 VIAL (ML) INJECTION ONCE
Qty: 0 | Refills: 0 | Status: COMPLETED | OUTPATIENT
Start: 2018-03-13 | End: 2018-03-13

## 2018-03-13 RX ADMIN — INSULIN GLARGINE 23 UNIT(S): 100 INJECTION, SOLUTION SUBCUTANEOUS at 08:00

## 2018-03-13 RX ADMIN — INSULIN GLARGINE 30 UNIT(S): 100 INJECTION, SOLUTION SUBCUTANEOUS at 21:30

## 2018-03-13 RX ADMIN — Medication 100 MILLIGRAM(S): at 22:34

## 2018-03-13 RX ADMIN — Medication 2: at 08:00

## 2018-03-13 RX ADMIN — Medication 100 MILLIGRAM(S): at 13:14

## 2018-03-13 RX ADMIN — Medication 650 MILLIGRAM(S): at 22:36

## 2018-03-13 RX ADMIN — POTASSIUM PHOSPHATE, MONOBASIC POTASSIUM PHOSPHATE, DIBASIC 62.5 MILLIMOLE(S): 236; 224 INJECTION, SOLUTION INTRAVENOUS at 14:56

## 2018-03-13 RX ADMIN — TICAGRELOR 90 MILLIGRAM(S): 90 TABLET ORAL at 17:04

## 2018-03-13 RX ADMIN — Medication 100 MILLIGRAM(S): at 05:51

## 2018-03-13 RX ADMIN — Medication 25 MILLIGRAM(S): at 05:51

## 2018-03-13 RX ADMIN — Medication 81 MILLIGRAM(S): at 11:46

## 2018-03-13 RX ADMIN — Medication 6: at 21:30

## 2018-03-13 RX ADMIN — GABAPENTIN 300 MILLIGRAM(S): 400 CAPSULE ORAL at 05:51

## 2018-03-13 RX ADMIN — GABAPENTIN 300 MILLIGRAM(S): 400 CAPSULE ORAL at 17:04

## 2018-03-13 RX ADMIN — SENNA PLUS 2 TABLET(S): 8.6 TABLET ORAL at 22:34

## 2018-03-13 RX ADMIN — Medication 100 MILLIGRAM(S): at 13:15

## 2018-03-13 RX ADMIN — TICAGRELOR 90 MILLIGRAM(S): 90 TABLET ORAL at 05:51

## 2018-03-13 RX ADMIN — Medication 2: at 17:04

## 2018-03-13 RX ADMIN — Medication 6: at 11:46

## 2018-03-13 RX ADMIN — Medication 50 GRAM(S): at 13:51

## 2018-03-13 RX ADMIN — ENOXAPARIN SODIUM 40 MILLIGRAM(S): 100 INJECTION SUBCUTANEOUS at 22:34

## 2018-03-13 RX ADMIN — Medication 650 MILLIGRAM(S): at 23:06

## 2018-03-13 RX ADMIN — Medication 1 MILLIGRAM(S): at 11:46

## 2018-03-13 RX ADMIN — Medication 1 TABLET(S): at 11:46

## 2018-03-13 RX ADMIN — TAMSULOSIN HYDROCHLORIDE 0.4 MILLIGRAM(S): 0.4 CAPSULE ORAL at 22:34

## 2018-03-13 NOTE — PROGRESS NOTE ADULT - SUBJECTIVE AND OBJECTIVE BOX
CARDIOLOGY FOLLOW UP - Dr. Gavin    CC no chest pain or sob    PHYSICAL EXAM:  T(C): 37.2 (03-13-18 @ 08:31), Max: 37.8 (03-12-18 @ 13:51)  HR: 89 (03-13-18 @ 08:31) (89 - 103)  BP: 149/88 (03-13-18 @ 08:31) (149/88 - 161/90)  RR: 17 (03-13-18 @ 08:31) (17 - 18)  SpO2: 98% (03-13-18 @ 08:31) (97% - 99%)  Wt(kg): --  I&O's Summary    12 Mar 2018 07:01  -  13 Mar 2018 07:00  --------------------------------------------------------  IN: 1590 mL / OUT: 850 mL / NET: 740 mL    13 Mar 2018 07:01  -  13 Mar 2018 11:07  --------------------------------------------------------  IN: 0 mL / OUT: 350 mL / NET: -350 mL        Appearance: Normal	  Cardiovascular: Normal S1 S2,RRR, No JVD, No murmurs  Respiratory: Lungs clear to auscultation	  Gastrointestinal:  Soft, Non-tender, + BS	  Extremities: Normal range of motion,  left foot dressing, +1 trace edema       MEDICATIONS  (STANDING):  aspirin enteric coated 81 milliGRAM(s) Oral daily  ceFAZolin   IVPB 2000 milliGRAM(s) IV Intermittent every 8 hours  docusate sodium 100 milliGRAM(s) Oral three times a day  enoxaparin Injectable 40 milliGRAM(s) SubCutaneous every 24 hours  folic acid 1 milliGRAM(s) Oral daily  gabapentin 300 milliGRAM(s) Oral two times a day  insulin glargine Injectable (LANTUS) 30 Unit(s) SubCutaneous at bedtime  insulin glargine Injectable (LANTUS) 23 Unit(s) SubCutaneous every morning  insulin lispro (HumaLOG) corrective regimen sliding scale   SubCutaneous three times a day before meals  insulin lispro (HumaLOG) corrective regimen sliding scale   SubCutaneous at bedtime  metoprolol succinate ER 25 milliGRAM(s) Oral daily  multivitamin 1 Tablet(s) Oral daily  senna 2 Tablet(s) Oral at bedtime  tamsulosin 0.4 milliGRAM(s) Oral at bedtime  ticagrelor 90 milliGRAM(s) Oral two times a day      TELEMETRY: 	    ECG:  	  RADIOLOGY:   DIAGNOSTIC TESTING:  [ ] Echocardiogram:  [ ]  Catheterization:  [ ] Stress Test:    OTHER: 	    LABS:	 	                                10.1   8.6   )-----------( 318      ( 13 Mar 2018 07:00 )             27.7     03-13    134<L>  |  99  |  14  ----------------------------<  186<H>  3.9   |  24  |  0.57    Ca    8.6      13 Mar 2018 07:00  Phos  2.8     03-13  Mg     1.7     03-13

## 2018-03-13 NOTE — PROGRESS NOTE ADULT - ASSESSMENT
67M s/p Left Femoral to Below Knee Popiteal Bypass with PTFE Graft, and Left Partial Hallux Amputation on 3/6 by Podiatry team,     - H/H stable  -Continue cardiac meds for stent (Toprol); (Cardiac and Hospital recs appreciated)  -Continue ancef  MSSA in wound; will be discharged on Augmentin per ID note.  -F/u Podiatry recs  - Dispo: [facility]

## 2018-03-13 NOTE — PROGRESS NOTE ADULT - ASSESSMENT
67 year old male with history of DM type II, etoh abuse and pancreatitis, CAD, s/p PCI, PVD admitted with worsening left foot infection . Now s /p Left Partial Hallux Amputation on 3/6 and s/p Left Femoral to Below Knee Popiteal Bypass with PTFE Graft. Currently in SICU for post op hypotension     1. CAD s/p PCI  CV stable post op no chest pain or sob    EKG no evidence of ACS   no evidence of CHF on exam   recent echo revealing normal lv sys fx , minimal MR   recent cath performed in Feb 2018 ( CANDIDO x 1pLAD, CANDIDO x 1 dCx, CANDIDO x 1 pOM1 and RCA )  beside echo noted   cont asa 81mg daily/ brilinta / BB     2. PAD  s/p  LE bypass  vascular f/u     3. Foot ulcer/OM  IV abx  s/p left partial hallux amp   pod f/u   BC/ UC neg     4. Hypotension   post op , likely hypovolemic in the setting of recent surgery   ekg no evidence of MI   bedside echo  noted   s/p 1 unit PRBC, IVF, plasma and albumin in SICU   bp / hr improving  s/p 1 unit PRBC yesterday   monitor CBC   pod f/u     dvt ppx

## 2018-03-13 NOTE — PROGRESS NOTE ADULT - ASSESSMENT
Patient is a 67 year old male with a past medical history significant for DM type II, h/o EtOH abuse and  pancreatitis, Cad s/p stent,  PVD, presents with purulent drainage from the left great toe over the past week found to have dry gangrene of the distal lt great toe. + Leucocytosis trending down, no fever. No active drainage rt now. Staph aureus in wound cx.       S/p revascularization  Low suspicion for residual OM at time of toe amputation    Monitor for fever    Continue antibiotics through 3/20/2018    When stable for discharge change to augmentin 875 mg po 12 to finish course.     Call with additional questions.

## 2018-03-13 NOTE — PROGRESS NOTE ADULT - SUBJECTIVE AND OBJECTIVE BOX
Follow Up:      Inverval History/ROS:Patient is a 67y old  Male who presents with a chief complaint of Worsened left foot infection (27 Feb 2018 05:00)  Afebrile.   No events.      Allergies    No Known Allergies    Intolerances        ANTIMICROBIALS:  ceFAZolin   IVPB 2000 every 8 hours    Day #14 of IV antibiotics      OTHER MEDS:  acetaminophen   Tablet. 650 milliGRAM(s) Oral every 6 hours PRN  acetaminophen  IVPB 1000 milliGRAM(s) IV Intermittent once PRN  aspirin enteric coated 81 milliGRAM(s) Oral daily  docusate sodium 100 milliGRAM(s) Oral three times a day  enoxaparin Injectable 40 milliGRAM(s) SubCutaneous every 24 hours  folic acid 1 milliGRAM(s) Oral daily  gabapentin 300 milliGRAM(s) Oral two times a day  insulin glargine Injectable (LANTUS) 30 Unit(s) SubCutaneous at bedtime  insulin glargine Injectable (LANTUS) 23 Unit(s) SubCutaneous every morning  insulin lispro (HumaLOG) corrective regimen sliding scale   SubCutaneous three times a day before meals  insulin lispro (HumaLOG) corrective regimen sliding scale   SubCutaneous at bedtime  metoprolol succinate ER 25 milliGRAM(s) Oral daily  multivitamin 1 Tablet(s) Oral daily  senna 2 Tablet(s) Oral at bedtime  tamsulosin 0.4 milliGRAM(s) Oral at bedtime  ticagrelor 90 milliGRAM(s) Oral two times a day      Vital Signs Last 24 Hrs  T(C): 37.2 (13 Mar 2018 08:31), Max: 37.8 (12 Mar 2018 13:51)  T(F): 98.9 (13 Mar 2018 08:31), Max: 100 (12 Mar 2018 13:51)  HR: 89 (13 Mar 2018 08:31) (89 - 103)  BP: 149/88 (13 Mar 2018 08:31) (149/88 - 161/90)  BP(mean): --  RR: 17 (13 Mar 2018 08:31) (17 - 18)  SpO2: 98% (13 Mar 2018 08:31) (97% - 99%)    PHYSICAL EXAM:  General: [x ] non-toxic  HEAD/EYES: [ ] PERRL [ x] white sclera [ ] icterus  ENT:  [ ] normal [x ] supple [ ] thrush [ ] pharyngeal exudate  Cardiovascular:   [ ] murmur [x ] normal [ ] PPM/AICD  Respiratory:  [ x] clear to ausculation bilaterally  GI:  [x ] soft, non-tender, normal bowel sounds  :  [ ] evans [ ] no CVA tenderness   Musculoskeletal:  [ ] no synovitis  Neurologic:  [ ] non-focal exam   Skin:  x[ ] no rash  Lymph: [ ] no lymphadenopathy  Psychiatric:  [x ] appropriate affect [ ] alert & oriented  Lines:  [x ] no phlebitis [ ] central line                                10.1   8.6   )-----------( 318      ( 13 Mar 2018 07:00 )             27.7       03-13    134<L>  |  99  |  14  ----------------------------<  186<H>  3.9   |  24  |  0.57    Ca    8.6      13 Mar 2018 07:00  Phos  2.8     03-13  Mg     1.7     03-13            MICROBIOLOGY:Culture Results:   No growth (03-10-18 @ 22:52)  Culture Results:   No growth to date. (03-10-18 @ 22:19)  Culture Results:   No growth to date. (03-10-18 @ 22:19)  Culture Results:   No growth at 5 days (03-06-18 @ 17:12)  Culture Results:   Testing in progress (03-06-18 @ 17:12)  Culture Results:   Rare Candida tropicalis (03-06-18 @ 17:05)  Culture Results:   Few Staphylococcus aureus  Few Staphylococcus lugdunensis ***********Note************  This isolate demonstrates inducible  clindamycin resistance.  Clindamycin may still be effective in some patients.  Rare Streptococcus agalactiae (Group B)  Group B streptococci are susceptible to ampicillin,  penicillin and cefazolin, but may be resistant to  erythromycin and clindamycin.  Recommendations for intrapartum prophylaxis for Group B  streptococci are penicillin or ampicillin. (03-06-18 @ 17:05)      RADIOLOGY:

## 2018-03-13 NOTE — PROGRESS NOTE ADULT - SUBJECTIVE AND OBJECTIVE BOX
General Surgery Progress Note  CHAPIS BALLESTEROS    S: Received 1 u pRBC yesterday. H/H 10/27 today. No acute events.    O:  T(C): 37.2 (03-13-18 @ 08:31), Max: 37.8 (03-12-18 @ 13:51)  HR: 89 (03-13-18 @ 08:31) (89 - 103)  BP: 149/88 (03-13-18 @ 08:31) (149/88 - 161/90)  RR: 17 (03-13-18 @ 08:31) (17 - 18)  SpO2: 98% (03-13-18 @ 08:31) (97% - 99%)      Physical Exam:  General: NAD  Respiratory: Nonlabored breathing  Extremities: R AT/DP/PT signals. L AT/DP/PT signals. L first toe with wet gangrenous tip s/p partial amputation, wrapped (no strikethrough, managed by Podiatry team)

## 2018-03-14 VITALS
SYSTOLIC BLOOD PRESSURE: 132 MMHG | RESPIRATION RATE: 18 BRPM | HEART RATE: 97 BPM | DIASTOLIC BLOOD PRESSURE: 75 MMHG | TEMPERATURE: 98 F | OXYGEN SATURATION: 98 %

## 2018-03-14 LAB
ANION GAP SERPL CALC-SCNC: 12 MMOL/L — SIGNIFICANT CHANGE UP (ref 5–17)
BUN SERPL-MCNC: 16 MG/DL — SIGNIFICANT CHANGE UP (ref 7–23)
CALCIUM SERPL-MCNC: 8.7 MG/DL — SIGNIFICANT CHANGE UP (ref 8.4–10.5)
CHLORIDE SERPL-SCNC: 98 MMOL/L — SIGNIFICANT CHANGE UP (ref 96–108)
CO2 SERPL-SCNC: 23 MMOL/L — SIGNIFICANT CHANGE UP (ref 22–31)
CREAT SERPL-MCNC: 0.6 MG/DL — SIGNIFICANT CHANGE UP (ref 0.5–1.3)
GLUCOSE BLDC GLUCOMTR-MCNC: 144 MG/DL — HIGH (ref 70–99)
GLUCOSE BLDC GLUCOMTR-MCNC: 300 MG/DL — HIGH (ref 70–99)
GLUCOSE SERPL-MCNC: 156 MG/DL — HIGH (ref 70–99)
HCT VFR BLD CALC: 28.8 % — LOW (ref 39–50)
HGB BLD-MCNC: 10 G/DL — LOW (ref 13–17)
MAGNESIUM SERPL-MCNC: 1.9 MG/DL — SIGNIFICANT CHANGE UP (ref 1.6–2.6)
MCHC RBC-ENTMCNC: 31.5 PG — SIGNIFICANT CHANGE UP (ref 27–34)
MCHC RBC-ENTMCNC: 34.8 GM/DL — SIGNIFICANT CHANGE UP (ref 32–36)
MCV RBC AUTO: 90.5 FL — SIGNIFICANT CHANGE UP (ref 80–100)
PHOSPHATE SERPL-MCNC: 3.3 MG/DL — SIGNIFICANT CHANGE UP (ref 2.5–4.5)
PLATELET # BLD AUTO: 355 K/UL — SIGNIFICANT CHANGE UP (ref 150–400)
POTASSIUM SERPL-MCNC: 4.2 MMOL/L — SIGNIFICANT CHANGE UP (ref 3.5–5.3)
POTASSIUM SERPL-SCNC: 4.2 MMOL/L — SIGNIFICANT CHANGE UP (ref 3.5–5.3)
RBC # BLD: 3.19 M/UL — LOW (ref 4.2–5.8)
RBC # FLD: 12.7 % — SIGNIFICANT CHANGE UP (ref 10.3–14.5)
SODIUM SERPL-SCNC: 133 MMOL/L — LOW (ref 135–145)
WBC # BLD: 9.8 K/UL — SIGNIFICANT CHANGE UP (ref 3.8–10.5)
WBC # FLD AUTO: 9.8 K/UL — SIGNIFICANT CHANGE UP (ref 3.8–10.5)

## 2018-03-14 PROCEDURE — 82330 ASSAY OF CALCIUM: CPT

## 2018-03-14 PROCEDURE — 82550 ASSAY OF CK (CPK): CPT

## 2018-03-14 PROCEDURE — 73630 X-RAY EXAM OF FOOT: CPT

## 2018-03-14 PROCEDURE — 86900 BLOOD TYPING SEROLOGIC ABO: CPT

## 2018-03-14 PROCEDURE — 99285 EMERGENCY DEPT VISIT HI MDM: CPT | Mod: 25

## 2018-03-14 PROCEDURE — 86850 RBC ANTIBODY SCREEN: CPT

## 2018-03-14 PROCEDURE — 85730 THROMBOPLASTIN TIME PARTIAL: CPT

## 2018-03-14 PROCEDURE — 84100 ASSAY OF PHOSPHORUS: CPT

## 2018-03-14 PROCEDURE — 93970 EXTREMITY STUDY: CPT

## 2018-03-14 PROCEDURE — 84132 ASSAY OF SERUM POTASSIUM: CPT

## 2018-03-14 PROCEDURE — 84295 ASSAY OF SERUM SODIUM: CPT

## 2018-03-14 PROCEDURE — 83735 ASSAY OF MAGNESIUM: CPT

## 2018-03-14 PROCEDURE — 82565 ASSAY OF CREATININE: CPT

## 2018-03-14 PROCEDURE — 87116 MYCOBACTERIA CULTURE: CPT

## 2018-03-14 PROCEDURE — 99232 SBSQ HOSP IP/OBS MODERATE 35: CPT

## 2018-03-14 PROCEDURE — 86901 BLOOD TYPING SEROLOGIC RH(D): CPT

## 2018-03-14 PROCEDURE — 84484 ASSAY OF TROPONIN QUANT: CPT

## 2018-03-14 PROCEDURE — P9045: CPT

## 2018-03-14 PROCEDURE — 36430 TRANSFUSION BLD/BLD COMPNT: CPT

## 2018-03-14 PROCEDURE — 86923 COMPATIBILITY TEST ELECTRIC: CPT

## 2018-03-14 PROCEDURE — 87206 SMEAR FLUORESCENT/ACID STAI: CPT

## 2018-03-14 PROCEDURE — C1769: CPT

## 2018-03-14 PROCEDURE — 88305 TISSUE EXAM BY PATHOLOGIST: CPT

## 2018-03-14 PROCEDURE — 87070 CULTURE OTHR SPECIMN AEROBIC: CPT

## 2018-03-14 PROCEDURE — 85027 COMPLETE CBC AUTOMATED: CPT

## 2018-03-14 PROCEDURE — 82947 ASSAY GLUCOSE BLOOD QUANT: CPT

## 2018-03-14 PROCEDURE — 81001 URINALYSIS AUTO W/SCOPE: CPT

## 2018-03-14 PROCEDURE — 82553 CREATINE MB FRACTION: CPT

## 2018-03-14 PROCEDURE — 96375 TX/PRO/DX INJ NEW DRUG ADDON: CPT

## 2018-03-14 PROCEDURE — 87102 FUNGUS ISOLATION CULTURE: CPT

## 2018-03-14 PROCEDURE — 97530 THERAPEUTIC ACTIVITIES: CPT

## 2018-03-14 PROCEDURE — 97116 GAIT TRAINING THERAPY: CPT

## 2018-03-14 PROCEDURE — 82803 BLOOD GASES ANY COMBINATION: CPT

## 2018-03-14 PROCEDURE — C1889: CPT

## 2018-03-14 PROCEDURE — 85610 PROTHROMBIN TIME: CPT

## 2018-03-14 PROCEDURE — 87075 CULTR BACTERIA EXCEPT BLOOD: CPT

## 2018-03-14 PROCEDURE — 80048 BASIC METABOLIC PNL TOTAL CA: CPT

## 2018-03-14 PROCEDURE — 71045 X-RAY EXAM CHEST 1 VIEW: CPT

## 2018-03-14 PROCEDURE — 97164 PT RE-EVAL EST PLAN CARE: CPT

## 2018-03-14 PROCEDURE — 83605 ASSAY OF LACTIC ACID: CPT

## 2018-03-14 PROCEDURE — 93005 ELECTROCARDIOGRAM TRACING: CPT

## 2018-03-14 PROCEDURE — 87086 URINE CULTURE/COLONY COUNT: CPT

## 2018-03-14 PROCEDURE — 88311 DECALCIFY TISSUE: CPT

## 2018-03-14 PROCEDURE — 81003 URINALYSIS AUTO W/O SCOPE: CPT

## 2018-03-14 PROCEDURE — 97162 PT EVAL MOD COMPLEX 30 MIN: CPT

## 2018-03-14 PROCEDURE — 82962 GLUCOSE BLOOD TEST: CPT

## 2018-03-14 PROCEDURE — 82435 ASSAY OF BLOOD CHLORIDE: CPT

## 2018-03-14 PROCEDURE — 96374 THER/PROPH/DIAG INJ IV PUSH: CPT

## 2018-03-14 PROCEDURE — 83880 ASSAY OF NATRIURETIC PEPTIDE: CPT

## 2018-03-14 PROCEDURE — 80053 COMPREHEN METABOLIC PANEL: CPT

## 2018-03-14 PROCEDURE — C1768: CPT

## 2018-03-14 PROCEDURE — P9016: CPT

## 2018-03-14 PROCEDURE — 87015 SPECIMEN INFECT AGNT CONCNTJ: CPT

## 2018-03-14 PROCEDURE — 87040 BLOOD CULTURE FOR BACTERIA: CPT

## 2018-03-14 PROCEDURE — 87186 SC STD MICRODIL/AGAR DIL: CPT

## 2018-03-14 PROCEDURE — 85014 HEMATOCRIT: CPT

## 2018-03-14 RX ORDER — METOPROLOL TARTRATE 50 MG
50 TABLET ORAL DAILY
Qty: 0 | Refills: 0 | Status: DISCONTINUED | OUTPATIENT
Start: 2018-03-14 | End: 2018-03-14

## 2018-03-14 RX ORDER — ACETAMINOPHEN 500 MG
2 TABLET ORAL
Qty: 0 | Refills: 0 | COMMUNITY
Start: 2018-03-14

## 2018-03-14 RX ORDER — DOCUSATE SODIUM 100 MG
1 CAPSULE ORAL
Qty: 0 | Refills: 0 | COMMUNITY
Start: 2018-03-14

## 2018-03-14 RX ORDER — METOPROLOL TARTRATE 50 MG
1 TABLET ORAL
Qty: 0 | Refills: 0 | COMMUNITY
Start: 2018-03-14

## 2018-03-14 RX ORDER — SENNA PLUS 8.6 MG/1
2 TABLET ORAL
Qty: 0 | Refills: 0 | COMMUNITY
Start: 2018-03-14

## 2018-03-14 RX ORDER — CEFAZOLIN SODIUM 1 G
2000 VIAL (EA) INJECTION EVERY 8 HOURS
Qty: 0 | Refills: 0 | Status: DISCONTINUED | OUTPATIENT
Start: 2018-03-14 | End: 2018-03-14

## 2018-03-14 RX ORDER — TAMSULOSIN HYDROCHLORIDE 0.4 MG/1
1 CAPSULE ORAL
Qty: 0 | Refills: 0 | COMMUNITY
Start: 2018-03-14

## 2018-03-14 RX ADMIN — Medication 650 MILLIGRAM(S): at 14:57

## 2018-03-14 RX ADMIN — Medication 1 MILLIGRAM(S): at 12:07

## 2018-03-14 RX ADMIN — Medication 100 MILLIGRAM(S): at 14:15

## 2018-03-14 RX ADMIN — TICAGRELOR 90 MILLIGRAM(S): 90 TABLET ORAL at 05:02

## 2018-03-14 RX ADMIN — Medication 6: at 12:04

## 2018-03-14 RX ADMIN — Medication 81 MILLIGRAM(S): at 12:06

## 2018-03-14 RX ADMIN — Medication 50 MILLIGRAM(S): at 14:13

## 2018-03-14 RX ADMIN — Medication 100 MILLIGRAM(S): at 14:14

## 2018-03-14 RX ADMIN — Medication 1 TABLET(S): at 12:07

## 2018-03-14 RX ADMIN — INSULIN GLARGINE 23 UNIT(S): 100 INJECTION, SOLUTION SUBCUTANEOUS at 08:25

## 2018-03-14 RX ADMIN — GABAPENTIN 300 MILLIGRAM(S): 400 CAPSULE ORAL at 05:02

## 2018-03-14 RX ADMIN — Medication 100 MILLIGRAM(S): at 05:02

## 2018-03-14 RX ADMIN — Medication 25 MILLIGRAM(S): at 05:02

## 2018-03-14 RX ADMIN — Medication 100 MILLIGRAM(S): at 05:03

## 2018-03-14 NOTE — PROGRESS NOTE ADULT - PROVIDER SPECIALTY LIST ADULT
Cardiology
Hospitalist
Infectious Disease
Internal Medicine
Podiatry
SICU
Vascular Surgery
Cardiology
Hospitalist
Infectious Disease
SICU
Vascular Surgery

## 2018-03-14 NOTE — PROGRESS NOTE ADULT - SUBJECTIVE AND OBJECTIVE BOX
General Surgery Progress Note    S: No events overnight.  Pain is well controlled.    O:  T(C): 37 (03-14-18 @ 05:01), Max: 37.4 (03-13-18 @ 20:58)  HR: 92 (03-14-18 @ 05:01) (89 - 96)  BP: 160/90 (03-14-18 @ 05:01) (149/88 - 167/84)  RR: 18 (03-14-18 @ 05:01) (17 - 18)  SpO2: 100% (03-14-18 @ 05:01) (94% - 100%)  Wt(kg): --    03-13 @ 07:01  -  03-14 @ 07:00  --------------------------------------------------------  IN:    Oral Fluid: 1260 mL    Solution: 150 mL    Solution: 175 mL  Total IN: 1585 mL    OUT:    Voided: 1950 mL  Total OUT: 1950 mL    Total NET: -365 mL        Physical Exam:  General: NAD  Respiratory: Nonlabored breathing  Extremities: R AT/DP/PT signals. L AT/DP/PT signals. L first toe with wet gangrenous tip s/p partial amputation, wrapped (no strikethrough, managed by Podiatry team)                            10.0   9.8   )-----------( 355      ( 14 Mar 2018 05:57 )             28.8     03-14    133<L>  |  98  |  16  ----------------------------<  156<H>  4.2   |  23  |  0.60    Ca    8.7      14 Mar 2018 05:57  Phos  3.3     03-14  Mg     1.9     03-14 General Surgery Progress Note    S: No events overnight.  Pain is well controlled.    O:  T(C): 37 (03-14-18 @ 05:01), Max: 37.4 (03-13-18 @ 20:58)  HR: 92 (03-14-18 @ 05:01) (89 - 96)  BP: 160/90 (03-14-18 @ 05:01) (149/88 - 167/84)  RR: 18 (03-14-18 @ 05:01) (17 - 18)  SpO2: 100% (03-14-18 @ 05:01) (94% - 100%)  Wt(kg): --    03-13 @ 07:01  -  03-14 @ 07:00  --------------------------------------------------------  IN:    Oral Fluid: 1260 mL    Solution: 150 mL    Solution: 175 mL  Total IN: 1585 mL    OUT:    Voided: 1950 mL  Total OUT: 1950 mL    Total NET: -365 mL        Physical Exam:  General: NAD  Respiratory: Nonlabored breathing  Extremities: R AT/DP/PT signals. L AT/DP/PT signals. L first toe  amp site healing well well perfused                          10.0   9.8   )-----------( 355      ( 14 Mar 2018 05:57 )             28.8     03-14    133<L>  |  98  |  16  ----------------------------<  156<H>  4.2   |  23  |  0.60    Ca    8.7      14 Mar 2018 05:57  Phos  3.3     03-14  Mg     1.9     03-14

## 2018-03-14 NOTE — PROGRESS NOTE ADULT - SUBJECTIVE AND OBJECTIVE BOX
pt seen at bedside in NAD. dressing to left foot c/d/i  left dressing intact. pt denies any pain at this time in the foot.  pt s/p bypass left LE  will continue to monitor while in house  abx as per ID

## 2018-03-14 NOTE — PROGRESS NOTE ADULT - NSHPATTENDINGPLANDISCUSS_GEN_ALL_CORE
medicine np
family and resident
patient
resident
surg ho
surg ho
wife, surgery
surg ho

## 2018-03-14 NOTE — PROGRESS NOTE ADULT - SUBJECTIVE AND OBJECTIVE BOX
Follow Up:      Inverval History/ROS:Patient is a 67y old  Male who presents with a chief complaint of Worsened left foot infection (27 Feb 2018 05:00)    No fever. no events.    Allergies    No Known Allergies    Intolerances        ANTIMICROBIALS:  ceFAZolin   IVPB 2000 every 8 hours      OTHER MEDS:  acetaminophen   Tablet. 650 milliGRAM(s) Oral every 6 hours PRN  acetaminophen  IVPB 1000 milliGRAM(s) IV Intermittent once PRN  aspirin enteric coated 81 milliGRAM(s) Oral daily  docusate sodium 100 milliGRAM(s) Oral three times a day  enoxaparin Injectable 40 milliGRAM(s) SubCutaneous every 24 hours  folic acid 1 milliGRAM(s) Oral daily  gabapentin 300 milliGRAM(s) Oral two times a day  insulin glargine Injectable (LANTUS) 30 Unit(s) SubCutaneous at bedtime  insulin glargine Injectable (LANTUS) 23 Unit(s) SubCutaneous every morning  insulin lispro (HumaLOG) corrective regimen sliding scale   SubCutaneous three times a day before meals  insulin lispro (HumaLOG) corrective regimen sliding scale   SubCutaneous at bedtime  metoprolol succinate ER 25 milliGRAM(s) Oral daily  multivitamin 1 Tablet(s) Oral daily  senna 2 Tablet(s) Oral at bedtime  tamsulosin 0.4 milliGRAM(s) Oral at bedtime  ticagrelor 90 milliGRAM(s) Oral two times a day      Vital Signs Last 24 Hrs  T(C): 37 (14 Mar 2018 05:01), Max: 37.4 (13 Mar 2018 20:58)  T(F): 98.6 (14 Mar 2018 05:01), Max: 99.4 (13 Mar 2018 20:58)  HR: 92 (14 Mar 2018 05:01) (91 - 96)  BP: 160/90 (14 Mar 2018 05:01) (152/82 - 167/84)  BP(mean): --  RR: 18 (14 Mar 2018 05:01) (17 - 18)  SpO2: 100% (14 Mar 2018 05:01) (94% - 100%)    PHYSICAL EXAM:  General: [x ] non-toxic  HEAD/EYES: [ ] PERRL [ ] white sclera [ ] icterus  ENT:  [ ] normal [x ] supple [ ] thrush [ ] pharyngeal exudate  Cardiovascular:   [ ] murmur [ x] normal [ ] PPM/AICD  Respiratory:  [ ] clear to ausculation bilaterally  GI:  [x ] soft, non-tender, normal bowel sounds  :  [ ] evans [ ] no CVA tenderness   Musculoskeletal:  [ ] no synovitis  Neurologic:  [ ] non-focal exam   Skin:  [x ] no rash  Lymph: [ ] no lymphadenopathy  Psychiatric:  [x ] appropriate affect [ ] alert & oriented  Lines:  x[ ] no phlebitis [ ] central line                                10.0   9.8   )-----------( 355      ( 14 Mar 2018 05:57 )             28.8       03-14    133<L>  |  98  |  16  ----------------------------<  156<H>  4.2   |  23  |  0.60    Ca    8.7      14 Mar 2018 05:57  Phos  3.3     03-14  Mg     1.9     03-14            MICROBIOLOGY:Culture Results:   No growth (03-10-18 @ 22:52)  Culture Results:   No growth to date. (03-10-18 @ 22:19)  Culture Results:   No growth to date. (03-10-18 @ 22:19)      RADIOLOGY:

## 2018-03-14 NOTE — PROGRESS NOTE ADULT - ASSESSMENT
Patient is a 67 year old male with a past medical history significant for DM type II, h/o EtOH abuse and  pancreatitis, Cad s/p stent,  PVD, presents with purulent drainage from the left great toe over the past week found to have dry gangrene of the distal lt great toe. + Leucocytosis trending down, no fever. No active drainage rt now. Staph aureus in wound cx.       S/p revascularization  Low suspicion for residual OM at time of toe amputation    Monitor for fever    Continue antibiotics through 3/20/2018    When stable for discharge change to augmentin 875 mg po 12 to finish course.   Discussed plan with podiatry    Call with additional questions. Will sign off.

## 2018-03-14 NOTE — PROGRESS NOTE ADULT - ASSESSMENT
67M s/p Left Femoral to Below Knee Popiteal Bypass with PTFE Graft, and Left Partial Hallux Amputation on 3/6 by Podiatry team,     - H/H stable  -Continue cardiac meds for stent (Toprol); (Cardiac and Hospital recs appreciated)  -Continue ancef  MSSA in wound; will be discharged on Augmentin per ID note.  -F/u Podiatry recs  - Dispo: [facility] 67M s/p Left Femoral to Below Knee Popiteal Bypass with PTFE Graft, and Left Partial Hallux Amputation on 3/6 by Podiatry team,     - d/c plan

## 2018-03-14 NOTE — PROGRESS NOTE ADULT - SUBJECTIVE AND OBJECTIVE BOX
CARDIOLOGY FOLLOW UP - Dr. Gavin    CC no chest pain or sob       PHYSICAL EXAM:  T(C): 37 (03-14-18 @ 05:01), Max: 37.4 (03-13-18 @ 20:58)  HR: 92 (03-14-18 @ 05:01) (91 - 96)  BP: 160/90 (03-14-18 @ 05:01) (152/82 - 167/84)  RR: 18 (03-14-18 @ 05:01) (17 - 18)  SpO2: 100% (03-14-18 @ 05:01) (94% - 100%)  Wt(kg): --  I&O's Summary    13 Mar 2018 07:01  -  14 Mar 2018 07:00  --------------------------------------------------------  IN: 1585 mL / OUT: 1950 mL / NET: -365 mL    14 Mar 2018 07:01  -  14 Mar 2018 10:06  --------------------------------------------------------  IN: 0 mL / OUT: 340 mL / NET: -340 mL        Appearance: Normal	  Cardiovascular: Normal S1 S2,RRR, No JVD, No murmurs  Respiratory: Lungs clear to auscultation	  Gastrointestinal:  Soft, Non-tender, + BS	  Extremities: Normal range of motion, Left foot dressing and +1 edema        MEDICATIONS  (STANDING):  aspirin enteric coated 81 milliGRAM(s) Oral daily  ceFAZolin   IVPB 2000 milliGRAM(s) IV Intermittent every 8 hours  docusate sodium 100 milliGRAM(s) Oral three times a day  enoxaparin Injectable 40 milliGRAM(s) SubCutaneous every 24 hours  folic acid 1 milliGRAM(s) Oral daily  gabapentin 300 milliGRAM(s) Oral two times a day  insulin glargine Injectable (LANTUS) 30 Unit(s) SubCutaneous at bedtime  insulin glargine Injectable (LANTUS) 23 Unit(s) SubCutaneous every morning  insulin lispro (HumaLOG) corrective regimen sliding scale   SubCutaneous three times a day before meals  insulin lispro (HumaLOG) corrective regimen sliding scale   SubCutaneous at bedtime  metoprolol succinate ER 25 milliGRAM(s) Oral daily  multivitamin 1 Tablet(s) Oral daily  senna 2 Tablet(s) Oral at bedtime  tamsulosin 0.4 milliGRAM(s) Oral at bedtime  ticagrelor 90 milliGRAM(s) Oral two times a day      TELEMETRY: 	    ECG:  	  RADIOLOGY:   DIAGNOSTIC TESTING:  [ ] Echocardiogram:  [ ]  Catheterization:  [ ] Stress Test:    OTHER: 	    LABS:	 	                                10.0   9.8   )-----------( 355      ( 14 Mar 2018 05:57 )             28.8     03-14    133<L>  |  98  |  16  ----------------------------<  156<H>  4.2   |  23  |  0.60    Ca    8.7      14 Mar 2018 05:57  Phos  3.3     03-14  Mg     1.9     03-14

## 2018-03-14 NOTE — PROGRESS NOTE ADULT - ASSESSMENT
67 year old male with history of DM type II, etoh abuse and pancreatitis, CAD, s/p PCI, PVD admitted with worsening left foot infection . Now s /p Left Partial Hallux Amputation on 3/6 and s/p Left Femoral to Below Knee Popiteal Bypass with PTFE Graft. Currently in SICU for post op hypotension     1. CAD s/p PCI  CV stable post op no chest pain or sob    EKG no evidence of ACS   no evidence of CHF on exam   recent echo revealing normal lv sys fx , minimal MR   recent cath performed in Feb 2018 ( CANDIDO x 1pLAD, CANDIDO x 1 dCx, CANDIDO x 1 pOM1 and RCA )  beside echo noted   cont asa 81mg daily/ brilinta / BB     2. PAD  s/p  LE bypass  vascular f/u     3. Foot ulcer/OM  IV abx  s/p left partial hallux amp   pod f/u   BC/ UC neg     4. Hypotension   post op , likely hypovolemic in the setting of recent surgery   ekg no evidence of MI   bedside echo  noted   s/p 1 unit PRBC, IVF, plasma and albumin in SICU   bp / hr improving  s/p 1 unit PRBC yesterday   monitor CBC   pod f/u     dvt ppx 67 year old male with history of DM type II, etoh abuse and pancreatitis, CAD, s/p PCI, PVD admitted with worsening left foot infection . Now s /p Left Partial Hallux Amputation on 3/6 and s/p Left Femoral to Below Knee Popiteal Bypass with PTFE Graft. Currently in SICU for post op hypotension     1. CAD s/p PCI  CV stable post op no chest pain or sob    EKG no evidence of ACS   no evidence of CHF on exam   recent echo revealing normal lv sys fx , minimal MR   recent cath performed in Feb 2018 ( CANDIDO x 1pLAD, CANDIDO x 1 dCx, CANDIDO x 1 pOM1 and RCA )  beside echo noted   cont asa 81mg daily/ brilinta / BB     2. PAD  s/p  LE bypass  vascular f/u     3. Foot ulcer/OM  IV abx  s/p left partial hallux amp   pod f/u   BC/ UC neg     4. Hypotension   resolved   post op , likely hypovolemic in the setting of recent surgery   ekg no evidence of MI   s/p  PRBC, IVF, plasma and albumin   monitor CBC   if BP remains elevated, increase toprol to 50 mg po daily   pod f/u     dvt ppx

## 2018-03-16 LAB
CULTURE RESULTS: SIGNIFICANT CHANGE UP
CULTURE RESULTS: SIGNIFICANT CHANGE UP
SPECIMEN SOURCE: SIGNIFICANT CHANGE UP
SPECIMEN SOURCE: SIGNIFICANT CHANGE UP

## 2018-04-20 ENCOUNTER — APPOINTMENT (OUTPATIENT)
Dept: VASCULAR SURGERY | Facility: CLINIC | Age: 68
End: 2018-04-20
Payer: MEDICARE

## 2018-04-20 VITALS
BODY MASS INDEX: 23.32 KG/M2 | HEART RATE: 88 BPM | WEIGHT: 140 LBS | SYSTOLIC BLOOD PRESSURE: 139 MMHG | TEMPERATURE: 98 F | DIASTOLIC BLOOD PRESSURE: 81 MMHG | HEIGHT: 65 IN

## 2018-04-20 PROCEDURE — 99024 POSTOP FOLLOW-UP VISIT: CPT

## 2018-04-20 RX ORDER — ASPIRIN 81 MG
81 TABLET, DELAYED RELEASE (ENTERIC COATED) ORAL
Refills: 0 | Status: ACTIVE | COMMUNITY

## 2018-04-20 RX ORDER — BLOOD SUGAR DIAGNOSTIC
STRIP MISCELLANEOUS
Qty: 100 | Refills: 0 | Status: ACTIVE | COMMUNITY
Start: 2017-09-18

## 2018-04-20 RX ORDER — NYSTATIN AND TRIAMCINOLONE ACETONIDE 100000; 1 MG/G; MG/G
100000-0.1 CREAM TOPICAL
Qty: 240 | Refills: 0 | Status: ACTIVE | COMMUNITY
Start: 2018-02-20

## 2018-04-20 RX ORDER — ALOE VERA/COLLAGEN
FOAM (ML) TOPICAL
Refills: 0 | Status: ACTIVE | COMMUNITY

## 2018-04-20 RX ORDER — FOLIC ACID 1 MG/1
1 TABLET ORAL
Refills: 0 | Status: ACTIVE | COMMUNITY

## 2018-04-20 RX ORDER — SENNOSIDES 8.6 MG TABLETS 8.6 MG/1
8.6 TABLET ORAL
Refills: 0 | Status: ACTIVE | COMMUNITY

## 2018-04-20 RX ORDER — TRAMADOL HYDROCHLORIDE 50 MG/1
50 TABLET, COATED ORAL
Refills: 0 | Status: ACTIVE | COMMUNITY

## 2018-04-20 RX ORDER — DOCUSATE SODIUM 100 MG/1
100 CAPSULE ORAL
Refills: 0 | Status: ACTIVE | COMMUNITY

## 2018-04-20 RX ORDER — ELECTROLYTES/DEXTROSE
SOLUTION, ORAL ORAL
Refills: 0 | Status: ACTIVE | COMMUNITY

## 2018-04-20 RX ORDER — NYSTATIN 100000 [USP'U]/G
100000 POWDER TOPICAL
Refills: 0 | Status: ACTIVE | COMMUNITY

## 2018-04-20 RX ORDER — AMOXICILLIN AND CLAVULANATE POTASSIUM 875; 125 MG/1; MG/1
875-125 TABLET, COATED ORAL
Qty: 20 | Refills: 0 | Status: ACTIVE | COMMUNITY
Start: 2018-02-20

## 2018-04-20 RX ORDER — TICAGRELOR 90 MG/1
90 TABLET ORAL
Refills: 0 | Status: ACTIVE | COMMUNITY

## 2018-04-20 RX ORDER — POVIDONE-IODINE 10 MG/ML
10 SOLUTION TOPICAL
Refills: 0 | Status: ACTIVE | COMMUNITY

## 2018-04-20 RX ORDER — TAMSULOSIN HYDROCHLORIDE 0.4 MG/1
0.4 CAPSULE ORAL
Refills: 0 | Status: ACTIVE | COMMUNITY

## 2018-04-20 RX ORDER — GABAPENTIN 300 MG/1
300 CAPSULE ORAL
Refills: 0 | Status: ACTIVE | COMMUNITY

## 2018-04-20 RX ORDER — METOPROLOL SUCCINATE 50 MG/1
50 TABLET, EXTENDED RELEASE ORAL
Refills: 0 | Status: ACTIVE | COMMUNITY

## 2018-04-25 LAB
CULTURE RESULTS: SIGNIFICANT CHANGE UP
SPECIMEN SOURCE: SIGNIFICANT CHANGE UP

## 2018-05-25 ENCOUNTER — APPOINTMENT (OUTPATIENT)
Dept: VASCULAR SURGERY | Facility: CLINIC | Age: 68
End: 2018-05-25
Payer: MEDICARE

## 2018-05-25 VITALS
BODY MASS INDEX: 23.32 KG/M2 | DIASTOLIC BLOOD PRESSURE: 80 MMHG | SYSTOLIC BLOOD PRESSURE: 126 MMHG | WEIGHT: 140 LBS | HEART RATE: 76 BPM | HEIGHT: 65 IN | TEMPERATURE: 97.8 F

## 2018-05-25 DIAGNOSIS — L98.499 STRICTURE OF ARTERY: ICD-10-CM

## 2018-05-25 DIAGNOSIS — I77.1 STRICTURE OF ARTERY: ICD-10-CM

## 2018-05-25 PROCEDURE — 93926 LOWER EXTREMITY STUDY: CPT | Mod: LT

## 2018-05-25 PROCEDURE — 99024 POSTOP FOLLOW-UP VISIT: CPT

## 2018-05-25 RX ORDER — MULTIVITAMIN WITH FOLIC ACID 400 MCG
TABLET ORAL
Qty: 30 | Refills: 0 | Status: ACTIVE | COMMUNITY
Start: 2018-05-10

## 2018-05-25 RX ORDER — SILVER 2" X 2"
0.9 BANDAGE TOPICAL
Qty: 40 | Refills: 0 | Status: ACTIVE | COMMUNITY
Start: 2018-05-10

## 2018-05-25 RX ORDER — ASPIRIN 81 MG
81 TABLET,CHEWABLE ORAL
Qty: 30 | Refills: 0 | Status: ACTIVE | COMMUNITY
Start: 2018-05-10

## 2018-05-25 RX ORDER — INSULIN ASPART 100 [IU]/ML
100 INJECTION, SOLUTION INTRAVENOUS; SUBCUTANEOUS
Qty: 10 | Refills: 0 | Status: ACTIVE | COMMUNITY
Start: 2018-05-10

## 2018-05-25 RX ORDER — INSULIN GLARGINE 100 [IU]/ML
100 INJECTION, SOLUTION SUBCUTANEOUS
Qty: 10 | Refills: 0 | Status: ACTIVE | COMMUNITY
Start: 2018-05-10

## 2018-06-29 ENCOUNTER — APPOINTMENT (OUTPATIENT)
Dept: VASCULAR SURGERY | Facility: CLINIC | Age: 68
End: 2018-06-29

## 2018-10-12 ENCOUNTER — INPATIENT (INPATIENT)
Facility: HOSPITAL | Age: 68
LOS: 11 days | DRG: 853 | End: 2018-10-24
Attending: INTERNAL MEDICINE | Admitting: STUDENT IN AN ORGANIZED HEALTH CARE EDUCATION/TRAINING PROGRAM
Payer: MEDICARE

## 2018-10-12 VITALS
DIASTOLIC BLOOD PRESSURE: 80 MMHG | TEMPERATURE: 98 F | RESPIRATION RATE: 16 BRPM | OXYGEN SATURATION: 97 % | HEART RATE: 108 BPM | SYSTOLIC BLOOD PRESSURE: 139 MMHG

## 2018-10-12 DIAGNOSIS — Z29.9 ENCOUNTER FOR PROPHYLACTIC MEASURES, UNSPECIFIED: ICD-10-CM

## 2018-10-12 DIAGNOSIS — E87.1 HYPO-OSMOLALITY AND HYPONATREMIA: ICD-10-CM

## 2018-10-12 DIAGNOSIS — I96 GANGRENE, NOT ELSEWHERE CLASSIFIED: ICD-10-CM

## 2018-10-12 DIAGNOSIS — R79.89 OTHER SPECIFIED ABNORMAL FINDINGS OF BLOOD CHEMISTRY: ICD-10-CM

## 2018-10-12 DIAGNOSIS — Z95.828 PRESENCE OF OTHER VASCULAR IMPLANTS AND GRAFTS: Chronic | ICD-10-CM

## 2018-10-12 DIAGNOSIS — A41.9 SEPSIS, UNSPECIFIED ORGANISM: ICD-10-CM

## 2018-10-12 DIAGNOSIS — E11.52 TYPE 2 DIABETES MELLITUS WITH DIABETIC PERIPHERAL ANGIOPATHY WITH GANGRENE: ICD-10-CM

## 2018-10-12 DIAGNOSIS — I25.10 ATHEROSCLEROTIC HEART DISEASE OF NATIVE CORONARY ARTERY WITHOUT ANGINA PECTORIS: ICD-10-CM

## 2018-10-12 DIAGNOSIS — I10 ESSENTIAL (PRIMARY) HYPERTENSION: ICD-10-CM

## 2018-10-12 LAB
ALBUMIN SERPL ELPH-MCNC: 4.1 G/DL — SIGNIFICANT CHANGE UP (ref 3.3–5)
ALP SERPL-CCNC: 96 U/L — SIGNIFICANT CHANGE UP (ref 40–120)
ALT FLD-CCNC: 19 U/L — SIGNIFICANT CHANGE UP (ref 10–45)
ANION GAP SERPL CALC-SCNC: 15 MMOL/L — SIGNIFICANT CHANGE UP (ref 5–17)
AST SERPL-CCNC: 25 U/L — SIGNIFICANT CHANGE UP (ref 10–40)
BASE EXCESS BLDV CALC-SCNC: -2.1 MMOL/L — LOW (ref -2–2)
BASOPHILS # BLD AUTO: 0 K/UL — SIGNIFICANT CHANGE UP (ref 0–0.2)
BASOPHILS NFR BLD AUTO: 0.2 % — SIGNIFICANT CHANGE UP (ref 0–2)
BILIRUB SERPL-MCNC: 0.6 MG/DL — SIGNIFICANT CHANGE UP (ref 0.2–1.2)
BUN SERPL-MCNC: 11 MG/DL — SIGNIFICANT CHANGE UP (ref 7–23)
CA-I SERPL-SCNC: 1.05 MMOL/L — LOW (ref 1.12–1.3)
CALCIUM SERPL-MCNC: 9.6 MG/DL — SIGNIFICANT CHANGE UP (ref 8.4–10.5)
CHLORIDE BLDV-SCNC: 99 MMOL/L — SIGNIFICANT CHANGE UP (ref 96–108)
CHLORIDE SERPL-SCNC: 91 MMOL/L — LOW (ref 96–108)
CO2 BLDV-SCNC: 24 MMOL/L — SIGNIFICANT CHANGE UP (ref 22–30)
CO2 SERPL-SCNC: 22 MMOL/L — SIGNIFICANT CHANGE UP (ref 22–31)
CREAT SERPL-MCNC: 0.63 MG/DL — SIGNIFICANT CHANGE UP (ref 0.5–1.3)
EOSINOPHIL # BLD AUTO: 0 K/UL — SIGNIFICANT CHANGE UP (ref 0–0.5)
EOSINOPHIL NFR BLD AUTO: 0.3 % — SIGNIFICANT CHANGE UP (ref 0–6)
ERYTHROCYTE [SEDIMENTATION RATE] IN BLOOD: 90 MM/HR — HIGH (ref 0–20)
GAS PNL BLDV: 126 MMOL/L — LOW (ref 136–145)
GAS PNL BLDV: SIGNIFICANT CHANGE UP
GLUCOSE BLDV-MCNC: 255 MG/DL — HIGH (ref 70–99)
GLUCOSE SERPL-MCNC: 272 MG/DL — HIGH (ref 70–99)
HCO3 BLDV-SCNC: 23 MMOL/L — SIGNIFICANT CHANGE UP (ref 21–29)
HCT VFR BLD CALC: 41.1 % — SIGNIFICANT CHANGE UP (ref 39–50)
HCT VFR BLDA CALC: 39 % — SIGNIFICANT CHANGE UP (ref 39–50)
HGB BLD CALC-MCNC: 12.7 G/DL — LOW (ref 13–17)
HGB BLD-MCNC: 13.8 G/DL — SIGNIFICANT CHANGE UP (ref 13–17)
HOROWITZ INDEX BLDV+IHG-RTO: SIGNIFICANT CHANGE UP
LACTATE BLDV-MCNC: 2.7 MMOL/L — HIGH (ref 0.7–2)
LYMPHOCYTES # BLD AUTO: 10.9 % — LOW (ref 13–44)
LYMPHOCYTES # BLD AUTO: 2 K/UL — SIGNIFICANT CHANGE UP (ref 1–3.3)
MCHC RBC-ENTMCNC: 28.7 PG — SIGNIFICANT CHANGE UP (ref 27–34)
MCHC RBC-ENTMCNC: 33.5 GM/DL — SIGNIFICANT CHANGE UP (ref 32–36)
MCV RBC AUTO: 85.4 FL — SIGNIFICANT CHANGE UP (ref 80–100)
MONOCYTES # BLD AUTO: 1 K/UL — HIGH (ref 0–0.9)
MONOCYTES NFR BLD AUTO: 5.8 % — SIGNIFICANT CHANGE UP (ref 2–14)
NEUTROPHILS # BLD AUTO: 14.8 K/UL — HIGH (ref 1.8–7.4)
NEUTROPHILS NFR BLD AUTO: 82.8 % — HIGH (ref 43–77)
PCO2 BLDV: 42 MMHG — SIGNIFICANT CHANGE UP (ref 35–50)
PH BLDV: 7.36 — SIGNIFICANT CHANGE UP (ref 7.35–7.45)
PLATELET # BLD AUTO: 450 K/UL — HIGH (ref 150–400)
PO2 BLDV: 20 MMHG — LOW (ref 25–45)
POTASSIUM BLDV-SCNC: 4.1 MMOL/L — SIGNIFICANT CHANGE UP (ref 3.5–5.3)
POTASSIUM SERPL-MCNC: 4.8 MMOL/L — SIGNIFICANT CHANGE UP (ref 3.5–5.3)
POTASSIUM SERPL-SCNC: 4.8 MMOL/L — SIGNIFICANT CHANGE UP (ref 3.5–5.3)
PROT SERPL-MCNC: 9.1 G/DL — HIGH (ref 6–8.3)
RBC # BLD: 4.81 M/UL — SIGNIFICANT CHANGE UP (ref 4.2–5.8)
RBC # FLD: 12.8 % — SIGNIFICANT CHANGE UP (ref 10.3–14.5)
SAO2 % BLDV: 22 % — LOW (ref 67–88)
SODIUM SERPL-SCNC: 128 MMOL/L — LOW (ref 135–145)
WBC # BLD: 17.8 K/UL — HIGH (ref 3.8–10.5)
WBC # FLD AUTO: 17.8 K/UL — HIGH (ref 3.8–10.5)

## 2018-10-12 PROCEDURE — 99221 1ST HOSP IP/OBS SF/LOW 40: CPT

## 2018-10-12 PROCEDURE — 73630 X-RAY EXAM OF FOOT: CPT | Mod: 26,LT

## 2018-10-12 PROCEDURE — 99285 EMERGENCY DEPT VISIT HI MDM: CPT | Mod: GC

## 2018-10-12 RX ORDER — PREGABALIN 225 MG/1
1000 CAPSULE ORAL DAILY
Qty: 0 | Refills: 0 | Status: DISCONTINUED | OUTPATIENT
Start: 2018-10-12 | End: 2018-10-24

## 2018-10-12 RX ORDER — INSULIN LISPRO 100/ML
VIAL (ML) SUBCUTANEOUS
Qty: 0 | Refills: 0 | Status: DISCONTINUED | OUTPATIENT
Start: 2018-10-12 | End: 2018-10-13

## 2018-10-12 RX ORDER — FOLIC ACID 0.8 MG
1 TABLET ORAL DAILY
Qty: 0 | Refills: 0 | Status: DISCONTINUED | OUTPATIENT
Start: 2018-10-12 | End: 2018-10-24

## 2018-10-12 RX ORDER — INSULIN NPH HUM/REG INSULIN HM 70-30/ML
0 VIAL (ML) SUBCUTANEOUS
Qty: 0 | Refills: 0 | COMMUNITY

## 2018-10-12 RX ORDER — DEXTROSE 50 % IN WATER 50 %
25 SYRINGE (ML) INTRAVENOUS ONCE
Qty: 0 | Refills: 0 | Status: DISCONTINUED | OUTPATIENT
Start: 2018-10-12 | End: 2018-10-24

## 2018-10-12 RX ORDER — SODIUM CHLORIDE 9 MG/ML
1000 INJECTION INTRAMUSCULAR; INTRAVENOUS; SUBCUTANEOUS ONCE
Qty: 0 | Refills: 0 | Status: COMPLETED | OUTPATIENT
Start: 2018-10-12 | End: 2018-10-12

## 2018-10-12 RX ORDER — ASPIRIN/CALCIUM CARB/MAGNESIUM 324 MG
81 TABLET ORAL DAILY
Qty: 0 | Refills: 0 | Status: DISCONTINUED | OUTPATIENT
Start: 2018-10-12 | End: 2018-10-24

## 2018-10-12 RX ORDER — GABAPENTIN 400 MG/1
1 CAPSULE ORAL
Qty: 0 | Refills: 0 | COMMUNITY

## 2018-10-12 RX ORDER — DEXTROSE 50 % IN WATER 50 %
15 SYRINGE (ML) INTRAVENOUS ONCE
Qty: 0 | Refills: 0 | Status: DISCONTINUED | OUTPATIENT
Start: 2018-10-12 | End: 2018-10-24

## 2018-10-12 RX ORDER — ATORVASTATIN CALCIUM 80 MG/1
1 TABLET, FILM COATED ORAL
Qty: 0 | Refills: 0 | COMMUNITY

## 2018-10-12 RX ORDER — GLUCAGON INJECTION, SOLUTION 0.5 MG/.1ML
1 INJECTION, SOLUTION SUBCUTANEOUS ONCE
Qty: 0 | Refills: 0 | Status: DISCONTINUED | OUTPATIENT
Start: 2018-10-12 | End: 2018-10-24

## 2018-10-12 RX ORDER — LABETALOL HCL 100 MG
100 TABLET ORAL ONCE
Qty: 0 | Refills: 0 | Status: COMPLETED | OUTPATIENT
Start: 2018-10-12 | End: 2018-10-12

## 2018-10-12 RX ORDER — PIPERACILLIN AND TAZOBACTAM 4; .5 G/20ML; G/20ML
3.38 INJECTION, POWDER, LYOPHILIZED, FOR SOLUTION INTRAVENOUS ONCE
Qty: 0 | Refills: 0 | Status: COMPLETED | OUTPATIENT
Start: 2018-10-12 | End: 2018-10-12

## 2018-10-12 RX ORDER — PIPERACILLIN AND TAZOBACTAM 4; .5 G/20ML; G/20ML
3.38 INJECTION, POWDER, LYOPHILIZED, FOR SOLUTION INTRAVENOUS EVERY 8 HOURS
Qty: 0 | Refills: 0 | Status: DISCONTINUED | OUTPATIENT
Start: 2018-10-12 | End: 2018-10-24

## 2018-10-12 RX ORDER — VANCOMYCIN HCL 1 G
1000 VIAL (EA) INTRAVENOUS ONCE
Qty: 0 | Refills: 0 | Status: COMPLETED | OUTPATIENT
Start: 2018-10-12 | End: 2018-10-12

## 2018-10-12 RX ORDER — INSULIN GLARGINE 100 [IU]/ML
40 INJECTION, SOLUTION SUBCUTANEOUS AT BEDTIME
Qty: 0 | Refills: 0 | Status: DISCONTINUED | OUTPATIENT
Start: 2018-10-12 | End: 2018-10-13

## 2018-10-12 RX ORDER — TICAGRELOR 90 MG/1
90 TABLET ORAL
Qty: 0 | Refills: 0 | Status: DISCONTINUED | OUTPATIENT
Start: 2018-10-12 | End: 2018-10-24

## 2018-10-12 RX ORDER — DOCUSATE SODIUM 100 MG
100 CAPSULE ORAL THREE TIMES A DAY
Qty: 0 | Refills: 0 | Status: DISCONTINUED | OUTPATIENT
Start: 2018-10-12 | End: 2018-10-24

## 2018-10-12 RX ORDER — SERTRALINE 25 MG/1
1 TABLET, FILM COATED ORAL
Qty: 0 | Refills: 0 | COMMUNITY

## 2018-10-12 RX ORDER — CEFTRIAXONE 500 MG/1
1 INJECTION, POWDER, FOR SOLUTION INTRAMUSCULAR; INTRAVENOUS ONCE
Qty: 0 | Refills: 0 | Status: COMPLETED | OUTPATIENT
Start: 2018-10-12 | End: 2018-10-12

## 2018-10-12 RX ORDER — METOPROLOL TARTRATE 50 MG
50 TABLET ORAL DAILY
Qty: 0 | Refills: 0 | Status: DISCONTINUED | OUTPATIENT
Start: 2018-10-12 | End: 2018-10-12

## 2018-10-12 RX ORDER — SERTRALINE 25 MG/1
100 TABLET, FILM COATED ORAL DAILY
Qty: 0 | Refills: 0 | Status: DISCONTINUED | OUTPATIENT
Start: 2018-10-12 | End: 2018-10-24

## 2018-10-12 RX ORDER — SENNA PLUS 8.6 MG/1
2 TABLET ORAL AT BEDTIME
Qty: 0 | Refills: 0 | Status: DISCONTINUED | OUTPATIENT
Start: 2018-10-12 | End: 2018-10-24

## 2018-10-12 RX ORDER — SODIUM CHLORIDE 9 MG/ML
1000 INJECTION, SOLUTION INTRAVENOUS
Qty: 0 | Refills: 0 | Status: DISCONTINUED | OUTPATIENT
Start: 2018-10-12 | End: 2018-10-13

## 2018-10-12 RX ORDER — DEXTROSE 50 % IN WATER 50 %
12.5 SYRINGE (ML) INTRAVENOUS ONCE
Qty: 0 | Refills: 0 | Status: DISCONTINUED | OUTPATIENT
Start: 2018-10-12 | End: 2018-10-24

## 2018-10-12 RX ORDER — CHOLECALCIFEROL (VITAMIN D3) 125 MCG
1 CAPSULE ORAL
Qty: 0 | Refills: 0 | COMMUNITY

## 2018-10-12 RX ORDER — VANCOMYCIN HCL 1 G
1000 VIAL (EA) INTRAVENOUS EVERY 12 HOURS
Qty: 0 | Refills: 0 | Status: DISCONTINUED | OUTPATIENT
Start: 2018-10-13 | End: 2018-10-14

## 2018-10-12 RX ORDER — ATORVASTATIN CALCIUM 80 MG/1
40 TABLET, FILM COATED ORAL AT BEDTIME
Qty: 0 | Refills: 0 | Status: DISCONTINUED | OUTPATIENT
Start: 2018-10-12 | End: 2018-10-24

## 2018-10-12 RX ORDER — TAMSULOSIN HYDROCHLORIDE 0.4 MG/1
0.4 CAPSULE ORAL AT BEDTIME
Qty: 0 | Refills: 0 | Status: DISCONTINUED | OUTPATIENT
Start: 2018-10-12 | End: 2018-10-24

## 2018-10-12 RX ORDER — SODIUM CHLORIDE 9 MG/ML
1000 INJECTION INTRAMUSCULAR; INTRAVENOUS; SUBCUTANEOUS
Qty: 0 | Refills: 0 | Status: DISCONTINUED | OUTPATIENT
Start: 2018-10-12 | End: 2018-10-13

## 2018-10-12 RX ORDER — INSULIN LISPRO 100/ML
10 VIAL (ML) SUBCUTANEOUS
Qty: 0 | Refills: 0 | Status: DISCONTINUED | OUTPATIENT
Start: 2018-10-12 | End: 2018-10-13

## 2018-10-12 RX ORDER — ENOXAPARIN SODIUM 100 MG/ML
40 INJECTION SUBCUTANEOUS EVERY 24 HOURS
Qty: 0 | Refills: 0 | Status: DISCONTINUED | OUTPATIENT
Start: 2018-10-12 | End: 2018-10-15

## 2018-10-12 RX ORDER — GABAPENTIN 400 MG/1
300 CAPSULE ORAL THREE TIMES A DAY
Qty: 0 | Refills: 0 | Status: DISCONTINUED | OUTPATIENT
Start: 2018-10-12 | End: 2018-10-24

## 2018-10-12 RX ORDER — INSULIN LISPRO 100/ML
VIAL (ML) SUBCUTANEOUS AT BEDTIME
Qty: 0 | Refills: 0 | Status: DISCONTINUED | OUTPATIENT
Start: 2018-10-12 | End: 2018-10-13

## 2018-10-12 RX ADMIN — ATORVASTATIN CALCIUM 40 MILLIGRAM(S): 80 TABLET, FILM COATED ORAL at 23:40

## 2018-10-12 RX ADMIN — GABAPENTIN 300 MILLIGRAM(S): 400 CAPSULE ORAL at 23:41

## 2018-10-12 RX ADMIN — Medication 250 MILLIGRAM(S): at 16:02

## 2018-10-12 RX ADMIN — SODIUM CHLORIDE 500 MILLILITER(S): 9 INJECTION INTRAMUSCULAR; INTRAVENOUS; SUBCUTANEOUS at 16:37

## 2018-10-12 RX ADMIN — TAMSULOSIN HYDROCHLORIDE 0.4 MILLIGRAM(S): 0.4 CAPSULE ORAL at 23:41

## 2018-10-12 RX ADMIN — Medication 100 MILLIGRAM(S): at 23:41

## 2018-10-12 RX ADMIN — SENNA PLUS 2 TABLET(S): 8.6 TABLET ORAL at 23:41

## 2018-10-12 RX ADMIN — SODIUM CHLORIDE 75 MILLILITER(S): 9 INJECTION INTRAMUSCULAR; INTRAVENOUS; SUBCUTANEOUS at 23:39

## 2018-10-12 RX ADMIN — CEFTRIAXONE 100 GRAM(S): 500 INJECTION, POWDER, FOR SOLUTION INTRAMUSCULAR; INTRAVENOUS at 16:03

## 2018-10-12 RX ADMIN — Medication 2: at 22:58

## 2018-10-12 RX ADMIN — PIPERACILLIN AND TAZOBACTAM 200 GRAM(S): 4; .5 INJECTION, POWDER, LYOPHILIZED, FOR SOLUTION INTRAVENOUS at 23:40

## 2018-10-12 RX ADMIN — SODIUM CHLORIDE 1000 MILLILITER(S): 9 INJECTION INTRAMUSCULAR; INTRAVENOUS; SUBCUTANEOUS at 16:37

## 2018-10-12 NOTE — ED ADULT NURSE NOTE - ED STAT RN HANDOFF DETAILS
Bedside report given to on coming nurse yoselin miller. Understands pmh, medications given and plan of care for patient. Patient in stable condition, vital signs updated, has no complaints at this time and has been updated on care plan. Explained to patient that it is change of shift and new nurse is taking over, pt verbalized understanding.

## 2018-10-12 NOTE — H&P ADULT - PROBLEM SELECTOR PLAN 2
- Patient presented with tachycardia, leukocytosis meeting SIRS criteria with likely source being the L foot gangrene  - s/p 1x Vanc and Ceftriaxone in the ED, continue with Vanc for MRSA coverage as patient has been recently hospitalized and Zosyn for gram negative/anaerobic coverage as patient is a diabetic  - Pending blood cultures, trend lactate, vitals Q4H, gentle IVF - Patient presented with tachycardia, leukocytosis meeting SIRS criteria with likely source being the L foot gangrene, concern for osteo given elevated ESR  - s/p 1x Vanc and Ceftriaxone in the ED, continue with Vanc for MRSA coverage as patient has been recently hospitalized and Zosyn for gram negative/anaerobic coverage as patient is a diabetic  - Pending blood cultures, trend lactate, vitals Q4H, gentle IVF

## 2018-10-12 NOTE — H&P ADULT - ASSESSMENT
Patient is a 68 year old man with history of HTN, CAD s/p CANDIDO to RCA in 02/2018 in addition to CANDIDO to LAD, LCX, PAD s/p left femoral to below knee popliteal bypass 03/2018, left great toe partial amputation presented today with increasing toe pain found to have sepsis likely secondary to L 4th and 5th toe gangrene.

## 2018-10-12 NOTE — ED PROVIDER NOTE - OBJECTIVE STATEMENT
68M, pmh HTN, CAD, DM, PVD, left great toe partial amputation sent in for L foot gangrene. Approximately ten days ago the patient's wife noted blood and fluid leakage along left toes. Podiatrist prescribed Keflex and saw the patient in the office today. Foot had no improvement so sent to ED. Denies chest pain, difficulty breathing, abdominal pain, pain or burning with urination, new pain or swelling in lower extremities. Reports subjective fever.

## 2018-10-12 NOTE — H&P ADULT - NSHPLABSRESULTS_GEN_ALL_CORE
LABS:                        13.8   17.8  )-----------( 450      ( 12 Oct 2018 16:08 )             41.1     Hgb Trend: 13.8<--  10-12    128<L>  |  91<L>  |  11  ----------------------------<  272<H>  4.8   |  22  |  0.63    Ca    9.6      12 Oct 2018 16:08    TPro  9.1<H>  /  Alb  4.1  /  TBili  0.6  /  DBili  x   /  AST  25  /  ALT  19  /  AlkPhos  96  10-12    Creatinine Trend: 0.63<--        Venous Blood Gas:  10-12 @ 19:59  7.36/42/20/23/22  VBG Lactate: 2.7  Venous Blood Gas:  10-12 @ 16:08  7.36/46/17/25/16  VBG Lactate: 3.2      Xray foot reviewed: no fracture

## 2018-10-12 NOTE — CONSULT NOTE ADULT - ASSESSMENT
69 y/o Male with gangrene of Left 4th + 5th digit   ·	Pt seen and evaluated   ·	Pt known to service   ·	vital signs stable, no acute signs of infection   ·	Pt has had previous bypass to left Lower extremity (March 2018)  ·	rec admission under vascular surgery with vascular workup   ·	No need for IV antibiotics - ischemic pathology with no signs of infection   ·	Pod plan pending vascular workup   ·	seen w/ attending

## 2018-10-12 NOTE — H&P ADULT - ATTENDING COMMENTS
pt seen and examined by me.  briefly 68 m with pvd, CAD s/p mult stents, dm, htn a/w likely left foot osteomyelitis with c/o foot pain.  no n/v/sob/cp.  Pt denies martinez or chest pain with exertion.  Remaining history as above.  Exam with clear lungs, RRR, left foot with intact dressing.  labs reviewed with hyperglycemia, past cath results reviewed.  EKG tonight initially with ST depression v3-v5 and on repeat depressions now v4-v5 4 hours later (v3 normalizing).  compared to 3/18 EKG these are new.    Case d/w MAR and cardiology consult called to [facility] nocturnist.    -for osteomyelitis f/u vasc and podiatry, vanco/zosyn for now  -for hyperglycemia dose insulin, concern for poor method of administration vs  insulin given high insulin doses with poorly controlled DM, endo consult  -for ekg changes, cards consult called and EKGs texted to phone for review, pt placed on tele monitor and stat labs with troponin, ck/mb and bnp being drawn STAT pt seen and examined by me.  briefly 68 m with pvd, CAD s/p mult stents, dm, htn a/w likely left foot osteomyelitis with c/o foot pain.  no n/v/sob/cp.  Pt denies martinez or chest pain with exertion.  Remaining history as above.  Exam with clear lungs, RRR, left foot with intact dressing.  labs reviewed with hyperglycemia, past cath results reviewed.  EKG tonight initially with ST depression v4-v6 and on repeat depressions now v5-v6 4 hours later (v3 normalizing).  compared to 3/18 EKG these are new.    Case d/w MAR and cardiology consult called to [facility] nocturnist.    -for osteomyelitis f/u vasc and podiatry, vanco/zosyn for now  -for hyperglycemia dose insulin, concern for poor method of administration vs  insulin given high insulin doses with poorly controlled DM, endo consult  -for ekg changes, cards consult called and EKGs texted to phone for review, pt placed on tele monitor and stat labs with troponin, ck/mb and bnp being drawn STAT

## 2018-10-12 NOTE — H&P ADULT - NSHPREVIEWOFSYSTEMS_GEN_ALL_CORE
Constitutional: No fever, or chills. No recent weight change  HEENT: No postnasal drip or nasal congestion.  CV: No chest pain, or palpitations. No orthopnea.   Resp: No cough, or sputum production. No shortness of breath or dyspnea on exertion.   GI: No nausea or vomiting. No diarrhea or constipation. No abdominal pain.   : No dysuria, no nocturia or increased urinary frequency.  Musculoskeletal: No back pain, no myalgias.   Skin: No rash or itchiness.   Neurological: No headache or dizziness.

## 2018-10-12 NOTE — ED PROVIDER NOTE - PHYSICAL EXAMINATION
General: well appearing male, no acute distress   HEENT: normocephalic, atraumatic   Respiratory: normal work of breathing, lungs clear to auscultation bilaterally   Cardiac: regular rate and rhythm, doppler left posterior tibial  Abdomen: soft, non-tender  MSK: L great toe partial amputation, no tenderness to palpation   Skin: no rashes   Neuro: A&Ox3

## 2018-10-12 NOTE — ED ADULT NURSE NOTE - NSIMPLEMENTINTERV_GEN_ALL_ED
Implemented All Fall Risk Interventions:  [place] to call system. Call bell, personal items and telephone within reach. Instruct patient to call for assistance. Room bathroom lighting operational. Non-slip footwear when patient is off stretcher. Physically safe environment: no spills, clutter or unnecessary equipment. Stretcher in lowest position, wheels locked, appropriate side rails in place. Provide visual cue, wrist band, yellow gown, etc. Monitor gait and stability. Monitor for mental status changes and reorient to person, place, and time. Review medications for side effects contributing to fall risk. Reinforce activity limits and safety measures with patient and family.

## 2018-10-12 NOTE — H&P ADULT - PROBLEM SELECTOR PLAN 5
BP elevated on arrival 140-180 systolic  - Home medication include Metoprolol XR 50 mg daily, hold in the setting of sepsis  - Continue to monitor vitals Q4H BP elevated on arrival 140-180 systolic  - Home medication include Metoprolol XR 50 mg daily, continue with Metoprolol 12.5 mg BID in the setting of sinus tachycardia (suspecting rebound tachycardia) with holding parameters  - Continue to monitor vitals Q4H

## 2018-10-12 NOTE — H&P ADULT - HISTORY OF PRESENT ILLNESS
Patient is a 68 year old man with history of HTN, CAD s/p CANDIDO to RCA in 02/2018 in addition to CANDIDO to LAD, LCX, PAD s/p left femoral to below knee popliteal bypass 03/2018, left great toe partial amputation presented today with increasing toe pain. Patient had been having pain in his L foot for about 10 days and noticed increasing blood and foul smelling fluid from the wound. Patient's wife brought the patient to the podiatry office today for evaluation and he was thus sent to the ED for further evaluation. Patient has been ambulating at home with cane but with significant pain. He endorses numbness and tingliness. He denies fever or chills. He denies chest pain, shortness of breath. He denies abdominal pain, nausea or vomiting, appetite unchanged. He denies recent trauma to the foot.     In the ED, patient's initial vitals were Temp 36.8, , /80, O2 saturation 97% on room air. Patient received 1x Vanc and Ceftriaxone and 2 L NS bolus in the ED.

## 2018-10-12 NOTE — CONSULT NOTE ADULT - SUBJECTIVE AND OBJECTIVE BOX
General Surgery Consult  Consulting surgical team:  Consulting attending:    HPI:      PAST MEDICAL HISTORY:  CAD (coronary artery disease)  Diabetes mellitus  Diabetes mellitus  Hypertension  Alcohol Dependency  Diabetes Mellitus      PAST SURGICAL HISTORY:  Stented coronary artery      MEDICATIONS:      ALLERGIES:  No Known Allergies      VITALS & I/Os:  Vital Signs Last 24 Hrs  T(C): 36.9 (12 Oct 2018 17:48), Max: 36.9 (12 Oct 2018 17:48)  T(F): 98.4 (12 Oct 2018 17:48), Max: 98.4 (12 Oct 2018 17:48)  HR: 106 (12 Oct 2018 19:22) (95 - 108)  BP: 149/88 (12 Oct 2018 19:22) (139/80 - 187/108)  BP(mean): --  RR: 16 (12 Oct 2018 17:48) (16 - 16)  SpO2: 98% (12 Oct 2018 17:48) (97% - 98%)    I&O's Summary      PHYSICAL EXAM:  General: No acute distress, appears nontoxic  Respiratory: Breathing unlabored  Cardiovascular: Tachycardic   Abdominal: Soft, nondistended, nontender  Extremities: Left 4th toe black, ischemic with scant drainage, foul smelling; left 1st toe partially amputated; palpable femoral pulses, triphasic signal at distal aspect of bypass, monophasic DP signal on left; triphasic signals right popliteal, DP/PT    LABS:                        13.8   17.8  )-----------( 450      ( 12 Oct 2018 16:08 )             41.1     10-12    128<L>  |  91<L>  |  11  ----------------------------<  272<H>  4.8   |  22  |  0.63    Ca    9.6      12 Oct 2018 16:08    TPro  9.1<H>  /  Alb  4.1  /  TBili  0.6  /  DBili  x   /  AST  25  /  ALT  19  /  AlkPhos  96  10-12    Lactate:  10-12 @ 19:59  2.7  10-12 @ 16:08  3.2 Vascular Surgery Consult  Consulting surgical team: Vascular Surgery  Consulting attending: Dr. Fuentes    HPI: 68 year old man with medical history of HTN, CAD, DM, PAD s/p left femoral to below knee popliteal PTFE bypass (3/2018) with Dr. Fuentes, left great toe partial amputation presented to [facility] ED today with a left 4th toe infection.  About 10 days ago, the patient's wife noticed some bloody fluid leaking from the left toe.  The patient frequently sees his podiatrist, Dr. Joe, who saw him in the office today and sent him to the ED for evaluation.  He has not had fevers/chills, nausea or vomiting.  He is nonambulatory at baseline and has been in bed for the past 5 years, per wife.  Vascular surgery was consulted for management recommendations.       PAST MEDICAL HISTORY:  CAD (coronary artery disease)  Diabetes mellitus  Diabetes mellitus  Hypertension  Alcohol Dependency  Diabetes Mellitus    PAST SURGICAL HISTORY:  Stented coronary artery  s/p LLE femoral to below knee popliteal PTFE bypass (3/2018)    MEDICATIONS:  see med rec    ALLERGIES:  No Known Allergies      VITALS & I/Os:  Vital Signs Last 24 Hrs  T(C): 36.9 (12 Oct 2018 17:48), Max: 36.9 (12 Oct 2018 17:48)  T(F): 98.4 (12 Oct 2018 17:48), Max: 98.4 (12 Oct 2018 17:48)  HR: 106 (12 Oct 2018 19:22) (95 - 108)  BP: 149/88 (12 Oct 2018 19:22) (139/80 - 187/108)  BP(mean): --  RR: 16 (12 Oct 2018 17:48) (16 - 16)  SpO2: 98% (12 Oct 2018 17:48) (97% - 98%)    I&O's Summary      PHYSICAL EXAM:  General: No acute distress, appears nontoxic  Respiratory: Breathing unlabored  Cardiovascular: Tachycardic   Abdominal: Soft, nondistended, nontender  Extremities: Left 4th toe black, ischemic with scant drainage, foul smelling; left 1st toe partially amputated; palpable femoral pulses, triphasic signal at distal aspect of bypass, monophasic DP signal on left; triphasic signals right popliteal, DP/PT    LABS:                        13.8   17.8  )-----------( 450      ( 12 Oct 2018 16:08 )             41.1     10-12    128<L>  |  91<L>  |  11  ----------------------------<  272<H>  4.8   |  22  |  0.63    Ca    9.6      12 Oct 2018 16:08    TPro  9.1<H>  /  Alb  4.1  /  TBili  0.6  /  DBili  x   /  AST  25  /  ALT  19  /  AlkPhos  96  10-12    Lactate:  10-12 @ 19:59  2.7  10-12 @ 16:08  3.2 Vascular Surgery Consult  Consulting surgical team: Vascular Surgery  Consulting attending: Dr. Fuentes    HPI: 68 year old man with medical history of HTN, CAD, DM, PAD s/p left femoral to below knee popliteal PTFE bypass (3/2018), left great toe partial amputation presented to [facility] ED today with a left 4th toe infection.  About 10 days ago, the patient's wife noticed some bloody fluid leaking from the left toe.  The patient frequently sees his podiatrist, Dr. Joe, who saw him in the office today and sent him to the ED for evaluation.  He has not had fevers/chills, nausea or vomiting.  He is nonambulatory at baseline and has been in bed for the past 5 years, per wife.  Vascular surgery was consulted for management recommendations.       PAST MEDICAL HISTORY:  CAD (coronary artery disease)  Diabetes mellitus  Diabetes mellitus  Hypertension  Alcohol Dependency  Diabetes Mellitus    PAST SURGICAL HISTORY:  Stented coronary artery  s/p LLE femoral to below knee popliteal PTFE bypass (3/2018)    MEDICATIONS:  see med rec    ALLERGIES:  No Known Allergies      VITALS & I/Os:  Vital Signs Last 24 Hrs  T(C): 36.9 (12 Oct 2018 17:48), Max: 36.9 (12 Oct 2018 17:48)  T(F): 98.4 (12 Oct 2018 17:48), Max: 98.4 (12 Oct 2018 17:48)  HR: 106 (12 Oct 2018 19:22) (95 - 108)  BP: 149/88 (12 Oct 2018 19:22) (139/80 - 187/108)  BP(mean): --  RR: 16 (12 Oct 2018 17:48) (16 - 16)  SpO2: 98% (12 Oct 2018 17:48) (97% - 98%)    I&O's Summary      PHYSICAL EXAM:  General: No acute distress, appears nontoxic  Respiratory: Breathing unlabored  Cardiovascular: Tachycardic   Abdominal: Soft, nondistended, nontender  Extremities: Left 4th toe black, ischemic with scant drainage, foul smelling; left 1st toe partially amputated; palpable femoral pulses, triphasic signal at distal aspect of bypass, monophasic DP signal on left; triphasic signals right popliteal, DP/PT    LABS:                        13.8   17.8  )-----------( 450      ( 12 Oct 2018 16:08 )             41.1     10-12    128<L>  |  91<L>  |  11  ----------------------------<  272<H>  4.8   |  22  |  0.63    Ca    9.6      12 Oct 2018 16:08    TPro  9.1<H>  /  Alb  4.1  /  TBili  0.6  /  DBili  x   /  AST  25  /  ALT  19  /  AlkPhos  96  10-12    Lactate:  10-12 @ 19:59  2.7  10-12 @ 16:08  3.2

## 2018-10-12 NOTE — ED ADULT NURSE NOTE - NS ED NURSE REPORT GIVEN TO FT
Bedside report given to on coming nurse oyselin miller. Understands pmh, medications given and plan of care for patient. Patient in stable condition, vital signs updated, has no complaints at this time and has been updated on care plan. Explained to patient that it is change of shift and new nurse is taking over, pt verbalized understanding.

## 2018-10-12 NOTE — ED ADULT NURSE NOTE - OBJECTIVE STATEMENT
67 y/o male sent in by pcp to r/o gangrene left foot. Pt has had previous left big toe amputation. Uses cane to ambulate. R/o infection to left foot third and fourth toe. States he was placed on oral antibiotics 10 days ago with no improvement. Denies chest pain, sob, ha, n/v/d, abdominal pain, f/c, urinary symptoms, hematuria. A&Ox4, hypertensive, left foot big toe amputation, 3rd and 4th toe infection noted, , MAEx4, lungs CTA, abd soft nondistended. Pt resting comfortably with no complaints at this time. Patient's bed in the lowest position, explained plan of care to patient and family members. Will continue to reassess.

## 2018-10-12 NOTE — H&P ADULT - PROBLEM SELECTOR PLAN 6
- Last A1C in 01/2018 13.2% suggest uncontrolled T2DM complicated by peripheral neuropathy and now with gangrene  - Home medication include Humulin 70/30 50 units in the am and 40 units before dinner and Novolog 15 units before meals, given by his wife  - Continue with ISS sliding scale, decrease total insulin units by at least 20%, Humalog 10 units TIDAC and Lantus 40 units QHS, can add Lantus in the am if FS not controlled  - Endocrinology consult in the am, A1C ordered - Last A1C in 01/2018 13.2% suggest uncontrolled T2DM complicated by peripheral neuropathy and now with gangrene  - Home medication include Humulin 70/30 50 units in the am and 40 units before dinner and Novolog 15 units before meals, given by his wife, suspecting that uncontrolled T2DM may also be related to poor insulin techniques  - Continue with ISS sliding scale   - Per weight based calculation, started on Humalog 5 units TIDAC and Lantus 20 units QHS  - Endocrinology consult in the am, A1C ordered

## 2018-10-12 NOTE — H&P ADULT - PROBLEM SELECTOR PLAN 3
- Hypovolemic on exam with hyponatremia likely secondary to underlying sepsis  - Continue with IVF for hydration, monitor BMP

## 2018-10-12 NOTE — ED ADULT NURSE NOTE - NS ED NURSE RECORD ANOTHER VITAL SIGN
"[facility], [place]   Neurology Daily Note  Rosa Mcclure  [id number]  [id number] inhibitor  - Necessity to avoid omeprazole Medley.     LASHAWN Escoto [facility] ANGELI, MSc(Res)  Neurology Resident, PGY-1  Pager: [phone]   Yes

## 2018-10-12 NOTE — ED PROVIDER NOTE - ATTENDING CONTRIBUTION TO CARE
Dr. Loaiza (Attending Physician)  I performed a history and physical exam of the patient and discussed their management with the resident. I reviewed the resident's note and agree with the documented findings and plan of care. My medical decision making and observations are found above.

## 2018-10-12 NOTE — ED PROVIDER NOTE - MEDICAL DECISION MAKING DETAILS
68M sent in for foot gangrene. no improvement on outpatient antibiotics. concern for osteo. plan for cbc, cmp, vbg, blood cultures, xray foot, ekg, podiatry and vascular consult. likely admission. Dr. Loaiza (Attending Physician)  68M sent in for foot gangrene of left 4th and 5th toes. no improvement on outpatient antibiotics. concern for osteo. plan for cbc, cmp, vbg, blood cultures, xray foot, ekg, podiatry and vascular consult. likely admission.

## 2018-10-12 NOTE — ED ADULT NURSE NOTE - CADM POA CENTRAL LINE
No Regarding: Diabetic, took insulin, blurry vision and dizzy.  ----- Message from Damon Huggins sent at 4/26/2017  8:25 PM CDT -----  Patient Name: Katelynn Cancer  Specialist or PCP:Navi  Pregnant (If Yes, how long?):na  Symptoms:Diabetic, took insulin, blurry vision and dizzy. Call Back #:[phone]  Is the patientâs permanent residence located in [address], or a compact State?  [place] [address] [facility] [address]  Call Center Account #:[id number]

## 2018-10-12 NOTE — H&P ADULT - PROBLEM SELECTOR PLAN 1
- Patient with pain, musky skin discoloration, no palpable pulse, also with some drainage concerning for gangrene secondary to long standing peripheral arterial disease; per vascular note, monophasic doppler signal on the left DP pulse  - Vascular and podiatry recs appreciated, VA duplex ordered, may need transmetatarsal amputation depending on the findings  - Continue with empiric antibiotics

## 2018-10-12 NOTE — H&P ADULT - NSHPPHYSICALEXAM_GEN_ALL_CORE
General: Alert and cooperative. Not in acute stress. Well developed, well nourished.   Head: Normocephalic  Eyes:  PERRLA, clear conjunctiva. EOMI, no ptosis.    Respiratory: Bilateral lung clear to auscultation, no crackles, no wheezes, no rhonchi   Cardiovascular: S1/S2 auscultated, no murmur, or gallop. Rhythm is regular. There is no peripheral edema   Abdomen: Soft, non-tender, nondistended, no guarding or rebound tenderness. Active bowel sounds in all 4 quadrants.     Extremities: Dry flaky skin on bilateral LE. R foot without ulcers. L foot cool to touch, with partial amputation of great toe, skin appears dusky of entire left 4th and 5th toe, mild drainage, foul smelling. Bilateral DP/PT pulse not appreciated.   Neurological: AOAx4. CN2-12 grosslly intact.

## 2018-10-12 NOTE — H&P ADULT - PROBLEM SELECTOR PLAN 7
- s/p multiple CANDIDO, most recent in 02/2018  - Continue with ASA/brilinta, continue with high intensity statin - s/p multiple CANDIDO, most recent in 02/2018  - Continue with ASA/Brilinta, continue with high intensity statin  - If planning for transmetatarsal amputation, patient likely will need cardiology clearance as he has multiple cardiac problems. EKG ordered. Last TTE in 02/2018 reviewed EF 65-70%

## 2018-10-12 NOTE — H&P ADULT - PROBLEM SELECTOR PLAN 4
- Initially 3.2 now downtrending to 2.7 likely indicative of severe sepsis  - Continue to trend lactate, IVF

## 2018-10-12 NOTE — ED PROVIDER NOTE - PROGRESS NOTE DETAILS
patient updated on results. agrees with plan for admission for iv antibiotics. - resident Marc Leavitt

## 2018-10-12 NOTE — CONSULT NOTE ADULT - ASSESSMENT
- No acute vascular surgery intervention  - Recommend duplex of left lower extremity to evaluate bypass patency  - Recommend IV antibiotics  - Recommend podiatry evaluation for possible toe amputation  - Vascular will follow  - Discussed with vascular surgery fellow on call 68 year old man with medical history of HTN, CAD, DM, PAD s/p left femoral to below knee popliteal PTFE bypass (3/2018) with Dr. Fuentes presenting with left 4th toe gangrene and likely osteomyelitis, sepsis    - No acute vascular surgery intervention  - Recommend duplex of left lower extremity to evaluate bypass patency  - Recommend IV antibiotics  - Recommend podiatry evaluation for possible toe amputation  - Vascular will follow  - Discussed with vascular surgery fellow on call    NASIR Wheat MD PGY4 [phone] 68 year old man with medical history of HTN, CAD, DM, PAD s/p left femoral to below knee popliteal PTFE bypass (3/2018) presenting with left 4th toe gangrene and likely osteomyelitis, sepsis    - No acute vascular surgery intervention  - Recommend duplex of left lower extremity to evaluate bypass patency  - Recommend IV antibiotics  - Recommend podiatry evaluation for possible toe amputation  - Vascular will follow  - Discussed with vascular surgery fellow on call    NASIR Wheat MD PGY4 [phone]

## 2018-10-12 NOTE — H&P ADULT - PROBLEM SELECTOR PLAN 8
- DVT prophylaxis: Lovenox  - GI prophylaxis: not indicated  - Diet: DASH/CC diet  - Dispo: eventually will need PT consult after planned amputation    Maxine Callejas PGY-2  Internal medicine night admit  Pager [phone]

## 2018-10-13 DIAGNOSIS — R74.8 ABNORMAL LEVELS OF OTHER SERUM ENZYMES: ICD-10-CM

## 2018-10-13 DIAGNOSIS — M86.10 OTHER ACUTE OSTEOMYELITIS, UNSPECIFIED SITE: ICD-10-CM

## 2018-10-13 LAB
ANION GAP SERPL CALC-SCNC: 14 MMOL/L — SIGNIFICANT CHANGE UP (ref 5–17)
APTT BLD: 28.3 SEC — SIGNIFICANT CHANGE UP (ref 27.5–37.4)
BUN SERPL-MCNC: 8 MG/DL — SIGNIFICANT CHANGE UP (ref 7–23)
CALCIUM SERPL-MCNC: 8.5 MG/DL — SIGNIFICANT CHANGE UP (ref 8.4–10.5)
CHLORIDE SERPL-SCNC: 94 MMOL/L — LOW (ref 96–108)
CK MB BLD-MCNC: 5.7 % — HIGH (ref 0–3.5)
CK MB CFR SERPL CALC: 35 NG/ML — HIGH (ref 0–6.7)
CK SERPL-CCNC: 614 U/L — HIGH (ref 30–200)
CO2 SERPL-SCNC: 20 MMOL/L — LOW (ref 22–31)
CREAT SERPL-MCNC: 0.62 MG/DL — SIGNIFICANT CHANGE UP (ref 0.5–1.3)
CRP SERPL-MCNC: 14.71 MG/DL — HIGH (ref 0–0.4)
GLUCOSE SERPL-MCNC: 241 MG/DL — HIGH (ref 70–99)
HBA1C BLD-MCNC: 10.1 % — HIGH (ref 4–5.6)
INR BLD: 1.17 RATIO — HIGH (ref 0.88–1.16)
LACTATE BLDV-MCNC: 1.4 MMOL/L — SIGNIFICANT CHANGE UP (ref 0.7–2)
MAGNESIUM SERPL-MCNC: 2 MG/DL — SIGNIFICANT CHANGE UP (ref 1.6–2.6)
NT-PROBNP SERPL-SCNC: 2641 PG/ML — HIGH (ref 0–300)
PHOSPHATE SERPL-MCNC: 2.8 MG/DL — SIGNIFICANT CHANGE UP (ref 2.5–4.5)
POTASSIUM SERPL-MCNC: 3.9 MMOL/L — SIGNIFICANT CHANGE UP (ref 3.5–5.3)
POTASSIUM SERPL-SCNC: 3.9 MMOL/L — SIGNIFICANT CHANGE UP (ref 3.5–5.3)
PROTHROM AB SERPL-ACNC: 12.7 SEC — SIGNIFICANT CHANGE UP (ref 9.8–12.7)
SODIUM SERPL-SCNC: 128 MMOL/L — LOW (ref 135–145)
TROPONIN T, HIGH SENSITIVITY RESULT: 543 NG/L — HIGH (ref 0–51)
TROPONIN T, HIGH SENSITIVITY RESULT: 594 NG/L — HIGH (ref 0–51)
TROPONIN T, HIGH SENSITIVITY RESULT: 598 NG/L — HIGH (ref 0–51)
TROPONIN T, HIGH SENSITIVITY RESULT: 666 NG/L — HIGH (ref 0–51)

## 2018-10-13 PROCEDURE — 99223 1ST HOSP IP/OBS HIGH 75: CPT | Mod: AI,GC

## 2018-10-13 PROCEDURE — 99222 1ST HOSP IP/OBS MODERATE 55: CPT

## 2018-10-13 PROCEDURE — 93926 LOWER EXTREMITY STUDY: CPT | Mod: 26,LT

## 2018-10-13 RX ORDER — INSULIN GLARGINE 100 [IU]/ML
25 INJECTION, SOLUTION SUBCUTANEOUS AT BEDTIME
Qty: 0 | Refills: 0 | Status: DISCONTINUED | OUTPATIENT
Start: 2018-10-13 | End: 2018-10-24

## 2018-10-13 RX ORDER — INSULIN LISPRO 100/ML
7 VIAL (ML) SUBCUTANEOUS
Qty: 0 | Refills: 0 | Status: DISCONTINUED | OUTPATIENT
Start: 2018-10-13 | End: 2018-10-14

## 2018-10-13 RX ORDER — INSULIN LISPRO 100/ML
VIAL (ML) SUBCUTANEOUS
Qty: 0 | Refills: 0 | Status: DISCONTINUED | OUTPATIENT
Start: 2018-10-13 | End: 2018-10-24

## 2018-10-13 RX ORDER — INSULIN LISPRO 100/ML
VIAL (ML) SUBCUTANEOUS AT BEDTIME
Qty: 0 | Refills: 0 | Status: DISCONTINUED | OUTPATIENT
Start: 2018-10-13 | End: 2018-10-24

## 2018-10-13 RX ORDER — INSULIN GLARGINE 100 [IU]/ML
20 INJECTION, SOLUTION SUBCUTANEOUS ONCE
Qty: 0 | Refills: 0 | Status: COMPLETED | OUTPATIENT
Start: 2018-10-13 | End: 2018-10-13

## 2018-10-13 RX ORDER — METOPROLOL TARTRATE 50 MG
12.5 TABLET ORAL
Qty: 0 | Refills: 0 | Status: DISCONTINUED | OUTPATIENT
Start: 2018-10-13 | End: 2018-10-14

## 2018-10-13 RX ORDER — INSULIN GLARGINE 100 [IU]/ML
20 INJECTION, SOLUTION SUBCUTANEOUS AT BEDTIME
Qty: 0 | Refills: 0 | Status: DISCONTINUED | OUTPATIENT
Start: 2018-10-13 | End: 2018-10-13

## 2018-10-13 RX ORDER — INSULIN LISPRO 100/ML
5 VIAL (ML) SUBCUTANEOUS
Qty: 0 | Refills: 0 | Status: DISCONTINUED | OUTPATIENT
Start: 2018-10-13 | End: 2018-10-13

## 2018-10-13 RX ADMIN — Medication 81 MILLIGRAM(S): at 11:55

## 2018-10-13 RX ADMIN — PIPERACILLIN AND TAZOBACTAM 25 GRAM(S): 4; .5 INJECTION, POWDER, LYOPHILIZED, FOR SOLUTION INTRAVENOUS at 19:27

## 2018-10-13 RX ADMIN — Medication 5 UNIT(S): at 08:21

## 2018-10-13 RX ADMIN — ENOXAPARIN SODIUM 40 MILLIGRAM(S): 100 INJECTION SUBCUTANEOUS at 08:21

## 2018-10-13 RX ADMIN — GABAPENTIN 300 MILLIGRAM(S): 400 CAPSULE ORAL at 05:11

## 2018-10-13 RX ADMIN — PIPERACILLIN AND TAZOBACTAM 25 GRAM(S): 4; .5 INJECTION, POWDER, LYOPHILIZED, FOR SOLUTION INTRAVENOUS at 08:21

## 2018-10-13 RX ADMIN — Medication 1 MILLIGRAM(S): at 11:55

## 2018-10-13 RX ADMIN — TICAGRELOR 90 MILLIGRAM(S): 90 TABLET ORAL at 05:11

## 2018-10-13 RX ADMIN — Medication 4: at 17:04

## 2018-10-13 RX ADMIN — INSULIN GLARGINE 20 UNIT(S): 100 INJECTION, SOLUTION SUBCUTANEOUS at 00:51

## 2018-10-13 RX ADMIN — Medication 5 UNIT(S): at 17:04

## 2018-10-13 RX ADMIN — GABAPENTIN 300 MILLIGRAM(S): 400 CAPSULE ORAL at 13:27

## 2018-10-13 RX ADMIN — Medication 100 MILLIGRAM(S): at 13:27

## 2018-10-13 RX ADMIN — SENNA PLUS 2 TABLET(S): 8.6 TABLET ORAL at 21:35

## 2018-10-13 RX ADMIN — INSULIN GLARGINE 25 UNIT(S): 100 INJECTION, SOLUTION SUBCUTANEOUS at 21:21

## 2018-10-13 RX ADMIN — GABAPENTIN 300 MILLIGRAM(S): 400 CAPSULE ORAL at 21:36

## 2018-10-13 RX ADMIN — PREGABALIN 1000 MICROGRAM(S): 225 CAPSULE ORAL at 13:27

## 2018-10-13 RX ADMIN — SERTRALINE 100 MILLIGRAM(S): 25 TABLET, FILM COATED ORAL at 13:26

## 2018-10-13 RX ADMIN — Medication 100 MILLIGRAM(S): at 05:11

## 2018-10-13 RX ADMIN — Medication 250 MILLIGRAM(S): at 18:03

## 2018-10-13 RX ADMIN — TICAGRELOR 90 MILLIGRAM(S): 90 TABLET ORAL at 18:28

## 2018-10-13 RX ADMIN — Medication 12.5 MILLIGRAM(S): at 08:23

## 2018-10-13 RX ADMIN — ATORVASTATIN CALCIUM 40 MILLIGRAM(S): 80 TABLET, FILM COATED ORAL at 21:20

## 2018-10-13 RX ADMIN — Medication 3: at 08:20

## 2018-10-13 RX ADMIN — Medication 4: at 21:18

## 2018-10-13 RX ADMIN — Medication 12.5 MILLIGRAM(S): at 17:57

## 2018-10-13 RX ADMIN — Medication 4: at 12:28

## 2018-10-13 RX ADMIN — Medication 100 MILLIGRAM(S): at 21:35

## 2018-10-13 RX ADMIN — TAMSULOSIN HYDROCHLORIDE 0.4 MILLIGRAM(S): 0.4 CAPSULE ORAL at 21:35

## 2018-10-13 RX ADMIN — Medication 1 TABLET(S): at 11:55

## 2018-10-13 RX ADMIN — Medication 250 MILLIGRAM(S): at 04:08

## 2018-10-13 RX ADMIN — Medication 5 UNIT(S): at 12:28

## 2018-10-13 RX ADMIN — Medication 12.5 MILLIGRAM(S): at 00:51

## 2018-10-13 NOTE — CONSULT NOTE ADULT - CONSULT REASON
elevated HST     hx HTN, CAD s/p CANDIDO to RCA in 02/2018 in addition to CANDIDO to LAD, LCX, PAD s/p left femoral to below knee popliteal bypass 03/2018, left great toe partial amputation

## 2018-10-13 NOTE — PROGRESS NOTE ADULT - PROBLEM SELECTOR PLAN 5
BP elevated on arrival 140-180 systolic  - Home medication include Metoprolol XR 50 mg daily, continue with Metoprolol 12.5 mg BID in the setting of sinus tachycardia (suspecting rebound tachycardia) with holding parameters  - Continue to monitor vitals Q4H

## 2018-10-13 NOTE — PROGRESS NOTE ADULT - PROBLEM SELECTOR PLAN 6
- Last A1C in 01/2018 13.2% suggest uncontrolled T2DM complicated by peripheral neuropathy and now with gangrene  - Home medication include Humulin 70/30 50 units in the am and 40 units before dinner and Novolog 15 units before meals, given by his wife, suspecting that uncontrolled T2DM may also be related to poor insulin techniques  - Continue with ISS sliding scale   - Per weight based calculation, started on Humalog 5 units TIDAC and Lantus 20 units QHS. Will increase humalog to 7 tidac, lantus 25 units qhs and moderate sliding scale.  - Endocrinology consult Sunday in the am, A1C 10.0

## 2018-10-13 NOTE — PROGRESS NOTE ADULT - PROBLEM SELECTOR PLAN 2
- Patient presented with tachycardia, leukocytosis meeting SIRS criteria with likely source being the L foot gangrene, concern for osteo given elevated ESR  - s/p 1x Vanc and Ceftriaxone in the ED, continue with Vanc for MRSA coverage as patient has been recently hospitalized and Zosyn for gram negative/anaerobic coverage as patient is a diabetic  - Pending blood cultures, trend lactate, vitals Q4H, gentle IVF will dc after today

## 2018-10-13 NOTE — PROGRESS NOTE ADULT - PROBLEM SELECTOR PLAN 4
- Initially 3.2 now downtrending to 2.7 likely indicative of severe sepsis  - Continue to trend lactate, now downtrending  -d/c ivf after today

## 2018-10-13 NOTE — CONSULT NOTE ADULT - ASSESSMENT
Patient is a 68 year old man with history of HTN, CAD s/p CANDIDO to RCA in 02/2018 in addition to CANDIDO to LAD, LCX, PAD s/p left femoral to below knee popliteal bypass 03/2018, left great toe partial amputation presenting with increasing toe pain, bleeding, and infection found to have elevated troponins.     1. Tropenemia   cv stable no chest pain or sob. no evidence of acute ischemia/ACS   EKG NSR, LVH, repolarization artifact  likely Type II MI, demand ischemia in setting of sepsis, osteomyelitis   no evidence of fluid overload on exam   pending echo to evaluate LV function, valvular disease  continue aspirin, brilinta, bb, statin     2. CAD s/p multiple PCI  cv stable no chest pain or sob. no evidence of acute ischemia/ACS   continue aspirin, brilinta, bb, statin     3. Sepsis/osteomylitis   on iv abx  med f/u   podiatry eval for poss toe amputation     4. Vascular f/u   No acute vascular surgery intervention  pending duplex of left lower extremity to evaluate bypass patency    dvt ppx Patient is a 68 year old man with history of HTN, CAD s/p CANDIDO to RCA in 02/2018 in addition to CANDIDO to LAD, LCX, PAD s/p left femoral to below knee popliteal bypass 03/2018, left great toe partial amputation presenting with increasing toe pain, bleeding, and infection found to have elevated troponins.     1. Tropenemia   cv stable no chest pain or sob. no evidence of acute ischemia/ACS   EKG NSR, LVH, repolarization artifact  likely Type II MI, demand ischemia in setting of sepsis, osteomyelitis   no evidence of fluid overload on exam   pending echo to evaluate LV function, valvular disease  continue aspirin, brilinta, bb, statin     2. CAD s/p multiple PCI  cv stable no chest pain or sob. no evidence of acute ischemia/ACS   recent cath performed in Feb 2018 ( CANDIDO x 1pLAD, CANDIDO x 1 dCx, CANDIOD x 1 pOM1 and RCA )  continue aspirin, brilinta, bb, statin     3. foot ulcer, Sepsis/osteomylitis   on iv abx  med f/u   podiatry eval for poss toe amputation     4. PAD s/p  LE bypass  No acute vascular surgery intervention  pending duplex of left lower extremity to evaluate bypass patency  Vascular f/u     5. htn  bp stable  continue current medication regimen     dvt ppx

## 2018-10-13 NOTE — CONSULT NOTE ADULT - SUBJECTIVE AND OBJECTIVE BOX
CARDIOLOGY CONSULT - Dr. Gavin     CHIEF COMPLAINT:     HPI:  Patient is a 68 year old man with history of HTN, CAD s/p CANDIDO to RCA in 2018 in addition to CANDIDO to LAD, LCX, PAD s/p left femoral to below knee popliteal bypass 2018, left great toe partial amputation presenting with increasing toe pain, bleeding, and infection. Lactate elevated to 3.2, Leukocytosis 17.8k, tachycardic on presentation, hs troponin 543, BNP 2641. Given broad spectrum Abx, IVF 2L NS with improvement in lactate, heart rate. ECG with new LVH and repolarization artifact ST depressions. Denies chest pain and shortness of breath, denies LE edema, palpitations.       PAST MEDICAL & SURGICAL HISTORY:  CAD (coronary artery disease)  Diabetes mellitus  Hypertension  Alcohol Dependency  Diabetes Mellitus  S/P femoral-popliteal bypass surgery  Stented coronary artery          PREVIOUS DIAGNOSTIC TESTING:    [ ] Echocardiogram: < from: Transthoracic Echocardiogram (18 @ 06:50) >  Conclusions:  1. Mitral annular calcification, otherwise normal mitral  valve. Minimal mitral regurgitation.  2. Calcified trileaflet aortic valve with normal opening.  Minimal aortic regurgitation.  3. Normal left ventricular systolic function. No segmental  wall motion abnormalities.  4. Normal right ventricular size and function.  *** No previous Echoexam.    < end of copied text >  < from: Transthoracic Echocardiogram (18 @ 06:50) >  EF (Visual Estimate): 65-70 %    < end of copied text >    [ ]  Catheterization: < from: Cardiac Cath Lab - Adult (18 @ 10:05) >    [facility]  Department of Cardiology  [address] [place] [address]  [phone]  Cath Lab Report -- Comprehensive Report  Patient: CHAPIS BALLESTEROS  Study date: 2018  Account number: [id number]  MR number: [id number]  : 1950  Gender: Male  Race: O  Case Physician(s):  Rufus Gavin M.D.  Fellow:  Wil Velez M.D.  Referring Physician:  Real Raphael M.D.  INDICATIONS: Patient with recent PCI to the proximal LAD, LCX, and Ramus.  Planned PCI to the RCA in the setting of recurrent chest pain, severe  multivessel CAD. recent nuclear stress revealing multiple areas of  ischemia.  HISTORY: The patient has a history of coronary artery disease. The patient  has hypertension, oral hypoglycemic-treated diabetes, medication-treated  dyslipidemia, and a family history of coronary artery disease. PRIOR  CARDIOVASCULAR PROCEDURES: Stent of the proximal LAD, proximal circumflex,  and proximal ramus intermedius ( 2018).  PROCEDURE:  --  Intervention on proximal RCA: drug-eluting stent.  TECHNIQUE: The risks and alternatives of the procedures and conscious  sedation were explained to the patient and informed consent was obtained.  Cardiac catheterization performed electively.  Right radial artery access. Local anesthetic given. The puncture site was  infiltrated with 1 % lidocaine. A 6FR PRELUDE KIT was inserted in the  vessel, utilizing the modified Seldinger technique. RADIATION EXPOSURE:  31.8 min. A drug-eluting stent was performed on the 95 % lesion in the  proximal RCA. Following intervention there was an excellent angiographic  appearance with a focal 20 % residual stenosis in the mid portion of the  stent due to excessive calcification. There was no dissection. Vessel  setup was performed. A 6FR JR4 LAUNCHER guiding catheter was used to  intubate the vessel. Vessel setup was performed. A BMW UNIVERSAL 190CM  wire was used to cross the lesion. Balloon angioplasty was performed,  using a 2.0 X 12 EUPHORA balloon, with5 inflations and a maximum  inflation pressure of 14 edwin. Balloon angioplasty was performed, using a  2.5 X 12 EUPHORA balloon, with 4 inflations and a maximum inflation  pressure of 14 edwin. Balloon angioplasty was performed, using a 2.50 X 12  NC APEX balloon, with 3 inflations and a maximum inflation pressure of 20  edwin. Balloon angioplasty was performed, using a 2.75 X 12 NC APEX balloon,  with 3 inflations and a maximum inflation pressure of 20 edwin. A 3.00 X 26  JESSE drug-eluting stent was placed across the lesion and deployed at a  maximum inflation pressure of 16 edwin. Balloon angioplasty was performed,  with 1 inflations and a maximum inflation pressure of 18 edwin. A 2.75 X 12  JESSE drug-eluting stent was placed across the lesion and deployed at a  maximum inflation pressure of 14 edwin. Balloon angioplasty was performed,  with 1 inflations and a maximum inflation pressure of 16 edwin. Balloon  angioplasty was performed, using a 3.50 X 12 NC APEX balloon, with 2  inflations and a maximuminflation pressure of 18 edwin.  CONTRAST GIVEN: Omnipaque 47 ml.  MEDICATIONS GIVEN: Verapamil (Isoptin, Calan, Covera), 2.5 mg, IA.  Nicardipine (Cardene), 250 mcg, intracoronary. Nitroglycerin, 100 mcg,  intracoronary. Nitroglycerin, 100 mcg, intracoronary. Nicardipine  (Cardene), 250 mcg, intracoronary. Heparin, 3000 units, IA. Heparin, 2500  units, IV.  CORONARY VESSELS:  RCA:   --  Proximal RCA: There was a diffuse 95 % stenosis in-stent. The  lesion was heavily calcified.  COMPLICATIONS: There were no complications.  DIAGNOSTIC IMPRESSIONS: Successful staged PCI to the proximal RCA in the  setting of recurrent chest pain following recent PCI to the LAD, LCX, and  Ramus.  DIAGNOSTIC RECOMMENDATIONS: Continue dual antiplatelet therapy.  Continue current medical therapy.  Anticipate DC tomorrow.  INTERVENTIONAL IMPRESSIONS: Successful staged PCI to the proximal RCA in  the setting of recurrent chest pain following recent PCI to the LAD, LCX,  and Ramus.  INTERVENTIONAL RECOMMENDATIONS: Continue dual antiplatelet therapy.  Continue current medical therapy.  Anticipate DC tomorrow.  DISPOSITION: The patient left the catheterization laboratory in stable  condition.    < end of copied text >    [ ] Stress Test:  	< from: Nuclear Stress Test-Pharmacologic (18 @ 09:27) >  IMPRESSIONS:Abnormal Study  * Chest Pain: No chest pain with administration of  Regadenoson.  * Symptom: No Symptom.  * HR Response: Appropriate.  * BP Response: Hypotensive Response with Pharmacologic  Agent.  * Heart Rhythm: Normal Sinus Rhythm - 78 BPM.  * Baseline ECG: No significant ST abnormalities.  * ECG Abnormalities: No ischemic ECG changes.  * Arrhythmia: Rare VPDs occurred.  * Review of raw data shows: The study is of good technical  quality.  * The left ventricle was normal in size. There are small  moderate defects in the basal inferior wall and apical  lateral wall that are predominantly reversible consistent  with ischemia.  TID is 1.31 which is not elevated for this  protocol.  * Post-stress gated wall motion analysis was performed  (LVEF = 53 %;LVEDV = 67 ml.) Reduced systolic thickening  of the basal inferior wall    < end of copied text >      MEDICATIONS:  MEDICATIONS  (STANDING):  aspirin enteric coated 81 milliGRAM(s) Oral daily  atorvastatin 40 milliGRAM(s) Oral at bedtime  cyanocobalamin 1000 MICROGram(s) Oral daily  dextrose 5%. 1000 milliLiter(s) (50 mL/Hr) IV Continuous <Continuous>  dextrose 50% Injectable 12.5 Gram(s) IV Push once  dextrose 50% Injectable 25 Gram(s) IV Push once  dextrose 50% Injectable 25 Gram(s) IV Push once  docusate sodium 100 milliGRAM(s) Oral three times a day  enoxaparin Injectable 40 milliGRAM(s) SubCutaneous every 24 hours  folic acid 1 milliGRAM(s) Oral daily  gabapentin 300 milliGRAM(s) Oral three times a day  insulin glargine Injectable (LANTUS) 20 Unit(s) SubCutaneous at bedtime  insulin lispro (HumaLOG) corrective regimen sliding scale   SubCutaneous three times a day before meals  insulin lispro (HumaLOG) corrective regimen sliding scale   SubCutaneous at bedtime  insulin lispro Injectable (HumaLOG) 5 Unit(s) SubCutaneous three times a day before meals  metoprolol tartrate 12.5 milliGRAM(s) Oral two times a day  multivitamin 1 Tablet(s) Oral daily  piperacillin/tazobactam IVPB. 3.375 Gram(s) IV Intermittent every 8 hours  senna 2 Tablet(s) Oral at bedtime  sertraline 100 milliGRAM(s) Oral daily  sodium chloride 0.9%. 1000 milliLiter(s) (75 mL/Hr) IV Continuous <Continuous>  tamsulosin 0.4 milliGRAM(s) Oral at bedtime  ticagrelor 90 milliGRAM(s) Oral two times a day  vancomycin  IVPB 1000 milliGRAM(s) IV Intermittent every 12 hours      FAMILY HISTORY:  Family history of diabetes mellitus (Father)      SOCIAL HISTORY:    [x] Non-smoker  [ ] Smoker  [ ] Alcohol    Allergies    No Known Allergies    Intolerances    	    REVIEW OF SYSTEMS:  CONSTITUTIONAL: No fever, weight loss, or fatigue  EYES: No eye pain, visual disturbances, or discharge  ENMT:  No difficulty hearing, tinnitus, vertigo; No sinus or throat pain  NECK: No pain or stiffness  RESPIRATORY: No cough, wheezing, chills or hemoptysis; No Shortness of Breath  CARDIOVASCULAR: No chest pain, palpitations, passing out, dizziness, or leg swelling  GASTROINTESTINAL: No abdominal or epigastric pain. No nausea, vomiting, or hematemesis; No diarrhea or constipation. No melena or hematochezia.  GENITOURINARY: No dysuria, frequency, hematuria, or incontinence  NEUROLOGICAL: No headaches, memory loss, loss of strength, numbness, or tremors  SKIN: No itching, burning, rashes, or lesions   	    [x] All others negative	  [ ] Unable to obtain    PHYSICAL EXAM:  T(C): 36.8 (10-13-18 @ 08:20), Max: 37.2 (10-13-18 @ 04:26)  HR: 87 (10-13-18 @ 08:20) (84 - 116)  BP: 120/75 (10-13-18 @ 08:20) (101/66 - 187/108)  RR: 18 (10-13-18 @ 08:20) (16 - 30)  SpO2: 96% (10-13-18 @ 08:20) (95% - 98%)  Wt(kg): --  I&O's Summary      Appearance: Normal	  Psychiatry: A & O x 3, Mood & affect appropriate  HEENT:   Normal oral mucosa, PERRL, EOMI	  Lymphatic: No lymphadenopathy  Cardiovascular: Normal S1 S2,RRR, No JVD, No murmurs  Respiratory: Lungs clear to auscultation	  Gastrointestinal:  Soft, Non-tender, + BS	  Skin: No rashes, No ecchymoses, No cyanosis	  Neurologic: Non-focal  Extremities: Normal range of motion, No clubbing, cyanosis or edema  Vascular: Peripheral pulses palpable 2+ bilaterally    TELEMETRY: NSR 	    ECG:  NSR, LVH, repolarization artifact   RADIOLOGY:< from: Xray Chest 1 View- PORTABLE-Urgent (03.10.18 @ 17:54) >  Impression:    Poor inspiratory effort. The heartis slightly enlarged. The lungs are   clear. Calcified aortic knob. Degenerative changes of the thoracic spine.    < end of copied text >    OTHER: 	  	  LABS:	 	    CARDIAC MARKERS:                                  13.8   17.8  )-----------( 450      ( 12 Oct 2018 16:08 )             41.1     10-13    128<L>  |  94<L>  |  8   ----------------------------<  241<H>  3.9   |  20<L>  |  0.62    Ca    8.5      13 Oct 2018 04:26  Phos  2.8     10-13  Mg     2.0     10-13    TPro  9.1<H>  /  Alb  4.1  /  TBili  0.6  /  DBili  x   /  AST  25  /  ALT  19  /  AlkPhos  96  10-    PT/INR - ( 13 Oct 2018 04:26 )   PT: 12.7 sec;   INR: 1.17 ratio         PTT - ( 13 Oct 2018 04:26 )  PTT:28.3 sec  proBNP: Serum Pro-Brain Natriuretic Peptide: 2641 pg/mL (10-13 @ 04:26)    Lipid Profile:   HgA1c: Hemoglobin A1C, Whole Blood: 10.1 % (10-13 @ 06:16)    TSH:

## 2018-10-13 NOTE — PROGRESS NOTE ADULT - PROBLEM SELECTOR PLAN 8
- s/p multiple CANDIDO, most recent in 02/2018  - Continue with ASA/Brilinta, continue with high intensity statin  - If planning for transmetatarsal amputation, patient likely will need cardiology clearance as he has multiple cardiac problems. EKG ordered. Last TTE in 02/2018 reviewed EF 65-70%

## 2018-10-13 NOTE — PROGRESS NOTE ADULT - PROBLEM SELECTOR PLAN 7
likely demand ischemia in setting of sepsis  now downtrending  cards recs appreciated- cont medical management-- TTE pending.

## 2018-10-13 NOTE — CONSULT NOTE ADULT - SUBJECTIVE AND OBJECTIVE BOX
MRN-[id number]    CHIEF COMPLAINT:  Abnormal ECG and elevated troponin     HISTORY OF PRESENT ILLNESS:  CHAPIS BALLESTEROS is a 68y Male patient with past medical history of HTN, CAD s/p CANDIDO 2/2018 to RCA, LAD, LCX, PAD s/p left femoral to below knee popliteal bypass 03/2018, left great toe partial amputation presenting with increasing toe pain, bleeding, and infection. Lactate elevated to 3.2, Leukocytosis 17.8k, tachycardic on presentation, hs troponin 543, BNP 2641. Given broad spectrum Abx, IVF 2L NS with improvement in lactate, heart rate. ECG with new LVH and repolarization artifact ST depressions. Denies chest pain and shortness of breath. Cardiology consulted for further management.     Allergies  No Known Allergies    PAST MEDICAL & SURGICAL HISTORY:  CAD (coronary artery disease)  Diabetes mellitus  Hypertension  Alcohol Dependency  Diabetes Mellitus  S/P femoral-popliteal bypass surgery  Stented coronary artery    FAMILY HISTORY:  Family history of diabetes mellitus (Father)    SOCIAL HISTORY:    Non-smoker    REVIEW OF SYSTEMS:  CONSTITUTIONAL: No fever, weight loss, Positive fatigue  EYES: No eye pain, visual disturbances  ENMT:  No difficulty hearing, tinnitus  NECK: No pain or stiffness  RESPIRATORY: No cough, wheezing, chills. No Shortness of Breath  CARDIOVASCULAR: No chest pain, palpitations, passing out, dizziness  GASTROINTESTINAL: No abdominal or epigastric pain. No nausea, vomiting  GENITOURINARY: No dysuria, frequency  NEUROLOGICAL: No headaches, memory loss, loss of strength  SKIN: No itching, burning. Positive dry skin   LYMPH Nodes: No enlarged glands  MUSCULOSKELETAL: No joint pain or swelling. Positive left toe infection/amputation/bleeding.   PSYCHIATRIC: No depression, anxiety  HEME/LYMPH: No easy bruising, or bleeding gums  ALLERY AND IMMUNOLOGIC: No hives or eczema	    PHYSICAL EXAM:  Vital Signs Last 24 Hrs  T(C): 36.8 (13 Oct 2018 06:33), Max: 37.2 (13 Oct 2018 04:26)  T(F): 98.2 (13 Oct 2018 06:33), Max: 99 (13 Oct 2018 04:26)  HR: 84 (13 Oct 2018 06:33) (84 - 116)  BP: 123/76 (13 Oct 2018 06:33) (101/66 - 187/108)  RR: 22 (13 Oct 2018 06:33) (16 - 30)  SpO2: 95% (13 Oct 2018 04:26) (95% - 98%)    Appearance: Normal	  HEENT: Normal oral mucosa	  Lymphatic: No lymphadenopathy  Cardiovascular: Normal S1 S2, Trace edema  Respiratory: Lungs clear to auscultation	  Psychiatry: A & O x 3  Gastrointestinal:  Soft, Non-tender, + BS	  Skin: No rashes, Extensive dry skin	  Neurologic: Non-focal  Extremities: No clubbing, cyanosis. Positive left big toe bleeding, ecchymosis, serosanguinous exudate.   Vascular: Peripheral pulses palpable 2+ upper extremities.     MEDICATIONS:  MEDICATIONS  (STANDING):  aspirin enteric coated 81 milliGRAM(s) Oral daily  atorvastatin 40 milliGRAM(s) Oral at bedtime  cyanocobalamin 1000 MICROGram(s) Oral daily  dextrose 5%. 1000 milliLiter(s) (50 mL/Hr) IV Continuous <Continuous>  dextrose 50% Injectable 12.5 Gram(s) IV Push once  dextrose 50% Injectable 25 Gram(s) IV Push once  dextrose 50% Injectable 25 Gram(s) IV Push once  docusate sodium 100 milliGRAM(s) Oral three times a day  enoxaparin Injectable 40 milliGRAM(s) SubCutaneous every 24 hours  folic acid 1 milliGRAM(s) Oral daily  gabapentin 300 milliGRAM(s) Oral three times a day  insulin glargine Injectable (LANTUS) 20 Unit(s) SubCutaneous at bedtime  insulin lispro (HumaLOG) corrective regimen sliding scale   SubCutaneous three times a day before meals  insulin lispro (HumaLOG) corrective regimen sliding scale   SubCutaneous at bedtime  insulin lispro Injectable (HumaLOG) 5 Unit(s) SubCutaneous three times a day before meals  metoprolol tartrate 12.5 milliGRAM(s) Oral two times a day  multivitamin 1 Tablet(s) Oral daily  piperacillin/tazobactam IVPB. 3.375 Gram(s) IV Intermittent every 8 hours  senna 2 Tablet(s) Oral at bedtime  sertraline 100 milliGRAM(s) Oral daily  sodium chloride 0.9%. 1000 milliLiter(s) (75 mL/Hr) IV Continuous <Continuous>  tamsulosin 0.4 milliGRAM(s) Oral at bedtime  ticagrelor 90 milliGRAM(s) Oral two times a day  vancomycin  IVPB 1000 milliGRAM(s) IV Intermittent every 12 hours    MEDICATIONS  (PRN):  dextrose 40% Gel 15 Gram(s) Oral once PRN Blood Glucose LESS THAN 70 milliGRAM(s)/deciliter  glucagon  Injectable 1 milliGRAM(s) IntraMuscular once PRN Glucose LESS THAN 70 milligrams/deciliter    LABS:	 	  CBC Full  -  ( 12 Oct 2018 16:08 )  WBC Count : 17.8 K/uL  Hemoglobin : 13.8 g/dL  Hematocrit : 41.1 %  Platelet Count - Automated : 450 K/uL  Mean Cell Volume : 85.4 fl  Mean Cell Hemoglobin : 28.7 pg  Mean Cell Hemoglobin Concentration : 33.5 gm/dL  Auto Neutrophil # : 14.8 K/uL  Auto Lymphocyte # : 2.0 K/uL  Auto Monocyte # : 1.0 K/uL  Auto Eosinophil # : 0.0 K/uL  Auto Basophil # : 0.0 K/uL  Auto Neutrophil % : 82.8 %  Auto Lymphocyte % : 10.9 %  Auto Monocyte % : 5.8 %  Auto Eosinophil % : 0.3 %  Auto Basophil % : 0.2 %    10-13    128<L>  |  94<L>  |  8   ----------------------------<  241<H>  3.9   |  20<L>  |  0.62  10-12    128<L>  |  91<L>  |  11  ----------------------------<  272<H>  4.8   |  22  |  0.63    Ca    8.5      13 Oct 2018 04:26  Ca    9.6      12 Oct 2018 16:08  Phos  2.8     10-13  Mg     2.0     10-13    TPro  9.1<H>  /  Alb  4.1  /  TBili  0.6  /  DBili  x   /  AST  25  /  ALT  19  /  AlkPhos  96  10-12    PT/INR - ( 13 Oct 2018 04:26 )   PT: 12.7 sec;   INR: 1.17 ratio      PTT - ( 13 Oct 2018 04:26 )  PTT:28.3 sec  CARDIAC MARKERS ( 13 Oct 2018 04:26 )  x     / x     / 614 U/L / x     / 35.0 ng/mL    TELEMETRY: NSR     ECG: NSR, new LVH with repolarization artifact (STD precordial leads)     CARDIAC TESTING/STUDIES:    Echocardiogram 2/4/2018  Conclusions:  1. Mitral annular calcification, otherwise normal mitral  valve. Minimal mitral regurgitation.  2. Calcified trileaflet aortic valve with normal opening.  Minimal aortic regurgitation.  3. Normal left ventricular systolic function. No segmental  wall motion abnormalities.  4. Normal right ventricular size and function.  *** No previous Echo exam.    Catheterization 2/5/2018  PROCEDURE:  --  Left heart catheterization.  --  Left coronary angiography.  --  Right coronary angiography.  --  Intervention on proximal LAD: drug-eluting stent.  --  Intervention on distal circumflex: drug-eluting stent.  --  Intervention on ramus intermedius: drug-eluting stent.    VENTRICLES: No left ventriculogram was performed.    CORONARY VESSELS: The coronary circulation is right dominant.  LM:   --  LM: Normal.  LAD:   --  Proximal LAD: There was a tubular 90 % stenosis at the distal  edge of prior stent.  CX:   --  Circumflex: Angiography showed minor luminal irregularities with  no flow limiting lesions.  --  Distal circumflex: There was a tubular 95 % stenosis.  --  OM1: The vessel was small sized.  --  OM2: The vessel was small sized.  --  OM3: There was a 30 % stenosis at the ostium of the vessel segment.  RI:   --  Ramus intermedius: There was a tubular 95 % stenosis in the  proximal third of the vessel segment.  RCA:   --  Proximal RCA: There was a 95 % stenosis at the site of a prior  stent.  --  RPDA: Angiography showed minor luminal irregularities with no flow  limiting lesions.  --  RPL1: Angiography showed minor luminal irregularities with no flow  limiting lesions.    COMPLICATIONS: There were no complications.    DIAGNOSTIC IMPRESSIONS: Pre-op vascular surgery (Complicated LE Bypass)  with known history of CAD, s/p multiple PCI. Decreased exercise tolerance  and dyspnea. Nuclear stress testing with high risk findings of ischemia in  multiple territories and borderline TID. Cath with severe focal lesions in  proximal LAD, proximal RCA, distal LCx, and proximal Ramus. EF overall  preserved on SPECT.  DIAGNOSTIC RECOMMENDATIONS: PCI with CANDIDO to severe lesions in proximal LAD,  proximal LCX, and Ramus. Lesions all critical in stenosis severity with  correlating ischemia on nuclear stress testing. Without coronary  revascularization, surgical risk would be prohibitive with this anatomy  and lesion burden. Revascularization performed. CABG deferred by patient  as he awaits needed LE bypass for wound healing/limb salvage. Continue ASA  81mg and Brilinta. Staged PCI to severe proximal RCA lesion to minimize  dye load and reduce risk of BERE.  INTERVENTIONAL IMPRESSIONS: Pre-op vascular surgery (Complicated LE Bypass)  with known history of CAD, s/p multiple PCI. Decreased exercise tolerance  and dyspnea. Nuclear stress testing with high risk findings of ischemia in  multiple territories and borderline TID. Cath with severe focal lesions in  proximal LAD, proximal RCA, distal LCx, and proximal Ramus. EF overall  preserved on SPECT.  INTERVENTIONAL RECOMMENDATIONS: PCI with CANDIDO to severe lesions in proximal  LAD, proximal LCX, and Ramus. Lesions all critical in stenosis severity  with correlating ischemia on nuclear stress testing. Without coronary  revascularization, surgical risk would be prohibitive with this anatomy  and lesion burden. Revascularization performed. CABG deferred by patient  as he awaits needed LE bypass for wound healing/limb salvage. Continue ASA  81mg and Brilinta. Staged PCI to severe proximal RCA lesion to minimize  dye load and reduce risk of BERE.    Catheterization 2/6/2018  PROCEDURE:  --  Intervention on proximal RCA: drug-eluting stent.    CORONARY VESSELS:  RCA:   --  Proximal RCA: There was a diffuse 95 % stenosis in-stent. The  lesion was heavily calcified.    COMPLICATIONS: There were no complications.    DIAGNOSTIC IMPRESSIONS: Successful staged PCI to the proximal RCA in the  setting of recurrent chest pain following recent PCI to the LAD, LCX, and  Ramus.    DIAGNOSTIC RECOMMENDATIONS: Continue dual antiplatelet therapy.  Continue current medical therapy.  Anticipate DC tomorrow.    INTERVENTIONAL IMPRESSIONS: Successful staged PCI to the proximal RCA in  the setting of recurrent chest pain following recent PCI to the LAD, LCX,  and Ramus.    INTERVENTIONAL RECOMMENDATIONS: Continue dual antiplatelet therapy.  Continue current medical therapy.  Anticipate DC tomorrow.    DISPOSITION: The patient left the catheterization laboratory in stable  condition.    Stress Test MPI 2/4/2018  IMPRESSIONS: Abnormal Study  * Chest Pain: No chest pain with administration of  Regadenoson.  * Symptom: No Symptom.  * HR Response: Appropriate.  * BP Response: Hypotensive Response with Pharmacologic  Agent.  * Heart Rhythm: Normal Sinus Rhythm - 78 BPM.  * Baseline ECG: No significant ST abnormalities.  * ECG Abnormalities: No ischemic ECG changes.  * Arrhythmia: Rare VPDs occurred.  * Review of raw data shows: The study is of good technical  quality.  * The left ventricle was normal in size. There are small  moderate defects in the basal inferior wall and apical  lateral wall that are predominantly reversible consistent  with ischemia.  TID is 1.31 which is not elevated for this protocol.  * Post-stress gated wall motion analysis was performed  (LVEF = 53 %;LVEDV = 67 ml.) Reduced systolic thickening  of the basal inferior wall    ASSESSMENT/PLAN:   68y Male patient with past medical history of HTN, CAD s/p CANDIDO 2/2018 to RCA, LAD, LCX, PAD s/p left femoral to below knee popliteal bypass 03/2018, left great toe partial amputation presenting with sepsis and NSTEMI type II.     1) NSTEMI type II  hs troponin 543  ECG with LVH and repolarization artifact.    ·	Pending ECHO to assess for LVH and wall motion abnormalities.   ·	Continue medical management with aspirin 81 mg daily and  ticagrelor 90 mg BID, metoprolol tartrate 12.5 mg BID, atorvastatin 40 mg nightly.   ·	Continue to treat underlying medical issues/infection, osteomyelitis.     2) CAD   Extensive 3v CAD with recent stenting 2/2018    ·	Plan as above.     Jairo Gill MD, MPH, EVONNE  Cardiovascular Specialist Attending  [facility]  C: [phone]  E: [email or web address]  (Cardiology Nocturnist cell number available 7 pm - 7 am every night; available daytime week days for follow-up only; daytime weekends covered by general cardiology consult service)

## 2018-10-13 NOTE — PROGRESS NOTE ADULT - ASSESSMENT
67 y/o Male with gangrene of Left 4th + 5th digit   ·	Pt seen and evaluated   ·	Pt known to service   ·	Pt has had previous bypass to left Lower extremity (March 2018)  ·	will likely need transmetatarsal amputation   ·	Pod plan pending vascular workup and cardiology work up  ·	d/w attending

## 2018-10-14 LAB
ANION GAP SERPL CALC-SCNC: 12 MMOL/L — SIGNIFICANT CHANGE UP (ref 5–17)
BUN SERPL-MCNC: 7 MG/DL — SIGNIFICANT CHANGE UP (ref 7–23)
CALCIUM SERPL-MCNC: 8.6 MG/DL — SIGNIFICANT CHANGE UP (ref 8.4–10.5)
CHLORIDE SERPL-SCNC: 97 MMOL/L — SIGNIFICANT CHANGE UP (ref 96–108)
CO2 SERPL-SCNC: 22 MMOL/L — SIGNIFICANT CHANGE UP (ref 22–31)
CREAT SERPL-MCNC: 0.56 MG/DL — SIGNIFICANT CHANGE UP (ref 0.5–1.3)
GLUCOSE SERPL-MCNC: 170 MG/DL — HIGH (ref 70–99)
HCT VFR BLD CALC: 32.8 % — LOW (ref 39–50)
HGB BLD-MCNC: 11.2 G/DL — LOW (ref 13–17)
MAGNESIUM SERPL-MCNC: 2 MG/DL — SIGNIFICANT CHANGE UP (ref 1.6–2.6)
MCHC RBC-ENTMCNC: 28.9 PG — SIGNIFICANT CHANGE UP (ref 27–34)
MCHC RBC-ENTMCNC: 34.1 GM/DL — SIGNIFICANT CHANGE UP (ref 32–36)
MCV RBC AUTO: 84.5 FL — SIGNIFICANT CHANGE UP (ref 80–100)
PLATELET # BLD AUTO: 363 K/UL — SIGNIFICANT CHANGE UP (ref 150–400)
POTASSIUM SERPL-MCNC: 3.5 MMOL/L — SIGNIFICANT CHANGE UP (ref 3.5–5.3)
POTASSIUM SERPL-SCNC: 3.5 MMOL/L — SIGNIFICANT CHANGE UP (ref 3.5–5.3)
RBC # BLD: 3.88 M/UL — LOW (ref 4.2–5.8)
RBC # FLD: 13.2 % — SIGNIFICANT CHANGE UP (ref 10.3–14.5)
SODIUM SERPL-SCNC: 131 MMOL/L — LOW (ref 135–145)
VANCOMYCIN TROUGH SERPL-MCNC: 8.3 UG/ML — LOW (ref 10–20)
WBC # BLD: 13.84 K/UL — HIGH (ref 3.8–10.5)
WBC # FLD AUTO: 13.84 K/UL — HIGH (ref 3.8–10.5)

## 2018-10-14 PROCEDURE — 99233 SBSQ HOSP IP/OBS HIGH 50: CPT

## 2018-10-14 RX ORDER — VANCOMYCIN HCL 1 G
1250 VIAL (EA) INTRAVENOUS EVERY 12 HOURS
Qty: 0 | Refills: 0 | Status: DISCONTINUED | OUTPATIENT
Start: 2018-10-14 | End: 2018-10-20

## 2018-10-14 RX ORDER — METOPROLOL TARTRATE 50 MG
12.5 TABLET ORAL ONCE
Qty: 0 | Refills: 0 | Status: COMPLETED | OUTPATIENT
Start: 2018-10-14 | End: 2018-10-14

## 2018-10-14 RX ORDER — INSULIN LISPRO 100/ML
9 VIAL (ML) SUBCUTANEOUS
Qty: 0 | Refills: 0 | Status: DISCONTINUED | OUTPATIENT
Start: 2018-10-14 | End: 2018-10-24

## 2018-10-14 RX ORDER — METOPROLOL TARTRATE 50 MG
25 TABLET ORAL
Qty: 0 | Refills: 0 | Status: DISCONTINUED | OUTPATIENT
Start: 2018-10-14 | End: 2018-10-24

## 2018-10-14 RX ADMIN — TICAGRELOR 90 MILLIGRAM(S): 90 TABLET ORAL at 17:34

## 2018-10-14 RX ADMIN — GABAPENTIN 300 MILLIGRAM(S): 400 CAPSULE ORAL at 14:10

## 2018-10-14 RX ADMIN — GABAPENTIN 300 MILLIGRAM(S): 400 CAPSULE ORAL at 06:18

## 2018-10-14 RX ADMIN — ENOXAPARIN SODIUM 40 MILLIGRAM(S): 100 INJECTION SUBCUTANEOUS at 10:02

## 2018-10-14 RX ADMIN — Medication 100 MILLIGRAM(S): at 21:51

## 2018-10-14 RX ADMIN — GABAPENTIN 300 MILLIGRAM(S): 400 CAPSULE ORAL at 21:51

## 2018-10-14 RX ADMIN — Medication 2: at 08:25

## 2018-10-14 RX ADMIN — Medication 4: at 17:30

## 2018-10-14 RX ADMIN — Medication 12.5 MILLIGRAM(S): at 06:18

## 2018-10-14 RX ADMIN — TICAGRELOR 90 MILLIGRAM(S): 90 TABLET ORAL at 06:46

## 2018-10-14 RX ADMIN — Medication 4: at 12:11

## 2018-10-14 RX ADMIN — ATORVASTATIN CALCIUM 40 MILLIGRAM(S): 80 TABLET, FILM COATED ORAL at 21:51

## 2018-10-14 RX ADMIN — Medication 1 MILLIGRAM(S): at 12:09

## 2018-10-14 RX ADMIN — PIPERACILLIN AND TAZOBACTAM 25 GRAM(S): 4; .5 INJECTION, POWDER, LYOPHILIZED, FOR SOLUTION INTRAVENOUS at 12:09

## 2018-10-14 RX ADMIN — INSULIN GLARGINE 25 UNIT(S): 100 INJECTION, SOLUTION SUBCUTANEOUS at 21:51

## 2018-10-14 RX ADMIN — Medication 100 MILLIGRAM(S): at 06:18

## 2018-10-14 RX ADMIN — Medication 1 TABLET(S): at 12:09

## 2018-10-14 RX ADMIN — Medication 12.5 MILLIGRAM(S): at 17:34

## 2018-10-14 RX ADMIN — Medication 9 UNIT(S): at 17:30

## 2018-10-14 RX ADMIN — Medication 166.67 MILLIGRAM(S): at 09:04

## 2018-10-14 RX ADMIN — Medication 7 UNIT(S): at 08:25

## 2018-10-14 RX ADMIN — PREGABALIN 1000 MICROGRAM(S): 225 CAPSULE ORAL at 12:09

## 2018-10-14 RX ADMIN — PIPERACILLIN AND TAZOBACTAM 25 GRAM(S): 4; .5 INJECTION, POWDER, LYOPHILIZED, FOR SOLUTION INTRAVENOUS at 03:19

## 2018-10-14 RX ADMIN — Medication 81 MILLIGRAM(S): at 12:09

## 2018-10-14 RX ADMIN — Medication 100 MILLIGRAM(S): at 14:10

## 2018-10-14 RX ADMIN — Medication 7 UNIT(S): at 12:11

## 2018-10-14 RX ADMIN — Medication 12.5 MILLIGRAM(S): at 18:24

## 2018-10-14 RX ADMIN — Medication 166.67 MILLIGRAM(S): at 21:50

## 2018-10-14 RX ADMIN — SERTRALINE 100 MILLIGRAM(S): 25 TABLET, FILM COATED ORAL at 12:09

## 2018-10-14 RX ADMIN — SENNA PLUS 2 TABLET(S): 8.6 TABLET ORAL at 21:51

## 2018-10-14 RX ADMIN — PIPERACILLIN AND TAZOBACTAM 25 GRAM(S): 4; .5 INJECTION, POWDER, LYOPHILIZED, FOR SOLUTION INTRAVENOUS at 21:48

## 2018-10-14 RX ADMIN — TAMSULOSIN HYDROCHLORIDE 0.4 MILLIGRAM(S): 0.4 CAPSULE ORAL at 21:51

## 2018-10-14 NOTE — PROGRESS NOTE ADULT - ASSESSMENT
Patient is a 68 year old man with history of HTN, CAD s/p CANDIDO to RCA in 02/2018 in addition to CANDIDO to LAD, LCX, PAD s/p left femoral to below knee popliteal bypass 03/2018, left great toe partial amputation presenting with increasing toe pain, bleeding, and infection found to have elevated troponins.     1. Tropenemia   cv stable no chest pain or sob. no evidence of acute ischemia/ACS   EKG NSR, LVH, repolarization artifact  likely Type II MI, demand ischemia in setting of sepsis, osteomyelitis   no evidence of fluid overload on exam   pending echo to evaluate LV function, valvular disease  pt is stable and asymptomatic, would defer AC  would defer from any ischemic eval (pt is asymptomatic and infected)  continue aspirin, brilinta, bb, statin     2. CAD s/p multiple PCI  cv stable no chest pain or sob. no evidence of acute ischemia/ACS   recent cath performed in Feb 2018 ( CANDIDO x 1pLAD, CANDIDO x 1 dCx, CANDIDO x 1 pOM1 and RCA )  continue aspirin, brilinta, bb, statin     3. foot ulcer, Sepsis/osteomylitis   on iv abx  med f/u   will likely need transmetatarsal amputation   podiatry f/u    4. PAD s/p  LE bypass  No acute vascular surgery intervention  pending duplex of left lower extremity to evaluate bypass patency  Vascular f/u     5. htn  bp stable  continue current medication regimen     dvt ppx

## 2018-10-14 NOTE — PROGRESS NOTE ADULT - ASSESSMENT
[facility]    Nephrology Progress Note     Assessment & Plan       Augie Powell is a 82 year old male who was admitted on 9/28/2018.      1) NSTEMI:  CKD will add a layer of complexity to evaluating his CAD. Cor angio tomorrow.     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  He has been on benazepril 20, which is a good choice in one with CKD and reduced renal mass.  Wise to hold for now given decline in GFR and need for beta blockade given new Dx CAD.  Prior baseline was gfr 30.  He is back to this baseline.  Renal US normal except for benign cysts.  Urine tests cancelled.      3) HTN:  Controlled.     4) Anemia:  HG 13.5.  So no need for JEN.     5) MBD:  Needs evaluation.     Plan:     Agree with holding ACEi for now  Reorder UA and U P/C ratio  PTH and Vit D  IV hydration per protocol prior to angio.    Augie Sanchez MD  [facility] - Nephrology  [phone]    Interval History     Feels fine.  Pt denies f/c/n/v.  No cp/sob/cough.  No dysuria/hematuria/abd or flank pain.  No dizziness, lightheadedness, headaches, or focal neuro sx.      Physical Exam   Temp: 98.1  F (36.7  C) Temp src: Oral BP: 124/63   Heart Rate: 54 Resp: 16 SpO2: 98 % O2 Device: None (Room air)    Vitals:    09/28/18 1950 09/29/18 0012 09/30/18 0123   Weight: 89 kg (196 lb 3.4 oz) 87.2 kg (192 lb 3.2 oz) 86.4 kg (190 lb 6.4 oz)     Vital Signs with Ranges  Temp:  [98.1  F (36.7  C)-98.6  F (37  C)] 98.1  F (36.7  C)  Heart Rate:  [52-64] 54  Resp:  [16] 16  BP: (110-138)/(57-76) 124/63  SpO2:  [95 %-98 %] 98 %  I/O last 3 completed shifts:  In: 1599.9 [P.O.:900; I.V.:699.9]  Out: 600 [Urine:600]    GENERAL APPEARANCE: pleasant, NAD, a & o  HEENT:  Eyes/ears/nose/neck grossly normal  RESP: lungs few basilar crackles.    CV: RRR, nl S1/S2, no m/r/g   ABDOMEN: o/s/nt/nd, bs present  EXTREMITIES/SKIN: no rashes/lesions; tr BLE edema    Medications     HEParin 750 Units/hr (09/30/18 0904)     - MEDICATION  INSTRUCTIONS -       - MEDICATION INSTRUCTIONS -       Reason ACE/ARB/ARNI order not selected       sodium chloride 50 mL/hr at 09/30/18 0902       amLODIPine  5 mg Oral Daily     [START ON 10/2/2018] aspirin  81 mg Oral Daily     aspirin  325 mg Oral Daily     atorvastatin  40 mg Oral QPM     [START ON 10/1/2018] clopidogrel  75 mg Oral Daily     metoprolol tartrate  12.5 mg Oral BID     nitroGLYcerin  1 patch Transdermal QAM     nitroGLYcerin   Transdermal Q8H     nitroGLYcerin   Transdermal QPM     saccharomyces boulardii  250 mg Oral BID     sodium chloride (PF)  3 mL Intracatheter Q8H     sodium chloride (PF)  3 mL Intracatheter Q8H       Data   BMP  Recent Labs  Lab 09/30/18  0535 09/29/18  0605 09/28/18 2120    141 137   POTASSIUM 4.7 4.7 5.0   CHLORIDE 109 109 104   RICCARDO 8.1* 8.3* 8.6   CO2 24 25 26   BUN 38* 37* 38*   CR 2.06* 2.25* 2.37*   * 105* 110*     [email or web address](phos:4)  CBC)  Recent Labs  Lab 09/30/18  0535 09/29/18  0110 09/28/18 2120   WBC  --  11.0 10.1   HGB 12.2* 13.5 13.4   HCT  --  38.9* 38.4*   MCV  --  91 92   PLT  --  190 168       Recent Labs  Lab 09/30/18  0535   AST 26   ALT 26   ALKPHOS 41   BILITOTAL 0.5     No lab results found in last 7 days.  No results found for: D2VIT, D3VIT, DTOT    Recent Labs  Lab 09/30/18  0535 09/29/18  0110   HGB 12.2* 13.5   HCT  --  38.9*   MCV  --  91     No results for input(s): PTHI in the last 168 hours.    Attestation:   I have reviewed today's relevant vital signs, notes, medications, labs and imaging.     Patient is a 68 year old man with history of HTN, CAD s/p CANDIDO to RCA in 02/2018 in addition to CANDIDO to LAD, LCX, PAD s/p left femoral to below knee popliteal bypass 03/2018, left great toe partial amputation presented today with increasing toe pain found to have sepsis likely secondary to L 4th and 5th toe gangrene.

## 2018-10-14 NOTE — PROGRESS NOTE ADULT - PROBLEM SELECTOR PLAN 3
- Hypovolemic on exam with hyponatremia likely secondary to underlying sepsis  - d/c ivf today, improving

## 2018-10-14 NOTE — PROGRESS NOTE ADULT - PROBLEM SELECTOR PLAN 5
BP elevated on arrival 140-180 systolic  - Home medication include Metoprolol XR 50 mg daily, placed on metoprolol 12.5mg bid on admission.  - Continue to monitor vitals Q4H

## 2018-10-14 NOTE — PROGRESS NOTE ADULT - PROBLEM SELECTOR PLAN 1
- Patient with pain, musky skin discoloration, no palpable pulse, also with some drainage concerning for gangrene secondary to long standing peripheral arterial disease; per vascular note, monophasic doppler signal on the left DP pulse  - Vascular and podiatry recs appreciated, VA duplex with occluded bypass. discussed with vascular surgery on 10/13. No planned intervention with these findings.  - Continue with empiric antibiotics  -per podiatry they will likely perform transmetatarsal amputation. awaiting final recs

## 2018-10-14 NOTE — PROGRESS NOTE ADULT - ATTENDING COMMENTS
agree with NP note above  cv stable  no cp/sob  iv abx  echo to eval LV function  podiatry follow up

## 2018-10-14 NOTE — PROGRESS NOTE ADULT - PROBLEM SELECTOR PLAN 2
- Patient presented with tachycardia, leukocytosis meeting SIRS criteria with likely source being the L foot gangrene, concern for osteo given elevated ESR. sepsis now resolving  -  continue with Vanc for MRSA coverage as patient has been recently hospitalized and Zosyn for gram negative/anaerobic coverage as patient is a diabetic  - follow up blood cx- ngtd  -off ivf

## 2018-10-15 LAB
ANION GAP SERPL CALC-SCNC: 12 MMOL/L — SIGNIFICANT CHANGE UP (ref 5–17)
BUN SERPL-MCNC: 8 MG/DL — SIGNIFICANT CHANGE UP (ref 7–23)
CALCIUM SERPL-MCNC: 8.5 MG/DL — SIGNIFICANT CHANGE UP (ref 8.4–10.5)
CHLORIDE SERPL-SCNC: 95 MMOL/L — LOW (ref 96–108)
CO2 SERPL-SCNC: 22 MMOL/L — SIGNIFICANT CHANGE UP (ref 22–31)
CREAT SERPL-MCNC: 0.65 MG/DL — SIGNIFICANT CHANGE UP (ref 0.5–1.3)
GLUCOSE SERPL-MCNC: 183 MG/DL — HIGH (ref 70–99)
HCT VFR BLD CALC: 32 % — LOW (ref 39–50)
HGB BLD-MCNC: 10.9 G/DL — LOW (ref 13–17)
MCHC RBC-ENTMCNC: 29 PG — SIGNIFICANT CHANGE UP (ref 27–34)
MCHC RBC-ENTMCNC: 34.1 GM/DL — SIGNIFICANT CHANGE UP (ref 32–36)
MCV RBC AUTO: 85.1 FL — SIGNIFICANT CHANGE UP (ref 80–100)
PLATELET # BLD AUTO: 396 K/UL — SIGNIFICANT CHANGE UP (ref 150–400)
POTASSIUM SERPL-MCNC: 3.7 MMOL/L — SIGNIFICANT CHANGE UP (ref 3.5–5.3)
POTASSIUM SERPL-SCNC: 3.7 MMOL/L — SIGNIFICANT CHANGE UP (ref 3.5–5.3)
RBC # BLD: 3.76 M/UL — LOW (ref 4.2–5.8)
RBC # FLD: 13.3 % — SIGNIFICANT CHANGE UP (ref 10.3–14.5)
SODIUM SERPL-SCNC: 129 MMOL/L — LOW (ref 135–145)
WBC # BLD: 13.1 K/UL — HIGH (ref 3.8–10.5)
WBC # FLD AUTO: 13.1 K/UL — HIGH (ref 3.8–10.5)

## 2018-10-15 PROCEDURE — 93923 UPR/LXTR ART STDY 3+ LVLS: CPT | Mod: 26

## 2018-10-15 PROCEDURE — 99233 SBSQ HOSP IP/OBS HIGH 50: CPT

## 2018-10-15 PROCEDURE — 93306 TTE W/DOPPLER COMPLETE: CPT | Mod: 26

## 2018-10-15 RX ORDER — ENOXAPARIN SODIUM 100 MG/ML
40 INJECTION SUBCUTANEOUS DAILY
Qty: 0 | Refills: 0 | Status: DISCONTINUED | OUTPATIENT
Start: 2018-10-16 | End: 2018-10-24

## 2018-10-15 RX ADMIN — Medication 166.67 MILLIGRAM(S): at 05:07

## 2018-10-15 RX ADMIN — Medication 25 MILLIGRAM(S): at 05:08

## 2018-10-15 RX ADMIN — Medication 2: at 07:48

## 2018-10-15 RX ADMIN — SENNA PLUS 2 TABLET(S): 8.6 TABLET ORAL at 22:34

## 2018-10-15 RX ADMIN — GABAPENTIN 300 MILLIGRAM(S): 400 CAPSULE ORAL at 22:34

## 2018-10-15 RX ADMIN — Medication 25 MILLIGRAM(S): at 17:17

## 2018-10-15 RX ADMIN — TAMSULOSIN HYDROCHLORIDE 0.4 MILLIGRAM(S): 0.4 CAPSULE ORAL at 22:34

## 2018-10-15 RX ADMIN — Medication 1 TABLET(S): at 12:02

## 2018-10-15 RX ADMIN — Medication 9 UNIT(S): at 12:01

## 2018-10-15 RX ADMIN — Medication 166.67 MILLIGRAM(S): at 18:27

## 2018-10-15 RX ADMIN — Medication 9 UNIT(S): at 17:14

## 2018-10-15 RX ADMIN — ATORVASTATIN CALCIUM 40 MILLIGRAM(S): 80 TABLET, FILM COATED ORAL at 22:34

## 2018-10-15 RX ADMIN — GABAPENTIN 300 MILLIGRAM(S): 400 CAPSULE ORAL at 14:30

## 2018-10-15 RX ADMIN — SERTRALINE 100 MILLIGRAM(S): 25 TABLET, FILM COATED ORAL at 12:02

## 2018-10-15 RX ADMIN — PIPERACILLIN AND TAZOBACTAM 25 GRAM(S): 4; .5 INJECTION, POWDER, LYOPHILIZED, FOR SOLUTION INTRAVENOUS at 22:41

## 2018-10-15 RX ADMIN — INSULIN GLARGINE 25 UNIT(S): 100 INJECTION, SOLUTION SUBCUTANEOUS at 22:35

## 2018-10-15 RX ADMIN — Medication 1 MILLIGRAM(S): at 12:02

## 2018-10-15 RX ADMIN — PIPERACILLIN AND TAZOBACTAM 25 GRAM(S): 4; .5 INJECTION, POWDER, LYOPHILIZED, FOR SOLUTION INTRAVENOUS at 05:59

## 2018-10-15 RX ADMIN — Medication 100 MILLIGRAM(S): at 14:30

## 2018-10-15 RX ADMIN — PIPERACILLIN AND TAZOBACTAM 25 GRAM(S): 4; .5 INJECTION, POWDER, LYOPHILIZED, FOR SOLUTION INTRAVENOUS at 15:24

## 2018-10-15 RX ADMIN — PREGABALIN 1000 MICROGRAM(S): 225 CAPSULE ORAL at 12:02

## 2018-10-15 RX ADMIN — ENOXAPARIN SODIUM 40 MILLIGRAM(S): 100 INJECTION SUBCUTANEOUS at 07:48

## 2018-10-15 RX ADMIN — Medication 81 MILLIGRAM(S): at 12:02

## 2018-10-15 RX ADMIN — TICAGRELOR 90 MILLIGRAM(S): 90 TABLET ORAL at 17:15

## 2018-10-15 RX ADMIN — GABAPENTIN 300 MILLIGRAM(S): 400 CAPSULE ORAL at 05:08

## 2018-10-15 RX ADMIN — Medication 2: at 12:01

## 2018-10-15 RX ADMIN — Medication 100 MILLIGRAM(S): at 05:07

## 2018-10-15 RX ADMIN — Medication 100 MILLIGRAM(S): at 22:34

## 2018-10-15 RX ADMIN — TICAGRELOR 90 MILLIGRAM(S): 90 TABLET ORAL at 05:07

## 2018-10-15 RX ADMIN — Medication 9 UNIT(S): at 07:48

## 2018-10-15 NOTE — PROGRESS NOTE ADULT - ATTENDING COMMENTS
should be on ace, await upro/cr.   need JOSAFAT/PVR to eval graft patency.   d/w NP and patient, will reahc out to podiatry to get timing of TMA moving forward.

## 2018-10-15 NOTE — CHART NOTE - NSCHARTNOTEFT_GEN_A_CORE
Medicine NP Note     CHAPIS BALLESTEROS  MRN-[id number]  Allergies    No Known Allergies    Intolerances     Called to evaluate patient for fever 100.6. Pt    Vital Signs Last 24 Hrs  T(C): 37.5 (10-15-18 @ 17:12), Max: 38.1 (10-15-18 @ 16:05)  T(F): 99.5 (10-15-18 @ 17:12), Max: 100.6 (10-15-18 @ 16:05)  HR: 99 (10-15-18 @ 17:12) (92 - 106)  BP: 144/84 (10-15-18 @ 17:12) (130/72 - 159/82)  BP(mean): --  RR: 18 (10-15-18 @ 17:12) (18 - 18)  SpO2: 93% (10-15-18 @ 17:12) (91% - 93%)  Daily     Daily   I&O's Summary    14 Oct 2018 07:01  -  15 Oct 2018 07:00  --------------------------------------------------------  IN: 1170 mL / OUT: 1150 mL / NET: 20 mL    15 Oct 2018 07:01  -  15 Oct 2018 17:27  --------------------------------------------------------  IN: 520 mL / OUT: 400 mL / NET: 120 mL                           10.9   13.10 )-----------( 396      ( 15 Oct 2018 07:51 )             32.0     10-15    129<L>  |  95<L>  |  8   ----------------------------<  183<H>  3.7   |  22  |  0.65    Ca    8.5      15 Oct 2018 06:29  Mg     2.0     10-14      Radiology:    PHYSICAL EXAM:  GENERAL: NAD, well-developed  HEAD:  Atraumatic, Normocephalic  EYES: EOMI, PERRLA, conjunctiva and sclera clear  NECK: Supple, No JVD  CHEST/LUNG: Clear to auscultation bilaterally; No wheeze  HEART: Regular rate and rhythm; No murmurs, rubs, or gallops  ABDOMEN: Soft, Nontender, Nondistended; Bowel sounds present  EXTREMITIES:  2+ Peripheral Pulses, No clubbing, cyanosis, or edema  PSYCH: AAOx3  NEUROLOGY: non-focal  SKIN: No rashes or lesions    Assessment/Plan: HPI:  Patient is a 68 year old man with history of HTN, CAD s/p CANDIDO to RCA in 02/2018 in addition to CANDIDO to LAD, LCX, PAD s/p left femoral to below knee popliteal bypass 03/2018, left great toe partial amputation presented today with increasing toe pain. Patient had been having pain in his L foot for about 10 days and noticed increasing blood and foul smelling fluid from the wound. Patient's wife brought the patient to the podiatry office today for evaluation and he was thus sent to the ED for further evaluation. Patient has been ambulating at home with cane but with significant pain. He endorses numbness and tingliness. He denies fever or chills. He denies chest pain, shortness of breath. He denies abdominal pain, nausea or vomiting, appetite unchanged. He denies recent trauma to the foot.     In the ED, patient's initial vitals were Temp 36.8, , /80, O2 saturation 97% on room air. Patient received 1x Vanc and Ceftriaxone and 2 L NS bolus in the ED. (12 Oct 2018 23:01) Medicine NP Note     CHAPIS BALLESTEROS  MRN-[id number]  Allergies    No Known Allergies    Intolerances     Called to evaluate patient for fever 100.6. Pt without chills, HA, n/v, abdominal pain, CP, SOB or worsened pain from foot.       Vital Signs Last 24 Hrs  T(C): 37.5 (10-15-18 @ 17:12), Max: 38.1 (10-15-18 @ 16:05)  T(F): 99.5 (10-15-18 @ 17:12), Max: 100.6 (10-15-18 @ 16:05)  HR: 99 (10-15-18 @ 17:12) (92 - 106)  BP: 144/84 (10-15-18 @ 17:12) (130/72 - 159/82)  BP(mean): --  RR: 18 (10-15-18 @ 17:12) (18 - 18)  SpO2: 93% (10-15-18 @ 17:12) (91% - 93%)  Daily     Daily   I&O's Summary    14 Oct 2018 07:01  -  15 Oct 2018 07:00  --------------------------------------------------------  IN: 1170 mL / OUT: 1150 mL / NET: 20 mL    15 Oct 2018 07:01  -  15 Oct 2018 17:27  --------------------------------------------------------  IN: 520 mL / OUT: 400 mL / NET: 120 mL                           10.9   13.10 )-----------( 396      ( 15 Oct 2018 07:51 )             32.0     10-15    129<L>  |  95<L>  |  8   ----------------------------<  183<H>  3.7   |  22  |  0.65    Ca    8.5      15 Oct 2018 06:29  Mg     2.0     10-14        PHYSICAL EXAM:  GENERAL: NAD, well-developed    CHEST/LUNG: Clear to auscultation bilaterally  HEART: Regular rate and rhythm;   ABDOMEN: Soft, Nontender, Nondistended; Bowel sounds present  EXTREMITIES:  no edema, LLext bandage present  PSYCH: AAOx3      Assessment/Plan: HPI:  68 M with history of HTN, CAD s/p CANDIDO to RCA in 02/2018 in addition to CANDIDO to LAD, LCX, PAD s/p left femoral to below knee popliteal bypass 03/2018, left great toe partial amputation presented today with increasing toe pain found to have sepsis likely secondary to L 4th and 5th toe gangrene.        Dx: Sepsis secondary to gangrene- on vanco/zosyn awaiting possible amputation, DMII , uncontrolled Elevated troponin, likely stage II MI, demand ischemia     In the ED, patient's initial vitals were Temp 36.8, , /80, O2 saturation 97% on room air. Patient received 1x Vanc and Ceftriaxone and 2 L NS bolus in the ED. (12 Oct 2018 23:01) Medicine NP Note     CHAPIS BALLESTEROS  MRN-[id number]  Allergies    No Known Allergies    Intolerances     Called to evaluate patient for fever 100.6. Pt without chills, HA, n/v, abdominal pain, CP, SOB or worsened pain from foot.       Vital Signs Last 24 Hrs  T(C): 37.5 (10-15-18 @ 17:12), Max: 38.1 (10-15-18 @ 16:05)  T(F): 99.5 (10-15-18 @ 17:12), Max: 100.6 (10-15-18 @ 16:05)  HR: 99 (10-15-18 @ 17:12) (92 - 106)  BP: 144/84 (10-15-18 @ 17:12) (130/72 - 159/82)  BP(mean): --  RR: 18 (10-15-18 @ 17:12) (18 - 18)  SpO2: 93% (10-15-18 @ 17:12) (91% - 93%)  Daily     Daily   I&O's Summary    14 Oct 2018 07:01  -  15 Oct 2018 07:00  --------------------------------------------------------  IN: 1170 mL / OUT: 1150 mL / NET: 20 mL    15 Oct 2018 07:01  -  15 Oct 2018 17:27  --------------------------------------------------------  IN: 520 mL / OUT: 400 mL / NET: 120 mL                           10.9   13.10 )-----------( 396      ( 15 Oct 2018 07:51 )             32.0     10-15    129<L>  |  95<L>  |  8   ----------------------------<  183<H>  3.7   |  22  |  0.65    Ca    8.5      15 Oct 2018 06:29  Mg     2.0     10-14        PHYSICAL EXAM:  GENERAL: NAD, well-developed    CHEST/LUNG: Clear to auscultation bilaterally  HEART: Regular rate and rhythm;   ABDOMEN: Soft, Nontender, Nondistended; Bowel sounds present  EXTREMITIES:  no edema, LLext bandage present  PSYCH: AAOx3      Assessment/Plan: HPI:  68 M with history of HTN, CAD s/p CANDIDO to RCA in 02/2018 in addition to CANDIDO to LAD, LCX, PAD s/p left femoral to below knee popliteal bypass 03/2018, left great toe partial amputation presented today with increasing toe pain found to have Sepsis secondary to gangrene- on vanco/zosyn awaiting possible amputation, DMII , uncontrolled Elevated troponin, likely stage II MI, demand ischemia, now with low grade fever.     1. Low grade fever in the setting of gangrene, already covered on zosyn and vanco. Discussed with Dr. Hirsch. No need for repeat cultures at this time. No tylenol given and temp went down to 99.5. If fever goes above 100.6 tonight, will repeat blood cultures as per Dr. Hirsch. Discussed case with podiatry, Plan for cath thurday to assess blood flow and hopeful for OR on friday 10/19/18.

## 2018-10-15 NOTE — PROGRESS NOTE ADULT - ASSESSMENT
Mr. Gaston is a 68 year old man with history of HTN, CAD s/p CANDIDO to RCA in 02/2018 in addition to CANDIDO to LAD, LCX, PAD s/p left femoral to below knee popliteal bypass 03/2018, left great toe partial amputation presented with increasing toe pain found to have sepsis likely secondary to L 4th and 5th toe gangrene.

## 2018-10-15 NOTE — PROGRESS NOTE ADULT - PROBLEM SELECTOR PLAN 5
BP elevated on arrival 140-180 systolic  - Home medication include Metoprolol XR 50 mg daily, continue with Metoprolol 25 mg BID  - Continue to monitor vitals Q4H

## 2018-10-15 NOTE — PROGRESS NOTE ADULT - ASSESSMENT
68 year old man with medical history of HTN, CAD, DM, PAD s/p left femoral to below knee popliteal PTFE bypass (3/2018) presenting with left 4th toe gangrene and likely osteomyelitis, sepsis    - No acute vascular surgery intervention  - Recommend JOSAFAT/PVR of left lower extremity to evaluate bypass patency - order placed  - Recommend IV antibiotics  - Recommend podiatry to perform TMA   - Plan for BKA in future if non healed TMA  - Vascular will follow  - Discussed with vascular surgery fellow on call    [id number]

## 2018-10-15 NOTE — PROGRESS NOTE ADULT - PROBLEM SELECTOR PLAN 6
- Last A1C in 01/2018 13.2% suggest uncontrolled T2DM complicated by peripheral neuropathy and now with gangrene  - Home medication include Humulin 70/30 50 units in the am and 40 units before dinner and Novolog 15 units before meals, given by his wife, suspecting that uncontrolled T2DM may also be related to poor insulin techniques  - Continue with ISS sliding scale   - Humalog 9 units TIDAC and Lantus 25 units QHS, cont to monitor may require further escalation.   - u pro/cr  - needs metformin on d/c.

## 2018-10-15 NOTE — PROGRESS NOTE ADULT - PROBLEM SELECTOR PLAN 8
- DVT prophylaxis: lovenox   - GI prophylaxis: not indicated  - Diet: DASH/CC diet  - Dispo: eventually will need PT consult after planned amputation

## 2018-10-15 NOTE — PROGRESS NOTE ADULT - ASSESSMENT
67 y/o Male with gangrene of Left 4th + 5th digit   ·	Pt seen and evaluated   ·	Pt known to service   ·	Pt has had previous bypass to left Lower extremity (March 2018)  ·	will likely need transmetatarsal amputation   ·	Pod plan pending vascular recs   ·	seen w/ attending

## 2018-10-15 NOTE — PROGRESS NOTE ADULT - PROBLEM SELECTOR PLAN 1
- Patient with pain, musky skin discoloration, no palpable pulse, also with some drainage concerning for gangrene secondary to long standing peripheral arterial disease.  - Vascular and podiatry recs appreciated, will need transmetatarsal amputation. Will coordinate timing w/ podiatry.   - Continue with empiric antibiotics for osteo, will need at least 6 weeks, may need longer pending patency of graft to foot. If poor blood supply he'll likely require suppressive ABx. Await results of JOSAFAT/PVR and culture data from bone that's removed.

## 2018-10-15 NOTE — PROGRESS NOTE ADULT - PROBLEM SELECTOR PLAN 7
- s/p multiple CANDIDO, most recent in 02/2018  - Continue with ASA/Brilinta, continue with high intensity statin  - If planning for transmetatarsal amputation, patient likely will need cardiology clearance as he has multiple cardiac problems. EKG ordered. Last TTE in 02/2018 reviewed EF 65-70%, repeat echo pending.  - tropinemia is demand in setting of osteo.

## 2018-10-15 NOTE — PROGRESS NOTE ADULT - ATTENDING COMMENTS
Agree with above NP note.  elevated troponin without chest pain, dyspnea  echo with lv dysfxn, prior nuclear stress revealed same inferior hypokinesis  wall motion abnl likely not new  for now cont med tx of cad, pci   trend ck, ckmb x 1more set  no ischemic eval planned in setting of active infection, fever, lack of symptoms

## 2018-10-15 NOTE — PROGRESS NOTE ADULT - PROBLEM SELECTOR PLAN 2
- Patient presented with tachycardia, leukocytosis meeting SIRS criteria with likely source being the L foot gangrene, concern for osteo given elevated ESR  - s/p 1x Vanc and Ceftriaxone in the ED, continue with Vanc for MRSA coverage as patient has been recently hospitalized and Zosyn for gram negative/anaerobic coverage as patient is a diabetic  - Pending blood cultures, trend lactate, vitals Q4H, gentle IVF

## 2018-10-15 NOTE — PROGRESS NOTE ADULT - ASSESSMENT
cath 2/2018 ( CANDIDO x 1pLAD, CANDIDO x 1 dCx, CANDIDO x 1 pOM1 and RCA )    a/p  68 year old man with history of HTN, CAD s/p CANDIDO to RCA in 02/2018 in addition to CANDIDO to LAD, LCX, PAD s/p left femoral to below knee popliteal bypass 03/2018, left great toe partial amputation presenting with increasing toe pain, bleeding, and infection found to have elevated troponins.     1. Tropenemia   cv stable no chest pain or sob. no evidence of acute ischemia/ACS   EKG NSR, LVH, repolarization artifact  likely Type II MI, demand ischemia in setting of sepsis, osteomyelitis   echo revealing EF 40-45%, decline LV sys fx compared to prior echo. now with mild- mod segmental lv sys dyfx  would defer from any ischemic eval given active infection and no symp of cp or decomp chf    continue medical management for now, On aspirin, brilinta, bb, statin     2. CAD s/p multiple PCI  cv stable no chest pain or sob   continue aspirin, brilinta, bb, statin   slightly tachycardic in the setting of fever    3. foot ulcer, Sepsis/ osteomyelitis   on iv abx  med f/u   will likely need transmetatarsal amputation   podiatry f/u    4. PAD s/p  LE bypass  No acute vascular surgery intervention  pending JOSAFAT/PVR of left lower extremity results to evaluate bypass patency  Vascular f/u     5. htn  bp stable  continue current medication regimen     dvt ppx

## 2018-10-16 DIAGNOSIS — I50.9 HEART FAILURE, UNSPECIFIED: ICD-10-CM

## 2018-10-16 DIAGNOSIS — I70.90 UNSPECIFIED ATHEROSCLEROSIS: ICD-10-CM

## 2018-10-16 LAB
ANION GAP SERPL CALC-SCNC: 14 MMOL/L — SIGNIFICANT CHANGE UP (ref 5–17)
APPEARANCE UR: CLEAR — SIGNIFICANT CHANGE UP
APTT BLD: 28.4 SEC — SIGNIFICANT CHANGE UP (ref 27.5–37.4)
BILIRUB UR-MCNC: NEGATIVE — SIGNIFICANT CHANGE UP
BUN SERPL-MCNC: 11 MG/DL — SIGNIFICANT CHANGE UP (ref 7–23)
CALCIUM SERPL-MCNC: 8.4 MG/DL — SIGNIFICANT CHANGE UP (ref 8.4–10.5)
CHLORIDE SERPL-SCNC: 93 MMOL/L — LOW (ref 96–108)
CK MB BLD-MCNC: 3.9 % — HIGH (ref 0–3.5)
CK MB CFR SERPL CALC: 5.6 NG/ML — SIGNIFICANT CHANGE UP (ref 0–6.7)
CK SERPL-CCNC: 145 U/L — SIGNIFICANT CHANGE UP (ref 30–200)
CO2 SERPL-SCNC: 23 MMOL/L — SIGNIFICANT CHANGE UP (ref 22–31)
COLOR SPEC: SIGNIFICANT CHANGE UP
CREAT SERPL-MCNC: 0.75 MG/DL — SIGNIFICANT CHANGE UP (ref 0.5–1.3)
DIFF PNL FLD: NEGATIVE — SIGNIFICANT CHANGE UP
GLUCOSE SERPL-MCNC: 180 MG/DL — HIGH (ref 70–99)
GLUCOSE UR QL: NEGATIVE — SIGNIFICANT CHANGE UP
HCT VFR BLD CALC: 32.7 % — LOW (ref 39–50)
HGB BLD-MCNC: 10.9 G/DL — LOW (ref 13–17)
KETONES UR-MCNC: NEGATIVE — SIGNIFICANT CHANGE UP
LEUKOCYTE ESTERASE UR-ACNC: NEGATIVE — SIGNIFICANT CHANGE UP
MCHC RBC-ENTMCNC: 28.6 PG — SIGNIFICANT CHANGE UP (ref 27–34)
MCHC RBC-ENTMCNC: 33.3 GM/DL — SIGNIFICANT CHANGE UP (ref 32–36)
MCV RBC AUTO: 85.8 FL — SIGNIFICANT CHANGE UP (ref 80–100)
NITRITE UR-MCNC: NEGATIVE — SIGNIFICANT CHANGE UP
PH UR: 6 — SIGNIFICANT CHANGE UP (ref 5–8)
PLATELET # BLD AUTO: 405 K/UL — HIGH (ref 150–400)
POTASSIUM SERPL-MCNC: 4.1 MMOL/L — SIGNIFICANT CHANGE UP (ref 3.5–5.3)
POTASSIUM SERPL-SCNC: 4.1 MMOL/L — SIGNIFICANT CHANGE UP (ref 3.5–5.3)
PROT UR-MCNC: ABNORMAL
RBC # BLD: 3.81 M/UL — LOW (ref 4.2–5.8)
RBC # FLD: 13.6 % — SIGNIFICANT CHANGE UP (ref 10.3–14.5)
SODIUM SERPL-SCNC: 130 MMOL/L — LOW (ref 135–145)
SP GR SPEC: 1.02 — SIGNIFICANT CHANGE UP (ref 1.01–1.02)
TROPONIN T, HIGH SENSITIVITY RESULT: 534 NG/L — HIGH (ref 0–51)
UROBILINOGEN FLD QL: NEGATIVE — SIGNIFICANT CHANGE UP
WBC # BLD: 14.43 K/UL — HIGH (ref 3.8–10.5)
WBC # FLD AUTO: 14.43 K/UL — HIGH (ref 3.8–10.5)

## 2018-10-16 PROCEDURE — 99231 SBSQ HOSP IP/OBS SF/LOW 25: CPT

## 2018-10-16 PROCEDURE — 99233 SBSQ HOSP IP/OBS HIGH 50: CPT

## 2018-10-16 PROCEDURE — 99222 1ST HOSP IP/OBS MODERATE 55: CPT

## 2018-10-16 RX ORDER — FUROSEMIDE 40 MG
40 TABLET ORAL ONCE
Qty: 0 | Refills: 0 | Status: COMPLETED | OUTPATIENT
Start: 2018-10-16 | End: 2018-10-16

## 2018-10-16 RX ADMIN — Medication 166.67 MILLIGRAM(S): at 05:35

## 2018-10-16 RX ADMIN — Medication 4: at 07:46

## 2018-10-16 RX ADMIN — Medication 9 UNIT(S): at 07:46

## 2018-10-16 RX ADMIN — GABAPENTIN 300 MILLIGRAM(S): 400 CAPSULE ORAL at 05:35

## 2018-10-16 RX ADMIN — Medication 9 UNIT(S): at 17:19

## 2018-10-16 RX ADMIN — ENOXAPARIN SODIUM 40 MILLIGRAM(S): 100 INJECTION SUBCUTANEOUS at 11:14

## 2018-10-16 RX ADMIN — Medication 1 TABLET(S): at 11:14

## 2018-10-16 RX ADMIN — SENNA PLUS 2 TABLET(S): 8.6 TABLET ORAL at 21:00

## 2018-10-16 RX ADMIN — Medication 1 MILLIGRAM(S): at 11:14

## 2018-10-16 RX ADMIN — PIPERACILLIN AND TAZOBACTAM 25 GRAM(S): 4; .5 INJECTION, POWDER, LYOPHILIZED, FOR SOLUTION INTRAVENOUS at 20:59

## 2018-10-16 RX ADMIN — Medication 166.67 MILLIGRAM(S): at 18:34

## 2018-10-16 RX ADMIN — TICAGRELOR 90 MILLIGRAM(S): 90 TABLET ORAL at 17:21

## 2018-10-16 RX ADMIN — Medication 40 MILLIGRAM(S): at 10:30

## 2018-10-16 RX ADMIN — PREGABALIN 1000 MICROGRAM(S): 225 CAPSULE ORAL at 11:13

## 2018-10-16 RX ADMIN — Medication 100 MILLIGRAM(S): at 13:27

## 2018-10-16 RX ADMIN — Medication 81 MILLIGRAM(S): at 11:13

## 2018-10-16 RX ADMIN — GABAPENTIN 300 MILLIGRAM(S): 400 CAPSULE ORAL at 20:59

## 2018-10-16 RX ADMIN — PIPERACILLIN AND TAZOBACTAM 25 GRAM(S): 4; .5 INJECTION, POWDER, LYOPHILIZED, FOR SOLUTION INTRAVENOUS at 05:35

## 2018-10-16 RX ADMIN — Medication 100 MILLIGRAM(S): at 05:35

## 2018-10-16 RX ADMIN — INSULIN GLARGINE 25 UNIT(S): 100 INJECTION, SOLUTION SUBCUTANEOUS at 21:45

## 2018-10-16 RX ADMIN — TAMSULOSIN HYDROCHLORIDE 0.4 MILLIGRAM(S): 0.4 CAPSULE ORAL at 20:59

## 2018-10-16 RX ADMIN — TICAGRELOR 90 MILLIGRAM(S): 90 TABLET ORAL at 05:35

## 2018-10-16 RX ADMIN — ATORVASTATIN CALCIUM 40 MILLIGRAM(S): 80 TABLET, FILM COATED ORAL at 21:00

## 2018-10-16 RX ADMIN — Medication 4: at 12:06

## 2018-10-16 RX ADMIN — Medication 25 MILLIGRAM(S): at 05:35

## 2018-10-16 RX ADMIN — PIPERACILLIN AND TAZOBACTAM 25 GRAM(S): 4; .5 INJECTION, POWDER, LYOPHILIZED, FOR SOLUTION INTRAVENOUS at 13:38

## 2018-10-16 RX ADMIN — Medication 9 UNIT(S): at 12:06

## 2018-10-16 RX ADMIN — Medication 25 MILLIGRAM(S): at 17:27

## 2018-10-16 RX ADMIN — Medication 100 MILLIGRAM(S): at 20:59

## 2018-10-16 RX ADMIN — GABAPENTIN 300 MILLIGRAM(S): 400 CAPSULE ORAL at 13:27

## 2018-10-16 RX ADMIN — SERTRALINE 100 MILLIGRAM(S): 25 TABLET, FILM COATED ORAL at 11:14

## 2018-10-16 NOTE — PROGRESS NOTE ADULT - PROBLEM SELECTOR PLAN 4
- Initially 3.2 now downtrending.  - Continue to trend lactate, IVF - diffuse arterial occlusion, high atherosclerotic burden in patient w/ dm2 a1c 13 + HLN though unusually high degree of arterial occlusion w/ multiple coronary arterial clots requiring coronary stents + occlusion of LE arteries requiring stents c/b re-occlusion and gangrene.   - pentoxifylline?   - sending hypercoaguable evaluation including apls, pnh.

## 2018-10-16 NOTE — PHYSICAL THERAPY INITIAL EVALUATION ADULT - ADDITIONAL COMMENTS
Pt lives with spouse in basement apartment, (+) flight of stairs to enter, (+) HR. PTA, pt ambulating independently with cane, pt also owns rollator.

## 2018-10-16 NOTE — PROGRESS NOTE ADULT - PROBLEM SELECTOR PLAN 7
- s/p multiple CANDIDO, most recent in 02/2018  - Continue with ASA/Brilinta, continue with high intensity statin  - If planning for transmetatarsal amputation, patient likely will need cardiology clearance as he has multiple cardiac problems. EKG ordered. Last TTE in 02/2018 reviewed EF 65-70%, repeat echo pending.  - tropinemia is demand in setting of osteo. - mild, likely SIADH from current infection. Cont to trend.

## 2018-10-16 NOTE — PROGRESS NOTE ADULT - ATTENDING COMMENTS
should be on ace, await upro/cr.   need JOSAFAT/PVR to eval graft patency.   d/w NP and patient, will reahc out to podiatry to get timing of TMA moving forward. d/w cardiology, vascular, NP d/w cardiology, vascular, NP  will d/w cardiology b blockade/ACE further, defer the latter until after contrast w/ cath.

## 2018-10-16 NOTE — PROGRESS NOTE ADULT - ASSESSMENT
cath 2/2018 ( CANDIDO x 1pLAD, CANDIDO x 1 dCx, CANDIDO x 1 pOM1 and RCA )    a/p  68 year old man with history of HTN, CAD s/p CANDIDO to RCA in 02/2018 in addition to CANDIDO to LAD, LCX, PAD s/p left femoral to below knee popliteal bypass 03/2018, left great toe partial amputation presenting with increasing toe pain, bleeding, and infection found to have elevated troponins.     1. Tropenemia   cv stable no chest pain or sob. no evidence of acute ischemia/ACS   EKG NSR, LVH, repolarization artifact  likely Type II MI, demand ischemia in setting of sepsis, osteomyelitis   echo revealing EF 40-45%, decline LV sys fx compared to prior echo. now with mild- mod segmental lv sys dyfx  would defer from any ischemic eval given active infection and no symp of cp or decomp chf    continue medical management for now, On aspirin, brilinta, bb, statin     2. CAD s/p multiple PCI  cv stable no chest pain or sob   continue aspirin, brilinta, bb, statin     3. foot ulcer, Sepsis/ osteomyelitis   on iv abx  med f/u   will likely need transmetatarsal amputation   podiatry f/u    4. PAD s/p  LE bypass  No acute vascular surgery intervention  pending JOSAFAT/PVR of left lower extremity results to evaluate bypass patency  plan for b/l LE angiograms, poss angioplasty on thurs   pt. cardiac optimized and can proceed for vascular angioplasty with mod cv risk.   Vascular f/u     5. htn  bp stable  continue current medication regimen     dvt ppx cath 2/2018 ( CANDIDO x 1pLAD, CANDIDO x 1 dCx, CANDIDO x 1 pOM1 and RCA )    a/p  68 year old man with history of HTN, CAD s/p CANDIDO to RCA in 02/2018 in addition to CANDIDO to LAD, LCX, PAD s/p left femoral to below knee popliteal bypass 03/2018, left great toe partial amputation presenting with increasing toe pain, bleeding, and infection found to have elevated troponins.     1. Tropenemia   cv stable no chest pain or sob. no evidence of acute ischemia/ACS   EKG NSR, LVH, repolarization artifact  likely Type II MI, demand ischemia in setting of sepsis, osteomyelitis   echo revealing EF 40-45%, decline LV sys fx compared to prior echo. now with mild- mod segmental lv sys dyfx  would defer from any ischemic eval given active infection and no symp of cp or decomp chf    continue medical management for now, On aspirin, brilinta, bb, statin     2. CAD s/p multiple PCI  cv stable no chest pain or sob   continue aspirin, brilinta, bb, statin     3. foot ulcer, Sepsis/ osteomyelitis   on iv abx  med f/u   will likely need transmetatarsal amputation   podiatry f/u    4. PAD s/p  LE bypass  No acute vascular surgery intervention  pending JOSAFAT/PVR of left lower extremity results to evaluate bypass patency  plan for b/l LE angiograms, poss angioplasty on thurs   pt is optimized for angio, can proceed with LE angio with elevated and acceptable CV risk  Vascular f/u     5. htn  bp stable  continue current medication regimen     dvt ppx

## 2018-10-16 NOTE — PHYSICAL THERAPY INITIAL EVALUATION ADULT - PLANNED THERAPY INTERVENTIONS, PT EVAL
balance training/gait training/transfer training/bed mobility training/GOAL: Pt will negotiate 6 stairs with 1 HR and step to pattern with min A in 2 weeks.

## 2018-10-16 NOTE — PROGRESS NOTE ADULT - PROBLEM SELECTOR PLAN 5
BP elevated on arrival 140-180 systolic  - Home medication include Metoprolol XR 50 mg daily, continue with Metoprolol 25 mg BID  - Continue to monitor vitals Q4H - today w/ new O2 requirement, b/l pulm edema worsened from yesterday. No acute chest pains, likely becoming fluid overloaded.   - lasix 40 IV x 1, much improvement by afternoon.   - will hold lasix in AM if we can to minimize contrast risk. Currently improved w/ resolved O2 requirement and resolved SOB. - today w/ new O2 requirement, b/l pulm edema worsened from yesterday. No acute chest pains or referred pains, trop downtrending, likely becoming fluid overloaded w/ persistent demand.   - put tele back.   - metop 25 bid on, will hold on escalating during active HF.  - needs ACE.    - lasix 40 IV x 1, much improvement by afternoon.   - will hold lasix in AM if we can to minimize contrast risk. Currently improved w/ resolved O2 requirement and resolved SOB.

## 2018-10-16 NOTE — PROGRESS NOTE ADULT - PROBLEM SELECTOR PLAN 3
- Hypovolemic on exam with hyponatremia likely secondary to underlying sepsis  - Continue with IVF for hydration, monitor BMP - NSTEMI 2/2 demand, trops downtrending. asymptomatic. stress test w/ inferior hypokinesis.   - s/p multiple CANDIDO, most recent in 02/2018  - Continue with ASA/Brilinta, continue with high intensity statin

## 2018-10-16 NOTE — CONSULT NOTE ADULT - ATTENDING COMMENTS
agree with the above assessment and plan by MICHEL Odell.  pt well known to us, history of extensive CAD s/p multiple PCI earlier this year, PAD LE s/p L foot OM/gangrene  note to have elevated cardiac enzymes  pt is asymptomatic, ECG unchanged(LVH w repol)  elevated eznymes could be related to sepsis type II MI  pt is stable and asymptomatic, would defer AC  cont antiplatet therapy  repeat ECHO to eval for any change in LV function  would defer from any ischemic eval(pt is asymptomatic and infected)
I and d for source control with podiatry.  duplex to eval bypass.  IV abx
Farrukh Mitchell MD  Pager [phone]  After 5pm/weekends call [phone]    I am away 10/17/18, and will return on 10/18/18.  For any questions, can contact the ID service at [phone].

## 2018-10-16 NOTE — PROGRESS NOTE ADULT - PROBLEM SELECTOR PLAN 2
- Patient presented with tachycardia, leukocytosis meeting SIRS criteria with likely source being the L foot gangrene, concern for osteo given elevated ESR  - s/p 1x Vanc and Ceftriaxone in the ED, continue with Vanc for MRSA coverage as patient has been recently hospitalized and Zosyn for gram negative/anaerobic coverage as patient is a diabetic  - Pending blood cultures, trend lactate, vitals Q4H, gentle IVF - Patient presented with tachycardia, leukocytosis meeting SIRS criteria with likely source being the L foot gangrene, concern for osteo given elevated ESR  - cont vanc/zosyn  - wound culture

## 2018-10-16 NOTE — PHYSICAL THERAPY INITIAL EVALUATION ADULT - PERTINENT HX OF CURRENT PROBLEM, REHAB EVAL
68 year old man with history of HTN, CAD s/p CANDIDO to RCA in 02/2018 in addition to CANDIDO to LAD, LCX, PAD s/p left femoral to below knee popliteal bypass 03/2018, left great toe partial amputation presented today with increasing toe pain found to have sepsis likely secondary to L 4th and 5th toe gangrene. 68 year old man with history of HTN, CAD s/p CANDIDO to RCA in 02/2018 in addition to CANDIDO to LAD, LCX, PAD s/p left femoral to below knee popliteal bypass 03/2018, left great toe partial amputation presented today with increasing toe pain found to have sepsis likely secondary to L 4th and 5th toe gangrene. X-ray L foot (10/12): (-) osteomyelitis or acute fx, chronic fx deformity distal 5th metatarsal. Pt s/p TTE with doppler on 10/15.

## 2018-10-16 NOTE — PROGRESS NOTE ADULT - ASSESSMENT
68 year old man with medical history of HTN, CAD, DM, PAD s/p left femoral to below knee popliteal PTFE bypass (3/2018) presenting with left 4th toe gangrene and likely osteomyelitis, sepsis    - Currently on schedule for Bilateral Lower Extremity Angiograms, Possible  Angioplasty, Possible stent with Dr. Mcconnell  - Please document medical and cardiac clearance  - Please send Pre-op labs in preparation for procedure: Type and Screen, PTT/PT/INR, CBC and BMP  - NPO at midnight tomorrow 10/17 for procedure 10/18  - Plan discussed with Dr. Jayesh Lange PGY-2  Vascular [id number]

## 2018-10-16 NOTE — PHYSICAL THERAPY INITIAL EVALUATION ADULT - GENERAL OBSERVATIONS, REHAB EVAL
Pt received semi-supine in bed, (+) telemetry, NAD. Pt's spouse at side. Pt alert, Malayam-speaking but able to speak/understand English, agreeable to PT eval.

## 2018-10-16 NOTE — PROGRESS NOTE ADULT - ASSESSMENT
67 y/o Male with gangrene of Left 4th + 5th digit   ·	Pt seen and evaluated   ·	Pt known to service   ·	Pt has had previous bypass to left Lower extremity (March 2018)  ·	will likely need transmetatarsal amputation   ·	Pod plan for TMA pending vascular recs and possible angio thursday    ·	seen w/ attending

## 2018-10-16 NOTE — PROGRESS NOTE ADULT - ATTENDING COMMENTS
Agree with above NP note.  elevated troponin without chest pain, dyspnea  echo with lv dysfxn, prior nuclear stress revealed same inferior hypokinesis  cont med tx of cad, pci   last ck, ckmb normal  no ischemic eval planned in setting of active infection, fever, lack of symptoms Agree with above NP note.  elevated troponin without chest pain, dyspnea  echo with lv dysfxn, prior nuclear stress revealed same inferior hypokinesis  cont med tx of cad, pci   last ck, ckmb normal  no ischemic eval planned in setting of active infection, fever, lack of symptoms  vascular planning for LE angio  pt has no active symptoms, trops downtrending  pt is optimized for angio, can proceed with LE angio with elevated and acceptable CV risk

## 2018-10-16 NOTE — PROGRESS NOTE ADULT - PROBLEM SELECTOR PLAN 8
- DVT prophylaxis: lovenox   - GI prophylaxis: not indicated  - Diet: DASH/CC diet  - Dispo: eventually will need PT consult after planned amputation BP elevated on arrival 140-180 systolic  - Home medication include Metoprolol XR 50 mg daily, continue with Metoprolol 25 mg BID  - Continue to monitor vitals Q4H

## 2018-10-16 NOTE — CONSULT NOTE ADULT - SUBJECTIVE AND OBJECTIVE BOX
HPI:  68 year old male with HTN, CAD s/p CANDIDO to RCA in 02/2018 in addition to CANDIDO to LAD, LCX, PAD s/p left femoral to below knee popliteal bypass 03/2018, left great toe partial amputation presented today with increasing toe pain. Patient had been having pain in his L foot for about 10 days and noticed increasing blood and foul smelling fluid from the wound. Patient's wife brought the patient to the podiatry office today for evaluation and he was thus sent to the ED for further evaluation. Patient has been ambulating at home with cane but with significant pain. He endorses numbness and tingliness. He denies fever or chills. He denies chest pain, shortness of breath. He denies abdominal pain, nausea or vomiting, appetite unchanged. He denies recent trauma to the foot.     In the ED, patient's initial vitals were Temp 36.8, , /80, O2 saturation 97% on room air. Patient received 1x Vanc and Ceftriaxone and 2 L NS bolus in the ED. (12 Oct 2018 23:01)      PAST MEDICAL & SURGICAL HISTORY:  CAD (coronary artery disease)  Diabetes mellitus  Hypertension  Alcohol Dependency  Diabetes Mellitus  S/P femoral-popliteal bypass surgery  Stented coronary artery      Allergies    No Known Allergies    Intolerances        ANTIMICROBIALS:  piperacillin/tazobactam IVPB. 3.375 every 8 hours  vancomycin  IVPB 1250 every 12 hours      OTHER MEDS:  aspirin enteric coated 81 milliGRAM(s) Oral daily  atorvastatin 40 milliGRAM(s) Oral at bedtime  cyanocobalamin 1000 MICROGram(s) Oral daily  dextrose 40% Gel 15 Gram(s) Oral once PRN  dextrose 50% Injectable 12.5 Gram(s) IV Push once  dextrose 50% Injectable 25 Gram(s) IV Push once  dextrose 50% Injectable 25 Gram(s) IV Push once  docusate sodium 100 milliGRAM(s) Oral three times a day  enoxaparin Injectable 40 milliGRAM(s) SubCutaneous daily  folic acid 1 milliGRAM(s) Oral daily  gabapentin 300 milliGRAM(s) Oral three times a day  glucagon  Injectable 1 milliGRAM(s) IntraMuscular once PRN  insulin glargine Injectable (LANTUS) 25 Unit(s) SubCutaneous at bedtime  insulin lispro (HumaLOG) corrective regimen sliding scale   SubCutaneous three times a day before meals  insulin lispro (HumaLOG) corrective regimen sliding scale   SubCutaneous at bedtime  insulin lispro Injectable (HumaLOG) 9 Unit(s) SubCutaneous three times a day before meals  metoprolol tartrate 25 milliGRAM(s) Oral two times a day  multivitamin 1 Tablet(s) Oral daily  senna 2 Tablet(s) Oral at bedtime  sertraline 100 milliGRAM(s) Oral daily  tamsulosin 0.4 milliGRAM(s) Oral at bedtime  ticagrelor 90 milliGRAM(s) Oral two times a day      SOCIAL HISTORY:    FAMILY HISTORY:  Family history of diabetes mellitus (Father)      Drug Dosing Weight  Height (cm): 165.1 (13 Oct 2018 07:27)  Weight (kg): 66 (13 Oct 2018 11:25)  BMI (kg/m2): 24.2 (13 Oct 2018 11:25)  BSA (m2): 1.73 (13 Oct 2018 11:25)    PE:    Vital Signs Last 24 Hrs  T(C): 37.1 (16 Oct 2018 12:18), Max: 38.1 (15 Oct 2018 16:05)  T(F): 98.7 (16 Oct 2018 12:18), Max: 100.6 (15 Oct 2018 16:05)  HR: 92 (16 Oct 2018 12:18) (86 - 109)  BP: 113/70 (16 Oct 2018 12:18) (106/69 - 144/84)  BP(mean): 88 (15 Oct 2018 21:06) (88 - 88)  RR: 18 (16 Oct 2018 12:18) (18 - 18)  SpO2: 93% (16 Oct 2018 12:18) (91% - 96%)    Gen: AOx3, NAD, non-toxic, pleasant  CV: S1+S2 normal, no murmurs, nontachycardic  Resp: Clear bilat, no resp distress, no crackles/wheezes  Abd: Soft, nontender, +BS  Ext: No LE edema, no wounds  : No Dominique  IV/Skin: No thrombophlebitis  Msk: No low back pain, no arthralgias, no joint swelling  Neuro: No sensory deficits, no motor deficits    LABS:                          10.9   14.43 )-----------( 405      ( 16 Oct 2018 08:13 )             32.7       10-16    130<L>  |  93<L>  |  11  ----------------------------<  180<H>  4.1   |  23  |  0.75    Ca    8.4      16 Oct 2018 06:33            MICROBIOLOGY:  v  .Blood Blood-Peripheral  10-12-18   No growth to date.  --  --    RADIOLOGY:    < from: [facility] Physiol Extremity Lower 3+ Level, BI (10.15.18 @ 11:37) >  Impression: The quality of the examination is adversely impacted on the   left by the noncompressible nature of the arteries.  There is bilateral lower extremity arterial vascular disease affecting   the femoral popliteal segments.  The left posterior tibial artery is probably occluded.      < end of copied text >    < from: [facility] Duplex Low Ext Arterial, Ltd, Left (10.13.18 @ 16:08) >  Impression: Significant diffuse calcified atherosclerotic changes seen   throughout the arteries of the left lower extremity.    There is significant stenosis involving the left common femoral artery,   proximal left superficial femoral artery and proximal left deep femoral   artery.    Patient's arterial bypass graft extending from the proximal left   superficial femoral artery to the left popliteal artery is occluded.    Distal segment left posterior tibial artery occluded.    Mid and distal segments left peroneal artery occluded.    Mid and distal segments left anterior tibial artery occluded.    Left dorsalis pedis artery not visualized due to overlying dressing.    Nurse practitioner Frank was notified October 13, 2018 at 4:06 PM.      < end of copied text >    < from: Xray Foot AP + Lateral + Oblique, Left (10.12.18 @ 17:25) >  IMPRESSION:    No radiographic evidence of osteomyelitis.    < end of copied text >    < from: Xray Chest 1 View- PORTABLE-Urgent (03.10.18 @ 17:54) >  Impression:    Poor inspiratory effort. The heartis slightly enlarged. The lungs are   clear. Calcified aortic knob. Degenerative changes of the thoracic spine.    < end of copied text > HPI:  68 year old male with HTN, CAD s/p CANDIDO to RCA in 02/2018 in addition to CANDIDO to LAD, LCX, PAD s/p left femoral to below knee popliteal bypass 03/2018, left great toe partial amputation presented today with increasing toe pain. Patient had been having pain in his Left foot for about 10 days and noticed increasing blood, purulence and foul smelling fluid from the wound. He was started on keflex by his PMD and took for 10 days as well. Patient's wife brought the patient to the podiatry office today for evaluation and he was thus sent to the ED for further evaluation. He Patient has been ambulating at home with a cane, but with significant pain. He endorses numbness and tingling. He denies fever or chills. He denies chest pain, shortness of breath. He denies abdominal pain, nausea or vomiting, appetite unchanged. He denies recent trauma to the foot. Patient seen by vascular surgery and planning for bilateral LE Angiograms, possible  angioplasty, and possible stent. Also, seen by Podiatry and planning for TMA.     In the ED, patient's initial vitals were Temp 36.8, , /80, O2 saturation 97% on room air. Patient received 1x Vanc and Ceftriaxone and 2 L NS bolus in the ED.     Started on vanco/ zosyn. While in the hospital developed a fever. Remains with leukocytosis, elevated ESR, CRP.      PAST MEDICAL & SURGICAL HISTORY:  CAD (coronary artery disease)  Diabetes mellitus  Hypertension  Alcohol Dependency  Diabetes Mellitus  S/P femoral-popliteal bypass surgery  Stented coronary artery    Allergies    No Known Allergies    Intolerances    ANTIMICROBIALS:  piperacillin/tazobactam IVPB. 3.375 every 8 hours  vancomycin  IVPB 1250 every 12 hours    OTHER MEDS:  aspirin enteric coated 81 milliGRAM(s) Oral daily  atorvastatin 40 milliGRAM(s) Oral at bedtime  cyanocobalamin 1000 MICROGram(s) Oral daily  dextrose 40% Gel 15 Gram(s) Oral once PRN  dextrose 50% Injectable 12.5 Gram(s) IV Push once  dextrose 50% Injectable 25 Gram(s) IV Push once  dextrose 50% Injectable 25 Gram(s) IV Push once  docusate sodium 100 milliGRAM(s) Oral three times a day  enoxaparin Injectable 40 milliGRAM(s) SubCutaneous daily  folic acid 1 milliGRAM(s) Oral daily  gabapentin 300 milliGRAM(s) Oral three times a day  glucagon  Injectable 1 milliGRAM(s) IntraMuscular once PRN  insulin glargine Injectable (LANTUS) 25 Unit(s) SubCutaneous at bedtime  insulin lispro (HumaLOG) corrective regimen sliding scale   SubCutaneous three times a day before meals  insulin lispro (HumaLOG) corrective regimen sliding scale   SubCutaneous at bedtime  insulin lispro Injectable (HumaLOG) 9 Unit(s) SubCutaneous three times a day before meals  metoprolol tartrate 25 milliGRAM(s) Oral two times a day  multivitamin 1 Tablet(s) Oral daily  senna 2 Tablet(s) Oral at bedtime  sertraline 100 milliGRAM(s) Oral daily  tamsulosin 0.4 milliGRAM(s) Oral at bedtime  ticagrelor 90 milliGRAM(s) Oral two times a day    SOCIAL HISTORY: Denies smoking, alcohol use or drug use.    FAMILY HISTORY:  Family history of diabetes mellitus (Father)      Drug Dosing Weight  Height (cm): 165.1 (13 Oct 2018 07:27)  Weight (kg): 66 (13 Oct 2018 11:25)  BMI (kg/m2): 24.2 (13 Oct 2018 11:25)  BSA (m2): 1.73 (13 Oct 2018 11:25)    PE:    Vital Signs Last 24 Hrs  T(C): 37.1 (16 Oct 2018 12:18), Max: 38.1 (15 Oct 2018 16:05)  T(F): 98.7 (16 Oct 2018 12:18), Max: 100.6 (15 Oct 2018 16:05)  HR: 92 (16 Oct 2018 12:18) (86 - 109)  BP: 113/70 (16 Oct 2018 12:18) (106/69 - 144/84)  BP(mean): 88 (15 Oct 2018 21:06) (88 - 88)  RR: 18 (16 Oct 2018 12:18) (18 - 18)  SpO2: 93% (16 Oct 2018 12:18) (91% - 96%)    Gen: AOx1, NAD, non-toxic, pleasant  CV: S1+S2 normal, no murmurs  Resp: Clear bilat, no resp distress  Abd: Soft, nontender, +BS  Ext: Left foot s/p prior hallux amputation intact, dry malodorous gangrene to left foot  : No Dominique  IV/Skin: No thrombophlebitis  Msk: No low back pain, no arthralgias, no joint swelling  Neuro: No sensory deficits, no motor deficits    LABS:                          10.9   14.43 )-----------( 405      ( 16 Oct 2018 08:13 )             32.7       10-16    130<L>  |  93<L>  |  11  ----------------------------<  180<H>  4.1   |  23  |  0.75    Ca    8.4      16 Oct 2018 06:33      MICROBIOLOGY:  v  .Blood Blood-Peripheral  10-12-18   No growth to date.  --  --    RADIOLOGY:    < from: [facility] Physiol Extremity Lower 3+ Level, BI (10.15.18 @ 11:37) >  Impression: The quality of the examination is adversely impacted on the   left by the noncompressible nature of the arteries.  There is bilateral lower extremity arterial vascular disease affecting   the femoral popliteal segments.  The left posterior tibial artery is probably occluded.      < end of copied text >    < from: [facility] Duplex Low Ext Arterial, Ltd, Left (10.13.18 @ 16:08) >  Impression: Significant diffuse calcified atherosclerotic changes seen   throughout the arteries of the left lower extremity.    There is significant stenosis involving the left common femoral artery,   proximal left superficial femoral artery and proximal left deep femoral   artery.    Patient's arterial bypass graft extending from the proximal left   superficial femoral artery to the left popliteal artery is occluded.    Distal segment left posterior tibial artery occluded.    Mid and distal segments left peroneal artery occluded.    Mid and distal segments left anterior tibial artery occluded.    Left dorsalis pedis artery not visualized due to overlying dressing.    Nurse practitioner Frank was notified October 13, 2018 at 4:06 PM.      < end of copied text >    < from: Xray Foot AP + Lateral + Oblique, Left (10.12.18 @ 17:25) >  IMPRESSION:    No radiographic evidence of osteomyelitis.    < end of copied text >    < from: Xray Chest 1 View- PORTABLE-Urgent (03.10.18 @ 17:54) >  Impression:    Poor inspiratory effort. The heartis slightly enlarged. The lungs are   clear. Calcified aortic knob. Degenerative changes of the thoracic spine.    < end of copied text >

## 2018-10-16 NOTE — PROGRESS NOTE ADULT - PROBLEM SELECTOR PLAN 1
- Patient with pain, musky skin discoloration, no palpable pulse, also with some drainage concerning for gangrene secondary to long standing peripheral arterial disease.  - Vascular and podiatry recs appreciated, will need transmetatarsal amputation. Will coordinate timing w/ podiatry.   - Continue with empiric antibiotics for osteo, will need at least 6 weeks, may need longer pending patency of graft to foot. If poor blood supply he'll likely require suppressive ABx. Await results of JOSAFAT/PVR and culture data from bone that's removed. - Vascular and podiatry recs appreciated, d/w vascular and cardiology today: set for b/l LE angiogram on thursday +/- stent. No moving ahead w/ surgery w/ out re-addressing risks/benefits.   - following vascular intervention, patient will need transmetatarsal amputation.  - Continue with empiric antibiotics for osteo, asked ID to evaluate for help w/ duration of abx. Will also send superficial wound culture.

## 2018-10-16 NOTE — CONSULT NOTE ADULT - ASSESSMENT
68 year old male with HTN, CAD s/p CANDIDO to RCA in 02/2018 in addition to CANDIDO to LAD, LCX, PAD s/p left femoral to below knee popliteal bypass 03/2018, left great toe partial amputation presented today with increasing toe pain.    Febrile yesterday  Leukocytosis  Elevated ESR/ CRP  Blood cxs NGTD  Gangrene of left foot 68 year old male with HTN, CAD s/p CANDIDO to RCA in 02/2018 in addition to CANDIDO to LAD, LCX, PAD s/p left femoral to below knee popliteal bypass 03/2018, left great toe partial amputation presented today with increasing toe pain.    Febrile yesterday  Leukocytosis  Elevated ESR/ CRP  Blood cxs NGTD  h/o left foot hallux amputation  Gangrene of left foot  Arterial duplex with b/l LE arterial vascular disease  Patient seen by vascular surgery and planning for bilateral LE Angiograms, possible  angioplasty, and possible stent  Podiatry planning for left TMA  Has dry gangrene with malodor to the left foot    Recommend:  -Continue vancomycin 1.25 grams IV q 12 hours and zosyn 3.375 grams IV q 8 hours  -Monitor vanco trough prior to 4th dose  -F/U blood cxs - NGTD  -Trend WBC  -F/U podiatry and vascular surgery.   -Will continue to follow with you.

## 2018-10-16 NOTE — PROGRESS NOTE ADULT - ASSESSMENT
Mr. Gaston is a 68 year old man with history of HTN, CAD s/p CANDIDO to RCA in 02/2018 in addition to CANDIDO to LAD, LCX, PAD s/p left femoral to below knee popliteal bypass 03/2018, left great toe partial amputation presented with increasing toe pain found to have sepsis likely secondary to L 4th and 5th toe gangrene c/b NSTEMI being medically managed w/ downtrending trops and no symptoms. Continuing abx, asked ID to weigh in re: length of ABx pending procedures below.    Vascular surgery is taking patient for angiogram of b/l LE on thursday. Patient now presenting w/ HF (pulm edema w/ new oxygen requirement improving swiftly to IV lasix), echo w/ inferior hypokinesis noted on old stress test. D/w cardiology Dr. Gavin, greatly appreciate his input: ok to move forward for cath +/- stent though if surgery were required we would need to discuss risks/benefits of moving forward. Podiatry will perform TMA after vascular surgery intervention.     Finally, patient w/ profound and diffuse clotting. Unusual. Sending [facility] labs. Mr. Gaston is a 68 year old man with history of DM2 a1c 13, HTN, CAD s/p CANDIDO to RCA in 02/2018 in addition to CANDIDO to LAD, LCX, PAD s/p left femoral to below knee popliteal bypass 03/2018, left great toe partial amputation presented with increasing toe pain found to have sepsis likely secondary to L 4th and 5th toe gangrene c/b NSTEMI being medically managed w/ downtrending trops and no symptoms. Continuing abx, asked ID to weigh in re: length of ABx pending procedures below.    Vascular surgery is taking patient for angiogram of b/l LE on thursday. Patient now presenting w/ HF (pulm edema w/ new oxygen requirement improving swiftly to IV lasix), echo w/ inferior hypokinesis noted on old stress test. D/w cardiology Dr. Gavin, greatly appreciate his input: ok to move forward for cath +/- stent though if surgery were required we would need to discuss risks/benefits of moving forward. Podiatry will perform TMA after vascular surgery intervention.     Finally, patient w/ profound and diffuse clotting in large, medium and small arteries. Unusual. Sending APLS + hemolytic basic labs (?[facility]). Check prot C and S as outpatient when off of heparin.

## 2018-10-17 LAB
% ALBUMIN: 38.1 % — SIGNIFICANT CHANGE UP
% ALPHA 1: 7.2 % — SIGNIFICANT CHANGE UP
% ALPHA 2: 20.1 % — SIGNIFICANT CHANGE UP
% BETA: 18.4 % — SIGNIFICANT CHANGE UP
% GAMMA: 16.2 % — SIGNIFICANT CHANGE UP
ALBUMIN SERPL ELPH-MCNC: 2.5 G/DL — LOW (ref 3.6–5.5)
ALBUMIN SERPL ELPH-MCNC: 3.1 G/DL — LOW (ref 3.3–5)
ALBUMIN/GLOB SERPL ELPH: 0.6 RATIO — SIGNIFICANT CHANGE UP
ALP SERPL-CCNC: 63 U/L — SIGNIFICANT CHANGE UP (ref 40–120)
ALPHA1 GLOB SERPL ELPH-MCNC: 0.5 G/DL — HIGH (ref 0.1–0.4)
ALPHA2 GLOB SERPL ELPH-MCNC: 1.3 G/DL — HIGH (ref 0.5–1)
ALT FLD-CCNC: 22 U/L — SIGNIFICANT CHANGE UP (ref 10–45)
ANION GAP SERPL CALC-SCNC: 13 MMOL/L — SIGNIFICANT CHANGE UP (ref 5–17)
AST SERPL-CCNC: 20 U/L — SIGNIFICANT CHANGE UP (ref 10–40)
B-GLOBULIN SERPL ELPH-MCNC: 1.2 G/DL — HIGH (ref 0.5–1)
BILIRUB SERPL-MCNC: 0.5 MG/DL — SIGNIFICANT CHANGE UP (ref 0.2–1.2)
BLD GP AB SCN SERPL QL: NEGATIVE — SIGNIFICANT CHANGE UP
BUN SERPL-MCNC: 16 MG/DL — SIGNIFICANT CHANGE UP (ref 7–23)
CALCIUM SERPL-MCNC: 8.9 MG/DL — SIGNIFICANT CHANGE UP (ref 8.4–10.5)
CHLORIDE SERPL-SCNC: 94 MMOL/L — LOW (ref 96–108)
CO2 SERPL-SCNC: 25 MMOL/L — SIGNIFICANT CHANGE UP (ref 22–31)
CREAT ?TM UR-MCNC: 64 MG/DL — SIGNIFICANT CHANGE UP
CREAT ?TM UR-MCNC: 96 MG/DL — SIGNIFICANT CHANGE UP
CREAT SERPL-MCNC: 0.93 MG/DL — SIGNIFICANT CHANGE UP (ref 0.5–1.3)
CULTURE RESULTS: SIGNIFICANT CHANGE UP
CULTURE RESULTS: SIGNIFICANT CHANGE UP
GAMMA GLOBULIN: 1.1 G/DL — SIGNIFICANT CHANGE UP (ref 0.6–1.6)
GLUCOSE BLDC GLUCOMTR-MCNC: 203 MG/DL — HIGH (ref 70–99)
GLUCOSE BLDC GLUCOMTR-MCNC: 311 MG/DL — HIGH (ref 70–99)
GLUCOSE BLDC GLUCOMTR-MCNC: 333 MG/DL — HIGH (ref 70–99)
GLUCOSE SERPL-MCNC: 135 MG/DL — HIGH (ref 70–99)
HAPTOGLOB SERPL-MCNC: 490 MG/DL — HIGH (ref 34–200)
HCT VFR BLD CALC: 30.6 % — LOW (ref 39–50)
HCYS SERPL-MCNC: 7.4 UMOL/L — SIGNIFICANT CHANGE UP (ref 5–20)
HGB BLD-MCNC: 10.6 G/DL — LOW (ref 13–17)
INR BLD: 1.14 RATIO — SIGNIFICANT CHANGE UP (ref 0.88–1.16)
INTERPRETATION SERPL IFE-IMP: SIGNIFICANT CHANGE UP
LDH SERPL L TO P-CCNC: 239 U/L — SIGNIFICANT CHANGE UP (ref 50–242)
MCHC RBC-ENTMCNC: 29.3 PG — SIGNIFICANT CHANGE UP (ref 27–34)
MCHC RBC-ENTMCNC: 34.6 GM/DL — SIGNIFICANT CHANGE UP (ref 32–36)
MCV RBC AUTO: 84.5 FL — SIGNIFICANT CHANGE UP (ref 80–100)
PLATELET # BLD AUTO: 394 K/UL — SIGNIFICANT CHANGE UP (ref 150–400)
POTASSIUM SERPL-MCNC: 3.6 MMOL/L — SIGNIFICANT CHANGE UP (ref 3.5–5.3)
POTASSIUM SERPL-SCNC: 3.6 MMOL/L — SIGNIFICANT CHANGE UP (ref 3.5–5.3)
PROT ?TM UR-MCNC: 33 MG/DL — HIGH (ref 0–12)
PROT ?TM UR-MCNC: 60 MG/DL — HIGH (ref 0–12)
PROT PATTERN SERPL ELPH-IMP: SIGNIFICANT CHANGE UP
PROT SERPL-MCNC: 6.5 G/DL — SIGNIFICANT CHANGE UP (ref 6–8.3)
PROT SERPL-MCNC: 6.5 G/DL — SIGNIFICANT CHANGE UP (ref 6–8.3)
PROT SERPL-MCNC: 7.1 G/DL — SIGNIFICANT CHANGE UP (ref 6–8.3)
PROT/CREAT UR-RTO: 0.5 RATIO — HIGH (ref 0–0.2)
PROT/CREAT UR-RTO: 0.6 RATIO — HIGH (ref 0–0.2)
PROTHROM AB SERPL-ACNC: 12.9 SEC — SIGNIFICANT CHANGE UP (ref 10–13.1)
RBC # BLD: 3.62 M/UL — LOW (ref 4.2–5.8)
RBC # FLD: 13.9 % — SIGNIFICANT CHANGE UP (ref 10.3–14.5)
RH IG SCN BLD-IMP: POSITIVE — SIGNIFICANT CHANGE UP
SODIUM SERPL-SCNC: 132 MMOL/L — LOW (ref 135–145)
SPECIMEN SOURCE: SIGNIFICANT CHANGE UP
SPECIMEN SOURCE: SIGNIFICANT CHANGE UP
WBC # BLD: 13.72 K/UL — HIGH (ref 3.8–10.5)
WBC # FLD AUTO: 13.72 K/UL — HIGH (ref 3.8–10.5)

## 2018-10-17 PROCEDURE — 76937 US GUIDE VASCULAR ACCESS: CPT | Mod: 26

## 2018-10-17 PROCEDURE — 37226: CPT

## 2018-10-17 RX ORDER — FUROSEMIDE 40 MG
40 TABLET ORAL DAILY
Qty: 0 | Refills: 0 | Status: DISCONTINUED | OUTPATIENT
Start: 2018-10-17 | End: 2018-10-24

## 2018-10-17 RX ORDER — FUROSEMIDE 40 MG
40 TABLET ORAL ONCE
Qty: 0 | Refills: 0 | Status: DISCONTINUED | OUTPATIENT
Start: 2018-10-17 | End: 2018-10-17

## 2018-10-17 RX ORDER — ALBUTEROL 90 UG/1
2.5 AEROSOL, METERED ORAL ONCE
Qty: 0 | Refills: 0 | Status: COMPLETED | OUTPATIENT
Start: 2018-10-17 | End: 2018-10-18

## 2018-10-17 RX ADMIN — Medication 1 MILLIGRAM(S): at 11:52

## 2018-10-17 RX ADMIN — ATORVASTATIN CALCIUM 40 MILLIGRAM(S): 80 TABLET, FILM COATED ORAL at 19:51

## 2018-10-17 RX ADMIN — Medication 9 UNIT(S): at 11:51

## 2018-10-17 RX ADMIN — TICAGRELOR 90 MILLIGRAM(S): 90 TABLET ORAL at 18:45

## 2018-10-17 RX ADMIN — Medication 25 MILLIGRAM(S): at 04:45

## 2018-10-17 RX ADMIN — TAMSULOSIN HYDROCHLORIDE 0.4 MILLIGRAM(S): 0.4 CAPSULE ORAL at 19:51

## 2018-10-17 RX ADMIN — Medication 1 TABLET(S): at 11:52

## 2018-10-17 RX ADMIN — PIPERACILLIN AND TAZOBACTAM 25 GRAM(S): 4; .5 INJECTION, POWDER, LYOPHILIZED, FOR SOLUTION INTRAVENOUS at 18:45

## 2018-10-17 RX ADMIN — Medication 166.67 MILLIGRAM(S): at 07:12

## 2018-10-17 RX ADMIN — TICAGRELOR 90 MILLIGRAM(S): 90 TABLET ORAL at 04:45

## 2018-10-17 RX ADMIN — GABAPENTIN 300 MILLIGRAM(S): 400 CAPSULE ORAL at 19:51

## 2018-10-17 RX ADMIN — Medication 4: at 21:28

## 2018-10-17 RX ADMIN — Medication 166.67 MILLIGRAM(S): at 19:50

## 2018-10-17 RX ADMIN — Medication 100 MILLIGRAM(S): at 19:51

## 2018-10-17 RX ADMIN — GABAPENTIN 300 MILLIGRAM(S): 400 CAPSULE ORAL at 18:45

## 2018-10-17 RX ADMIN — Medication 4: at 11:50

## 2018-10-17 RX ADMIN — PIPERACILLIN AND TAZOBACTAM 25 GRAM(S): 4; .5 INJECTION, POWDER, LYOPHILIZED, FOR SOLUTION INTRAVENOUS at 04:46

## 2018-10-17 RX ADMIN — Medication 40 MILLIGRAM(S): at 18:49

## 2018-10-17 RX ADMIN — Medication 100 MILLIGRAM(S): at 04:45

## 2018-10-17 RX ADMIN — Medication 81 MILLIGRAM(S): at 11:52

## 2018-10-17 RX ADMIN — INSULIN GLARGINE 25 UNIT(S): 100 INJECTION, SOLUTION SUBCUTANEOUS at 21:28

## 2018-10-17 RX ADMIN — Medication 25 MILLIGRAM(S): at 18:45

## 2018-10-17 RX ADMIN — SENNA PLUS 2 TABLET(S): 8.6 TABLET ORAL at 19:51

## 2018-10-17 RX ADMIN — Medication 9 UNIT(S): at 08:03

## 2018-10-17 RX ADMIN — ENOXAPARIN SODIUM 40 MILLIGRAM(S): 100 INJECTION SUBCUTANEOUS at 11:53

## 2018-10-17 RX ADMIN — SERTRALINE 100 MILLIGRAM(S): 25 TABLET, FILM COATED ORAL at 11:52

## 2018-10-17 RX ADMIN — Medication 2: at 08:03

## 2018-10-17 RX ADMIN — PREGABALIN 1000 MICROGRAM(S): 225 CAPSULE ORAL at 11:52

## 2018-10-17 RX ADMIN — GABAPENTIN 300 MILLIGRAM(S): 400 CAPSULE ORAL at 04:44

## 2018-10-17 NOTE — CHART NOTE - NSCHARTNOTEFT_GEN_A_CORE
POST-OPERATIVE NOTE    Subjective:  Patient is s/p b/l angiogram, angioplasty and stent of left femoral artery. Tolerating PO, denies nausea or vomiting. Denies pain at the site. Denies chest pain, SOB, dizziness, or headache. Recovering appropriately.     Vital Signs Last 24 Hrs  T(C): 37.7 (17 Oct 2018 22:44), Max: 37.7 (17 Oct 2018 22:44)  T(F): 99.9 (17 Oct 2018 22:44), Max: 99.9 (17 Oct 2018 22:44)  HR: 114 (17 Oct 2018 22:44) (76 - 118)  BP: 131/88 (17 Oct 2018 22:44) (114/72 - 160/90)  BP(mean): --  RR: 20 (17 Oct 2018 22:44) (17 - 20)  SpO2: 97% (17 Oct 2018 22:44) (92% - 99%)  I&O's Detail    16 Oct 2018 07:01  -  17 Oct 2018 07:00  --------------------------------------------------------  IN:    IV PiggyBack: 350 mL    Oral Fluid: 1020 mL  Total IN: 1370 mL    OUT:    Voided: 2300 mL  Total OUT: 2300 mL    Total NET: -930 mL      17 Oct 2018 07:01  -  17 Oct 2018 23:31  --------------------------------------------------------  IN:    IV PiggyBack: 100 mL    Oral Fluid: 520 mL  Total IN: 620 mL    OUT:    Voided: 100 mL  Total OUT: 100 mL    Total NET: 520 mL        piperacillin/tazobactam IVPB. 3.375  vancomycin  IVPB 1250  aspirin enteric coated 81  enoxaparin Injectable 40  furosemide   Injectable 40  metoprolol tartrate 25  piperacillin/tazobactam IVPB. 3.375  tamsulosin 0.4  ticagrelor 90  vancomycin  IVPB 1250    PAST MEDICAL & SURGICAL HISTORY:  CAD (coronary artery disease)  Diabetes mellitus  Hypertension  Alcohol Dependency  Diabetes Mellitus  S/P femoral-popliteal bypass surgery  Stented coronary artery        Physical Exam:  General: NAD, resting comfortably in bed  Pulmonary: Nonlabored breathing, no respiratory distress  Cardiovascular: NSR  Abdominal: soft, NT/ND  Extremities: WWP, right groin dressing clean, dry, and intact, no hematoma or edema noted       LABS:                        10.6   13.72 )-----------( 394      ( 17 Oct 2018 09:03 )             30.6     10-17    132<L>  |  94<L>  |  16  ----------------------------<  135<H>  3.6   |  25  |  0.93    Ca    8.9      17 Oct 2018 06:20    TPro  6.5  /  Alb  2.5<L>  /  TBili  x   /  DBili  x   /  AST  x   /  ALT  x   /  AlkPhos  x   10-17    PT/INR - ( 17 Oct 2018 09:01 )   PT: 12.9 sec;   INR: 1.14 ratio         PTT - ( 16 Oct 2018 10:43 )  PTT:28.4 sec  CAPILLARY BLOOD GLUCOSE      POCT Blood Glucose.: 333 mg/dL (17 Oct 2018 21:26)  POCT Blood Glucose.: 311 mg/dL (17 Oct 2018 21:06)  POCT Blood Glucose.: 203 mg/dL (17 Oct 2018 17:58)  POCT Blood Glucose.: 219 mg/dL (17 Oct 2018 11:41)  POCT Blood Glucose.: 158 mg/dL (17 Oct 2018 07:37)      Radiology and Additional Studies:    Assessment:  The patient is a 68y Male who is now several hours post-op from a b/l angiogram, angioplasty and stent of left femoral artery     Plan:  - Pain control as needed  - DVT ppx  - OOB and ambulating as tolerated  - management as per primary team     Aminta Peace, PGY-1  [facility] Vascular Surgery POST-OPERATIVE NOTE    Subjective:  Patient is s/p b/l angiogram, angioplasty and stent of left femoral artery. Tolerating PO, denies nausea or vomiting. Denies pain at the site. Denies chest pain, SOB, dizziness, or headache. Recovering appropriately.     Vital Signs Last 24 Hrs  T(C): 37.7 (17 Oct 2018 22:44), Max: 37.7 (17 Oct 2018 22:44)  T(F): 99.9 (17 Oct 2018 22:44), Max: 99.9 (17 Oct 2018 22:44)  HR: 114 (17 Oct 2018 22:44) (76 - 118)  BP: 131/88 (17 Oct 2018 22:44) (114/72 - 160/90)  BP(mean): --  RR: 20 (17 Oct 2018 22:44) (17 - 20)  SpO2: 97% (17 Oct 2018 22:44) (92% - 99%)  I&O's Detail    16 Oct 2018 07:01  -  17 Oct 2018 07:00  --------------------------------------------------------  IN:    IV PiggyBack: 350 mL    Oral Fluid: 1020 mL  Total IN: 1370 mL    OUT:    Voided: 2300 mL  Total OUT: 2300 mL    Total NET: -930 mL      17 Oct 2018 07:01  -  17 Oct 2018 23:31  --------------------------------------------------------  IN:    IV PiggyBack: 100 mL    Oral Fluid: 520 mL  Total IN: 620 mL    OUT:    Voided: 100 mL  Total OUT: 100 mL    Total NET: 520 mL        piperacillin/tazobactam IVPB. 3.375  vancomycin  IVPB 1250  aspirin enteric coated 81  enoxaparin Injectable 40  furosemide   Injectable 40  metoprolol tartrate 25  piperacillin/tazobactam IVPB. 3.375  tamsulosin 0.4  ticagrelor 90  vancomycin  IVPB 1250    PAST MEDICAL & SURGICAL HISTORY:  CAD (coronary artery disease)  Diabetes mellitus  Hypertension  Alcohol Dependency  Diabetes Mellitus  S/P femoral-popliteal bypass surgery  Stented coronary artery        Physical Exam:  General: NAD, resting comfortably in bed  Pulmonary: Nonlabored breathing, no respiratory distress  Cardiovascular: NSR  Abdominal: soft, NT/ND  Extremities: WWP, right groin dressing clean, dry, and intact, no hematoma or edema noted, femoral pulses palpable b/l      LABS:                        10.6   13.72 )-----------( 394      ( 17 Oct 2018 09:03 )             30.6     10-17    132<L>  |  94<L>  |  16  ----------------------------<  135<H>  3.6   |  25  |  0.93    Ca    8.9      17 Oct 2018 06:20    TPro  6.5  /  Alb  2.5<L>  /  TBili  x   /  DBili  x   /  AST  x   /  ALT  x   /  AlkPhos  x   10-17    PT/INR - ( 17 Oct 2018 09:01 )   PT: 12.9 sec;   INR: 1.14 ratio         PTT - ( 16 Oct 2018 10:43 )  PTT:28.4 sec  CAPILLARY BLOOD GLUCOSE      POCT Blood Glucose.: 333 mg/dL (17 Oct 2018 21:26)  POCT Blood Glucose.: 311 mg/dL (17 Oct 2018 21:06)  POCT Blood Glucose.: 203 mg/dL (17 Oct 2018 17:58)  POCT Blood Glucose.: 219 mg/dL (17 Oct 2018 11:41)  POCT Blood Glucose.: 158 mg/dL (17 Oct 2018 07:37)      Radiology and Additional Studies:    Assessment:  The patient is a 68y Male who is now several hours post-op from a b/l angiogram, angioplasty and stent of left femoral artery     Plan:  - Pain control as needed  - DVT ppx  - OOB and ambulating as tolerated  - management as per primary team     Aminta Peace, PGY-1  [facility] Vascular Surgery

## 2018-10-17 NOTE — BRIEF OPERATIVE NOTE - OPERATION/FINDINGS
left lower extremity angiogram, common femoral short non-hemodynamically significant lesion, diffuse SFA disease, with total occlusion, reconstitution of above knee pop. Single vessel, peroneal artery run off below the knee.   SFA, above knee pop stentx3

## 2018-10-17 NOTE — PROGRESS NOTE ADULT - ASSESSMENT
68 year old man with medical history of HTN, CAD, DM, PAD s/p left femoral to below knee popliteal PTFE bypass (3/2018) presenting with left 4th toe gangrene and likely osteomyelitis, sepsis, doing well overall     - Currently on schedule for Bilateral Lower Extremity Angiograms, Possible  Angioplasty, Possible stent with Dr. Mcconnell tomorrow 10/18/18  -  medical and cardiac clearance documented and in chart  - Consent signed and in chart  - NPO at midnight   - Plan discussed with Dr. Jayesh Lange PGY-2  Vascular [id number]

## 2018-10-17 NOTE — BRIEF OPERATIVE NOTE - PROCEDURE
<<-----Click on this checkbox to enter Procedure Angiogram extremity bilateral  10/17/2018    Active  ANG1  Angioplasty and stent of left femoral artery  10/17/2018    Active  [facility]

## 2018-10-17 NOTE — PROGRESS NOTE ADULT - ATTENDING COMMENTS
pt seen and examined, agree with above NP note.  recent dyspnea/orthopnea improved w IV lasix now appears euvolemic  will start standing IV lasix for now to maintain euvolemia  cont med tx of cad, pci   last ck, ckmb normal  vascular planning for LE angio  pt has no active symptoms, trops downtrending  pt is optimized for angio, can proceed with LE angio with elevated and acceptable CV risk

## 2018-10-17 NOTE — PROGRESS NOTE ADULT - ASSESSMENT
Problem/Plan - 1:  ·  Problem: Gangrene.  Plan: - Vascular and podiatry fu  angiogram today  cw antibiotics  will monitor    Problem/Plan - 2:  ·  Problem: Acute osteomyelitis.  Plan: - ID fu  - cont vanc/zosyn  - fu cultures      Problem/Plan - 3:  ·  Problem: CAD (coronary artery disease).  Plan: - NSTEMI 2/2 demand, trops downtrending. asymptomatic. stress test w/ inferior hypokinesis.   - s/p multiple CANDIDO, most recent in 02/2018  - Continue with ASA/Brilinta, continue with high intensity statin.     Problem/Plan - 4:  ·  Problem:  Heart failure.  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 Plan: - monitor FS  Basal/bolus    Problem/Plan - 6:  ·  Problem: Hyponatremia.  Plan: - mild, likely SIADH from current infection.   monitor bmp

## 2018-10-17 NOTE — PROGRESS NOTE ADULT - ASSESSMENT
cath 2/2018 ( CANDIDO x 1pLAD, CANDIDO x 1 dCx, CANDIDO x 1 pOM1 and RCA )    a/p  68 year old man with history of HTN, CAD s/p CANDIDO to RCA in 02/2018 in addition to CANDIDO to LAD, LCX, PAD s/p left femoral to below knee popliteal bypass 03/2018, left great toe partial amputation presenting with increasing toe pain, bleeding, and infection found to have elevated troponins.     1. Tropenemia   cv stable no chest pain or sob. no evidence of acute ischemia/ACS   EKG NSR, LVH, repolarization artifact  likely Type II MI, demand ischemia in setting of sepsis, osteomyelitis   echo revealing EF 40-45%, decline LV sys fx compared to prior echo. now with mild- mod segmental lv sys dyfx  would defer from any ischemic eval given active infection and no symp of cp or decomp chf    continue medical management for now, On aspirin, brilinta, bb, statin     2. CAD s/p multiple PCI  cv stable no chest pain or sob   continue aspirin, brilinta, bb, statin     3. foot ulcer, Sepsis/ osteomyelitis   on iv abx  med f/u   will likely need transmetatarsal amputation   podiatry f/u    4. PAD s/p  LE bypass  JOSAFAT/PVR results noted   plan for b/l LE angiograms, poss angioplasty on thurs   pt is optimized for angio, can proceed with LE angio with elevated and acceptable CV risk  Vascular f/u     5. htn  bp stable  continue current medication regimen     dvt ppx cath 2/2018 ( CANDIDO x 1pLAD, CANDIDO x 1 dCx, CANDIDO x 1 pOM1 and RCA )    a/p  68 year old man with history of HTN, CAD s/p CANDIDO to RCA in 02/2018 in addition to CANDIDO to LAD, LCX, PAD s/p left femoral to below knee popliteal bypass 03/2018, left great toe partial amputation presenting with increasing toe pain, bleeding, and infection found to have elevated troponins.     1. Tropenemia   cv stable no chest pain or sob. no evidence of acute ischemia/ACS   EKG NSR, LVH, repolarization artifact  likely Type II MI, demand ischemia in setting of sepsis, osteomyelitis   echo revealing EF 40-45%, decline LV sys fx compared to prior echo. now with mild- mod segmental lv sys dyfx  would defer from any ischemic eval given active infection and no symp of cp or decomp chf    pt. mildly fluid overloaded on exam, start lasix 40mg IVP BID   continue medical management for now, On aspirin, brilinta, bb, statin     2. CAD s/p multiple PCI  cv stable no chest pain or sob   continue aspirin, brilinta, bb, statin     3. foot ulcer, Sepsis/ osteomyelitis   on iv abx  med f/u   will likely need transmetatarsal amputation   podiatry f/u    4. PAD s/p  LE bypass  JOSAFAT/PVR results noted   plan for b/l LE angiograms, poss angioplasty on thurs   pt is optimized for angio, can proceed with LE angio with elevated and acceptable CV risk  Vascular f/u     5. htn  bp stable  continue current medication regimen     dvt ppx

## 2018-10-18 LAB
ANION GAP SERPL CALC-SCNC: 14 MMOL/L — SIGNIFICANT CHANGE UP (ref 5–17)
B2 GLYCOPROT1 AB SER QL: NEGATIVE — SIGNIFICANT CHANGE UP
BUN SERPL-MCNC: 23 MG/DL — SIGNIFICANT CHANGE UP (ref 7–23)
CALCIUM SERPL-MCNC: 8.8 MG/DL — SIGNIFICANT CHANGE UP (ref 8.4–10.5)
CARDIOLIPIN AB SER-ACNC: NEGATIVE — SIGNIFICANT CHANGE UP
CHLORIDE SERPL-SCNC: 92 MMOL/L — LOW (ref 96–108)
CO2 SERPL-SCNC: 25 MMOL/L — SIGNIFICANT CHANGE UP (ref 22–31)
CREAT SERPL-MCNC: 1.03 MG/DL — SIGNIFICANT CHANGE UP (ref 0.5–1.3)
GLUCOSE BLDC GLUCOMTR-MCNC: 177 MG/DL — HIGH (ref 70–99)
GLUCOSE BLDC GLUCOMTR-MCNC: 180 MG/DL — HIGH (ref 70–99)
GLUCOSE BLDC GLUCOMTR-MCNC: 207 MG/DL — HIGH (ref 70–99)
GLUCOSE BLDC GLUCOMTR-MCNC: 219 MG/DL — HIGH (ref 70–99)
GLUCOSE BLDC GLUCOMTR-MCNC: 274 MG/DL — HIGH (ref 70–99)
GLUCOSE SERPL-MCNC: 267 MG/DL — HIGH (ref 70–99)
HCT VFR BLD CALC: 31.5 % — LOW (ref 39–50)
HGB BLD-MCNC: 10.6 G/DL — LOW (ref 13–17)
MCHC RBC-ENTMCNC: 28.7 PG — SIGNIFICANT CHANGE UP (ref 27–34)
MCHC RBC-ENTMCNC: 33.7 GM/DL — SIGNIFICANT CHANGE UP (ref 32–36)
MCV RBC AUTO: 85.4 FL — SIGNIFICANT CHANGE UP (ref 80–100)
PLATELET # BLD AUTO: 399 K/UL — SIGNIFICANT CHANGE UP (ref 150–400)
POTASSIUM SERPL-MCNC: 4.1 MMOL/L — SIGNIFICANT CHANGE UP (ref 3.5–5.3)
POTASSIUM SERPL-SCNC: 4.1 MMOL/L — SIGNIFICANT CHANGE UP (ref 3.5–5.3)
RBC # BLD: 3.69 M/UL — LOW (ref 4.2–5.8)
RBC # FLD: 13.9 % — SIGNIFICANT CHANGE UP (ref 10.3–14.5)
SODIUM SERPL-SCNC: 131 MMOL/L — LOW (ref 135–145)
VANCOMYCIN TROUGH SERPL-MCNC: 18.6 UG/ML — SIGNIFICANT CHANGE UP (ref 10–20)
WBC # BLD: 13.75 K/UL — HIGH (ref 3.8–10.5)
WBC # FLD AUTO: 13.75 K/UL — HIGH (ref 3.8–10.5)

## 2018-10-18 PROCEDURE — 99232 SBSQ HOSP IP/OBS MODERATE 35: CPT

## 2018-10-18 PROCEDURE — 99231 SBSQ HOSP IP/OBS SF/LOW 25: CPT

## 2018-10-18 RX ADMIN — Medication 100 MILLIGRAM(S): at 05:08

## 2018-10-18 RX ADMIN — Medication 81 MILLIGRAM(S): at 12:17

## 2018-10-18 RX ADMIN — PIPERACILLIN AND TAZOBACTAM 25 GRAM(S): 4; .5 INJECTION, POWDER, LYOPHILIZED, FOR SOLUTION INTRAVENOUS at 22:16

## 2018-10-18 RX ADMIN — PREGABALIN 1000 MICROGRAM(S): 225 CAPSULE ORAL at 12:17

## 2018-10-18 RX ADMIN — Medication 25 MILLIGRAM(S): at 05:08

## 2018-10-18 RX ADMIN — Medication 9 UNIT(S): at 17:35

## 2018-10-18 RX ADMIN — SENNA PLUS 2 TABLET(S): 8.6 TABLET ORAL at 22:15

## 2018-10-18 RX ADMIN — Medication 100 MILLIGRAM(S): at 22:15

## 2018-10-18 RX ADMIN — Medication 1 TABLET(S): at 12:17

## 2018-10-18 RX ADMIN — Medication 1 MILLIGRAM(S): at 12:17

## 2018-10-18 RX ADMIN — Medication 166.67 MILLIGRAM(S): at 07:01

## 2018-10-18 RX ADMIN — TICAGRELOR 90 MILLIGRAM(S): 90 TABLET ORAL at 05:08

## 2018-10-18 RX ADMIN — TAMSULOSIN HYDROCHLORIDE 0.4 MILLIGRAM(S): 0.4 CAPSULE ORAL at 22:15

## 2018-10-18 RX ADMIN — Medication 6: at 08:03

## 2018-10-18 RX ADMIN — Medication 40 MILLIGRAM(S): at 05:08

## 2018-10-18 RX ADMIN — PIPERACILLIN AND TAZOBACTAM 25 GRAM(S): 4; .5 INJECTION, POWDER, LYOPHILIZED, FOR SOLUTION INTRAVENOUS at 08:03

## 2018-10-18 RX ADMIN — Medication 9 UNIT(S): at 12:17

## 2018-10-18 RX ADMIN — TICAGRELOR 90 MILLIGRAM(S): 90 TABLET ORAL at 17:34

## 2018-10-18 RX ADMIN — PIPERACILLIN AND TAZOBACTAM 25 GRAM(S): 4; .5 INJECTION, POWDER, LYOPHILIZED, FOR SOLUTION INTRAVENOUS at 13:51

## 2018-10-18 RX ADMIN — SERTRALINE 100 MILLIGRAM(S): 25 TABLET, FILM COATED ORAL at 12:17

## 2018-10-18 RX ADMIN — ALBUTEROL 2.5 MILLIGRAM(S): 90 AEROSOL, METERED ORAL at 06:09

## 2018-10-18 RX ADMIN — GABAPENTIN 300 MILLIGRAM(S): 400 CAPSULE ORAL at 05:08

## 2018-10-18 RX ADMIN — Medication 25 MILLIGRAM(S): at 17:34

## 2018-10-18 RX ADMIN — Medication 9 UNIT(S): at 08:03

## 2018-10-18 RX ADMIN — PIPERACILLIN AND TAZOBACTAM 25 GRAM(S): 4; .5 INJECTION, POWDER, LYOPHILIZED, FOR SOLUTION INTRAVENOUS at 02:10

## 2018-10-18 RX ADMIN — Medication 2: at 17:35

## 2018-10-18 RX ADMIN — ATORVASTATIN CALCIUM 40 MILLIGRAM(S): 80 TABLET, FILM COATED ORAL at 22:15

## 2018-10-18 RX ADMIN — Medication 166.67 MILLIGRAM(S): at 20:11

## 2018-10-18 RX ADMIN — Medication 4: at 12:18

## 2018-10-18 RX ADMIN — GABAPENTIN 300 MILLIGRAM(S): 400 CAPSULE ORAL at 13:52

## 2018-10-18 RX ADMIN — ENOXAPARIN SODIUM 40 MILLIGRAM(S): 100 INJECTION SUBCUTANEOUS at 12:17

## 2018-10-18 RX ADMIN — INSULIN GLARGINE 25 UNIT(S): 100 INJECTION, SOLUTION SUBCUTANEOUS at 22:16

## 2018-10-18 RX ADMIN — GABAPENTIN 300 MILLIGRAM(S): 400 CAPSULE ORAL at 22:16

## 2018-10-18 NOTE — PROGRESS NOTE ADULT - ATTENDING COMMENTS
Agree with above NP note.  cv stable  remains asymptomatic   cont med tx of cad, cmp  lasix as ordered

## 2018-10-18 NOTE — PROGRESS NOTE ADULT - ASSESSMENT
68 year old male with HTN, CAD s/p CANDIDO to RCA in 02/2018 in addition to CANDIDO to LAD, LCX, PAD s/p left femoral to below knee popliteal bypass 03/2018, left great toe partial amputation presented today with increasing toe pain.    Now afebrile  Leukocytosis overall improving  Elevated ESR/ CRP  Blood cxs NGTD  h/o left foot hallux amputation  Gangrene of left foot  Arterial duplex with b/l LE arterial vascular disease  s/p angiogram extremity bilateral on 10/17/2018, angioplasty and stent of left femoral artery  Podiatry planning for left TMA  Has dry gangrene with malodor to the left foot    Recommend:  -Continue vancomycin 1.25 grams IV q 12 hours and zosyn 3.375 grams IV q 8 hours  -F/U blood cxs - NGTD  -Trend WBC  -F/U podiatry and vascular surgery.   -Will continue to follow with you.

## 2018-10-18 NOTE — PROGRESS NOTE ADULT - ASSESSMENT
Problem/Plan - 1:  ·  Problem: Gangrene.  Plan: - Vascular and podiatry fu  angiogram today  cw antibiotics  will monitor    Problem/Plan - 2:  ·  Problem: Acute osteomyelitis.  Plan: - ID fu  - cont vanc/zosyn  - fu cultures      Problem/Plan - 3:  ·  Problem: CAD (coronary artery disease).  Plan: - NSTEMI 2/2 demand, trops downtrending. asymptomatic. stress test w/ inferior hypokinesis.   - s/p multiple CANDIDO, most recent in 02/2018- Continue with ASA/Brilinta, continue with high intensity statin.     Problem/Plan - 4:  ·  Problem:  Heart failure.  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 Plan: - monitor FS  Basal/bolus    Problem/Plan - 6:  ·  Problem: Hyponatremia.  Plan: - mild, likely SIADH from current infection.   monitor bmp

## 2018-10-18 NOTE — PROGRESS NOTE ADULT - ASSESSMENT
cath 2/2018 ( CANDIDO x 1pLAD, CANDIDO x 1 dCx, CANDIDO x 1 pOM1 and RCA )    a/p  68 year old man with history of HTN, CAD s/p CANDIDO to RCA in 02/2018 in addition to CANDIDO to LAD, LCX, PAD s/p left femoral to below knee popliteal bypass 03/2018, left great toe partial amputation presenting with increasing toe pain, bleeding, and infection found to have elevated troponins.     1. Tropenemia   cv stable no chest pain or sob. no evidence of acute ischemia/ACS   EKG NSR, LVH, repolarization artifact  likely Type II MI, demand ischemia in setting of sepsis, osteomyelitis   echo revealing EF 40-45%, decline LV sys fx compared to prior echo. now with mild- mod segmental lv sys dyfx  would defer from any ischemic eval given active infection and no symp of cp or decomp chf    fluid status improving, continue lasix 40mg IVP daily    continue medical management, On aspirin, brilinta, bb, statin     2. CAD s/p multiple PCI  cv stable no chest pain or sob   continue aspirin, brilinta, bb, statin     3. foot ulcer, Sepsis/ osteomyelitis   on iv abx  med f/u   will likely need transmetatarsal amputation   podiatry f/u    4. PAD s/p  LE bypass  JOSAFAT/PVR results noted   s/p b/l angiogram with angioplasty and SFA, above knee pop stentx3  OR planning for additional angiogram for below the knee disease  pt is optimized for angio, can proceed with LE angio with elevated and acceptable CV risk  Vascular f/u     5. htn  bp stable  continue current medication regimen     dvt ppx cath 2/2018 ( CANDIDO x 1pLAD, CANDIDO x 1 dCx, CANDIDO x 1 pOM1 and RCA )    a/p  68 year old man with history of HTN, CAD s/p CANDIDO to RCA in 02/2018 in addition to CANDIDO to LAD, LCX, PAD s/p left femoral to below knee popliteal bypass 03/2018, left great toe partial amputation presenting with increasing toe pain, bleeding, and infection found to have elevated troponins.     1. Troponinemia   cv stable no chest pain or sob. no evidence of acute ischemia/ACS   EKG NSR, LVH, repolarization artifact  likely Type II MI, demand ischemia in setting of sepsis, osteomyelitis   echo revealing EF 40-45%, possible decline in LV sys fx  nuclear stress in past revealed inferior hypokinesis and mild lv dysfxn  would defer from any ischemic eval given active infection and no cp/decomp chf    fluid status improving, continue lasix 40mg IVP daily    continue medical management, On aspirin, brilinta, bb, statin     2. CAD s/p multiple PCI  ce's down trended  cv stable no chest pain or sob   continue aspirin, brilinta, bb, statin     3. foot ulcer, Sepsis/ osteomyelitis   on iv abx  med f/u   will likely need transmetatarsal amputation   podiatry f/u    4. PAD s/p  LE bypass  JOSAFAT/PVR results noted   s/p b/l angiogram with angioplasty and SFA, above knee pop stentx3  OR planning for additional angiogram for below the knee disease  pt is optimized for angio, can proceed with LE angio with elevated and acceptable CV risk  Vascular f/u     5. htn  bp stable  continue current medication regimen     dvt ppx

## 2018-10-18 NOTE — PROGRESS NOTE ADULT - ASSESSMENT
68 year old man with medical history of HTN, CAD, DM, PAD s/p left femoral to below knee popliteal PTFE bypass (3/2018) presenting with left 4th toe gangrene and likely osteomyelitis now POD 1 s/p b/l angiogram with angioplasty and SFA, above knee pop stentx3, recovering wihout issue    - Monitor neurovascular exam  - OR planning for additional angiogram for below the knee disease  - care as per primary team  - Plan discessed with Dr. Jayesh Lange PGY-2  Vascular [id number]

## 2018-10-19 LAB
ANION GAP SERPL CALC-SCNC: 12 MMOL/L — SIGNIFICANT CHANGE UP (ref 5–17)
BUN SERPL-MCNC: 21 MG/DL — SIGNIFICANT CHANGE UP (ref 7–23)
CALCIUM SERPL-MCNC: 8.9 MG/DL — SIGNIFICANT CHANGE UP (ref 8.4–10.5)
CHLORIDE SERPL-SCNC: 92 MMOL/L — LOW (ref 96–108)
CO2 SERPL-SCNC: 26 MMOL/L — SIGNIFICANT CHANGE UP (ref 22–31)
CREAT SERPL-MCNC: 1.09 MG/DL — SIGNIFICANT CHANGE UP (ref 0.5–1.3)
GLUCOSE BLDC GLUCOMTR-MCNC: 144 MG/DL — HIGH (ref 70–99)
GLUCOSE BLDC GLUCOMTR-MCNC: 172 MG/DL — HIGH (ref 70–99)
GLUCOSE BLDC GLUCOMTR-MCNC: 199 MG/DL — HIGH (ref 70–99)
GLUCOSE BLDC GLUCOMTR-MCNC: 223 MG/DL — HIGH (ref 70–99)
GLUCOSE SERPL-MCNC: 137 MG/DL — HIGH (ref 70–99)
HCT VFR BLD CALC: 30.5 % — LOW (ref 39–50)
HGB BLD-MCNC: 10.3 G/DL — LOW (ref 13–17)
MCHC RBC-ENTMCNC: 28.8 PG — SIGNIFICANT CHANGE UP (ref 27–34)
MCHC RBC-ENTMCNC: 33.8 GM/DL — SIGNIFICANT CHANGE UP (ref 32–36)
MCV RBC AUTO: 85.2 FL — SIGNIFICANT CHANGE UP (ref 80–100)
PLATELET # BLD AUTO: 380 K/UL — SIGNIFICANT CHANGE UP (ref 150–400)
POTASSIUM SERPL-MCNC: 3.8 MMOL/L — SIGNIFICANT CHANGE UP (ref 3.5–5.3)
POTASSIUM SERPL-SCNC: 3.8 MMOL/L — SIGNIFICANT CHANGE UP (ref 3.5–5.3)
RBC # BLD: 3.58 M/UL — LOW (ref 4.2–5.8)
RBC # FLD: 14 % — SIGNIFICANT CHANGE UP (ref 10.3–14.5)
SODIUM SERPL-SCNC: 130 MMOL/L — LOW (ref 135–145)
WBC # BLD: 14.64 K/UL — HIGH (ref 3.8–10.5)
WBC # FLD AUTO: 14.64 K/UL — HIGH (ref 3.8–10.5)

## 2018-10-19 PROCEDURE — 99232 SBSQ HOSP IP/OBS MODERATE 35: CPT

## 2018-10-19 RX ADMIN — Medication 25 MILLIGRAM(S): at 17:02

## 2018-10-19 RX ADMIN — ATORVASTATIN CALCIUM 40 MILLIGRAM(S): 80 TABLET, FILM COATED ORAL at 21:50

## 2018-10-19 RX ADMIN — SERTRALINE 100 MILLIGRAM(S): 25 TABLET, FILM COATED ORAL at 12:12

## 2018-10-19 RX ADMIN — Medication 100 MILLIGRAM(S): at 13:50

## 2018-10-19 RX ADMIN — GABAPENTIN 300 MILLIGRAM(S): 400 CAPSULE ORAL at 21:50

## 2018-10-19 RX ADMIN — Medication 4: at 17:02

## 2018-10-19 RX ADMIN — ENOXAPARIN SODIUM 40 MILLIGRAM(S): 100 INJECTION SUBCUTANEOUS at 12:13

## 2018-10-19 RX ADMIN — Medication 40 MILLIGRAM(S): at 05:56

## 2018-10-19 RX ADMIN — Medication 1 MILLIGRAM(S): at 12:12

## 2018-10-19 RX ADMIN — Medication 2: at 08:12

## 2018-10-19 RX ADMIN — Medication 81 MILLIGRAM(S): at 12:12

## 2018-10-19 RX ADMIN — PIPERACILLIN AND TAZOBACTAM 25 GRAM(S): 4; .5 INJECTION, POWDER, LYOPHILIZED, FOR SOLUTION INTRAVENOUS at 05:56

## 2018-10-19 RX ADMIN — GABAPENTIN 300 MILLIGRAM(S): 400 CAPSULE ORAL at 05:55

## 2018-10-19 RX ADMIN — TICAGRELOR 90 MILLIGRAM(S): 90 TABLET ORAL at 18:28

## 2018-10-19 RX ADMIN — Medication 166.67 MILLIGRAM(S): at 08:12

## 2018-10-19 RX ADMIN — PIPERACILLIN AND TAZOBACTAM 25 GRAM(S): 4; .5 INJECTION, POWDER, LYOPHILIZED, FOR SOLUTION INTRAVENOUS at 13:50

## 2018-10-19 RX ADMIN — GABAPENTIN 300 MILLIGRAM(S): 400 CAPSULE ORAL at 13:50

## 2018-10-19 RX ADMIN — PREGABALIN 1000 MICROGRAM(S): 225 CAPSULE ORAL at 12:12

## 2018-10-19 RX ADMIN — SENNA PLUS 2 TABLET(S): 8.6 TABLET ORAL at 21:50

## 2018-10-19 RX ADMIN — PIPERACILLIN AND TAZOBACTAM 25 GRAM(S): 4; .5 INJECTION, POWDER, LYOPHILIZED, FOR SOLUTION INTRAVENOUS at 21:49

## 2018-10-19 RX ADMIN — Medication 100 MILLIGRAM(S): at 21:50

## 2018-10-19 RX ADMIN — Medication 25 MILLIGRAM(S): at 05:55

## 2018-10-19 RX ADMIN — Medication 1 TABLET(S): at 12:12

## 2018-10-19 RX ADMIN — Medication 100 MILLIGRAM(S): at 05:55

## 2018-10-19 RX ADMIN — Medication 9 UNIT(S): at 17:02

## 2018-10-19 RX ADMIN — Medication 2: at 12:13

## 2018-10-19 RX ADMIN — Medication 9 UNIT(S): at 12:13

## 2018-10-19 RX ADMIN — INSULIN GLARGINE 25 UNIT(S): 100 INJECTION, SOLUTION SUBCUTANEOUS at 21:49

## 2018-10-19 RX ADMIN — Medication 9 UNIT(S): at 08:12

## 2018-10-19 RX ADMIN — TICAGRELOR 90 MILLIGRAM(S): 90 TABLET ORAL at 05:55

## 2018-10-19 RX ADMIN — Medication 166.67 MILLIGRAM(S): at 20:59

## 2018-10-19 RX ADMIN — TAMSULOSIN HYDROCHLORIDE 0.4 MILLIGRAM(S): 0.4 CAPSULE ORAL at 21:50

## 2018-10-19 NOTE — PROGRESS NOTE ADULT - ASSESSMENT
68 year old male with HTN, CAD s/p CANDIDO to RCA in 02/2018 in addition to CANDIDO to LAD, LCX, PAD s/p left femoral to below knee popliteal bypass 03/2018, left great toe partial amputation presented today with increasing toe pain.    Now afebrile  Leukocytosis overall improving  Elevated ESR/ CRP  Blood cxs NGTD  h/o left foot hallux amputation  Gangrene of left foot  Arterial duplex with b/l LE arterial vascular disease  s/p angiogram extremity bilateral on 10/17/2018, angioplasty and stent of left femoral artery  Podiatry planning for left TMA  Has dry gangrene with malodor to the left foot  Leukocytosis  Fever resolved    Recommend:  -Continue vancomycin 1.25 grams IV q 12 hours and zosyn 3.375 grams IV q 8 hours  -Trend WBC  -F/U Podiatry - planned for surgery next week    ID service available on the weekend. For questions, please call [phone].

## 2018-10-19 NOTE — PROGRESS NOTE ADULT - ASSESSMENT
cath 2/2018 ( CANDIDO x 1pLAD, CANDIDO x 1 dCx, CANDIDO x 1 pOM1 and RCA )    a/p  68 year old man with history of HTN, CAD s/p CANDIDO to RCA in 02/2018 in addition to CANDIDO to LAD, LCX, PAD s/p left femoral to below knee popliteal bypass 03/2018, left great toe partial amputation presenting with increasing toe pain, bleeding, and infection found to have elevated troponins.     1. Troponinemia   cv stable no chest pain/inc sob  no evidence of acute ischemia/ACS   likely Type II MI, demand ischemia in setting of sepsis, osteomyelitis   echo revealing EF 40-45%, possible decline in LV sys fx  nuclear stress in past revealed inferior hypokinesis and mild lv dysfxn  would defer from any ischemic eval given active infection and no cp/decomp chf      2. Acute/chronic systolic HF  volume status improving   denies any dyspnea  cont lasix 40mg IVP daily    continue medical management, On aspirin, brilinta, bb, statin     3. CAD s/p multiple PCI  cv stable  no chest pain   continue aspirin, brilinta, bb, statin     4. foot ulcer, Sepsis/ osteomyelitis   on iv abx  med f/u   will likely need transmetatarsal amputation   podiatry f/u    5. PAD s/p  LE bypass  JOSAFAT/PVR results noted   s/p b/l angiogram with angioplasty and SFA, above knee pop stentx3  OR planning for additional angiogram for below the knee disease  pt is optimized for angio, can proceed with LE angio with elevated and acceptable CV risk  Vascular f/u     6. htn  bp stable  continue current medication regimen     dvt ppx

## 2018-10-20 LAB
ANION GAP SERPL CALC-SCNC: 16 MMOL/L — SIGNIFICANT CHANGE UP (ref 5–17)
BUN SERPL-MCNC: 26 MG/DL — HIGH (ref 7–23)
CALCIUM SERPL-MCNC: 8.9 MG/DL — SIGNIFICANT CHANGE UP (ref 8.4–10.5)
CHLORIDE SERPL-SCNC: 90 MMOL/L — LOW (ref 96–108)
CO2 SERPL-SCNC: 24 MMOL/L — SIGNIFICANT CHANGE UP (ref 22–31)
CREAT SERPL-MCNC: 1.16 MG/DL — SIGNIFICANT CHANGE UP (ref 0.5–1.3)
GLUCOSE BLDC GLUCOMTR-MCNC: 180 MG/DL — HIGH (ref 70–99)
GLUCOSE BLDC GLUCOMTR-MCNC: 183 MG/DL — HIGH (ref 70–99)
GLUCOSE BLDC GLUCOMTR-MCNC: 185 MG/DL — HIGH (ref 70–99)
GLUCOSE BLDC GLUCOMTR-MCNC: 201 MG/DL — HIGH (ref 70–99)
GLUCOSE SERPL-MCNC: 159 MG/DL — HIGH (ref 70–99)
HCT VFR BLD CALC: 31.8 % — LOW (ref 39–50)
HGB BLD-MCNC: 10.7 G/DL — LOW (ref 13–17)
MCHC RBC-ENTMCNC: 28.8 PG — SIGNIFICANT CHANGE UP (ref 27–34)
MCHC RBC-ENTMCNC: 33.6 GM/DL — SIGNIFICANT CHANGE UP (ref 32–36)
MCV RBC AUTO: 85.7 FL — SIGNIFICANT CHANGE UP (ref 80–100)
PLATELET # BLD AUTO: 422 K/UL — HIGH (ref 150–400)
POTASSIUM SERPL-MCNC: 3.6 MMOL/L — SIGNIFICANT CHANGE UP (ref 3.5–5.3)
POTASSIUM SERPL-SCNC: 3.6 MMOL/L — SIGNIFICANT CHANGE UP (ref 3.5–5.3)
RBC # BLD: 3.71 M/UL — LOW (ref 4.2–5.8)
RBC # FLD: 13.8 % — SIGNIFICANT CHANGE UP (ref 10.3–14.5)
SODIUM SERPL-SCNC: 130 MMOL/L — LOW (ref 135–145)
VANCOMYCIN TROUGH SERPL-MCNC: 27 UG/ML — CRITICAL HIGH (ref 10–20)
WBC # BLD: 14.52 K/UL — HIGH (ref 3.8–10.5)
WBC # FLD AUTO: 14.52 K/UL — HIGH (ref 3.8–10.5)

## 2018-10-20 RX ADMIN — Medication 40 MILLIGRAM(S): at 06:04

## 2018-10-20 RX ADMIN — Medication 1 MILLIGRAM(S): at 12:05

## 2018-10-20 RX ADMIN — GABAPENTIN 300 MILLIGRAM(S): 400 CAPSULE ORAL at 13:13

## 2018-10-20 RX ADMIN — PREGABALIN 1000 MICROGRAM(S): 225 CAPSULE ORAL at 12:04

## 2018-10-20 RX ADMIN — Medication 2: at 08:14

## 2018-10-20 RX ADMIN — Medication 9 UNIT(S): at 08:15

## 2018-10-20 RX ADMIN — SERTRALINE 100 MILLIGRAM(S): 25 TABLET, FILM COATED ORAL at 12:05

## 2018-10-20 RX ADMIN — GABAPENTIN 300 MILLIGRAM(S): 400 CAPSULE ORAL at 06:04

## 2018-10-20 RX ADMIN — ENOXAPARIN SODIUM 40 MILLIGRAM(S): 100 INJECTION SUBCUTANEOUS at 12:05

## 2018-10-20 RX ADMIN — PIPERACILLIN AND TAZOBACTAM 25 GRAM(S): 4; .5 INJECTION, POWDER, LYOPHILIZED, FOR SOLUTION INTRAVENOUS at 06:05

## 2018-10-20 RX ADMIN — SENNA PLUS 2 TABLET(S): 8.6 TABLET ORAL at 21:35

## 2018-10-20 RX ADMIN — Medication 9 UNIT(S): at 12:04

## 2018-10-20 RX ADMIN — Medication 1 TABLET(S): at 12:04

## 2018-10-20 RX ADMIN — TAMSULOSIN HYDROCHLORIDE 0.4 MILLIGRAM(S): 0.4 CAPSULE ORAL at 21:35

## 2018-10-20 RX ADMIN — Medication 81 MILLIGRAM(S): at 12:05

## 2018-10-20 RX ADMIN — INSULIN GLARGINE 25 UNIT(S): 100 INJECTION, SOLUTION SUBCUTANEOUS at 21:34

## 2018-10-20 RX ADMIN — Medication 9 UNIT(S): at 16:49

## 2018-10-20 RX ADMIN — PIPERACILLIN AND TAZOBACTAM 25 GRAM(S): 4; .5 INJECTION, POWDER, LYOPHILIZED, FOR SOLUTION INTRAVENOUS at 13:12

## 2018-10-20 RX ADMIN — Medication 25 MILLIGRAM(S): at 06:04

## 2018-10-20 RX ADMIN — PIPERACILLIN AND TAZOBACTAM 25 GRAM(S): 4; .5 INJECTION, POWDER, LYOPHILIZED, FOR SOLUTION INTRAVENOUS at 21:34

## 2018-10-20 RX ADMIN — Medication 100 MILLIGRAM(S): at 13:13

## 2018-10-20 RX ADMIN — TICAGRELOR 90 MILLIGRAM(S): 90 TABLET ORAL at 17:39

## 2018-10-20 RX ADMIN — Medication 4: at 12:04

## 2018-10-20 RX ADMIN — ATORVASTATIN CALCIUM 40 MILLIGRAM(S): 80 TABLET, FILM COATED ORAL at 21:35

## 2018-10-20 RX ADMIN — GABAPENTIN 300 MILLIGRAM(S): 400 CAPSULE ORAL at 21:35

## 2018-10-20 RX ADMIN — Medication 25 MILLIGRAM(S): at 17:39

## 2018-10-20 RX ADMIN — TICAGRELOR 90 MILLIGRAM(S): 90 TABLET ORAL at 06:04

## 2018-10-20 RX ADMIN — Medication 2: at 16:49

## 2018-10-20 RX ADMIN — Medication 100 MILLIGRAM(S): at 06:04

## 2018-10-20 RX ADMIN — Medication 100 MILLIGRAM(S): at 21:35

## 2018-10-20 NOTE — PROGRESS NOTE ADULT - ASSESSMENT
Problem/Plan - 1:  ·  Problem: Gangrene.  Plan: - Vascular and podiatry fu  sp angiogram  TMA next week  cw antibiotics  will monitor    Problem/Plan - 2:  ·  Problem: Acute osteomyelitis.  Plan: - ID fu  - cont vanc/zosyn  - fu cultures      Problem/Plan - 3:  ·  Problem: CAD (coronary artery disease).  Plan: - NSTEMI 2/2 demand, trops downtrending. asymptomatic. stress test w/ inferior hypokinesis.   - s/p multiple CANDIDO, most recent in 02/2018- Continue with ASA/Brilinta, continue with high intensity statin.     Problem/Plan - 4:  ·  Problem:  Heart failure.  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 Plan: - monitor FS  Basal/bolus

## 2018-10-20 NOTE — PROVIDER CONTACT NOTE (CRITICAL VALUE NOTIFICATION) - BACKGROUND
pt admitted for L foot infection, possible amputation. sepsis/gangrene of LLE toes
Admitted for sepsis due to left 4th and 5th toe gangrene

## 2018-10-20 NOTE — PROGRESS NOTE ADULT - ASSESSMENT
cath 2/2018 ( CANDIDO x 1pLAD, CANDIDO x 1 dCx, CANDIDO x 1 pOM1 and RCA )    a/p  68 year old man with history of HTN, CAD s/p CANDIDO to RCA in 02/2018 in addition to CANDIDO to LAD, LCX, PAD s/p left femoral to below knee popliteal bypass 03/2018, left great toe partial amputation presenting with increasing toe pain, bleeding, and infection found to have elevated troponins.     1. Troponinemia   cv stable no chest pain/inc sob  no evidence of acute ischemia/ACS   likely Type II MI, demand ischemia in setting of sepsis, osteomyelitis   echo revealing EF 40-45%, possible decline in LV sys fx  nuclear stress in past revealed inferior hypokinesis and mild lv dysfxn  would defer from any ischemic eval given active infection and no cp/decomp chf      2. Acute/chronic systolic HF  volume status improved  denies any dyspnea  cont lasix 40mg IVP daily for now     continue medical management, On aspirin, brilinta, bb, statin     3. CAD s/p multiple PCI  cv stable  no chest pain   continue aspirin, brilinta, bb, statin     4. foot ulcer, Sepsis/ osteomyelitis   on iv abx  med f/u   podiatry f/u    5. PAD s/p  LE bypass  JOSAFAT/PVR results noted   s/p b/l angiogram with angioplasty and SFA, above knee pop stentx3  Vascular f/u     6. htn  bp stable  continue current medication regimen     dvt ppx

## 2018-10-20 NOTE — PROVIDER CONTACT NOTE (CRITICAL VALUE NOTIFICATION) - ACTION/TREATMENT ORDERED:
as per MD redraw trops in 1 hour and re evaluate patient then. tele pack patient for transport to [facility]. no further interventions needed.
as per NP hold  8am vancomycin dosage.

## 2018-10-20 NOTE — PROVIDER CONTACT NOTE (CRITICAL VALUE NOTIFICATION) - ASSESSMENT
pt asleep in stretcher, denies chest pain or SOB at this time.
Patient alert and responsive, not in any acute distress

## 2018-10-20 NOTE — CHART NOTE - NSCHARTNOTEFT_GEN_A_CORE
MEDICINE NP NOTE  Spectra [id number]    Notified by RN, patient with vancomycin trough level 27 this AM.  Will hold vancomycin for now.  Recheck trough level in the morning.  If level 20 or less can restart vancoymcin 1 gram IVSS q12h.  Discussed with House ID on call.  Will continue to monitor and sign out to night team.

## 2018-10-21 LAB
ANION GAP SERPL CALC-SCNC: 14 MMOL/L — SIGNIFICANT CHANGE UP (ref 5–17)
BUN SERPL-MCNC: 27 MG/DL — HIGH (ref 7–23)
CALCIUM SERPL-MCNC: 8.9 MG/DL — SIGNIFICANT CHANGE UP (ref 8.4–10.5)
CHLORIDE SERPL-SCNC: 92 MMOL/L — LOW (ref 96–108)
CO2 SERPL-SCNC: 25 MMOL/L — SIGNIFICANT CHANGE UP (ref 22–31)
CREAT SERPL-MCNC: 1.19 MG/DL — SIGNIFICANT CHANGE UP (ref 0.5–1.3)
GLUCOSE BLDC GLUCOMTR-MCNC: 144 MG/DL — HIGH (ref 70–99)
GLUCOSE BLDC GLUCOMTR-MCNC: 176 MG/DL — HIGH (ref 70–99)
GLUCOSE BLDC GLUCOMTR-MCNC: 232 MG/DL — HIGH (ref 70–99)
GLUCOSE BLDC GLUCOMTR-MCNC: 240 MG/DL — HIGH (ref 70–99)
GLUCOSE SERPL-MCNC: 127 MG/DL — HIGH (ref 70–99)
HCT VFR BLD CALC: 31 % — LOW (ref 39–50)
HGB BLD-MCNC: 10.6 G/DL — LOW (ref 13–17)
MCHC RBC-ENTMCNC: 28.9 PG — SIGNIFICANT CHANGE UP (ref 27–34)
MCHC RBC-ENTMCNC: 34.2 GM/DL — SIGNIFICANT CHANGE UP (ref 32–36)
MCV RBC AUTO: 84.5 FL — SIGNIFICANT CHANGE UP (ref 80–100)
PLATELET # BLD AUTO: 411 K/UL — HIGH (ref 150–400)
POTASSIUM SERPL-MCNC: 3.6 MMOL/L — SIGNIFICANT CHANGE UP (ref 3.5–5.3)
POTASSIUM SERPL-SCNC: 3.6 MMOL/L — SIGNIFICANT CHANGE UP (ref 3.5–5.3)
RBC # BLD: 3.67 M/UL — LOW (ref 4.2–5.8)
RBC # FLD: 14 % — SIGNIFICANT CHANGE UP (ref 10.3–14.5)
SODIUM SERPL-SCNC: 131 MMOL/L — LOW (ref 135–145)
VANCOMYCIN TROUGH SERPL-MCNC: 13.6 UG/ML — SIGNIFICANT CHANGE UP (ref 10–20)
WBC # BLD: 13.58 K/UL — HIGH (ref 3.8–10.5)
WBC # FLD AUTO: 13.58 K/UL — HIGH (ref 3.8–10.5)

## 2018-10-21 RX ADMIN — Medication 100 MILLIGRAM(S): at 12:22

## 2018-10-21 RX ADMIN — Medication 25 MILLIGRAM(S): at 17:04

## 2018-10-21 RX ADMIN — ATORVASTATIN CALCIUM 40 MILLIGRAM(S): 80 TABLET, FILM COATED ORAL at 21:06

## 2018-10-21 RX ADMIN — Medication 25 MILLIGRAM(S): at 05:45

## 2018-10-21 RX ADMIN — TICAGRELOR 90 MILLIGRAM(S): 90 TABLET ORAL at 05:44

## 2018-10-21 RX ADMIN — SENNA PLUS 2 TABLET(S): 8.6 TABLET ORAL at 21:06

## 2018-10-21 RX ADMIN — Medication 1 TABLET(S): at 12:23

## 2018-10-21 RX ADMIN — Medication 4: at 16:50

## 2018-10-21 RX ADMIN — Medication 40 MILLIGRAM(S): at 05:44

## 2018-10-21 RX ADMIN — GABAPENTIN 300 MILLIGRAM(S): 400 CAPSULE ORAL at 12:22

## 2018-10-21 RX ADMIN — PIPERACILLIN AND TAZOBACTAM 25 GRAM(S): 4; .5 INJECTION, POWDER, LYOPHILIZED, FOR SOLUTION INTRAVENOUS at 05:45

## 2018-10-21 RX ADMIN — ENOXAPARIN SODIUM 40 MILLIGRAM(S): 100 INJECTION SUBCUTANEOUS at 12:22

## 2018-10-21 RX ADMIN — INSULIN GLARGINE 25 UNIT(S): 100 INJECTION, SOLUTION SUBCUTANEOUS at 21:29

## 2018-10-21 RX ADMIN — Medication 1 MILLIGRAM(S): at 12:22

## 2018-10-21 RX ADMIN — PIPERACILLIN AND TAZOBACTAM 25 GRAM(S): 4; .5 INJECTION, POWDER, LYOPHILIZED, FOR SOLUTION INTRAVENOUS at 14:16

## 2018-10-21 RX ADMIN — Medication 4: at 12:22

## 2018-10-21 RX ADMIN — Medication 9 UNIT(S): at 12:22

## 2018-10-21 RX ADMIN — Medication 81 MILLIGRAM(S): at 12:22

## 2018-10-21 RX ADMIN — GABAPENTIN 300 MILLIGRAM(S): 400 CAPSULE ORAL at 21:05

## 2018-10-21 RX ADMIN — Medication 100 MILLIGRAM(S): at 21:06

## 2018-10-21 RX ADMIN — TAMSULOSIN HYDROCHLORIDE 0.4 MILLIGRAM(S): 0.4 CAPSULE ORAL at 21:06

## 2018-10-21 RX ADMIN — Medication 100 MILLIGRAM(S): at 05:44

## 2018-10-21 RX ADMIN — Medication 9 UNIT(S): at 16:50

## 2018-10-21 RX ADMIN — GABAPENTIN 300 MILLIGRAM(S): 400 CAPSULE ORAL at 05:44

## 2018-10-21 RX ADMIN — TICAGRELOR 90 MILLIGRAM(S): 90 TABLET ORAL at 17:05

## 2018-10-21 RX ADMIN — Medication 9 UNIT(S): at 08:02

## 2018-10-21 RX ADMIN — SERTRALINE 100 MILLIGRAM(S): 25 TABLET, FILM COATED ORAL at 12:23

## 2018-10-21 RX ADMIN — PREGABALIN 1000 MICROGRAM(S): 225 CAPSULE ORAL at 12:22

## 2018-10-21 RX ADMIN — PIPERACILLIN AND TAZOBACTAM 25 GRAM(S): 4; .5 INJECTION, POWDER, LYOPHILIZED, FOR SOLUTION INTRAVENOUS at 21:05

## 2018-10-21 NOTE — PROGRESS NOTE ADULT - ATTENDING COMMENTS
Agree with above NP note.  cv stable  no cp, decomp  await tma tuesday   cardiac optimized and can proceed with intermediate and acceptable cardiac risk   cont current cv meds  change lasix to po brigida

## 2018-10-21 NOTE — PROGRESS NOTE ADULT - ASSESSMENT
cath 2/2018 ( CANDIDO x 1pLAD, CANDIDO x 1 dCx, CANDIDO x 1 pOM1 and RCA )    a/p  68 year old man with history of HTN, CAD s/p CANDIDO to RCA in 02/2018 in addition to CANDIDO to LAD, LCX, PAD s/p left femoral to below knee popliteal bypass 03/2018, left great toe partial amputation presenting with increasing toe pain, bleeding, and infection found to have elevated troponins.     1. Troponinemia   cv stable no chest pain/inc sob  likely Type II MI, demand ischemia in setting of sepsis, osteomyelitis, no concern for ACS   echo revealing EF 40-45%, possible decline in LV sys fx  nuclear stress in past revealed inferior hypokinesis and mild lv dysfxn  would defer from any ischemic eval given active infection and no s/s of cp or decomp chf    brief PAT noted yesterday, continue with BB     2. Acute/chronic systolic HF  volume status stable, no dyspnea   cont lasix 40mg IVP daily for now, likely change to PO tomorrow   continue medical management, On aspirin, brilinta, bb, statin     3. CAD s/p multiple PCI  cv stable  no chest pain   continue aspirin, brilinta, bb, statin     4. foot ulcer, Sepsis/ osteomyelitis   on iv abx  med f/u , podiatry f/u    5. PAD s/p  LE bypass  s/p b/l angiogram with angioplasty and SFA, above knee pop stentx3  Vascular f/u     6. htn  bp stable  continue current medication regimen     dvt ppx cath 2/2018 ( CANDIDO x 1pLAD, CANDIDO x 1 dCx, CANDIDO x 1 pOM1 and RCA )    a/p  68 year old man with history of HTN, CAD s/p CANDIDO to RCA in 02/2018 in addition to CANDIDO to LAD, LCX, PAD s/p left femoral to below knee popliteal bypass 03/2018, left great toe partial amputation presenting with increasing toe pain, bleeding, and infection found to have elevated troponins.     1. Troponinemia   cv stable no chest pain/inc sob  likely Type II MI, demand ischemia in setting of sepsis, osteomyelitis, no concern for ACS   echo revealing EF 40-45%, possible small interval decline in LV sys fx  nuclear stress in past did reveal inferior hypokinesis and mild lv dysfxn  would defer from any ischemic eval given active infection and no s/s of cp or decomp chf    brief PAT noted yesterday, continue with BB     2. Acute/chronic systolic HF  volume status stable, no dyspnea   cont lasix 40mg IVP daily for now, likely change to PO tomorrow   continue medical management, On aspirin, brilinta, bb, statin     3. CAD s/p multiple PCI  cv stable  no chest pain   continue aspirin, brilinta, bb, statin     4. foot ulcer, Sepsis/ osteomyelitis   on iv abx  med f/u , podiatry f/u    5. PAD s/p  LE bypass  s/p b/l angiogram with angioplasty and SFA, above knee pop stentx3  Vascular f/u     6. htn  bp stable  continue current medication regimen     dvt ppx

## 2018-10-21 NOTE — PROGRESS NOTE ADULT - ASSESSMENT
Patient visited at bedside sleeping. Gangrene left foot with bandages clean dry and ntact  No signs of cellulitis. patient awaiting surgical management on Tuesday for TMA left foot. Await medial clearance & vascular clearance for surgery. Podiatry will follow

## 2018-10-22 LAB
ANION GAP SERPL CALC-SCNC: 14 MMOL/L — SIGNIFICANT CHANGE UP (ref 5–17)
BUN SERPL-MCNC: 32 MG/DL — HIGH (ref 7–23)
CALCIUM SERPL-MCNC: 9.1 MG/DL — SIGNIFICANT CHANGE UP (ref 8.4–10.5)
CHLORIDE SERPL-SCNC: 92 MMOL/L — LOW (ref 96–108)
CO2 SERPL-SCNC: 25 MMOL/L — SIGNIFICANT CHANGE UP (ref 22–31)
CREAT SERPL-MCNC: 1.16 MG/DL — SIGNIFICANT CHANGE UP (ref 0.5–1.3)
GLUCOSE BLDC GLUCOMTR-MCNC: 190 MG/DL — HIGH (ref 70–99)
GLUCOSE BLDC GLUCOMTR-MCNC: 237 MG/DL — HIGH (ref 70–99)
GLUCOSE BLDC GLUCOMTR-MCNC: 243 MG/DL — HIGH (ref 70–99)
GLUCOSE BLDC GLUCOMTR-MCNC: 267 MG/DL — HIGH (ref 70–99)
GLUCOSE SERPL-MCNC: 164 MG/DL — HIGH (ref 70–99)
HCT VFR BLD CALC: 31.6 % — LOW (ref 39–50)
HGB BLD-MCNC: 10.4 G/DL — LOW (ref 13–17)
MCHC RBC-ENTMCNC: 28.2 PG — SIGNIFICANT CHANGE UP (ref 27–34)
MCHC RBC-ENTMCNC: 32.9 GM/DL — SIGNIFICANT CHANGE UP (ref 32–36)
MCV RBC AUTO: 85.6 FL — SIGNIFICANT CHANGE UP (ref 80–100)
PLATELET # BLD AUTO: 459 K/UL — HIGH (ref 150–400)
POTASSIUM SERPL-MCNC: 4.2 MMOL/L — SIGNIFICANT CHANGE UP (ref 3.5–5.3)
POTASSIUM SERPL-SCNC: 4.2 MMOL/L — SIGNIFICANT CHANGE UP (ref 3.5–5.3)
RBC # BLD: 3.69 M/UL — LOW (ref 4.2–5.8)
RBC # FLD: 14.1 % — SIGNIFICANT CHANGE UP (ref 10.3–14.5)
SODIUM SERPL-SCNC: 131 MMOL/L — LOW (ref 135–145)
WBC # BLD: 16.66 K/UL — HIGH (ref 3.8–10.5)
WBC # FLD AUTO: 16.66 K/UL — HIGH (ref 3.8–10.5)

## 2018-10-22 PROCEDURE — 99231 SBSQ HOSP IP/OBS SF/LOW 25: CPT | Mod: 57

## 2018-10-22 PROCEDURE — 71045 X-RAY EXAM CHEST 1 VIEW: CPT | Mod: 26

## 2018-10-22 PROCEDURE — 99232 SBSQ HOSP IP/OBS MODERATE 35: CPT

## 2018-10-22 RX ORDER — VANCOMYCIN HCL 1 G
1000 VIAL (EA) INTRAVENOUS EVERY 12 HOURS
Qty: 0 | Refills: 0 | Status: DISCONTINUED | OUTPATIENT
Start: 2018-10-22 | End: 2018-10-24

## 2018-10-22 RX ORDER — ACETAMINOPHEN 500 MG
1000 TABLET ORAL ONCE
Qty: 0 | Refills: 0 | Status: COMPLETED | OUTPATIENT
Start: 2018-10-22 | End: 2018-10-22

## 2018-10-22 RX ADMIN — Medication 9 UNIT(S): at 12:18

## 2018-10-22 RX ADMIN — SERTRALINE 100 MILLIGRAM(S): 25 TABLET, FILM COATED ORAL at 11:26

## 2018-10-22 RX ADMIN — PIPERACILLIN AND TAZOBACTAM 25 GRAM(S): 4; .5 INJECTION, POWDER, LYOPHILIZED, FOR SOLUTION INTRAVENOUS at 21:42

## 2018-10-22 RX ADMIN — Medication 81 MILLIGRAM(S): at 11:27

## 2018-10-22 RX ADMIN — Medication 400 MILLIGRAM(S): at 21:42

## 2018-10-22 RX ADMIN — TICAGRELOR 90 MILLIGRAM(S): 90 TABLET ORAL at 05:03

## 2018-10-22 RX ADMIN — ENOXAPARIN SODIUM 40 MILLIGRAM(S): 100 INJECTION SUBCUTANEOUS at 11:27

## 2018-10-22 RX ADMIN — INSULIN GLARGINE 25 UNIT(S): 100 INJECTION, SOLUTION SUBCUTANEOUS at 21:42

## 2018-10-22 RX ADMIN — TICAGRELOR 90 MILLIGRAM(S): 90 TABLET ORAL at 17:09

## 2018-10-22 RX ADMIN — Medication 25 MILLIGRAM(S): at 05:03

## 2018-10-22 RX ADMIN — Medication 250 MILLIGRAM(S): at 17:08

## 2018-10-22 RX ADMIN — Medication 9 UNIT(S): at 07:58

## 2018-10-22 RX ADMIN — Medication 25 MILLIGRAM(S): at 17:09

## 2018-10-22 RX ADMIN — Medication 4: at 12:18

## 2018-10-22 RX ADMIN — PIPERACILLIN AND TAZOBACTAM 25 GRAM(S): 4; .5 INJECTION, POWDER, LYOPHILIZED, FOR SOLUTION INTRAVENOUS at 13:29

## 2018-10-22 RX ADMIN — Medication 100 MILLIGRAM(S): at 21:42

## 2018-10-22 RX ADMIN — Medication 40 MILLIGRAM(S): at 05:03

## 2018-10-22 RX ADMIN — ATORVASTATIN CALCIUM 40 MILLIGRAM(S): 80 TABLET, FILM COATED ORAL at 21:42

## 2018-10-22 RX ADMIN — Medication 9 UNIT(S): at 16:59

## 2018-10-22 RX ADMIN — GABAPENTIN 300 MILLIGRAM(S): 400 CAPSULE ORAL at 05:02

## 2018-10-22 RX ADMIN — Medication 4: at 07:58

## 2018-10-22 RX ADMIN — GABAPENTIN 300 MILLIGRAM(S): 400 CAPSULE ORAL at 21:42

## 2018-10-22 RX ADMIN — GABAPENTIN 300 MILLIGRAM(S): 400 CAPSULE ORAL at 13:29

## 2018-10-22 RX ADMIN — TAMSULOSIN HYDROCHLORIDE 0.4 MILLIGRAM(S): 0.4 CAPSULE ORAL at 21:42

## 2018-10-22 RX ADMIN — Medication 1000 MILLIGRAM(S): at 22:15

## 2018-10-22 RX ADMIN — Medication 1 MILLIGRAM(S): at 11:26

## 2018-10-22 RX ADMIN — Medication 6: at 16:59

## 2018-10-22 RX ADMIN — PREGABALIN 1000 MICROGRAM(S): 225 CAPSULE ORAL at 11:26

## 2018-10-22 RX ADMIN — Medication 100 MILLIGRAM(S): at 13:29

## 2018-10-22 RX ADMIN — Medication 1 TABLET(S): at 11:26

## 2018-10-22 RX ADMIN — Medication 100 MILLIGRAM(S): at 05:02

## 2018-10-22 RX ADMIN — SENNA PLUS 2 TABLET(S): 8.6 TABLET ORAL at 21:42

## 2018-10-22 RX ADMIN — Medication 250 MILLIGRAM(S): at 05:00

## 2018-10-22 RX ADMIN — PIPERACILLIN AND TAZOBACTAM 25 GRAM(S): 4; .5 INJECTION, POWDER, LYOPHILIZED, FOR SOLUTION INTRAVENOUS at 05:10

## 2018-10-22 NOTE — CHART NOTE - NSCHARTNOTEFT_GEN_A_CORE
Pre-operative Note: Vascular Surgery    - Pre-operative Diagnosis: Toe gangrene    - Procedure: LLE angiogram, possible angioplasty, possible stent.    - Labs:                        10.4   16.66 )-----------( 459      ( 22 Oct 2018 07:26 )             31.6     10-22    131<L>  |  92<L>  |  32<H>  ----------------------------<  164<H>  4.2   |  25  |  1.16    Ca    9.1      22 Oct 2018 05:32        Type & Screen #1: Type + Screen (10.17.18 @ 06:17)    ABO Interpretation: O    Rh Interpretation: Positive    Antibody Screen: Negative    Type & Screen #2: Pending    - CXR: Ordered    - EKG: < from: 12 Lead ECG (10.13.18 @ 03:55) >    Ventricular Rate 90 BPM    Atrial Rate 90 BPM    P-R Interval 158 ms    QRS Duration 88 ms    Q-T Interval 388 ms    QTC Calculation(Bezet) 474 ms    P Axis 41 degrees    R Axis 17 degrees    T Axis 112 degrees    Diagnosis Line NORMAL SINUS RHYTHM  POSSIBLE LEFT ATRIAL ENLARGEMENT  ANTEROSEPTAL INFARCT , AGE UNDETERMINED  MARKED ST ABNORMALITY, POSSIBLE LATERAL SUBENDOCARDIAL INJURY  ABNORMAL ECG    < end of copied text >        - AICD/Pacemaker Interrogation Date:     - Blood: Not needed.     - Orders:  > NPO at midnight  > IVF at midnight  > Morning Labs: CBC, BMP, coags, type & screen.    - Permits:  > Consent in chart.  > Case scheduled with OR.

## 2018-10-22 NOTE — PROGRESS NOTE ADULT - NSHPATTENDINGPLANDISCUSS_GEN_ALL_CORE
NP
patient and wife at bedside, NP
patient and wife at bedside.
resident
medicine NP. patient and his daughter
medicine NP. patient and his daughter

## 2018-10-22 NOTE — PROGRESS NOTE ADULT - ASSESSMENT
68 year old male with HTN, CAD s/p CANDIDO to RCA in 02/2018 in addition to CANDIDO to LAD, LCX, PAD s/p left femoral to below knee popliteal bypass 03/2018, left great toe partial amputation presented today with increasing toe pain.    Now afebrile  Leukocytosis overall improving  Elevated ESR/ CRP  Blood cxs NGTD  h/o left foot hallux amputation  Gangrene of left foot  Arterial duplex with b/l LE arterial vascular disease  s/p angiogram extremity bilateral on 10/17/2018, angioplasty and stent of left femoral artery  Podiatry planning for left TMA Tuesday  Angiogram planned for Tuesday with vascular  Has dry gangrene with malodor to the left foot  Leukocytosis  Fevers resolved    Recommend:  -Continue vancomycin 1000 mg IV q 12 hours and zosyn 3.375 grams IV q 8 hours  -Continue to monitor vanco trough.  -Trend WBC  -F/U Podiatry and vascular - planned for OR tomorrow.

## 2018-10-22 NOTE — PROGRESS NOTE ADULT - ASSESSMENT
68 year old man with medical history of HTN, CAD, DM, PAD s/p left femoral to below knee popliteal PTFE bypass (3/2018) presenting with left 4th toe gangrene and likely osteomyelitis now s/p b/l angiogram with angioplasty and SFA, above knee pop stentx3    - Monitor neurovascular exam  - Will plan for angiogram for below the knee disease in cath lab tomorrow 10/23  - NPO past midnight, pre-op labs, will obtain consent  - Care as per primary team  - Discussed with Dr. Jayesh Mcmanus PGY 2  Vascular surgery  Pager [phone]

## 2018-10-22 NOTE — PROGRESS NOTE ADULT - ASSESSMENT
68 year old M w/ LF dry gangrene of digits 4 and 5  - Pt seen and evaluated  - WBC 16.66, pt is afebrile   - dressing left CDI today  - vasc plan angio tomorrow   - patient awaiting surgical management on Tuesday for TMA left foot.   - pt  has cardiac clearance.   - d/w attending.

## 2018-10-22 NOTE — PROGRESS NOTE ADULT - ASSESSMENT
Problem/Plan - 1:  ·  Problem: Gangrene.  Plan: - Vascular and podiatry fu  angiogram and TMA in am  cw antibiotics  will monitor    Problem/Plan - 2:  ·  Problem: Acute osteomyelitis.  Plan: - ID fu  - cont vanc/zosyn  - fu cultures      Problem/Plan - 3:  ·  Problem: CAD (coronary artery disease).  Plan: - NSTEMI 2/2 demand, trops downtrending. asymptomatic. stress test w/ inferior hypokinesis.   - s/p multiple CANDIDO, most recent in 02/2018- Continue with ASA/Brilinta, continue with high intensity statin.     Problem/Plan - 4:  ·  Problem:  Heart failure.  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 Plan: - monitor FS  Basal/bolus

## 2018-10-23 LAB
ANION GAP SERPL CALC-SCNC: 12 MMOL/L — SIGNIFICANT CHANGE UP (ref 5–17)
APTT BLD: 32.2 SEC — SIGNIFICANT CHANGE UP (ref 27.5–37.4)
BUN SERPL-MCNC: 32 MG/DL — HIGH (ref 7–23)
CALCIUM SERPL-MCNC: 9.1 MG/DL — SIGNIFICANT CHANGE UP (ref 8.4–10.5)
CHLORIDE SERPL-SCNC: 92 MMOL/L — LOW (ref 96–108)
CO2 SERPL-SCNC: 26 MMOL/L — SIGNIFICANT CHANGE UP (ref 22–31)
CREAT SERPL-MCNC: 1.18 MG/DL — SIGNIFICANT CHANGE UP (ref 0.5–1.3)
GLUCOSE BLDC GLUCOMTR-MCNC: 191 MG/DL — HIGH (ref 70–99)
GLUCOSE BLDC GLUCOMTR-MCNC: 257 MG/DL — HIGH (ref 70–99)
GLUCOSE BLDC GLUCOMTR-MCNC: 375 MG/DL — HIGH (ref 70–99)
GLUCOSE SERPL-MCNC: 177 MG/DL — HIGH (ref 70–99)
HCT VFR BLD CALC: 33.2 % — LOW (ref 39–50)
HGB BLD-MCNC: 10.8 G/DL — LOW (ref 13–17)
INR BLD: 1.03 RATIO — SIGNIFICANT CHANGE UP (ref 0.88–1.16)
MCHC RBC-ENTMCNC: 28.1 PG — SIGNIFICANT CHANGE UP (ref 27–34)
MCHC RBC-ENTMCNC: 32.5 GM/DL — SIGNIFICANT CHANGE UP (ref 32–36)
MCV RBC AUTO: 86.5 FL — SIGNIFICANT CHANGE UP (ref 80–100)
PLATELET # BLD AUTO: 490 K/UL — HIGH (ref 150–400)
POTASSIUM SERPL-MCNC: 4.3 MMOL/L — SIGNIFICANT CHANGE UP (ref 3.5–5.3)
POTASSIUM SERPL-SCNC: 4.3 MMOL/L — SIGNIFICANT CHANGE UP (ref 3.5–5.3)
PROTHROM AB SERPL-ACNC: 11.6 SEC — SIGNIFICANT CHANGE UP (ref 10–13.1)
RBC # BLD: 3.84 M/UL — LOW (ref 4.2–5.8)
RBC # FLD: 14 % — SIGNIFICANT CHANGE UP (ref 10.3–14.5)
SODIUM SERPL-SCNC: 130 MMOL/L — LOW (ref 135–145)
VANCOMYCIN TROUGH SERPL-MCNC: 18.6 UG/ML — SIGNIFICANT CHANGE UP (ref 10–20)
WBC # BLD: 15.14 K/UL — HIGH (ref 3.8–10.5)
WBC # FLD AUTO: 15.14 K/UL — HIGH (ref 3.8–10.5)

## 2018-10-23 RX ORDER — SODIUM CHLORIDE 9 MG/ML
1000 INJECTION INTRAMUSCULAR; INTRAVENOUS; SUBCUTANEOUS
Qty: 0 | Refills: 0 | Status: DISCONTINUED | OUTPATIENT
Start: 2018-10-23 | End: 2018-10-24

## 2018-10-23 RX ADMIN — Medication 1 MILLIGRAM(S): at 15:23

## 2018-10-23 RX ADMIN — Medication 81 MILLIGRAM(S): at 09:59

## 2018-10-23 RX ADMIN — PIPERACILLIN AND TAZOBACTAM 25 GRAM(S): 4; .5 INJECTION, POWDER, LYOPHILIZED, FOR SOLUTION INTRAVENOUS at 15:24

## 2018-10-23 RX ADMIN — GABAPENTIN 300 MILLIGRAM(S): 400 CAPSULE ORAL at 15:23

## 2018-10-23 RX ADMIN — Medication 6: at 08:07

## 2018-10-23 RX ADMIN — Medication 25 MILLIGRAM(S): at 18:02

## 2018-10-23 RX ADMIN — SERTRALINE 100 MILLIGRAM(S): 25 TABLET, FILM COATED ORAL at 15:23

## 2018-10-23 RX ADMIN — ATORVASTATIN CALCIUM 40 MILLIGRAM(S): 80 TABLET, FILM COATED ORAL at 21:45

## 2018-10-23 RX ADMIN — Medication 9 UNIT(S): at 16:47

## 2018-10-23 RX ADMIN — Medication 250 MILLIGRAM(S): at 19:33

## 2018-10-23 RX ADMIN — GABAPENTIN 300 MILLIGRAM(S): 400 CAPSULE ORAL at 21:45

## 2018-10-23 RX ADMIN — Medication 100 MILLIGRAM(S): at 15:23

## 2018-10-23 RX ADMIN — Medication 250 MILLIGRAM(S): at 05:41

## 2018-10-23 RX ADMIN — PIPERACILLIN AND TAZOBACTAM 25 GRAM(S): 4; .5 INJECTION, POWDER, LYOPHILIZED, FOR SOLUTION INTRAVENOUS at 21:45

## 2018-10-23 RX ADMIN — PIPERACILLIN AND TAZOBACTAM 25 GRAM(S): 4; .5 INJECTION, POWDER, LYOPHILIZED, FOR SOLUTION INTRAVENOUS at 05:41

## 2018-10-23 RX ADMIN — TAMSULOSIN HYDROCHLORIDE 0.4 MILLIGRAM(S): 0.4 CAPSULE ORAL at 21:45

## 2018-10-23 RX ADMIN — Medication 25 MILLIGRAM(S): at 05:42

## 2018-10-23 RX ADMIN — Medication 40 MILLIGRAM(S): at 05:42

## 2018-10-23 RX ADMIN — Medication 2: at 16:47

## 2018-10-23 RX ADMIN — PREGABALIN 1000 MICROGRAM(S): 225 CAPSULE ORAL at 15:23

## 2018-10-23 RX ADMIN — INSULIN GLARGINE 25 UNIT(S): 100 INJECTION, SOLUTION SUBCUTANEOUS at 21:44

## 2018-10-23 RX ADMIN — SENNA PLUS 2 TABLET(S): 8.6 TABLET ORAL at 21:45

## 2018-10-23 RX ADMIN — TICAGRELOR 90 MILLIGRAM(S): 90 TABLET ORAL at 05:42

## 2018-10-23 RX ADMIN — Medication 6: at 21:46

## 2018-10-23 RX ADMIN — GABAPENTIN 300 MILLIGRAM(S): 400 CAPSULE ORAL at 05:42

## 2018-10-23 RX ADMIN — Medication 100 MILLIGRAM(S): at 05:42

## 2018-10-23 RX ADMIN — TICAGRELOR 90 MILLIGRAM(S): 90 TABLET ORAL at 18:02

## 2018-10-23 RX ADMIN — Medication 100 MILLIGRAM(S): at 21:45

## 2018-10-23 RX ADMIN — Medication 1 TABLET(S): at 15:23

## 2018-10-23 NOTE — CHART NOTE - NSCHARTNOTEFT_GEN_A_CORE
Called by nurse for saturated dressing at right groin puncture site. Dressing removed with small amount of oozing. Manual pressure held for 20 minutes and hemostasis achieved. No hematoma palpable. Compartments soft.    Please page vascular at [phone] with any concerns.    ARANZA Mcmanus PGY 2

## 2018-10-23 NOTE — PROGRESS NOTE ADULT - ASSESSMENT
cath 2/2018 ( CANDIDO x 1pLAD, CANDIDO x 1 dCx, CANDIDO x 1 pOM1 and RCA )    a/p  68 year old man with history of HTN, CAD s/p CANDIDO to RCA in 02/2018 in addition to CANDIDO to LAD, LCX, PAD s/p left femoral to below knee popliteal bypass 03/2018, left great toe partial amputation presenting with increasing toe pain, bleeding, and infection found to have elevated troponins.     1. Troponinemia   cv stable no chest pain/inc sob  likely Type II MI, demand ischemia in setting of sepsis, osteomyelitis, no concern for ACS   echo revealing EF 40-45%, possible small interval decline in LV sys fx  nuclear stress in past did reveal inferior hypokinesis and mild lv dysfxn  would defer from any ischemic eval given active infection and no s/s of cp or decomp chf    continue with BB     2. Acute/chronic systolic HF  volume status stable, no dyspnea   change lasix to 40mg PO daily   continue medical management, On aspirin, brilinta, bb, statin     3. CAD s/p multiple PCI  cv stable  no chest pain   continue aspirin, brilinta, bb, statin     4. foot ulcer, Sepsis/ osteomyelitis   on iv abx  med f/u , podiatry f/u  await tma tomorrow   cardiac optimized and can proceed with intermediate and acceptable cardiac risk     5. PAD s/p  LE bypass  s/p b/l angiogram with angioplasty and SFA, above knee pop stentx3  angio planned for today, TMA for weds   Vascular f/u     6. htn  bp stable  continue current medication regimen     dvt ppx

## 2018-10-23 NOTE — CHART NOTE - NSCHARTNOTEFT_GEN_A_CORE
SURGICAL POST-OP CHECK NOTE:    Procedure: LLE Angiogram    Subjective:   Patient seen and examined.   Pain well controlled.  No N/V.    Some bleeding at R groin.     Vital Signs Last 24 Hrs  T(C): 36.6 (23 Oct 2018 16:59), Max: 37 (23 Oct 2018 00:01)  T(F): 97.9 (23 Oct 2018 16:59), Max: 98.6 (23 Oct 2018 00:01)  HR: 86 (23 Oct 2018 18:01) (64 - 88)  BP: 117/75 (23 Oct 2018 18:01) (104/67 - 131/85)  BP(mean): --  RR: 18 (23 Oct 2018 16:59) (18 - 18)  SpO2: 96% (23 Oct 2018 16:59) (93% - 97%)  I&O's Summary    22 Oct 2018 07:01  -  23 Oct 2018 07:00  --------------------------------------------------------  IN: 1175 mL / OUT: 2650 mL / NET: -1475 mL    23 Oct 2018 07:01  -  23 Oct 2018 19:11  --------------------------------------------------------  IN: 340 mL / OUT: 300 mL / NET: 40 mL                            10.8   15.14 )-----------( 490      ( 23 Oct 2018 08:11 )             33.2     10-23    130<L>  |  92<L>  |  32<H>  ----------------------------<  177<H>  4.3   |  26  |  1.18    Ca    9.1      23 Oct 2018 04:58     PT/INR - ( 23 Oct 2018 08:14 )   PT: 11.6 sec;   INR: 1.03 ratio         PTT - ( 23 Oct 2018 08:14 )  PTT:32.2 sec    PHYSICAL EXAM:  Gen: NAD, resting comfortably, conversive.   Cardiopulm: Non-labored breathing.   Ab: Soft, appropriately tender, nondistended. R groin dressing with sanguinous saturation. Pressure held for 20 min. Incision hemostatic and new dressing applied. No hematoma.   Ext: Moves all 4 spontaneously.     ASSESSMENT:   68M s/p LLE angiogram for PAD.     PLAN:  -Please page #[phone] if additional episodes of bleeding at site, apply pressure.   -Will follow up angio results with attending.     -Rest of care as per primary team.

## 2018-10-23 NOTE — PROGRESS NOTE ADULT - ASSESSMENT
68 year old M w/ LF dry gangrene of digits 4 and 5  - Pt seen and evaluated  - Pt gangrene stable clinically  - vasc plan angio today  - Pt to OR tomorrow 10/24 for TMA 12PM pending success with angio today  - pt  has cardiac clearance.   - seen w/ attending

## 2018-10-23 NOTE — PROGRESS NOTE ADULT - ASSESSMENT
Problem/Plan - 1:  ·  Problem: Gangrene.  Plan: - Vascular and podiatry fu  angiogram and TMA in am  cw antibiotics  will monitor    Problem/Plan - 2:  ·  Problem: Acute osteomyelitis.  Plan: - ID fu  - cont vanc/zosyn  - fu cultures      Problem/Plan - 3:  ·  Problem: CAD (coronary artery disease).  Plan: - NSTEMI 2/2 demand, trops downtrending. asymptomatic. stress test w/ inferior hypokinesis.   - s/p multiple CANDIDO, most recent in 02/2018- Continue with ASA/Brilinta, continue with high intensity statin.     Problem/Plan - 4:  ·  Problem:  Heart failure.  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 Plan: - monitor FS  Basal/bolus Problem/Plan - 1:  ·  Problem: Gangrene.  Plan: - Vascular and podiatry fu  angiogram and TMA today  cw antibiotics  will monitor    Problem/Plan - 2:  ·  Problem: Acute osteomyelitis.  Plan: - ID fu  - cont vanc/zosyn  - fu cultures      Problem/Plan - 3:  ·  Problem: CAD (coronary artery disease).  Plan: - NSTEMI 2/2 demand, trops downtrending. asymptomatic. stress test w/ inferior hypokinesis.   - s/p multiple CANDIDO, most recent in 02/2018- Continue with ASA/Brilinta, continue with high intensity statin.     Problem/Plan - 4:  ·  Problem:  Heart failure.  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 Plan: - monitor FS  Basal/bolus

## 2018-10-24 VITALS — RESPIRATION RATE: 24 BRPM

## 2018-10-24 LAB
ANION GAP SERPL CALC-SCNC: 17 MMOL/L — SIGNIFICANT CHANGE UP (ref 5–17)
APTT BLD: 30.8 SEC — SIGNIFICANT CHANGE UP (ref 27.5–37.4)
BLD GP AB SCN SERPL QL: NEGATIVE — SIGNIFICANT CHANGE UP
BUN SERPL-MCNC: 39 MG/DL — HIGH (ref 7–23)
CALCIUM SERPL-MCNC: 9.3 MG/DL — SIGNIFICANT CHANGE UP (ref 8.4–10.5)
CHLORIDE SERPL-SCNC: 89 MMOL/L — LOW (ref 96–108)
CO2 SERPL-SCNC: 24 MMOL/L — SIGNIFICANT CHANGE UP (ref 22–31)
CREAT SERPL-MCNC: 1.33 MG/DL — HIGH (ref 0.5–1.3)
GAS PNL BLDA: SIGNIFICANT CHANGE UP
GLUCOSE BLDC GLUCOMTR-MCNC: 356 MG/DL — HIGH (ref 70–99)
GLUCOSE BLDC GLUCOMTR-MCNC: 380 MG/DL — HIGH (ref 70–99)
GLUCOSE BLDC GLUCOMTR-MCNC: 403 MG/DL — HIGH (ref 70–99)
GLUCOSE SERPL-MCNC: 309 MG/DL — HIGH (ref 70–99)
HCT VFR BLD CALC: 34.5 % — LOW (ref 39–50)
HGB BLD-MCNC: 11.4 G/DL — LOW (ref 13–17)
INR BLD: 1.04 RATIO — SIGNIFICANT CHANGE UP (ref 0.88–1.16)
MCHC RBC-ENTMCNC: 29 PG — SIGNIFICANT CHANGE UP (ref 27–34)
MCHC RBC-ENTMCNC: 33 GM/DL — SIGNIFICANT CHANGE UP (ref 32–36)
MCV RBC AUTO: 87.8 FL — SIGNIFICANT CHANGE UP (ref 80–100)
PLATELET # BLD AUTO: 593 K/UL — HIGH (ref 150–400)
POTASSIUM SERPL-MCNC: 4.7 MMOL/L — SIGNIFICANT CHANGE UP (ref 3.5–5.3)
POTASSIUM SERPL-SCNC: 4.7 MMOL/L — SIGNIFICANT CHANGE UP (ref 3.5–5.3)
PROTHROM AB SERPL-ACNC: 11.8 SEC — SIGNIFICANT CHANGE UP (ref 10–13.1)
RBC # BLD: 3.93 M/UL — LOW (ref 4.2–5.8)
RBC # FLD: 14 % — SIGNIFICANT CHANGE UP (ref 10.3–14.5)
RH IG SCN BLD-IMP: POSITIVE — SIGNIFICANT CHANGE UP
SODIUM SERPL-SCNC: 130 MMOL/L — LOW (ref 135–145)
WBC # BLD: 16.76 K/UL — HIGH (ref 3.8–10.5)
WBC # FLD AUTO: 16.76 K/UL — HIGH (ref 3.8–10.5)

## 2018-10-24 PROCEDURE — 99291 CRITICAL CARE FIRST HOUR: CPT | Mod: 25

## 2018-10-24 PROCEDURE — 92950 HEART/LUNG RESUSCITATION CPR: CPT | Mod: GC

## 2018-10-24 RX ORDER — NOREPINEPHRINE BITARTRATE/D5W 8 MG/250ML
0.05 PLASTIC BAG, INJECTION (ML) INTRAVENOUS
Qty: 8 | Refills: 0 | Status: DISCONTINUED | OUTPATIENT
Start: 2018-10-24 | End: 2018-10-24

## 2018-10-24 RX ADMIN — TICAGRELOR 90 MILLIGRAM(S): 90 TABLET ORAL at 05:30

## 2018-10-24 RX ADMIN — Medication 10: at 09:12

## 2018-10-24 RX ADMIN — Medication 25 MILLIGRAM(S): at 05:30

## 2018-10-24 RX ADMIN — GABAPENTIN 300 MILLIGRAM(S): 400 CAPSULE ORAL at 05:30

## 2018-10-24 RX ADMIN — PIPERACILLIN AND TAZOBACTAM 25 GRAM(S): 4; .5 INJECTION, POWDER, LYOPHILIZED, FOR SOLUTION INTRAVENOUS at 05:30

## 2018-10-24 RX ADMIN — Medication 40 MILLIGRAM(S): at 05:30

## 2018-10-24 RX ADMIN — SODIUM CHLORIDE 30 MILLILITER(S): 9 INJECTION INTRAMUSCULAR; INTRAVENOUS; SUBCUTANEOUS at 01:21

## 2018-10-24 RX ADMIN — Medication 250 MILLIGRAM(S): at 09:13

## 2018-10-24 RX ADMIN — SODIUM CHLORIDE 30 MILLILITER(S): 9 INJECTION INTRAMUSCULAR; INTRAVENOUS; SUBCUTANEOUS at 09:13

## 2018-10-24 NOTE — CHART NOTE - NSCHARTNOTEFT_GEN_A_CORE
MEDICINE NOTE    Called to bedside to evaluate the patient for cardiopulmonary arrest.    On physical exam, patient did not respond to verbal or noxious stimuli.  No spontaneous respirations.  Absent heart and breath sounds.  Absent radial and carotid pulses.   Pupils are fixed and dilated, no corneal reflex.  EKG rhythm strip shows asystole.  Patient pronounced dead at 11:33 am. Attending, Dr. Goodson notified.  Family declined autopsy.     Guadalupe Anna MD-PGY1  Department of Internal Medicine  Pager [phone]

## 2018-10-24 NOTE — DISCHARGE NOTE FOR THE EXPIRED PATIENT - HOSPITAL COURSE
Pt is a 69 yo M admitted for gangrene of L foot, treated for osteomyelitis, planned for TMA on 10/24/2018. This morning the pt was found to be in PEA arrest. Patient was coded on the floors. ROSC was achieved after 6 round of epinephrine. Pt was transferred to MICU. Upon arrival was found to be pulseless. CPR initiated, continued for 10 mins. Epi x1 given. At that time, family elected to stop. Pt was pronounced at 11:33 am. Family declined autopsy.

## 2018-10-24 NOTE — PROGRESS NOTE ADULT - REASON FOR ADMISSION
L foot pain

## 2018-10-24 NOTE — AIRWAY PLACEMENT NOTE ADULT - POST AIRWAY PLACEMENT ASSESSMENT:
breath sounds equal/positive end tidal CO2 noted/skin color improved/chest excursion noted/breath sounds bilateral/CXR pending

## 2018-10-24 NOTE — PROGRESS NOTE ADULT - PROVIDER SPECIALTY LIST ADULT
Cardiology
Hospitalist
Infectious Disease
Internal Medicine
Internal Medicine
Podiatry
Vascular Surgery
Cardiology
Hospitalist

## 2018-10-24 NOTE — PROGRESS NOTE ADULT - ASSESSMENT
68 year old man with medical history of HTN, CAD, DM, PAD s/p left femoral to below knee popliteal PTFE bypass (3/2018) presenting with left 4th toe gangrene and likely osteomyelitis now s/p b/l angiogram with angioplasty and SFA, above knee pop stentx3 and repeat angiogram with tibioperoneal trunk angioplasty    - Monitor neurovascular exam and right groin  - Continue DAPT  - Care as per primary team and podiatry    ARANZA Mcmanus PGY 2  Vascular surgery  Pager [phone]

## 2018-10-24 NOTE — CHART NOTE - NSCHARTNOTEFT_GEN_A_CORE
MEDICINE NP     CHAPIS BALLESTEROS  68y Male  Patient is a 68y old  Male who presents with a chief complaint of L foot pain (24 Oct 2018 08:40)       Event Summary:    At 10 am call by RN to evaluate patient with changes in mental status   Patient seen at bedside an found to be unresponsive no respirations , no pulse , code called   CPR initiated with chest compression and oxygenation by ambu   Team arrived      PHI: Patient is a 68 year old man with history of DMT2 (on Insulin),  HTN, CAD s/p CANDIDO to RCA in 02/2018 in addition to CANDIDO to LAD, LCX, PAD s/p left femoral to below knee popliteal bypass 03/2018, left great toe partial amputation presented on 10/12/18  with increasing toe pain. Patient had been having pain in his L foot for about 10 days and noticed increasing blood and foul smelling fluid from the wound. Patient's wife brought the patient to the podiatry office today for evaluation and he was thus sent to the ED for further evaluation.  Patient admitted for sepsis secondary to gangrene at L 4th and 5 th toe. Patient is given vanc and ceftriaxone in the ED, now on vancomycin and zosyn, blood cultures pending. Patient is seen by vascular and podiatry and recommended VA duplex arterial of LLE, ordered, may need transmetatarsal amputation.  On 10/17 had LE angiogram done - 3 stents  left popliteal: self-expanding stent,left superficial femoral: self-expanding stent, left superficial femoral: self-expanding stent. cont asa/brillinta. will need to return for repeat angio and tibial revascularization.  Patient scheduled for TMA on 10/24.      At 11:10 am patient resuscitated IV fluids and Levophed drip in progress - patient transferred to the ICU        Liz Curry DNP-C  Medicine Department

## 2018-10-24 NOTE — CHART NOTE - NSCHARTNOTEFT_GEN_A_CORE
MAR CODE BLUE NOTE     Code Blue called after patient found unresponsive without pulse.  In summary, 67 yo M admitted for gangrene of L foot, treated for osteomyelitis, planned for TMA today, s/p LLE angiogram yesterday.  Received 6 rounds of epinephrine for rhythm PEA/asystole.  Family at bedside, patient full code.  1L NS bolus given.  IO inserted in R femur for more adequate IV access as patient w/ only 1 IV, IO infiltrated so another IO inserted in L femur.  ROSC achieved after 6 rounds of epinephrine, /90.  BP trended down to 103/67 so levofed started at 0.1 mcg/kg/min.  Patient lost pulse again at 10:43, additional 4 rounds of epinephrine given for PEA / asystole, additional 1L NS given, 2 amps bicarb given.  ROSC achieved after additional epinephrine.  Patient transported to MICU.  Upon arrival to MICU patient lost pulse again, PEA on monitor. Care assumed by MICU team, see death note for further details.  Family made patient DNR, patient  at 11:33 am.      Mary Alice Shankar, PGY-3  [phone] / [phone]

## 2018-10-24 NOTE — AIRWAY PLACEMENT NOTE ADULT - AIRWAY COMMENTS:
pt intubated by MD during code in [address] [place] [address]
Called to jose, ACLS in progress, Intubated atraumatically with saini 3 #7.5 cuff ett @ 23 cm lip +etco2 +=bs, care resumed by code team

## 2018-10-24 NOTE — PROGRESS NOTE ADULT - ASSESSMENT
Plan:   To OR today at _12pm___ with  __Tatyana____ for __left foot TMA w/ ROGER _____.   CXR on sunrise.  EKG on sunrise.  Medical/Cardiac clearance since _10/21___ and documented in chart.  Consent signed and in chart.  Procedure was explained to patient in detail. All alternatives, risks and complications were discussed. All questions answered.

## 2018-10-24 NOTE — DISCHARGE NOTE FOR THE EXPIRED PATIENT - OTHER SIGNIFICANT FINDINGS
Agree with above. Seen and examined with residents. S/P long code blue with now loss of pulse again. Family discussion and decision made to stop CPR. Family at bedside. Comfort care.

## 2018-10-24 NOTE — CHART NOTE - NSCHARTNOTEFT_GEN_A_CORE
CHIEF COMPLAINT: L foot pain    HPI: Patient is a 68 year old man with history of HTN, CAD s/p CANDIDO to RCA in 02/2018 in addition to CANDIDO to LAD, LCX, PAD s/p left femoral to below knee popliteal bypass 03/2018, left great toe partial amputation presented today with increasing toe pain. Patient had been having pain in his L foot for about 10 days prior to admission on 10/12/18 and noticed increasing blood and foul smelling fluid from the wound. Patient's wife brought the patient to the podiatry office for evaluation and he was thus sent to the ED for further evaluation. Patient has been ambulating at home with cane but with significant pain. He endorses numbness and tingling. He denies fever or chills. At that time he denied chest pain, shortness of breath, abdominal pain, nausea or vomiting, appetite unchanged ,no recent trauma to the foot.     In the ED, patient's initial vitals were Temp 36.8, , /80, O2 saturation 97% on room air. Patient received 1x Vanc and Ceftriaxone and 2 L NS bolus in the ED. He had dry flaky skin on bilateral LE. R foot without ulcers. L foot cool to touch, with partial amputation of great toe, skin appears dusky of entire left 4th and 5th toe, mild drainage, foul smelling. Bilateral DP/PT pulse not appreciated. Pt found to be septic likely 2/2 L 4th and 5th toe gangrene. Vascular and podiatry were consulted.  Pt treated with Vanc for MRSA coverage as patient has been recently hospitalized and Zosyn for gram negative/anaerobic coverage as patient is a diabetic. Lactate trended down from 3.2 to 2.7 IVF resuscitation.       Interval History:  Pt was admitted to [address] [place] [address]. Scheduled for TMA today. However, pt arrested. Code Blue was called. Received epinephrine x6. Pt was intubated.     PAST MEDICAL & SURGICAL HISTORY:   CAD (coronary artery disease)  Diabetes mellitus  Hypertension  Alcohol Dependency  Diabetes Mellitus  S/P femoral-popliteal bypass surgery  Stented coronary artery    FAMILY HISTORY:  Family history of diabetes mellitus (Father)    SOCIAL HISTORY:  -Denies smoking, alcohol use or drug use  - Lives at home with wife    Allergies    No Known Allergies    Intolerances      HOME MEDICATIONS:   metoprolol succinate 50 mg oral tablet, extended release: 1 tab(s) orally once a day, Last Dose Taken:    · 	docusate sodium 100 mg oral capsule: 1 cap(s) orally 3 times a day, Last Dose Taken:    · 	senna oral tablet: 2 tab(s) orally once a day (at bedtime), Last Dose Taken:    · 	tamsulosin 0.4 mg oral capsule: 1 cap(s) orally once a day (at bedtime), Last Dose Taken:    · 	aspirin 81 mg oral tablet: 1 tab(s) orally once a day MDD:1, Last Dose Taken:    · 	Brilinta (ticagrelor) 90 mg oral tablet: 1 tab(s) orally 2 times a day MDD:2, Last Dose Taken:    · 	cyanocobalamin 1000 mcg oral tablet: 1 tab(s) orally once a day MDD:1, Last Dose Taken:    · 	folic acid 1 mg oral tablet: 1 tab(s) orally once a day MDD:1, Last Dose Taken:    · 	Multiple Vitamins oral tablet: 1 tab(s) orally once a day MDD:1, Last Dose Taken:    · 	NovoLOG FlexPen 100 units/mL injectable solution: 15 unit(s) injectable 3 times a day  , Last Dose Taken:    · 	Vitamin D3: 1 cap(s) orally once a day, Last Dose Taken:    · 	atorvastatin 40 mg oral tablet: 1 tab(s) orally once a day, Last Dose Taken:    · 	sertraline 100 mg oral tablet: 1 tab(s) orally once a day (at bedtime), Last Dose Taken:    · 	gabapentin 300 mg oral capsule: 1 cap(s) orally 3 times a day, Last Dose Taken:    · 	HumuLIN 70/30 subcutaneous suspension:   	50 units in the morning  	40 units before dinner    REVIEW OF SYSTEMS:  [ ] Unable to assess ROS because pt is intubated and sedated    OBJECTIVE:  ICU Vital Signs Last 24 Hrs  T(C): 37.2 (24 Oct 2018 08:33), Max: 37.5 (24 Oct 2018 00:15)  T(F): 99 (24 Oct 2018 08:33), Max: 99.5 (24 Oct 2018 00:15)  HR: 98 (24 Oct 2018 08:33) (74 - 111)  BP: 101/72 (24 Oct 2018 08:33) (101/72 - 131/85)  BP(mean): --  ABP: --  ABP(mean): --  RR: 18 (24 Oct 2018 08:33) (18 - 18)  SpO2: 94% (24 Oct 2018 08:33) (86% - 96%)        10-23 @ 07:01  -  10-24 @ 07:00  --------------------------------------------------------  IN: 490 mL / OUT: 1000 mL / NET: -510 mL    10-24 @ 07:01  -  10-24 @ 10:58  --------------------------------------------------------  IN: 0 mL / OUT: 100 mL / NET: -100 mL      CAPILLARY BLOOD GLUCOSE      POCT Blood Glucose.: 380 mg/dL (24 Oct 2018 09:07)      PHYSICAL EXAM:  General:   HEENT:   Lymph Nodes:  Neck:   Respiratory:   Cardiovascular:   Abdomen:   Extremities:   Skin:   Neurological:  Psychiatry:    LINES:     HOSPITAL MEDICATIONS:  MEDICATIONS  (STANDING):  aspirin enteric coated 81 milliGRAM(s) Oral daily  atorvastatin 40 milliGRAM(s) Oral at bedtime  cyanocobalamin 1000 MICROGram(s) Oral daily  dextrose 50% Injectable 12.5 Gram(s) IV Push once  dextrose 50% Injectable 25 Gram(s) IV Push once  dextrose 50% Injectable 25 Gram(s) IV Push once  docusate sodium 100 milliGRAM(s) Oral three times a day  enoxaparin Injectable 40 milliGRAM(s) SubCutaneous daily  folic acid 1 milliGRAM(s) Oral daily  furosemide   Injectable 40 milliGRAM(s) IV Push daily  gabapentin 300 milliGRAM(s) Oral three times a day  insulin glargine Injectable (LANTUS) 25 Unit(s) SubCutaneous at bedtime  insulin lispro (HumaLOG) corrective regimen sliding scale   SubCutaneous three times a day before meals  insulin lispro (HumaLOG) corrective regimen sliding scale   SubCutaneous at bedtime  insulin lispro Injectable (HumaLOG) 9 Unit(s) SubCutaneous three times a day before meals  metoprolol tartrate 25 milliGRAM(s) Oral two times a day  multivitamin 1 Tablet(s) Oral daily  norepinephrine Infusion 0.05 MICROgram(s)/kG/Min (6.188 mL/Hr) IV Continuous <Continuous>  piperacillin/tazobactam IVPB. 3.375 Gram(s) IV Intermittent every 8 hours  senna 2 Tablet(s) Oral at bedtime  sertraline 100 milliGRAM(s) Oral daily  sodium chloride 0.9%. 1000 milliLiter(s) (30 mL/Hr) IV Continuous <Continuous>  tamsulosin 0.4 milliGRAM(s) Oral at bedtime  ticagrelor 90 milliGRAM(s) Oral two times a day  vancomycin  IVPB 1000 milliGRAM(s) IV Intermittent every 12 hours    MEDICATIONS  (PRN):  dextrose 40% Gel 15 Gram(s) Oral once PRN Blood Glucose LESS THAN 70 milliGRAM(s)/deciliter  glucagon  Injectable 1 milliGRAM(s) IntraMuscular once PRN Glucose LESS THAN 70 milligrams/deciliter      LABS:                        11.4   16.76 )-----------( 593      ( 24 Oct 2018 07:40 )             34.5     Hgb Trend: 11.4<--, 10.8<--, 10.4<--, 10.6<--, 10.7<--  10-24    130<L>  |  89<L>  |  39<H>  ----------------------------<  309<H>  4.7   |  24  |  1.33<H>    Ca    9.3      24 Oct 2018 06:24      Creatinine Trend: 1.33<--, 1.18<--, 1.16<--, 1.19<--, 1.16<--, 1.09<--  PT/INR - ( 24 Oct 2018 07:54 )   PT: 11.8 sec;   INR: 1.04 ratio         PTT - ( 24 Oct 2018 07:54 )  PTT:30.8 sec          MICROBIOLOGY:     RADIOLOGY:  [ ] Reviewed and interpreted by me    EKG:  [facility] ex [id number] CHIEF COMPLAINT: L foot pain    HPI: Patient is a 68 year old man with history of HTN, CAD s/p CANDIDO to RCA in 02/2018 in addition to CANDIDO to LAD, LCX, PAD s/p left femoral to below knee popliteal bypass 03/2018, left great toe partial amputation presented today with increasing toe pain. Patient had been having pain in his L foot for about 10 days prior to admission on 10/12/18 and noticed increasing blood and foul smelling fluid from the wound. Patient's wife brought the patient to the podiatry office for evaluation and he was thus sent to the ED for further evaluation. Patient has been ambulating at home with cane but with significant pain. He endorses numbness and tingling. He denies fever or chills. At that time he denied chest pain, shortness of breath, abdominal pain, nausea or vomiting, appetite unchanged ,no recent trauma to the foot.     In the ED, patient's initial vitals were Temp 36.8, , /80, O2 saturation 97% on room air. Patient received 1x Vanc and Ceftriaxone and 2 L NS bolus in the ED. He had dry flaky skin on bilateral LE. R foot without ulcers. L foot cool to touch, with partial amputation of great toe, skin appears dusky of entire left 4th and 5th toe, mild drainage, foul smelling. Bilateral DP/PT pulse not appreciated. Pt found to be septic likely 2/2 L 4th and 5th toe gangrene. Vascular and podiatry were consulted.  Pt treated with Vanc for MRSA coverage as patient has been recently hospitalized and Zosyn for gram negative/anaerobic coverage as patient is a diabetic. Lactate trended down from 3.2 to 2.7 IVF resuscitation. Home meds that were resumed:       Interval History:  Pt was admitted to [address] [place] [address]. Scheduled for TMA today. However, pt arrested. Code Blue was called. Received epinephrine x6. Pt was intubated.     PAST MEDICAL & SURGICAL HISTORY:   CAD (coronary artery disease)  Diabetes mellitus  Hypertension  Alcohol Dependency  Diabetes Mellitus  S/P femoral-popliteal bypass surgery  Stented coronary artery    FAMILY HISTORY:  Family history of diabetes mellitus (Father)    SOCIAL HISTORY:  -Denies smoking, alcohol use or drug use  - Lives at home with wife    Allergies    No Known Allergies    Intolerances      HOME MEDICATIONS:   metoprolol succinate 50 mg oral tablet, extended release: 1 tab(s) orally once a day, Last Dose Taken:    · 	docusate sodium 100 mg oral capsule: 1 cap(s) orally 3 times a day, Last Dose Taken:    · 	senna oral tablet: 2 tab(s) orally once a day (at bedtime), Last Dose Taken:    · 	tamsulosin 0.4 mg oral capsule: 1 cap(s) orally once a day (at bedtime), Last Dose Taken:    · 	aspirin 81 mg oral tablet: 1 tab(s) orally once a day MDD:1, Last Dose Taken:    · 	Brilinta (ticagrelor) 90 mg oral tablet: 1 tab(s) orally 2 times a day MDD:2, Last Dose Taken:    · 	cyanocobalamin 1000 mcg oral tablet: 1 tab(s) orally once a day MDD:1, Last Dose Taken:    · 	folic acid 1 mg oral tablet: 1 tab(s) orally once a day MDD:1, Last Dose Taken:    · 	Multiple Vitamins oral tablet: 1 tab(s) orally once a day MDD:1, Last Dose Taken:    · 	NovoLOG FlexPen 100 units/mL injectable solution: 15 unit(s) injectable 3 times a day  , Last Dose Taken:    · 	Vitamin D3: 1 cap(s) orally once a day, Last Dose Taken:    · 	atorvastatin 40 mg oral tablet: 1 tab(s) orally once a day, Last Dose Taken:    · 	sertraline 100 mg oral tablet: 1 tab(s) orally once a day (at bedtime), Last Dose Taken:    · 	gabapentin 300 mg oral capsule: 1 cap(s) orally 3 times a day, Last Dose Taken:    · 	HumuLIN 70/30 subcutaneous suspension:   	50 units in the morning  	40 units before dinner    REVIEW OF SYSTEMS:  [ ] Unable to assess ROS because pt is intubated and sedated    OBJECTIVE:  ICU Vital Signs Last 24 Hrs  T(C): 37.2 (24 Oct 2018 08:33), Max: 37.5 (24 Oct 2018 00:15)  T(F): 99 (24 Oct 2018 08:33), Max: 99.5 (24 Oct 2018 00:15)  HR: 98 (24 Oct 2018 08:33) (74 - 111)  BP: 101/72 (24 Oct 2018 08:33) (101/72 - 131/85)  BP(mean): --  ABP: --  ABP(mean): --  RR: 18 (24 Oct 2018 08:33) (18 - 18)  SpO2: 94% (24 Oct 2018 08:33) (86% - 96%)        10-23 @ 07:01  -  10-24 @ 07:00  --------------------------------------------------------  IN: 490 mL / OUT: 1000 mL / NET: -510 mL    10-24 @ 07:01  -  10-24 @ 10:58  --------------------------------------------------------  IN: 0 mL / OUT: 100 mL / NET: -100 mL      CAPILLARY BLOOD GLUCOSE      POCT Blood Glucose.: 380 mg/dL (24 Oct 2018 09:07)      PHYSICAL EXAM:  General:   HEENT:   Lymph Nodes:  Neck:   Respiratory:   Cardiovascular:   Abdomen:   Extremities:   Skin:   Neurological:  Psychiatry:    LINES:     HOSPITAL MEDICATIONS:  MEDICATIONS  (STANDING):  aspirin enteric coated 81 milliGRAM(s) Oral daily  atorvastatin 40 milliGRAM(s) Oral at bedtime  cyanocobalamin 1000 MICROGram(s) Oral daily  dextrose 50% Injectable 12.5 Gram(s) IV Push once  dextrose 50% Injectable 25 Gram(s) IV Push once  dextrose 50% Injectable 25 Gram(s) IV Push once  docusate sodium 100 milliGRAM(s) Oral three times a day  enoxaparin Injectable 40 milliGRAM(s) SubCutaneous daily  folic acid 1 milliGRAM(s) Oral daily  furosemide   Injectable 40 milliGRAM(s) IV Push daily  gabapentin 300 milliGRAM(s) Oral three times a day  insulin glargine Injectable (LANTUS) 25 Unit(s) SubCutaneous at bedtime  insulin lispro (HumaLOG) corrective regimen sliding scale   SubCutaneous three times a day before meals  insulin lispro (HumaLOG) corrective regimen sliding scale   SubCutaneous at bedtime  insulin lispro Injectable (HumaLOG) 9 Unit(s) SubCutaneous three times a day before meals  metoprolol tartrate 25 milliGRAM(s) Oral two times a day  multivitamin 1 Tablet(s) Oral daily  norepinephrine Infusion 0.05 MICROgram(s)/kG/Min (6.188 mL/Hr) IV Continuous <Continuous>  piperacillin/tazobactam IVPB. 3.375 Gram(s) IV Intermittent every 8 hours  senna 2 Tablet(s) Oral at bedtime  sertraline 100 milliGRAM(s) Oral daily  sodium chloride 0.9%. 1000 milliLiter(s) (30 mL/Hr) IV Continuous <Continuous>  tamsulosin 0.4 milliGRAM(s) Oral at bedtime  ticagrelor 90 milliGRAM(s) Oral two times a day  vancomycin  IVPB 1000 milliGRAM(s) IV Intermittent every 12 hours    MEDICATIONS  (PRN):  dextrose 40% Gel 15 Gram(s) Oral once PRN Blood Glucose LESS THAN 70 milliGRAM(s)/deciliter  glucagon  Injectable 1 milliGRAM(s) IntraMuscular once PRN Glucose LESS THAN 70 milligrams/deciliter      LABS:                        11.4   16.76 )-----------( 593      ( 24 Oct 2018 07:40 )             34.5     Hgb Trend: 11.4<--, 10.8<--, 10.4<--, 10.6<--, 10.7<--  10-24    130<L>  |  89<L>  |  39<H>  ----------------------------<  309<H>  4.7   |  24  |  1.33<H>    Ca    9.3      24 Oct 2018 06:24      Creatinine Trend: 1.33<--, 1.18<--, 1.16<--, 1.19<--, 1.16<--, 1.09<--  PT/INR - ( 24 Oct 2018 07:54 )   PT: 11.8 sec;   INR: 1.04 ratio         PTT - ( 24 Oct 2018 07:54 )  PTT:30.8 sec          MICROBIOLOGY:     RADIOLOGY:  [ ] Reviewed and interpreted by me    EKG:  [facility]- [facility] ex [id number] CHIEF COMPLAINT: L foot pain    HPI: Patient is a 68 year old man with history of HTN, CAD s/p CANDIDO to RCA in 02/2018 in addition to CANDIDO to LAD, LCX, PAD s/p left femoral to below knee popliteal bypass 03/2018, left great toe partial amputation presented today with increasing toe pain. Patient had been having pain in his L foot for about 10 days prior to admission on 10/12/18 and noticed increasing blood and foul smelling fluid from the wound. Patient's wife brought the patient to the podiatry office for evaluation and he was thus sent to the ED for further evaluation. Patient has been ambulating at home with cane but with significant pain. He endorses numbness and tingling. He denies fever or chills. At that time he denied chest pain, shortness of breath, abdominal pain, nausea or vomiting, appetite unchanged ,no recent trauma to the foot.     In the ED, patient's initial vitals were Temp 36.8, , /80, O2 saturation 97% on room air. Patient received 1x Vanc and Ceftriaxone and 2 L NS bolus in the ED. He had dry flaky skin on bilateral LE. R foot without ulcers. L foot cool to touch, with partial amputation of great toe, skin appears dusky of entire left 4th and 5th toe, mild drainage, foul smelling. Bilateral DP/PT pulse not appreciated. Pt found to be septic likely 2/2 L 4th and 5th toe gangrene. Vascular and podiatry were consulted.  Pt treated with Vanc for MRSA coverage as patient has been recently hospitalized and Zosyn for gram negative/anaerobic coverage as patient is a diabetic. Lactate trended down from 3.2 to 2.7 IVF resuscitation. Was also found to be hyponatremic. Home meds that were resumed: Metoprolol 25 XL.   C/C/B NSTEMI.      Interval History:  Pt was admitted to [address] [place] [address]. Scheduled for TMA today. However, pt arrested. Code Blue was called. Received epinephrine x6. Pt was intubated.     PAST MEDICAL & SURGICAL HISTORY:   CAD (coronary artery disease)  Diabetes mellitus  Hypertension  Alcohol Dependency  Diabetes Mellitus  S/P femoral-popliteal bypass surgery  Stented coronary artery    FAMILY HISTORY:  Family history of diabetes mellitus (Father)    SOCIAL HISTORY:  -Denies smoking, alcohol use or drug use  - Lives at home with wife    Allergies    No Known Allergies    Intolerances      HOME MEDICATIONS:   metoprolol succinate 50 mg oral tablet, extended release: 1 tab(s) orally once a day, Last Dose Taken:    · 	docusate sodium 100 mg oral capsule: 1 cap(s) orally 3 times a day, Last Dose Taken:    · 	senna oral tablet: 2 tab(s) orally once a day (at bedtime), Last Dose Taken:    · 	tamsulosin 0.4 mg oral capsule: 1 cap(s) orally once a day (at bedtime), Last Dose Taken:    · 	aspirin 81 mg oral tablet: 1 tab(s) orally once a day MDD:1, Last Dose Taken:    · 	Brilinta (ticagrelor) 90 mg oral tablet: 1 tab(s) orally 2 times a day MDD:2, Last Dose Taken:    · 	cyanocobalamin 1000 mcg oral tablet: 1 tab(s) orally once a day MDD:1, Last Dose Taken:    · 	folic acid 1 mg oral tablet: 1 tab(s) orally once a day MDD:1, Last Dose Taken:    · 	Multiple Vitamins oral tablet: 1 tab(s) orally once a day MDD:1, Last Dose Taken:    · 	NovoLOG FlexPen 100 units/mL injectable solution: 15 unit(s) injectable 3 times a day  , Last Dose Taken:    · 	Vitamin D3: 1 cap(s) orally once a day, Last Dose Taken:    · 	atorvastatin 40 mg oral tablet: 1 tab(s) orally once a day, Last Dose Taken:    · 	sertraline 100 mg oral tablet: 1 tab(s) orally once a day (at bedtime), Last Dose Taken:    · 	gabapentin 300 mg oral capsule: 1 cap(s) orally 3 times a day, Last Dose Taken:    · 	HumuLIN 70/30 subcutaneous suspension:   	50 units in the morning  	40 units before dinner    REVIEW OF SYSTEMS:  [ ] Unable to assess ROS because pt is intubated and sedated    OBJECTIVE:  ICU Vital Signs Last 24 Hrs  T(C): 37.2 (24 Oct 2018 08:33), Max: 37.5 (24 Oct 2018 00:15)  T(F): 99 (24 Oct 2018 08:33), Max: 99.5 (24 Oct 2018 00:15)  HR: 98 (24 Oct 2018 08:33) (74 - 111)  BP: 101/72 (24 Oct 2018 08:33) (101/72 - 131/85)  BP(mean): --  ABP: --  ABP(mean): --  RR: 18 (24 Oct 2018 08:33) (18 - 18)  SpO2: 94% (24 Oct 2018 08:33) (86% - 96%)        10-23 @ 07:01  -  10-24 @ 07:00  --------------------------------------------------------  IN: 490 mL / OUT: 1000 mL / NET: -510 mL    10-24 @ 07:01  -  10-24 @ 10:58  --------------------------------------------------------  IN: 0 mL / OUT: 100 mL / NET: -100 mL      CAPILLARY BLOOD GLUCOSE      POCT Blood Glucose.: 380 mg/dL (24 Oct 2018 09:07)      PHYSICAL EXAM:  General:   HEENT:   Lymph Nodes:  Neck:   Respiratory:   Cardiovascular:   Abdomen:   Extremities:   Skin:   Neurological:  Psychiatry:    LINES:     HOSPITAL MEDICATIONS:  MEDICATIONS  (STANDING):  aspirin enteric coated 81 milliGRAM(s) Oral daily  atorvastatin 40 milliGRAM(s) Oral at bedtime  cyanocobalamin 1000 MICROGram(s) Oral daily  dextrose 50% Injectable 12.5 Gram(s) IV Push once  dextrose 50% Injectable 25 Gram(s) IV Push once  dextrose 50% Injectable 25 Gram(s) IV Push once  docusate sodium 100 milliGRAM(s) Oral three times a day  enoxaparin Injectable 40 milliGRAM(s) SubCutaneous daily  folic acid 1 milliGRAM(s) Oral daily  furosemide   Injectable 40 milliGRAM(s) IV Push daily  gabapentin 300 milliGRAM(s) Oral three times a day  insulin glargine Injectable (LANTUS) 25 Unit(s) SubCutaneous at bedtime  insulin lispro (HumaLOG) corrective regimen sliding scale   SubCutaneous three times a day before meals  insulin lispro (HumaLOG) corrective regimen sliding scale   SubCutaneous at bedtime  insulin lispro Injectable (HumaLOG) 9 Unit(s) SubCutaneous three times a day before meals  metoprolol tartrate 25 milliGRAM(s) Oral two times a day  multivitamin 1 Tablet(s) Oral daily  norepinephrine Infusion 0.05 MICROgram(s)/kG/Min (6.188 mL/Hr) IV Continuous <Continuous>  piperacillin/tazobactam IVPB. 3.375 Gram(s) IV Intermittent every 8 hours  senna 2 Tablet(s) Oral at bedtime  sertraline 100 milliGRAM(s) Oral daily  sodium chloride 0.9%. 1000 milliLiter(s) (30 mL/Hr) IV Continuous <Continuous>  tamsulosin 0.4 milliGRAM(s) Oral at bedtime  ticagrelor 90 milliGRAM(s) Oral two times a day  vancomycin  IVPB 1000 milliGRAM(s) IV Intermittent every 12 hours    MEDICATIONS  (PRN):  dextrose 40% Gel 15 Gram(s) Oral once PRN Blood Glucose LESS THAN 70 milliGRAM(s)/deciliter  glucagon  Injectable 1 milliGRAM(s) IntraMuscular once PRN Glucose LESS THAN 70 milligrams/deciliter      LABS:                        11.4   16.76 )-----------( 593      ( 24 Oct 2018 07:40 )             34.5     Hgb Trend: 11.4<--, 10.8<--, 10.4<--, 10.6<--, 10.7<--  10-24    130<L>  |  89<L>  |  39<H>  ----------------------------<  309<H>  4.7   |  24  |  1.33<H>    Ca    9.3      24 Oct 2018 06:24      Creatinine Trend: 1.33<--, 1.18<--, 1.16<--, 1.19<--, 1.16<--, 1.09<--  PT/INR - ( 24 Oct 2018 07:54 )   PT: 11.8 sec;   INR: 1.04 ratio         PTT - ( 24 Oct 2018 07:54 )  PTT:30.8 sec        MICROBIOLOGY:     RADIOLOGY:  [ ] Reviewed and interpreted by me    A/P:    #Neuro- obtunded    #CV-  s/p multiple CANDIDO, most recent in 02/2018  - Continue with ASA/Brilinta, continue with high intensity statin  - If planning for transmetatarsal amputation, patient likely will need cardiology clearance as he has multiple cardiac problems. EKG ordered. Last TTE in 02/2018 reviewed EF 65-70%, repeat echo pending.  - tropinemia is demand in setting of osteo.     NSTEMI 2/2 demand, trops downtrending. asymptomatic. stress test w/ inferior hypokinesis.   - s/p multiple CANDIDO, most recent in 02/2018- Continue with ASA/Brilinta, continue with high intensity statin.   -Upon arrival to MICU pt was found to be pulseless, CPR done for 10 mins, received 1 round of epinephrine after which pt's family elected to stop.     #Pulm-    #GI-    #Renal-  #Electrolytes-     #ID-  s/p b/l angiogram with angioplasty and SFA, above knee pop stentx3 and repeat angiogram with tibioperoneal trunk angioplasty  concern for osteomyelitis   on vanc and zosyn    #Heme-    #Endocrine-  Last A1C in 01/2018 13.2% suggest uncontrolled T2DM complicated by peripheral neuropathy and now with gangrene  - Home medication include Humulin 70/30 50 units in the am and 40 units before dinner and Novolog 15 units before meals, given by his wife, suspecting that uncontrolled T2DM may also be related to poor insulin techniques  - Continue with ISS sliding scale   - Humalog 9 units TIDAC and Lantus 25 units QHS,     #PPx-    #Dispo- CHIEF COMPLAINT: L foot pain    HPI: Patient is a 68 year old man with history of HTN, CAD s/p CANDIDO to RCA in 02/2018 in addition to CANDIDO to LAD, LCX, PAD s/p left femoral to below knee popliteal bypass 03/2018, left great toe partial amputation presented today with increasing toe pain. Patient had been having pain in his L foot for about 10 days prior to admission on 10/12/18 and noticed increasing blood and foul smelling fluid from the wound. Patient's wife brought the patient to the podiatry office for evaluation and he was thus sent to the ED for further evaluation. Patient has been ambulating at home with cane but with significant pain. He endorses numbness and tingling. He denies fever or chills. At that time he denied chest pain, shortness of breath, abdominal pain, nausea or vomiting, appetite unchanged ,no recent trauma to the foot.     In the ED, patient's initial vitals were Temp 36.8, , /80, O2 saturation 97% on room air. Patient received 1x Vanc and Ceftriaxone and 2 L NS bolus in the ED. He had dry flaky skin on bilateral LE. R foot without ulcers. L foot cool to touch, with partial amputation of great toe, skin appears dusky of entire left 4th and 5th toe, mild drainage, foul smelling. Bilateral DP/PT pulse not appreciated. Pt found to be septic likely 2/2 L 4th and 5th toe gangrene. Vascular and podiatry were consulted.  Pt treated with Vanc for MRSA coverage as patient has been recently hospitalized and Zosyn for gram negative/anaerobic coverage as patient is a diabetic. Lactate trended down from 3.2 to 2.7 IVF resuscitation. Was also found to be hyponatremic. Home meds that were resumed: Metoprolol 25 XL.   C/C/B NSTEMI, elevated cardiac enzymes on 10/14.       Interval History:  Pt was admitted to [address] [place] [address]. Scheduled for TMA today. However, pt arrested. Code Blue was called. Received epinephrine x6. Pt was intubated.     PAST MEDICAL & SURGICAL HISTORY:   CAD (coronary artery disease)  Diabetes mellitus  Hypertension  Alcohol Dependency  Diabetes Mellitus  S/P femoral-popliteal bypass surgery  Stented coronary artery    FAMILY HISTORY:  Family history of diabetes mellitus (Father)    SOCIAL HISTORY:  -Denies smoking, alcohol use or drug use  - Lives at home with wife    Allergies    No Known Allergies    Intolerances      HOME MEDICATIONS:   metoprolol succinate 50 mg oral tablet, extended release: 1 tab(s) orally once a day, Last Dose Taken:    · 	docusate sodium 100 mg oral capsule: 1 cap(s) orally 3 times a day, Last Dose Taken:    · 	senna oral tablet: 2 tab(s) orally once a day (at bedtime), Last Dose Taken:    · 	tamsulosin 0.4 mg oral capsule: 1 cap(s) orally once a day (at bedtime), Last Dose Taken:    · 	aspirin 81 mg oral tablet: 1 tab(s) orally once a day MDD:1, Last Dose Taken:    · 	Brilinta (ticagrelor) 90 mg oral tablet: 1 tab(s) orally 2 times a day MDD:2, Last Dose Taken:    · 	cyanocobalamin 1000 mcg oral tablet: 1 tab(s) orally once a day MDD:1, Last Dose Taken:    · 	folic acid 1 mg oral tablet: 1 tab(s) orally once a day MDD:1, Last Dose Taken:    · 	Multiple Vitamins oral tablet: 1 tab(s) orally once a day MDD:1, Last Dose Taken:    · 	NovoLOG FlexPen 100 units/mL injectable solution: 15 unit(s) injectable 3 times a day  , Last Dose Taken:    · 	Vitamin D3: 1 cap(s) orally once a day, Last Dose Taken:    · 	atorvastatin 40 mg oral tablet: 1 tab(s) orally once a day, Last Dose Taken:    · 	sertraline 100 mg oral tablet: 1 tab(s) orally once a day (at bedtime), Last Dose Taken:    · 	gabapentin 300 mg oral capsule: 1 cap(s) orally 3 times a day, Last Dose Taken:    · 	HumuLIN 70/30 subcutaneous suspension:   	50 units in the morning  	40 units before dinner    REVIEW OF SYSTEMS:  [ ] Unable to assess ROS because pt is intubated and sedated    OBJECTIVE:  ICU Vital Signs Last 24 Hrs  T(C): 37.2 (24 Oct 2018 08:33), Max: 37.5 (24 Oct 2018 00:15)  T(F): 99 (24 Oct 2018 08:33), Max: 99.5 (24 Oct 2018 00:15)  HR: 98 (24 Oct 2018 08:33) (74 - 111)  BP: 101/72 (24 Oct 2018 08:33) (101/72 - 131/85)  BP(mean): --  ABP: --  ABP(mean): --  RR: 18 (24 Oct 2018 08:33) (18 - 18)  SpO2: 94% (24 Oct 2018 08:33) (86% - 96%)        10-23 @ 07:01  -  10-24 @ 07:00  --------------------------------------------------------  IN: 490 mL / OUT: 1000 mL / NET: -510 mL    10-24 @ 07:01  -  10-24 @ 10:58  --------------------------------------------------------  IN: 0 mL / OUT: 100 mL / NET: -100 mL      CAPILLARY BLOOD GLUCOSE      POCT Blood Glucose.: 380 mg/dL (24 Oct 2018 09:07)      PHYSICAL EXAM:  General:   HEENT:   Lymph Nodes:  Neck:   Respiratory:   Cardiovascular:   Abdomen:   Extremities:   Skin:   Neurological:  Psychiatry:    LINES:     HOSPITAL MEDICATIONS:  MEDICATIONS  (STANDING):  aspirin enteric coated 81 milliGRAM(s) Oral daily  atorvastatin 40 milliGRAM(s) Oral at bedtime  cyanocobalamin 1000 MICROGram(s) Oral daily  dextrose 50% Injectable 12.5 Gram(s) IV Push once  dextrose 50% Injectable 25 Gram(s) IV Push once  dextrose 50% Injectable 25 Gram(s) IV Push once  docusate sodium 100 milliGRAM(s) Oral three times a day  enoxaparin Injectable 40 milliGRAM(s) SubCutaneous daily  folic acid 1 milliGRAM(s) Oral daily  furosemide   Injectable 40 milliGRAM(s) IV Push daily  gabapentin 300 milliGRAM(s) Oral three times a day  insulin glargine Injectable (LANTUS) 25 Unit(s) SubCutaneous at bedtime  insulin lispro (HumaLOG) corrective regimen sliding scale   SubCutaneous three times a day before meals  insulin lispro (HumaLOG) corrective regimen sliding scale   SubCutaneous at bedtime  insulin lispro Injectable (HumaLOG) 9 Unit(s) SubCutaneous three times a day before meals  metoprolol tartrate 25 milliGRAM(s) Oral two times a day  multivitamin 1 Tablet(s) Oral daily  norepinephrine Infusion 0.05 MICROgram(s)/kG/Min (6.188 mL/Hr) IV Continuous <Continuous>  piperacillin/tazobactam IVPB. 3.375 Gram(s) IV Intermittent every 8 hours  senna 2 Tablet(s) Oral at bedtime  sertraline 100 milliGRAM(s) Oral daily  sodium chloride 0.9%. 1000 milliLiter(s) (30 mL/Hr) IV Continuous <Continuous>  tamsulosin 0.4 milliGRAM(s) Oral at bedtime  ticagrelor 90 milliGRAM(s) Oral two times a day  vancomycin  IVPB 1000 milliGRAM(s) IV Intermittent every 12 hours    MEDICATIONS  (PRN):  dextrose 40% Gel 15 Gram(s) Oral once PRN Blood Glucose LESS THAN 70 milliGRAM(s)/deciliter  glucagon  Injectable 1 milliGRAM(s) IntraMuscular once PRN Glucose LESS THAN 70 milligrams/deciliter      LABS:                        11.4   16.76 )-----------( 593      ( 24 Oct 2018 07:40 )             34.5     Hgb Trend: 11.4<--, 10.8<--, 10.4<--, 10.6<--, 10.7<--  10-24    130<L>  |  89<L>  |  39<H>  ----------------------------<  309<H>  4.7   |  24  |  1.33<H>    Ca    9.3      24 Oct 2018 06:24      Creatinine Trend: 1.33<--, 1.18<--, 1.16<--, 1.19<--, 1.16<--, 1.09<--  PT/INR - ( 24 Oct 2018 07:54 )   PT: 11.8 sec;   INR: 1.04 ratio         PTT - ( 24 Oct 2018 07:54 )  PTT:30.8 sec        MICROBIOLOGY:     RADIOLOGY:  [ ] Reviewed and interpreted by me    A/P: 69 yo M with PMH of HTN, CAD s/p CANDIDO to RCA in 02/2018 in addition to CANDIDO to LAD, LCX, PAD s/p left femoral to below knee popliteal bypass 03/2018, left great toe partial amputation admitted for gangrenous L foot, treated for osteomyelitis, planned for TMA c/c/b NSTEMI on 10/14 and cardiac arrest on 10/24.     #Neuro- obtunded    #CV-  s/p multiple CANDIDO, most recent in 02/2018  - Continue with ASA/Brilinta, continue with high intensity statin  - If planning for transmetatarsal amputation, patient likely will need cardiology clearance as he has multiple cardiac problems. EKG ordered. Last TTE in 02/2018 reviewed EF 65-70%.  - tropinemia is demand in setting of osteo.     NSTEMI 2/2 demand, trops downtrending. asymptomatic. stress test w/ inferior hypokinesis.   - s/p multiple CANDIDO, most recent in 02/2018- Continue with ASA/Brilinta, continue with high intensity statin.   **Upon arrival to MICU pt was found to be pulseless, CPR done for 10 mins, received 1 round of epinephrine after which pt's family elected to stop.     #ID-  s/p b/l angiogram with angioplasty and SFA, above knee pop stentx3 and repeat angiogram with tibioperoneal trunk angioplasty  concern for osteomyelitis   on vanc and zosyn    #Endocrine-  Last A1C in 01/2018 13.2% suggest uncontrolled T2DM complicated by peripheral neuropathy and now with gangrene  - Home medication include Humulin 70/30 50 units in the am and 40 units before dinner and Novolog 15 units before meals, given by his wife, suspecting that uncontrolled T2DM may also be related to poor insulin techniques  - Continue with ISS sliding scale   - Humalog 9 units TIDAC and Lantus 25 units QHS,

## 2018-10-24 NOTE — PROGRESS NOTE ADULT - SUBJECTIVE AND OBJECTIVE BOX
CARDIOLOGY FOLLOW UP - Dr. Gavin    CC no cp/sob       PHYSICAL EXAM:  T(C): 36.9 (10-14-18 @ 04:23), Max: 36.9 (10-14-18 @ 04:23)  HR: 90 (10-14-18 @ 04:23) (79 - 99)  BP: 131/75 (10-14-18 @ 04:23) (120/75 - 148/78)  RR: 18 (10-14-18 @ 04:23) (18 - 18)  SpO2: 93% (10-14-18 @ 04:23) (93% - 98%)  Wt(kg): --  I&O's Summary    13 Oct 2018 07:01  -  14 Oct 2018 07:00  --------------------------------------------------------  IN: 600 mL / OUT: 275 mL / NET: 325 mL        Appearance: Normal	  Cardiovascular: Normal S1 S2,RRR, No JVD, No murmurs  Respiratory: Lungs clear to auscultation	  Gastrointestinal:  Soft, Non-tender, + BS	  Extremities: Normal range of motion, No clubbing, cyanosis or edema        MEDICATIONS  (STANDING):  aspirin enteric coated 81 milliGRAM(s) Oral daily  atorvastatin 40 milliGRAM(s) Oral at bedtime  cyanocobalamin 1000 MICROGram(s) Oral daily  dextrose 50% Injectable 12.5 Gram(s) IV Push once  dextrose 50% Injectable 25 Gram(s) IV Push once  dextrose 50% Injectable 25 Gram(s) IV Push once  docusate sodium 100 milliGRAM(s) Oral three times a day  enoxaparin Injectable 40 milliGRAM(s) SubCutaneous every 24 hours  folic acid 1 milliGRAM(s) Oral daily  gabapentin 300 milliGRAM(s) Oral three times a day  insulin glargine Injectable (LANTUS) 25 Unit(s) SubCutaneous at bedtime  insulin lispro (HumaLOG) corrective regimen sliding scale   SubCutaneous three times a day before meals  insulin lispro (HumaLOG) corrective regimen sliding scale   SubCutaneous at bedtime  insulin lispro Injectable (HumaLOG) 7 Unit(s) SubCutaneous three times a day before meals  metoprolol tartrate 12.5 milliGRAM(s) Oral two times a day  multivitamin 1 Tablet(s) Oral daily  piperacillin/tazobactam IVPB. 3.375 Gram(s) IV Intermittent every 8 hours  senna 2 Tablet(s) Oral at bedtime  sertraline 100 milliGRAM(s) Oral daily  tamsulosin 0.4 milliGRAM(s) Oral at bedtime  ticagrelor 90 milliGRAM(s) Oral two times a day  vancomycin  IVPB 1250 milliGRAM(s) IV Intermittent every 12 hours      TELEMETRY: NSR 80-90S	    ECG:  	  RADIOLOGY:   DIAGNOSTIC TESTING:  [ ] Echocardiogram:  [ ]  Catheterization:  [ ] Stress Test:    OTHER: 	    LABS:	 	                                11.2   13.84 )-----------( 363      ( 14 Oct 2018 06:46 )             32.8     10-14    131<L>  |  97  |  7   ----------------------------<  170<H>  3.5   |  22  |  0.56    Ca    8.6      14 Oct 2018 06:03  Phos  2.8     10-13  Mg     2.0     10-14    TPro  9.1<H>  /  Alb  4.1  /  TBili  0.6  /  DBili  x   /  AST  25  /  ALT  19  /  AlkPhos  96  10-12    PT/INR - ( 13 Oct 2018 04:26 )   PT: 12.7 sec;   INR: 1.17 ratio         PTT - ( 13 Oct 2018 04:26 )  PTT:28.3 sec
CARDIOLOGY FOLLOW UP NOTE - DR. MUNOZ    Subjective:    no cp, no inc sob    PHYSICAL EXAM:  T(C): 36.6 (10-19-18 @ 12:27), Max: 37.3 (10-18-18 @ 20:04)  HR: 84 (10-19-18 @ 12:27) (79 - 94)  BP: 123/79 (10-19-18 @ 12:27) (109/71 - 131/79)  RR: 18 (10-19-18 @ 12:27) (18 - 18)  SpO2: 93% (10-19-18 @ 12:27) (90% - 98%)  Wt(kg): --  I&O's Summary    18 Oct 2018 07:01  -  19 Oct 2018 07:00  --------------------------------------------------------  IN: 2260 mL / OUT: 1600 mL / NET: 660 mL    19 Oct 2018 07:01  -  19 Oct 2018 15:11  --------------------------------------------------------  IN: 840 mL / OUT: 500 mL / NET: 340 mL        Appearance: Normal	  Cardiovascular: Normal S1 S2,RRR, No JVD, No murmurs  Respiratory: basilar crackels  Gastrointestinal:  Soft, Non-tender, + BS	  Extremities: Normal range of motion, edema    MEDICATIONS  (STANDING):  aspirin enteric coated 81 milliGRAM(s) Oral daily  atorvastatin 40 milliGRAM(s) Oral at bedtime  cyanocobalamin 1000 MICROGram(s) Oral daily  dextrose 50% Injectable 12.5 Gram(s) IV Push once  dextrose 50% Injectable 25 Gram(s) IV Push once  dextrose 50% Injectable 25 Gram(s) IV Push once  docusate sodium 100 milliGRAM(s) Oral three times a day  enoxaparin Injectable 40 milliGRAM(s) SubCutaneous daily  folic acid 1 milliGRAM(s) Oral daily  furosemide   Injectable 40 milliGRAM(s) IV Push daily  gabapentin 300 milliGRAM(s) Oral three times a day  insulin glargine Injectable (LANTUS) 25 Unit(s) SubCutaneous at bedtime  insulin lispro (HumaLOG) corrective regimen sliding scale   SubCutaneous three times a day before meals  insulin lispro (HumaLOG) corrective regimen sliding scale   SubCutaneous at bedtime  insulin lispro Injectable (HumaLOG) 9 Unit(s) SubCutaneous three times a day before meals  metoprolol tartrate 25 milliGRAM(s) Oral two times a day  multivitamin 1 Tablet(s) Oral daily  piperacillin/tazobactam IVPB. 3.375 Gram(s) IV Intermittent every 8 hours  senna 2 Tablet(s) Oral at bedtime  sertraline 100 milliGRAM(s) Oral daily  tamsulosin 0.4 milliGRAM(s) Oral at bedtime  ticagrelor 90 milliGRAM(s) Oral two times a day  vancomycin  IVPB 1250 milliGRAM(s) IV Intermittent every 12 hours      TELEMETRY: 	    ECG:  	  RADIOLOGY:   DIAGNOSTIC TESTING:  [ ] Echocardiogram:  [ ] Catheterization:  [ ] Stress Test:    OTHER: 	    LABS:	 	    CARDIAC MARKERS:                                10.3   14.64 )-----------( 380      ( 19 Oct 2018 07:57 )             30.5     10-19    130<L>  |  92<L>  |  21  ----------------------------<  137<H>  3.8   |  26  |  1.09    Ca    8.9      19 Oct 2018 05:26      proBNP:     Lipid Profile:   HgA1c:
Patient is a 68y old  Male who presents with a chief complaint of L foot pain (14 Oct 2018 11:09)      SUBJECTIVE / OVERNIGHT EVENTS:    pain controlled today. without complaints. awaiting plan from podiatry.  TTE pending    MEDICATIONS  (STANDING):  aspirin enteric coated 81 milliGRAM(s) Oral daily  atorvastatin 40 milliGRAM(s) Oral at bedtime  cyanocobalamin 1000 MICROGram(s) Oral daily  dextrose 50% Injectable 12.5 Gram(s) IV Push once  dextrose 50% Injectable 25 Gram(s) IV Push once  dextrose 50% Injectable 25 Gram(s) IV Push once  docusate sodium 100 milliGRAM(s) Oral three times a day  enoxaparin Injectable 40 milliGRAM(s) SubCutaneous every 24 hours  folic acid 1 milliGRAM(s) Oral daily  gabapentin 300 milliGRAM(s) Oral three times a day  insulin glargine Injectable (LANTUS) 25 Unit(s) SubCutaneous at bedtime  insulin lispro (HumaLOG) corrective regimen sliding scale   SubCutaneous three times a day before meals  insulin lispro (HumaLOG) corrective regimen sliding scale   SubCutaneous at bedtime  insulin lispro Injectable (HumaLOG) 9 Unit(s) SubCutaneous three times a day before meals  metoprolol tartrate 12.5 milliGRAM(s) Oral two times a day  multivitamin 1 Tablet(s) Oral daily  piperacillin/tazobactam IVPB. 3.375 Gram(s) IV Intermittent every 8 hours  senna 2 Tablet(s) Oral at bedtime  sertraline 100 milliGRAM(s) Oral daily  tamsulosin 0.4 milliGRAM(s) Oral at bedtime  ticagrelor 90 milliGRAM(s) Oral two times a day  vancomycin  IVPB 1250 milliGRAM(s) IV Intermittent every 12 hours    MEDICATIONS  (PRN):  dextrose 40% Gel 15 Gram(s) Oral once PRN Blood Glucose LESS THAN 70 milliGRAM(s)/deciliter  glucagon  Injectable 1 milliGRAM(s) IntraMuscular once PRN Glucose LESS THAN 70 milligrams/deciliter        CAPILLARY BLOOD GLUCOSE      POCT Blood Glucose.: 242 mg/dL (14 Oct 2018 16:41)  POCT Blood Glucose.: 236 mg/dL (14 Oct 2018 11:54)  POCT Blood Glucose.: 168 mg/dL (14 Oct 2018 08:03)  POCT Blood Glucose.: 316 mg/dL (13 Oct 2018 21:05)    I&O's Summary    13 Oct 2018 07:01  -  14 Oct 2018 07:00  --------------------------------------------------------  IN: 600 mL / OUT: 275 mL / NET: 325 mL    14 Oct 2018 07:01  -  14 Oct 2018 17:25  --------------------------------------------------------  IN: 480 mL / OUT: 450 mL / NET: 30 mL      T 98.2, P 91, /78, R 18, O2 96% RA  PHYSICAL EXAM:  	Head: Normocephalic  	Eyes:  PERRLA, clear conjunctiva. EOMI, no ptosis.    	Respiratory: Bilateral lung clear to auscultation, no crackles, no wheezes, no rhonchi   	Cardiovascular: S1/S2 auscultated, no murmur, or gallop. Rhythm is regular. There is no peripheral edema   	Abdomen: Soft, non-tender, nondistended, no guarding or rebound tenderness. Active bowel sounds in all 4 quadrants.     	Extremities: Dry flaky skin on bilateral LE. R foot without ulcers. L foot cool to touch, with partial amputation of great toe, skin appears dusky of entire left 4th and 5th toe, mild drainage, foul smelling. Bilateral DP/PT pulse not appreciated.   Neurological: AOAx4. CN2-12 grosslly intact.    LABS:                        11.2   13.84 )-----------( 363      ( 14 Oct 2018 06:46 )             32.8     10-14    131<L>  |  97  |  7   ----------------------------<  170<H>  3.5   |  22  |  0.56    Ca    8.6      14 Oct 2018 06:03  Phos  2.8     10-13  Mg     2.0     10-14      PT/INR - ( 13 Oct 2018 04:26 )   PT: 12.7 sec;   INR: 1.17 ratio         PTT - ( 13 Oct 2018 04:26 )  PTT:28.3 sec  CARDIAC MARKERS ( 13 Oct 2018 04:26 )  x     / x     / 614 U/L / x     / 35.0 ng/mL          RADIOLOGY & ADDITIONAL TESTS:    Imaging Personally Reviewed:    Consultant(s) Notes Reviewed:  podiatry; cards    Care Discussed with Consultants/Other Providers:  NP
CARDIOLOGY FOLLOW UP - Dr. Gavin    CC no chest pain or sob        PHYSICAL EXAM:  T(C): 37.5 (10-15-18 @ 17:12), Max: 38.1 (10-15-18 @ 16:05)  HR: 99 (10-15-18 @ 17:12) (92 - 106)  BP: 144/84 (10-15-18 @ 17:12) (130/72 - 159/82)  RR: 18 (10-15-18 @ 17:12) (18 - 18)  SpO2: 93% (10-15-18 @ 17:12) (91% - 93%)  Wt(kg): --  I&O's Summary    14 Oct 2018 07:01  -  15 Oct 2018 07:00  --------------------------------------------------------  IN: 1170 mL / OUT: 1150 mL / NET: 20 mL    15 Oct 2018 07:01  -  15 Oct 2018 17:24  --------------------------------------------------------  IN: 520 mL / OUT: 400 mL / NET: 120 mL        Appearance: Normal	  Cardiovascular: Normal S1 S2,RRR, tachycardic   Respiratory: Lungs clear to auscultation	  Gastrointestinal:  Soft, Non-tender, + BS	  Extremities: Normal range of motion, No clubbing, cyanosis or edema        MEDICATIONS  (STANDING):  aspirin enteric coated 81 milliGRAM(s) Oral daily  atorvastatin 40 milliGRAM(s) Oral at bedtime  cyanocobalamin 1000 MICROGram(s) Oral daily  dextrose 50% Injectable 12.5 Gram(s) IV Push once  dextrose 50% Injectable 25 Gram(s) IV Push once  dextrose 50% Injectable 25 Gram(s) IV Push once  docusate sodium 100 milliGRAM(s) Oral three times a day  folic acid 1 milliGRAM(s) Oral daily  gabapentin 300 milliGRAM(s) Oral three times a day  insulin glargine Injectable (LANTUS) 25 Unit(s) SubCutaneous at bedtime  insulin lispro (HumaLOG) corrective regimen sliding scale   SubCutaneous three times a day before meals  insulin lispro (HumaLOG) corrective regimen sliding scale   SubCutaneous at bedtime  insulin lispro Injectable (HumaLOG) 9 Unit(s) SubCutaneous three times a day before meals  metoprolol tartrate 25 milliGRAM(s) Oral two times a day  multivitamin 1 Tablet(s) Oral daily  piperacillin/tazobactam IVPB. 3.375 Gram(s) IV Intermittent every 8 hours  senna 2 Tablet(s) Oral at bedtime  sertraline 100 milliGRAM(s) Oral daily  tamsulosin 0.4 milliGRAM(s) Oral at bedtime  ticagrelor 90 milliGRAM(s) Oral two times a day  vancomycin  IVPB 1250 milliGRAM(s) IV Intermittent every 12 hours      TELEMETRY: 	    ECG:  	  RADIOLOGY:   DIAGNOSTIC TESTING:  [ ] Echocardiogram:    < from: TTE with Doppler (w/Cont) (10.15.18 @ 13:03) >  EF (Visual Estimate): 40-45 %  ------------------------------------------------------------------------  Observations:  Mitral Valve: Mitral annular calcification, otherwise  normal mitral valve. Minimal mitral regurgitation.  Aortic Valve/Aorta: Calcified trileaflet aortic valve with  normal opening. Minimal aortic regurgitation.  Normal aortic root (Ao: 3.0 cm at the sinuses of Valsalva).  Left Atrium: Normal left atrium.  LA volume index = 26  cc/m2.  Left Ventricle: Endocardial visualization enhanced with  intravenous injection of Ultrasonic Enhancing Agent  (Definity).  Mild to moderate segmental left ventricular  systolic dysfunction. The basal inferior wall is  hypokinetic.  Normal left ventricular internal dimensions  and wall thicknesses.  Right Heart: Normal right atrium. Normal right ventricular  size and function. Normal tricuspid valve. Mild tricuspid  regurgitation. Pulmonic valve not well visualized.  Pericardium/Pleura: Normal pericardium with no pericardial  effusion.  Hemodynamic: Estimated right atrial pressure is 8 mm Hg.  Estimated right ventricular systolic pressure equals 47 mm  Hg, assuming right atrial pressure equals 8 mm Hg,  consistent with mild pulmonary hypertension.  ------------------------------------------------------------------------  Conclusions:  1. Normal left ventricular internal dimensions and wall  thicknesses.  2. Endocardial visualization enhanced with intravenous  injection of Ultrasonic Enhancing Agent (Definity).  Mild  to moderate segmental left ventricular systolic  dysfunction. The basal inferior wall is hypokinetic.  3. Normal right ventricular size and function.  *** Compared with echocardiogram of 2/4/2018, it appears  that LV function has declined with new wall motion  abnormalities on today's study. Echo contrast used today,  was not used in previous study therefore accurate  comparison is limited.  ------------------------------------------------------------------------  Confirmed on  10/15/2018 - 14:56:50 by Ирина Min M.D.  ------------------------------------------------------------------------    < end of copied text >  [ ]  Catheterization:  [ ] Stress Test:    OTHER: 	    LABS:	 	                                10.9   13.10 )-----------( 396      ( 15 Oct 2018 07:51 )             32.0     10-15    129<L>  |  95<L>  |  8   ----------------------------<  183<H>  3.7   |  22  |  0.65    Ca    8.5      15 Oct 2018 06:29  Mg     2.0     10-14
CARDIOLOGY FOLLOW UP - Dr. Gavin    CC no cp/sob       PHYSICAL EXAM:  T(C): 36.6 (10-22-18 @ 11:49), Max: 37.1 (10-22-18 @ 04:26)  HR: 73 (10-22-18 @ 11:49) (70 - 86)  BP: 131/78 (10-22-18 @ 11:49) (118/68 - 135/81)  RR: 18 (10-22-18 @ 11:49) (18 - 18)  SpO2: 96% (10-22-18 @ 11:49) (92% - 96%)  Wt(kg): --  I&O's Summary    21 Oct 2018 07:01  -  22 Oct 2018 07:00  --------------------------------------------------------  IN: 340 mL / OUT: 300 mL / NET: 40 mL    22 Oct 2018 07:01  -  22 Oct 2018 11:58  --------------------------------------------------------  IN: 285 mL / OUT: 1275 mL / NET: -990 mL        Appearance: Normal	  Cardiovascular: Normal S1 S2,RRR, No JVD, No murmurs  Respiratory: Lungs clear to auscultation	  Gastrointestinal:  Soft, Non-tender, + BS	  Extremities: Normal range of motion, No clubbing, cyanosis or edema        MEDICATIONS  (STANDING):  aspirin enteric coated 81 milliGRAM(s) Oral daily  atorvastatin 40 milliGRAM(s) Oral at bedtime  cyanocobalamin 1000 MICROGram(s) Oral daily  dextrose 50% Injectable 12.5 Gram(s) IV Push once  dextrose 50% Injectable 25 Gram(s) IV Push once  dextrose 50% Injectable 25 Gram(s) IV Push once  docusate sodium 100 milliGRAM(s) Oral three times a day  enoxaparin Injectable 40 milliGRAM(s) SubCutaneous daily  folic acid 1 milliGRAM(s) Oral daily  furosemide   Injectable 40 milliGRAM(s) IV Push daily  gabapentin 300 milliGRAM(s) Oral three times a day  insulin glargine Injectable (LANTUS) 25 Unit(s) SubCutaneous at bedtime  insulin lispro (HumaLOG) corrective regimen sliding scale   SubCutaneous three times a day before meals  insulin lispro (HumaLOG) corrective regimen sliding scale   SubCutaneous at bedtime  insulin lispro Injectable (HumaLOG) 9 Unit(s) SubCutaneous three times a day before meals  metoprolol tartrate 25 milliGRAM(s) Oral two times a day  multivitamin 1 Tablet(s) Oral daily  piperacillin/tazobactam IVPB. 3.375 Gram(s) IV Intermittent every 8 hours  senna 2 Tablet(s) Oral at bedtime  sertraline 100 milliGRAM(s) Oral daily  tamsulosin 0.4 milliGRAM(s) Oral at bedtime  ticagrelor 90 milliGRAM(s) Oral two times a day  vancomycin  IVPB 1000 milliGRAM(s) IV Intermittent every 12 hours      TELEMETRY: NSR 60-80S 	    ECG:  	  RADIOLOGY:   DIAGNOSTIC TESTING:  [ ] Echocardiogram:  [ ]  Catheterization:  [ ] Stress Test:    OTHER: 	    LABS:	 	                                10.4   16.66 )-----------( 459      ( 22 Oct 2018 07:26 )             31.6     10-22    131<L>  |  92<L>  |  32<H>  ----------------------------<  164<H>  4.2   |  25  |  1.16    Ca    9.1      22 Oct 2018 05:32
CARDIOLOGY FOLLOW UP - Dr. Gavin    CC no cp/sob       PHYSICAL EXAM:  T(C): 36.7 (10-16-18 @ 08:23), Max: 38.1 (10-15-18 @ 16:05)  HR: 86 (10-16-18 @ 08:23) (86 - 109)  BP: 106/69 (10-16-18 @ 08:23) (106/69 - 159/82)  RR: 18 (10-16-18 @ 08:23) (18 - 18)  SpO2: 96% (10-16-18 @ 08:23) (91% - 96%)  Wt(kg): --  I&O's Summary    15 Oct 2018 07:01  -  16 Oct 2018 07:00  --------------------------------------------------------  IN: 1380 mL / OUT: 800 mL / NET: 580 mL    16 Oct 2018 07:01  -  16 Oct 2018 11:54  --------------------------------------------------------  IN: 240 mL / OUT: 450 mL / NET: -210 mL        Appearance: Normal	  Cardiovascular: Normal S1 S2,RRR, No JVD, No murmurs  Respiratory: Lungs clear to auscultation	  Gastrointestinal:  Soft, Non-tender, + BS	  Extremities: Normal range of motion, No clubbing, cyanosis or edema        MEDICATIONS  (STANDING):  aspirin enteric coated 81 milliGRAM(s) Oral daily  atorvastatin 40 milliGRAM(s) Oral at bedtime  cyanocobalamin 1000 MICROGram(s) Oral daily  dextrose 50% Injectable 12.5 Gram(s) IV Push once  dextrose 50% Injectable 25 Gram(s) IV Push once  dextrose 50% Injectable 25 Gram(s) IV Push once  docusate sodium 100 milliGRAM(s) Oral three times a day  enoxaparin Injectable 40 milliGRAM(s) SubCutaneous daily  folic acid 1 milliGRAM(s) Oral daily  gabapentin 300 milliGRAM(s) Oral three times a day  insulin glargine Injectable (LANTUS) 25 Unit(s) SubCutaneous at bedtime  insulin lispro (HumaLOG) corrective regimen sliding scale   SubCutaneous three times a day before meals  insulin lispro (HumaLOG) corrective regimen sliding scale   SubCutaneous at bedtime  insulin lispro Injectable (HumaLOG) 9 Unit(s) SubCutaneous three times a day before meals  metoprolol tartrate 25 milliGRAM(s) Oral two times a day  multivitamin 1 Tablet(s) Oral daily  piperacillin/tazobactam IVPB. 3.375 Gram(s) IV Intermittent every 8 hours  senna 2 Tablet(s) Oral at bedtime  sertraline 100 milliGRAM(s) Oral daily  tamsulosin 0.4 milliGRAM(s) Oral at bedtime  ticagrelor 90 milliGRAM(s) Oral two times a day  vancomycin  IVPB 1250 milliGRAM(s) IV Intermittent every 12 hours      TELEMETRY: NSR-ST 	    ECG:  	  RADIOLOGY:   DIAGNOSTIC TESTING:  [ ] Echocardiogram:  [ ]  Catheterization:  [ ] Stress Test:    OTHER: 	    LABS:	 	                                10.9   14.43 )-----------( 405      ( 16 Oct 2018 08:13 )             32.7     10-16    130<L>  |  93<L>  |  11  ----------------------------<  180<H>  4.1   |  23  |  0.75    Ca    8.4      16 Oct 2018 06:33      PTT - ( 16 Oct 2018 10:43 )  PTT:28.4 sec
CARDIOLOGY FOLLOW UP - Dr. Gavin    CC no cp/sob       PHYSICAL EXAM:  T(C): 37.4 (10-17-18 @ 08:33), Max: 37.4 (10-16-18 @ 20:27)  HR: 76 (10-17-18 @ 08:33) (76 - 100)  BP: 114/72 (10-17-18 @ 08:33) (113/70 - 129/80)  RR: 18 (10-17-18 @ 08:33) (18 - 18)  SpO2: 92% (10-17-18 @ 08:33) (92% - 94%)  Wt(kg): --  I&O's Summary    16 Oct 2018 07:01  -  17 Oct 2018 07:00  --------------------------------------------------------  IN: 1370 mL / OUT: 2300 mL / NET: -930 mL    17 Oct 2018 07:01  -  17 Oct 2018 10:39  --------------------------------------------------------  IN: 280 mL / OUT: 0 mL / NET: 280 mL        Appearance: Normal	  Cardiovascular: Normal S1 S2,RRR, No JVD, No murmurs  Respiratory: Lungs clear to auscultation	  Gastrointestinal:  Soft, Non-tender, + BS	  Extremities: Normal range of motion, No clubbing, cyanosis or edema        MEDICATIONS  (STANDING):  ALBUTerol    0.083%. 2.5 milliGRAM(s) Nebulizer once  aspirin enteric coated 81 milliGRAM(s) Oral daily  atorvastatin 40 milliGRAM(s) Oral at bedtime  cyanocobalamin 1000 MICROGram(s) Oral daily  dextrose 50% Injectable 12.5 Gram(s) IV Push once  dextrose 50% Injectable 25 Gram(s) IV Push once  dextrose 50% Injectable 25 Gram(s) IV Push once  docusate sodium 100 milliGRAM(s) Oral three times a day  enoxaparin Injectable 40 milliGRAM(s) SubCutaneous daily  folic acid 1 milliGRAM(s) Oral daily  furosemide   Injectable 40 milliGRAM(s) IV Push once  gabapentin 300 milliGRAM(s) Oral three times a day  insulin glargine Injectable (LANTUS) 25 Unit(s) SubCutaneous at bedtime  insulin lispro (HumaLOG) corrective regimen sliding scale   SubCutaneous three times a day before meals  insulin lispro (HumaLOG) corrective regimen sliding scale   SubCutaneous at bedtime  insulin lispro Injectable (HumaLOG) 9 Unit(s) SubCutaneous three times a day before meals  metoprolol tartrate 25 milliGRAM(s) Oral two times a day  multivitamin 1 Tablet(s) Oral daily  piperacillin/tazobactam IVPB. 3.375 Gram(s) IV Intermittent every 8 hours  senna 2 Tablet(s) Oral at bedtime  sertraline 100 milliGRAM(s) Oral daily  tamsulosin 0.4 milliGRAM(s) Oral at bedtime  ticagrelor 90 milliGRAM(s) Oral two times a day  vancomycin  IVPB 1250 milliGRAM(s) IV Intermittent every 12 hours      TELEMETRY: NSR 	    ECG:  	  RADIOLOGY:   DIAGNOSTIC TESTING:  [ ] Echocardiogram:  [ ]  Catheterization:  [ ] Stress Test:    OTHER: 	    LABS:	 	                                10.6   13.72 )-----------( 394      ( 17 Oct 2018 09:03 )             30.6     10-17    132<L>  |  94<L>  |  16  ----------------------------<  135<H>  3.6   |  25  |  0.93    Ca    8.9      17 Oct 2018 06:20    TPro  7.1  /  Alb  3.1<L>  /  TBili  0.5  /  DBili  x   /  AST  20  /  ALT  22  /  AlkPhos  63  10-17    PT/INR - ( 17 Oct 2018 09:01 )   PT: 12.9 sec;   INR: 1.14 ratio         PTT - ( 16 Oct 2018 10:43 )  PTT:28.4 sec
CARDIOLOGY FOLLOW UP - Dr. Gavin    CC no cp/sob      PHYSICAL EXAM:  T(C): 36.3 (10-23-18 @ 08:14), Max: 37 (10-23-18 @ 00:01)  HR: 64 (10-23-18 @ 08:14) (64 - 88)  BP: 104/67 (10-23-18 @ 08:14) (104/67 - 136/79)  RR: 18 (10-23-18 @ 08:14) (18 - 18)  SpO2: 97% (10-23-18 @ 08:14) (93% - 97%)  Wt(kg): --  I&O's Summary    22 Oct 2018 07:01  -  23 Oct 2018 07:00  --------------------------------------------------------  IN: 1175 mL / OUT: 2650 mL / NET: -1475 mL    23 Oct 2018 07:01  -  23 Oct 2018 10:59  --------------------------------------------------------  IN: 0 mL / OUT: 300 mL / NET: -300 mL        Appearance: Normal	  Cardiovascular: Normal S1 S2,RRR, No JVD, No murmurs  Respiratory: Lungs clear to auscultation	  Gastrointestinal:  Soft, Non-tender, + BS	  Extremities: Normal range of motion, No clubbing, cyanosis or edema        MEDICATIONS  (STANDING):  aspirin enteric coated 81 milliGRAM(s) Oral daily  atorvastatin 40 milliGRAM(s) Oral at bedtime  cyanocobalamin 1000 MICROGram(s) Oral daily  dextrose 50% Injectable 12.5 Gram(s) IV Push once  dextrose 50% Injectable 25 Gram(s) IV Push once  dextrose 50% Injectable 25 Gram(s) IV Push once  docusate sodium 100 milliGRAM(s) Oral three times a day  enoxaparin Injectable 40 milliGRAM(s) SubCutaneous daily  folic acid 1 milliGRAM(s) Oral daily  furosemide   Injectable 40 milliGRAM(s) IV Push daily  gabapentin 300 milliGRAM(s) Oral three times a day  insulin glargine Injectable (LANTUS) 25 Unit(s) SubCutaneous at bedtime  insulin lispro (HumaLOG) corrective regimen sliding scale   SubCutaneous three times a day before meals  insulin lispro (HumaLOG) corrective regimen sliding scale   SubCutaneous at bedtime  insulin lispro Injectable (HumaLOG) 9 Unit(s) SubCutaneous three times a day before meals  metoprolol tartrate 25 milliGRAM(s) Oral two times a day  multivitamin 1 Tablet(s) Oral daily  piperacillin/tazobactam IVPB. 3.375 Gram(s) IV Intermittent every 8 hours  senna 2 Tablet(s) Oral at bedtime  sertraline 100 milliGRAM(s) Oral daily  sodium chloride 0.9%. 1000 milliLiter(s) (30 mL/Hr) IV Continuous <Continuous>  tamsulosin 0.4 milliGRAM(s) Oral at bedtime  ticagrelor 90 milliGRAM(s) Oral two times a day  vancomycin  IVPB 1000 milliGRAM(s) IV Intermittent every 12 hours      TELEMETRY: NSR 	    ECG:  	  RADIOLOGY:   DIAGNOSTIC TESTING:  [ ] Echocardiogram:  [ ]  Catheterization:  [ ] Stress Test:    OTHER: 	    LABS:	 	                                10.8   15.14 )-----------( 490      ( 23 Oct 2018 08:11 )             33.2     10-23    130<L>  |  92<L>  |  32<H>  ----------------------------<  177<H>  4.3   |  26  |  1.18    Ca    9.1      23 Oct 2018 04:58      PT/INR - ( 23 Oct 2018 08:14 )   PT: 11.6 sec;   INR: 1.03 ratio         PTT - ( 23 Oct 2018 08:14 )  PTT:32.2 sec
CARDIOLOGY FOLLOW UP - Dr. Gavin    CC no cp/sob, resting comfortably lying flat       PHYSICAL EXAM:  T(C): 36.6 (10-18-18 @ 08:33), Max: 37.7 (10-17-18 @ 22:44)  HR: 83 (10-18-18 @ 08:33) (77 - 118)  BP: 100/64 (10-18-18 @ 08:33) (100/64 - 160/90)  RR: 18 (10-18-18 @ 08:33) (17 - 20)  SpO2: 92% (10-18-18 @ 08:33) (92% - 99%)  Wt(kg): --  I&O's Summary    17 Oct 2018 07:01  -  18 Oct 2018 07:00  --------------------------------------------------------  IN: 1520 mL / OUT: 8101 mL / NET: -6581 mL    18 Oct 2018 07:01  -  18 Oct 2018 11:25  --------------------------------------------------------  IN: 240 mL / OUT: 0 mL / NET: 240 mL        Appearance: Normal	  Cardiovascular: Normal S1 S2,RRR, No JVD, No murmurs  Respiratory: Lungs clear to auscultation	  Gastrointestinal:  Soft, Non-tender, + BS	  Extremities: Normal range of motion, No clubbing, cyanosis or edema        MEDICATIONS  (STANDING):  aspirin enteric coated 81 milliGRAM(s) Oral daily  atorvastatin 40 milliGRAM(s) Oral at bedtime  cyanocobalamin 1000 MICROGram(s) Oral daily  dextrose 50% Injectable 12.5 Gram(s) IV Push once  dextrose 50% Injectable 25 Gram(s) IV Push once  dextrose 50% Injectable 25 Gram(s) IV Push once  docusate sodium 100 milliGRAM(s) Oral three times a day  enoxaparin Injectable 40 milliGRAM(s) SubCutaneous daily  folic acid 1 milliGRAM(s) Oral daily  furosemide   Injectable 40 milliGRAM(s) IV Push daily  gabapentin 300 milliGRAM(s) Oral three times a day  insulin glargine Injectable (LANTUS) 25 Unit(s) SubCutaneous at bedtime  insulin lispro (HumaLOG) corrective regimen sliding scale   SubCutaneous three times a day before meals  insulin lispro (HumaLOG) corrective regimen sliding scale   SubCutaneous at bedtime  insulin lispro Injectable (HumaLOG) 9 Unit(s) SubCutaneous three times a day before meals  metoprolol tartrate 25 milliGRAM(s) Oral two times a day  multivitamin 1 Tablet(s) Oral daily  piperacillin/tazobactam IVPB. 3.375 Gram(s) IV Intermittent every 8 hours  senna 2 Tablet(s) Oral at bedtime  sertraline 100 milliGRAM(s) Oral daily  tamsulosin 0.4 milliGRAM(s) Oral at bedtime  ticagrelor 90 milliGRAM(s) Oral two times a day  vancomycin  IVPB 1250 milliGRAM(s) IV Intermittent every 12 hours      TELEMETRY: NSR 	    ECG:  	  RADIOLOGY:   DIAGNOSTIC TESTING:  [ ] Echocardiogram:  [ ]  Catheterization:  [ ] Stress Test:    OTHER: 	  < from: Cardiac Cath Lab - Adult (10.17.18 @ 13:50) >  NDICATIONS: LEV ulcer/gangrene location: heal/mid foot.  PROCEDURE:  --  [id number] angiography.  --  Right leg angiography.  --  Sonosite - Diagnostic.  --  PTA Femoral - Popliteal Arteries.  --  PTA each additional Peripheral Vessel.  --  Hemostasis with Mynx-Intervention.  --  Intervention on left popliteal: self-expanding stent.  --  Intervention on left superficial femoral: self-expanding stent.  --  Intervention on left superficial femoral: self-expanding stent.  TECHNIQUE: Cardiac catheterization performed electively.  Local anesthetic given. Right femoral artery access. Buwpc-rnuz-[id number]  angiography.A catheter was positioned. Right leg angiography. A catheter  was positioned. Sonosite - Diagnostic. A self-expanding stent was  performed on the 100 % lesion in the left popliteal artery. Following  intervention there was a 1 % residual stenosis. Vessel setup was  performed. A AMPLATZ SUPER STIFF  X 260 wire was used to cross the  lesion. A 5 X 120 X 120 EVERFLEX stent was placed across the lesion and  deployed. Vessel setup was performed. A AMPLATZ SUPER STIFF  X 260  wire was used to cross the lesion. Balloon angioplasty was performed, with  1 inflations and a maximum inflation pressure of 10 edwin. A self-expanding  stent was performed on the 100 % lesion in the left superficial femoral  artery. Following intervention there was a 1 % residual stenosis. Vessel  setup was performed. A AMPLATZ SUPER STIFF  X 260 wire was used to  cross the lesion. A 5 X 120 X 120 EVERFLEX stent was placed across the  lesion and deployed. Vessel setup was performed. A AMPLATZ SUPER STIFF STR  035 X 260 wire was used to cross the lesion. Balloon angioplasty was  performed, with 1 inflations and a maximum inflation pressure of 10 edwin. A  self-expanding stent was performed on the 100 % lesion in the left  superficial femoral artery. Following intervention there was a 1 %  residual stenosis. Vessel setup was performed. A AMPLATZ SUPER STIFF STR  035 X 260 wire was used to cross the lesion. A 6 X 200 X 120 EVERFLEX  stent was placed across the lesion and deployed. Vessel setup was  performed. A AMPLATZ SUPER STIFF  X 260 wire was used to cross the  lesion. A 6 X 80 X 120 EVERFLEX stent was placed across the lesion and  deployed. Vessel setup was performed. A AMPLATZ SUPER STIFF  X 260  wire was used to cross the lesion. Balloon angioplasty was performed,  using a 6 X 200 X 135CM MUSTANG balloon, with 2 inflations and a maximum  inflation pressure of 10 edwin. PTA Femoral - Popliteal Arteries. PTA each  additional Peripheral Vessel. Hemostasis with Mynx-Intervention.  CONTRAST GIVEN: Visipaque 20 ml. Visipaque 47 ml.  MEDICATIONS GIVEN: Fentanyl, 25 mcg, IV. Heparin, 6000 units, IV. Heparin,  1000 units, IV. Heparin, 1000 units, IV.  LEFT LOWER EXTREMITY VESSELS: thrombosed bypass.  50% cfa lesion  diffuse stenosis prox sfa with distal occlusion (20 cm)  above knee pop reconstitution, then 99 % tp trunk stenosis, dominant  peroneal runoff Left superficial femoral: There was a 100 % stenosis. Left  popliteal: There was a 100 % stenosis.  RIGHT LOWER EXTREMITY VESSELS: cfa access confirmed  INTERVENTIONAL RECOMMENDATIONS: cont asa/brillinta.  will need to return for repeat angio and tibial revascularization  Prepared and signed by  Dimas Mcconnell M.D.    < end of copied text >    LABS:	 	                                10.6   13.75 )-----------( 399      ( 18 Oct 2018 07:51 )             31.5     10-18    131<L>  |  92<L>  |  23  ----------------------------<  267<H>  4.1   |  25  |  1.03    Ca    8.8      18 Oct 2018 06:16    TPro  6.5  /  Alb  2.5<L>  /  TBili  x   /  DBili  x   /  AST  x   /  ALT  x   /  AlkPhos  x   10-17    PT/INR - ( 17 Oct 2018 09:01 )   PT: 12.9 sec;   INR: 1.14 ratio
CARDIOLOGY FOLLOW UP NOTE - DR. MUNOZ    Subjective:    no sob,no cp    PHYSICAL EXAM:  T(C): 37 (10-20-18 @ 04:02), Max: 37 (10-20-18 @ 04:02)  HR: 89 (10-20-18 @ 06:03) (84 - 99)  BP: 132/75 (10-20-18 @ 06:03) (123/74 - 132/75)  RR: 18 (10-20-18 @ 04:02) (18 - 18)  SpO2: 93% (10-20-18 @ 04:02) (91% - 95%)  Wt(kg): --  I&O's Summary    19 Oct 2018 07:01  -  20 Oct 2018 07:00  --------------------------------------------------------  IN: 1690 mL / OUT: 500 mL / NET: 1190 mL    20 Oct 2018 07:01  -  20 Oct 2018 10:44  --------------------------------------------------------  IN: 0 mL / OUT: 1045 mL / NET: -1045 mL        Appearance: Normal	  Cardiovascular: Normal S1 S2,RRR, No JVD, No murmurs  Respiratory: dec bs bases  Gastrointestinal:  Soft, Non-tender, + BS	  Extremities: Normal range of motion, No clubbing, cyanosis or edema  Vascular: Peripheral pulses palpable 2+ bilaterally    MEDICATIONS  (STANDING):  aspirin enteric coated 81 milliGRAM(s) Oral daily  atorvastatin 40 milliGRAM(s) Oral at bedtime  cyanocobalamin 1000 MICROGram(s) Oral daily  dextrose 50% Injectable 12.5 Gram(s) IV Push once  dextrose 50% Injectable 25 Gram(s) IV Push once  dextrose 50% Injectable 25 Gram(s) IV Push once  docusate sodium 100 milliGRAM(s) Oral three times a day  enoxaparin Injectable 40 milliGRAM(s) SubCutaneous daily  folic acid 1 milliGRAM(s) Oral daily  furosemide   Injectable 40 milliGRAM(s) IV Push daily  gabapentin 300 milliGRAM(s) Oral three times a day  insulin glargine Injectable (LANTUS) 25 Unit(s) SubCutaneous at bedtime  insulin lispro (HumaLOG) corrective regimen sliding scale   SubCutaneous three times a day before meals  insulin lispro (HumaLOG) corrective regimen sliding scale   SubCutaneous at bedtime  insulin lispro Injectable (HumaLOG) 9 Unit(s) SubCutaneous three times a day before meals  metoprolol tartrate 25 milliGRAM(s) Oral two times a day  multivitamin 1 Tablet(s) Oral daily  piperacillin/tazobactam IVPB. 3.375 Gram(s) IV Intermittent every 8 hours  senna 2 Tablet(s) Oral at bedtime  sertraline 100 milliGRAM(s) Oral daily  tamsulosin 0.4 milliGRAM(s) Oral at bedtime  ticagrelor 90 milliGRAM(s) Oral two times a day      TELEMETRY: 	    ECG:  	  RADIOLOGY:   DIAGNOSTIC TESTING:  [ ] Echocardiogram:  [ ] Catheterization:  [ ] Stress Test:    OTHER: 	    LABS:	 	    CARDIAC MARKERS:                                10.7   14.52 )-----------( 422      ( 20 Oct 2018 08:18 )             31.8     10-20    130<L>  |  90<L>  |  26<H>  ----------------------------<  159<H>  3.6   |  24  |  1.16    Ca    8.9      20 Oct 2018 06:53      proBNP:     Lipid Profile:   HgA1c:
CC: Patient is a 68y old  Male who presents with a chief complaint of L foot pain (18 Oct 2018 11:25)    ID following for left foot gangrene    Interval History/ROS: Patient has no complaints. No longer with fevers. s/p angiogram extremity bilateral on 10/17/2018, angioplasty and stent of left femoral artery, podiatry following for possible TMA.      Rest of ROS negative.    Allergies  No Known Allergies    ANTIMICROBIALS:  piperacillin/tazobactam IVPB. 3.375 every 8 hours  vancomycin  IVPB 1250 every 12 hours    PE:    Vital Signs Last 24 Hrs  T(C): 37.2 (18 Oct 2018 16:01), Max: 37.7 (17 Oct 2018 22:44)  T(F): 99 (18 Oct 2018 16:01), Max: 99.9 (17 Oct 2018 22:44)  HR: 93 (18 Oct 2018 16:01) (77 - 118)  BP: 109/71 (18 Oct 2018 16:01) (100/64 - 160/90)  BP(mean): --  RR: 18 (18 Oct 2018 16:01) (17 - 20)  SpO2: 93% (18 Oct 2018 16:01) (92% - 99%)    Gen: AOx3, NAD, non-toxic, pleasant  CV: S1+S2 normal, no murmurs  Resp: Clear bilat, no resp distress  Abd: Soft, nontender, +BS  Ext: left foot grangrene of toes, s/p prior amputation of hallux intact  : No Dominique  IV/Skin: No thrombophlebitis  Neuro: no focal deficits    LABS:                          10.6   13.75 )-----------( 399      ( 18 Oct 2018 07:51 )             31.5       10-18    131<L>  |  92<L>  |  23  ----------------------------<  267<H>  4.1   |  25  |  1.03    Ca    8.8      18 Oct 2018 06:16    TPro  6.5  /  Alb  2.5<L>  /  TBili  x   /  DBili  x   /  AST  x   /  ALT  x   /  AlkPhos  x   10-17      Urinalysis Basic - ( 16 Oct 2018 21:25 )    Color: x / Appearance: x / SG: x / pH: x  Gluc: x / Ketone: x  / Bili: x / Urobili: x   Blood: x / Protein: x / Nitrite: x   Leuk Esterase: x / RBC: 2 /hpf / WBC 1 /hpf   Sq Epi: x / Non Sq Epi: 0 /hpf / Bacteria: Negative      MICROBIOLOGY:  Vancomycin Level, Trough: 18.6 ug/mL (10-18-18 @ 06:16)  v  .Blood Blood-Peripheral  10-12-18   No growth at 5 days.  --  --    RADIOLOGY:    < from: [facility] Physiol Extremity Lower 3+ Level, BI (10.15.18 @ 11:37) >  Impression: The quality of the examination is adversely impacted on the   left by the noncompressible nature of the arteries.  There is bilateral lower extremity arterial vascular disease affecting   the femoral popliteal segments.  The left posterior tibial artery is probably occluded.        < end of copied text >
CC: Patient is a 68y old  Male who presents with a chief complaint of L foot pain (19 Oct 2018 08:18)    ID following for left foot gangrene    Interval History/ROS: Patient feeling better today. Denies fever, chills.    Rest of ROS negative.    Allergies  No Known Allergies    ANTIMICROBIALS:  piperacillin/tazobactam IVPB. 3.375 every 8 hours  vancomycin  IVPB 1250 every 12 hours    PE:    Vital Signs Last 24 Hrs  T(C): 36.6 (19 Oct 2018 12:27), Max: 37.3 (18 Oct 2018 20:04)  T(F): 97.9 (19 Oct 2018 12:27), Max: 99.1 (18 Oct 2018 20:04)  HR: 84 (19 Oct 2018 12:27) (79 - 94)  BP: 123/79 (19 Oct 2018 12:27) (109/71 - 131/79)  BP(mean): --  RR: 18 (19 Oct 2018 12:27) (18 - 18)  SpO2: 93% (19 Oct 2018 12:27) (90% - 98%)    Gen: AOx3, NAD, non-toxic, pleasant  CV: S1+S2 normal, no murmurs  Resp: Clear bilat, no resp distress  Abd: Soft, nontender, +BS  Ext: left foot grangrene of toes, s/p prior amputation of hallux intact  : No Dominique  IV/Skin: No thrombophlebitis  Neuro: no focal deficits    LABS:                          10.3   14.64 )-----------( 380      ( 19 Oct 2018 07:57 )             30.5       10-19    130<L>  |  92<L>  |  21  ----------------------------<  137<H>  3.8   |  26  |  1.09    Ca    8.9      19 Oct 2018 05:26      MICROBIOLOGY:  v  .Blood Blood-Peripheral  10-12-18   No growth at 5 days.  --  --    RADIOLOGY:    < from: [facility] Physiol Extremity Lower 3+ Level, BI (10.15.18 @ 11:37) >  Impression: The quality of the examination is adversely impacted on the   left by the noncompressible nature of the arteries.  There is bilateral lower extremity arterial vascular disease affecting   the femoral popliteal segments.  The left posterior tibial artery is probably occluded.    < end of copied text >
CC: Patient is a 68y old  Male who presents with a chief complaint of L foot pain (22 Oct 2018 11:58)    ID following for left foot gangrene    Interval History/ROS: Patient remains with dry gangrene of left foot. Has no new complaints. Denies fever, chills. Remains with leukocytosis. Awaiting OR tomorrow for angiogram and possible TMA left foot being followed by vascular/ podiatry.    Rest of ROS negative.    Allergies  No Known Allergies    ANTIMICROBIALS:  piperacillin/tazobactam IVPB. 3.375 every 8 hours  vancomycin  IVPB 1000 every 12 hours    PE:    Vital Signs Last 24 Hrs  T(C): 36.6 (22 Oct 2018 11:49), Max: 37.1 (22 Oct 2018 04:26)  T(F): 97.9 (22 Oct 2018 11:49), Max: 98.8 (22 Oct 2018 04:26)  HR: 73 (22 Oct 2018 11:49) (70 - 86)  BP: 131/78 (22 Oct 2018 11:49) (118/68 - 135/81)  BP(mean): --  RR: 18 (22 Oct 2018 11:49) (18 - 18)  SpO2: 96% (22 Oct 2018 11:49) (92% - 96%)    Gen: AOx3, NAD, non-toxic, pleasant  CV: S1+S2 normal, no murmurs  Resp: Clear bilat, no resp distress  Abd: Soft, nontender, +BS  Ext: left foot grangrene of toes, s/p prior amputation of hallux intact  : No Dominique  IV/Skin: No thrombophlebitis  Neuro: no focal deficits    LABS:                          10.4   16.66 )-----------( 459      ( 22 Oct 2018 07:26 )             31.6       10-22    131<L>  |  92<L>  |  32<H>  ----------------------------<  164<H>  4.2   |  25  |  1.16    Ca    9.1      22 Oct 2018 05:32      MICROBIOLOGY:  v  .Blood Blood-Peripheral  10-12-18   No growth at 5 days.  --  --    RADIOLOGY:    < from: [facility] Physiol Extremity Lower 3+ Level, BI (10.15.18 @ 11:37) >  Impression: The quality of the examination is adversely impacted on the   left by the noncompressible nature of the arteries.  There is bilateral lower extremity arterial vascular disease affecting   the femoral popliteal segments.  The left posterior tibial artery is probably occluded.      < end of copied text >
CHAPIS [id number]  68y  Male  Gangrene, not elsewhere classified  Family history of diabetes mellitus (Father)  Handoff  MEWS Score  CAD (coronary artery disease)  Diabetes mellitus  Diabetes mellitus  Hypertension  Alcohol Dependency  Diabetes Mellitus  PAD (peripheral artery disease)  PAD (peripheral artery disease)  Gangrene  Heart failure  Arterial occlusion  Elevated troponin  Acute osteomyelitis  Severe sepsis  Prophylactic measure  CAD (coronary artery disease)  Type 2 diabetes mellitus with diabetic peripheral angiopathy and gangrene, with long-term current use of insulin  Essential hypertension  Elevated lactic acid level  Hyponatremia  Sepsis  Gangrene  Angioplasty and stent of left femoral artery  Angiogram extremity bilateral  S/P femoral-popliteal bypass surgery  Stented coronary artery  GANGRENE L FOOT  90+    aspirin enteric coated 81 milliGRAM(s) Oral daily  atorvastatin 40 milliGRAM(s) Oral at bedtime  cyanocobalamin 1000 MICROGram(s) Oral daily  dextrose 40% Gel 15 Gram(s) Oral once PRN  dextrose 50% Injectable 12.5 Gram(s) IV Push once  dextrose 50% Injectable 25 Gram(s) IV Push once  dextrose 50% Injectable 25 Gram(s) IV Push once  docusate sodium 100 milliGRAM(s) Oral three times a day  enoxaparin Injectable 40 milliGRAM(s) SubCutaneous daily  folic acid 1 milliGRAM(s) Oral daily  furosemide   Injectable 40 milliGRAM(s) IV Push daily  gabapentin 300 milliGRAM(s) Oral three times a day  glucagon  Injectable 1 milliGRAM(s) IntraMuscular once PRN  insulin glargine Injectable (LANTUS) 25 Unit(s) SubCutaneous at bedtime  insulin lispro (HumaLOG) corrective regimen sliding scale   SubCutaneous three times a day before meals  insulin lispro (HumaLOG) corrective regimen sliding scale   SubCutaneous at bedtime  insulin lispro Injectable (HumaLOG) 9 Unit(s) SubCutaneous three times a day before meals  metoprolol tartrate 25 milliGRAM(s) Oral two times a day  multivitamin 1 Tablet(s) Oral daily  piperacillin/tazobactam IVPB. 3.375 Gram(s) IV Intermittent every 8 hours  senna 2 Tablet(s) Oral at bedtime  sertraline 100 milliGRAM(s) Oral daily  tamsulosin 0.4 milliGRAM(s) Oral at bedtime  ticagrelor 90 milliGRAM(s) Oral two times a day  vancomycin  IVPB 1250 milliGRAM(s) IV Intermittent every 12 hours  No Known Allergies    CBC Full  -  ( 19 Oct 2018 07:57 )  WBC Count : 14.64 K/uL  Hemoglobin : 10.3 g/dL  Hematocrit : 30.5 %  Platelet Count - Automated : 380 K/uL  Mean Cell Volume : 85.2 fl  Mean Cell Hemoglobin : 28.8 pg  Mean Cell Hemoglobin Concentration : 33.8 gm/dL  Auto Neutrophil # : x  Auto Lymphocyte # : x  Auto Monocyte # : x  Auto Eosinophil # : x  Auto Basophil # : x  Auto Neutrophil % : x  Auto Lymphocyte % : x  Auto Monocyte % : x  Auto Eosinophil % : x  Auto Basophil % : x    Patient visited at bedside sleeping. Gangrene left foot with bandages clean dry and ntact  No signs of cellulitis. patient awaiting surgical management on Tuesday for TMA left foot. Await medial clearance for surgery. Podiatry will follow
CHAPIS FAVIAN  [id number]    Subjective:    No acute overnight events.        Objective:  T(C): 37.1 (10-15-18 @ 08:34), Max: 37.3 (10-14-18 @ 11:19)  HR: 92 (10-15-18 @ 08:34) (89 - 101)  BP: 138/72 (10-15-18 @ 08:34) (130/72 - 152/82)  RR: 18 (10-15-18 @ 08:34) (18 - 18)  SpO2: 93% (10-15-18 @ 08:34) (91% - 94%)  Wt(kg): --   10-15    129<L>  |  95<L>  |  8   ----------------------------<  183<H>  3.7   |  22  |  0.65    Ca    8.5      15 Oct 2018 06:29  Mg     2.0     10-14                          10.9   13.10 )-----------( 396      ( 15 Oct 2018 07:51 )             32.0       10-14 @ 07:01  -  10-15 @ 07:00  --------------------------------------------------------  IN: 1170 mL / OUT: 1150 mL / NET: 20 mL      MEDICATIONS  (STANDING):  aspirin enteric coated 81 milliGRAM(s) Oral daily  atorvastatin 40 milliGRAM(s) Oral at bedtime  cyanocobalamin 1000 MICROGram(s) Oral daily  dextrose 50% Injectable 12.5 Gram(s) IV Push once  dextrose 50% Injectable 25 Gram(s) IV Push once  dextrose 50% Injectable 25 Gram(s) IV Push once  docusate sodium 100 milliGRAM(s) Oral three times a day  enoxaparin Injectable 40 milliGRAM(s) SubCutaneous every 24 hours  folic acid 1 milliGRAM(s) Oral daily  gabapentin 300 milliGRAM(s) Oral three times a day  insulin glargine Injectable (LANTUS) 25 Unit(s) SubCutaneous at bedtime  insulin lispro (HumaLOG) corrective regimen sliding scale   SubCutaneous three times a day before meals  insulin lispro (HumaLOG) corrective regimen sliding scale   SubCutaneous at bedtime  insulin lispro Injectable (HumaLOG) 9 Unit(s) SubCutaneous three times a day before meals  metoprolol tartrate 25 milliGRAM(s) Oral two times a day  multivitamin 1 Tablet(s) Oral daily  piperacillin/tazobactam IVPB. 3.375 Gram(s) IV Intermittent every 8 hours  senna 2 Tablet(s) Oral at bedtime  sertraline 100 milliGRAM(s) Oral daily  tamsulosin 0.4 milliGRAM(s) Oral at bedtime  ticagrelor 90 milliGRAM(s) Oral two times a day  vancomycin  IVPB 1250 milliGRAM(s) IV Intermittent every 12 hours    MEDICATIONS  (PRN):  dextrose 40% Gel 15 Gram(s) Oral once PRN Blood Glucose LESS THAN 70 milliGRAM(s)/deciliter  glucagon  Injectable 1 milliGRAM(s) IntraMuscular once PRN Glucose LESS THAN 70 milligrams/deciliter
CHAPIS [id number]  68y  Male  Gangrene, not elsewhere classified  Family history of diabetes mellitus (Father)  Handoff  MEWS Score  CAD (coronary artery disease)  Diabetes mellitus  Diabetes mellitus  Hypertension  Alcohol Dependency  Diabetes Mellitus  PAD (peripheral artery disease)  PAD (peripheral artery disease)  Gangrene  Heart failure  Arterial occlusion  Elevated troponin  Acute osteomyelitis  Severe sepsis  Prophylactic measure  CAD (coronary artery disease)  Type 2 diabetes mellitus with diabetic peripheral angiopathy and gangrene, with long-term current use of insulin  Essential hypertension  Elevated lactic acid level  Hyponatremia  Sepsis  Gangrene  Angioplasty and stent of left femoral artery  Angiogram extremity bilateral  S/P femoral-popliteal bypass surgery  Stented coronary artery  GANGRENE L FOOT  90+    aspirin enteric coated 81 milliGRAM(s) Oral daily  atorvastatin 40 milliGRAM(s) Oral at bedtime  cyanocobalamin 1000 MICROGram(s) Oral daily  dextrose 40% Gel 15 Gram(s) Oral once PRN  dextrose 50% Injectable 12.5 Gram(s) IV Push once  dextrose 50% Injectable 25 Gram(s) IV Push once  dextrose 50% Injectable 25 Gram(s) IV Push once  docusate sodium 100 milliGRAM(s) Oral three times a day  enoxaparin Injectable 40 milliGRAM(s) SubCutaneous daily  folic acid 1 milliGRAM(s) Oral daily  furosemide   Injectable 40 milliGRAM(s) IV Push daily  gabapentin 300 milliGRAM(s) Oral three times a day  glucagon  Injectable 1 milliGRAM(s) IntraMuscular once PRN  insulin glargine Injectable (LANTUS) 25 Unit(s) SubCutaneous at bedtime  insulin lispro (HumaLOG) corrective regimen sliding scale   SubCutaneous three times a day before meals  insulin lispro (HumaLOG) corrective regimen sliding scale   SubCutaneous at bedtime  insulin lispro Injectable (HumaLOG) 9 Unit(s) SubCutaneous three times a day before meals  metoprolol tartrate 25 milliGRAM(s) Oral two times a day  multivitamin 1 Tablet(s) Oral daily  piperacillin/tazobactam IVPB. 3.375 Gram(s) IV Intermittent every 8 hours  senna 2 Tablet(s) Oral at bedtime  sertraline 100 milliGRAM(s) Oral daily  tamsulosin 0.4 milliGRAM(s) Oral at bedtime  ticagrelor 90 milliGRAM(s) Oral two times a day  vancomycin  IVPB 1250 milliGRAM(s) IV Intermittent every 12 hours  No Known Allergies    CBC Full  -  ( 18 Oct 2018 07:51 )  WBC Count : 13.75 K/uL  Hemoglobin : 10.6 g/dL  Hematocrit : 31.5 %  Platelet Count - Automated : 399 K/uL  Mean Cell Volume : 85.4 fl  Mean Cell Hemoglobin : 28.7 pg  Mean Cell Hemoglobin Concentration : 33.7 gm/dL  Auto Neutrophil # : x  Auto Lymphocyte # : x  Auto Monocyte # : x  Auto Eosinophil # : x  Auto Basophil # : x  Auto Neutrophil % : x  Auto Lymphocyte % : x  Auto Monocyte % : x  Auto Eosinophil % : x  Auto Basophil % : x  Patient visisted at bedside INAD with gangrene dry and stable left foot awaiting surgical management by Dr. Joe next week Tuesday. Continue with betadine DSD left foot daily dressing changes. No signs of cellulitis. Patient to have TMA left foot next week. Podiatry will follow
Patient is a 68y old  Male who presents with a chief complaint of L foot pain (13 Oct 2018 09:17)       INTERVAL HPI/OVERNIGHT EVENTS:  Patient seen and evaluated at bedside.  Pt is resting comfortable in NAD. Denies N/V/F/C.     Allergies    No Known Allergies    Intolerances        Vital Signs Last 24 Hrs  T(C): 36.8 (13 Oct 2018 08:20), Max: 37.2 (13 Oct 2018 04:26)  T(F): 98.2 (13 Oct 2018 08:20), Max: 99 (13 Oct 2018 04:26)  HR: 87 (13 Oct 2018 08:20) (84 - 116)  BP: 120/75 (13 Oct 2018 08:20) (101/66 - 187/108)  BP(mean): --  RR: 18 (13 Oct 2018 08:20) (16 - 30)  SpO2: 96% (13 Oct 2018 08:20) (95% - 98%)    LABS:                        13.8   17.8  )-----------( 450      ( 12 Oct 2018 16:08 )             41.1     10-13    128<L>  |  94<L>  |  8   ----------------------------<  241<H>  3.9   |  20<L>  |  0.62    Ca    8.5      13 Oct 2018 04:26  Phos  2.8     10-13  Mg     2.0     10-13    TPro  9.1<H>  /  Alb  4.1  /  TBili  0.6  /  DBili  x   /  AST  25  /  ALT  19  /  AlkPhos  96  10-12    PT/INR - ( 13 Oct 2018 04:26 )   PT: 12.7 sec;   INR: 1.17 ratio         PTT - ( 13 Oct 2018 04:26 )  PTT:28.3 sec    CAPILLARY BLOOD GLUCOSE      POCT Blood Glucose.: 251 mg/dL (13 Oct 2018 07:48)  POCT Blood Glucose.: 255 mg/dL (13 Oct 2018 00:35)  POCT Blood Glucose.: 314 mg/dL (12 Oct 2018 22:49)  POCT Blood Glucose.: 322 mg/dL (12 Oct 2018 22:39)      Lower Extremity Physical Exam:  Vasc: DP/PT 0/4 b/l, CFT < 3 sec x 8, sluggish on Left 4th/5th digit, cold foot b/l   Neuro: epicritic sensation diminished to level of toes b/l   Msk/Ortho: previous partial hallux amp L foot - closed   Skin: duskiness to entire left 4th and 5th toe, no open lesions, no cellulitis, no malodor, no acute signs of infection
Patient is a 68y old  Male who presents with a chief complaint of L foot pain (13 Oct 2018 09:50)      SUBJECTIVE / OVERNIGHT EVENTS:    pt evaluated with daughter at bedside. no overnight events-- denies cp/sob. pain in foot controlled.   tolerating po.    MEDICATIONS  (STANDING):  aspirin enteric coated 81 milliGRAM(s) Oral daily  atorvastatin 40 milliGRAM(s) Oral at bedtime  cyanocobalamin 1000 MICROGram(s) Oral daily  dextrose 50% Injectable 12.5 Gram(s) IV Push once  dextrose 50% Injectable 25 Gram(s) IV Push once  dextrose 50% Injectable 25 Gram(s) IV Push once  docusate sodium 100 milliGRAM(s) Oral three times a day  enoxaparin Injectable 40 milliGRAM(s) SubCutaneous every 24 hours  folic acid 1 milliGRAM(s) Oral daily  gabapentin 300 milliGRAM(s) Oral three times a day  insulin glargine Injectable (LANTUS) 20 Unit(s) SubCutaneous at bedtime  insulin lispro (HumaLOG) corrective regimen sliding scale   SubCutaneous three times a day before meals  insulin lispro (HumaLOG) corrective regimen sliding scale   SubCutaneous at bedtime  insulin lispro Injectable (HumaLOG) 5 Unit(s) SubCutaneous three times a day before meals  metoprolol tartrate 12.5 milliGRAM(s) Oral two times a day  multivitamin 1 Tablet(s) Oral daily  piperacillin/tazobactam IVPB. 3.375 Gram(s) IV Intermittent every 8 hours  senna 2 Tablet(s) Oral at bedtime  sertraline 100 milliGRAM(s) Oral daily  tamsulosin 0.4 milliGRAM(s) Oral at bedtime  ticagrelor 90 milliGRAM(s) Oral two times a day  vancomycin  IVPB 1000 milliGRAM(s) IV Intermittent every 12 hours    MEDICATIONS  (PRN):  dextrose 40% Gel 15 Gram(s) Oral once PRN Blood Glucose LESS THAN 70 milliGRAM(s)/deciliter  glucagon  Injectable 1 milliGRAM(s) IntraMuscular once PRN Glucose LESS THAN 70 milligrams/deciliter        CAPILLARY BLOOD GLUCOSE      POCT Blood Glucose.: 338 mg/dL (13 Oct 2018 16:13)  POCT Blood Glucose.: 309 mg/dL (13 Oct 2018 12:02)  POCT Blood Glucose.: 251 mg/dL (13 Oct 2018 07:48)  POCT Blood Glucose.: 255 mg/dL (13 Oct 2018 00:35)  POCT Blood Glucose.: 314 mg/dL (12 Oct 2018 22:49)  POCT Blood Glucose.: 322 mg/dL (12 Oct 2018 22:39)    I&O's Summary    13 Oct 2018 07:01  -  13 Oct 2018 18:11  --------------------------------------------------------  IN: 400 mL / OUT: 125 mL / NET: 275 mL      tele sinus   T 98. P 93, /78, R 18, O2 98% RA  	Head: Normocephalic  	Eyes:  PERRLA, clear conjunctiva. EOMI, no ptosis.    	Respiratory: Bilateral lung clear to auscultation, no crackles, no wheezes, no rhonchi   	Cardiovascular: S1/S2 auscultated, no murmur, or gallop. Rhythm is regular. There is no peripheral edema   	Abdomen: Soft, non-tender, nondistended, no guarding or rebound tenderness. Active bowel sounds in all 4 quadrants.     	Extremities: Dry flaky skin on bilateral LE. R foot without ulcers. L foot cool to touch, with partial amputation of great toe, skin appears dusky of entire left 4th and 5th toe, mild drainage, foul smelling. Bilateral DP/PT pulse not appreciated.   Neurological: AOAx4. CN2-12 grosslly intact.    LABS:                        13.8   17.8  )-----------( 450      ( 12 Oct 2018 16:08 )             41.1     10-13    128<L>  |  94<L>  |  8   ----------------------------<  241<H>  3.9   |  20<L>  |  0.62    Ca    8.5      13 Oct 2018 04:26  Phos  2.8     10-13  Mg     2.0     10-13    TPro  9.1<H>  /  Alb  4.1  /  TBili  0.6  /  DBili  x   /  AST  25  /  ALT  19  /  AlkPhos  96  10-12    PT/INR - ( 13 Oct 2018 04:26 )   PT: 12.7 sec;   INR: 1.17 ratio         PTT - ( 13 Oct 2018 04:26 )  PTT:28.3 sec  CARDIAC MARKERS ( 13 Oct 2018 04:26 )  x     / x     / 614 U/L / x     / 35.0 ng/mL          RADIOLOGY & ADDITIONAL TESTS:        Consultant(s) Notes Reviewed:  cards, podiatry, vascular    Care Discussed with Consultants/Other Providers: vascular
Patient is a 68y old  Male who presents with a chief complaint of L foot pain (14 Oct 2018 08:03)       INTERVAL HPI/OVERNIGHT EVENTS:  Patient seen and evaluated at bedside.  Pt is resting comfortable in NAD. Denies N/V/F/C.      Allergies    No Known Allergies    Intolerances        Vital Signs Last 24 Hrs  T(C): 36.8 (14 Oct 2018 08:31), Max: 36.9 (14 Oct 2018 04:23)  T(F): 98.2 (14 Oct 2018 08:31), Max: 98.4 (14 Oct 2018 04:23)  HR: 91 (14 Oct 2018 08:31) (79 - 99)  BP: 145/78 (14 Oct 2018 08:31) (123/76 - 148/78)  BP(mean): --  RR: 18 (14 Oct 2018 08:31) (18 - 18)  SpO2: 96% (14 Oct 2018 08:31) (93% - 98%)    LABS:                        11.2   13.84 )-----------( 363      ( 14 Oct 2018 06:46 )             32.8     10-14    131<L>  |  97  |  7   ----------------------------<  170<H>  3.5   |  22  |  0.56    Ca    8.6      14 Oct 2018 06:03  Phos  2.8     10-13  Mg     2.0     10-14    TPro  9.1<H>  /  Alb  4.1  /  TBili  0.6  /  DBili  x   /  AST  25  /  ALT  19  /  AlkPhos  96  10-12    PT/INR - ( 13 Oct 2018 04:26 )   PT: 12.7 sec;   INR: 1.17 ratio         PTT - ( 13 Oct 2018 04:26 )  PTT:28.3 sec    CAPILLARY BLOOD GLUCOSE      POCT Blood Glucose.: 168 mg/dL (14 Oct 2018 08:03)  POCT Blood Glucose.: 316 mg/dL (13 Oct 2018 21:05)  POCT Blood Glucose.: 338 mg/dL (13 Oct 2018 16:13)  POCT Blood Glucose.: 309 mg/dL (13 Oct 2018 12:02)      Lower Extremity Physical Exam:  Vasc: DP/PT 0/4 b/l, CFT < 3 sec x 8, sluggish on Left 4th/5th digit, cold foot b/l   Neuro: epicritic sensation diminished to level of toes b/l   Msk/Ortho: previous partial hallux amp L foot - closed   Skin: duskiness to entire left 4th and 5th toe, 2nd and 3rd toes cool to touch, no open lesions, no cellulitis, no malodor, no acute signs of infection
Patient is a 68y old  Male who presents with a chief complaint of L foot pain (15 Oct 2018 09:39)       INTERVAL HPI/OVERNIGHT EVENTS:  Patient seen and evaluated at bedside.  Pt is resting comfortable in NAD. Denies N/V/F/C.      Allergies    No Known Allergies    Intolerances        Vital Signs Last 24 Hrs  T(C): 37.1 (15 Oct 2018 08:34), Max: 37.3 (14 Oct 2018 11:19)  T(F): 98.8 (15 Oct 2018 08:34), Max: 99.1 (14 Oct 2018 11:19)  HR: 92 (15 Oct 2018 08:34) (89 - 101)  BP: 138/72 (15 Oct 2018 08:34) (130/72 - 152/82)  BP(mean): --  RR: 18 (15 Oct 2018 08:34) (18 - 18)  SpO2: 93% (15 Oct 2018 08:34) (91% - 94%)    LABS:                        10.9   13.10 )-----------( 396      ( 15 Oct 2018 07:51 )             32.0     10-15    129<L>  |  95<L>  |  8   ----------------------------<  183<H>  3.7   |  22  |  0.65    Ca    8.5      15 Oct 2018 06:29  Mg     2.0     10-14          CAPILLARY BLOOD GLUCOSE      POCT Blood Glucose.: 199 mg/dL (15 Oct 2018 07:38)  POCT Blood Glucose.: 223 mg/dL (14 Oct 2018 21:13)  POCT Blood Glucose.: 242 mg/dL (14 Oct 2018 16:41)  POCT Blood Glucose.: 236 mg/dL (14 Oct 2018 11:54)      Lower Extremity Physical Exam:  Vasc: DP/PT 0/4 b/l, CFT < 3 sec x 8, sluggish on Left 4th/5th digit, cold foot b/l   Neuro: epicritic sensation diminished to level of toes b/l   Msk/Ortho: previous partial hallux amp L foot - closed   Skin: duskiness to entire left 4th and 5th toe, 2nd and 3rd toes cool to touch, no open lesions, no cellulitis, no malodor, no acute signs of infection
Patient is a 68y old  Male who presents with a chief complaint of L foot pain (15 Oct 2018 17:24)       INTERVAL HPI/OVERNIGHT EVENTS:  Patient seen and evaluated at bedside.  Pt is resting comfortable in NAD. Denies N/V/F/C.    Allergies    No Known Allergies    Intolerances        Vital Signs Last 24 Hrs  T(C): 36.7 (16 Oct 2018 08:23), Max: 38.1 (15 Oct 2018 16:05)  T(F): 98.1 (16 Oct 2018 08:23), Max: 100.6 (15 Oct 2018 16:05)  HR: 86 (16 Oct 2018 08:23) (86 - 109)  BP: 106/69 (16 Oct 2018 08:23) (106/69 - 159/82)  BP(mean): 88 (15 Oct 2018 21:06) (88 - 88)  RR: 18 (16 Oct 2018 08:23) (18 - 18)  SpO2: 96% (16 Oct 2018 08:23) (91% - 96%)    LABS:                        10.9   14.43 )-----------( 405      ( 16 Oct 2018 08:13 )             32.7     10-16    130<L>  |  93<L>  |  11  ----------------------------<  180<H>  4.1   |  23  |  0.75    Ca    8.4      16 Oct 2018 06:33          CAPILLARY BLOOD GLUCOSE      POCT Blood Glucose.: 214 mg/dL (16 Oct 2018 07:33)  POCT Blood Glucose.: 188 mg/dL (15 Oct 2018 22:31)  POCT Blood Glucose.: 197 mg/dL (15 Oct 2018 20:59)  POCT Blood Glucose.: 133 mg/dL (15 Oct 2018 16:47)  POCT Blood Glucose.: 168 mg/dL (15 Oct 2018 11:48)      Lower Extremity Physical Exam:  Vasc: DP/PT 0/4 b/l, CFT < 3 sec x 8, sluggish on Left 4th/5th digit, cold foot b/l   Neuro: epicritic sensation diminished to level of toes b/l   Msk/Ortho: previous partial hallux amp L foot - closed   Skin: duskiness to entire left 4th and 5th toe, 2nd and 3rd toes cool to touch, no open lesions, no cellulitis, no malodor, no acute signs of infection
Patient is a 68y old  Male who presents with a chief complaint of L foot pain (16 Oct 2018 13:27)       INTERVAL HPI/OVERNIGHT EVENTS:  Patient seen and evaluated at bedside.  Pt is resting comfortable in NAD. Denies N/V/F/C.      Allergies    No Known Allergies    Intolerances        Vital Signs Last 24 Hrs  T(C): 37.1 (17 Oct 2018 04:25), Max: 37.4 (16 Oct 2018 20:27)  T(F): 98.7 (17 Oct 2018 04:25), Max: 99.3 (16 Oct 2018 20:27)  HR: 81 (17 Oct 2018 04:25) (81 - 100)  BP: 122/68 (17 Oct 2018 04:25) (106/69 - 129/80)  BP(mean): --  RR: 18 (17 Oct 2018 04:25) (18 - 18)  SpO2: 94% (17 Oct 2018 04:25) (92% - 96%)    LABS:                        10.9   14.43 )-----------( 405      ( 16 Oct 2018 08:13 )             32.7     10-17    132<L>  |  94<L>  |  16  ----------------------------<  135<H>  3.6   |  25  |  0.93    Ca    8.9      17 Oct 2018 06:20    TPro  7.1  /  Alb  3.1<L>  /  TBili  0.5  /  DBili  x   /  AST  20  /  ALT  22  /  AlkPhos  63  10-17    PTT - ( 16 Oct 2018 10:43 )  PTT:28.4 sec  Urinalysis Basic - ( 16 Oct 2018 21:25 )    Color: x / Appearance: x / SG: x / pH: x  Gluc: x / Ketone: x  / Bili: x / Urobili: x   Blood: x / Protein: x / Nitrite: x   Leuk Esterase: x / RBC: 2 /hpf / WBC 1 /hpf   Sq Epi: x / Non Sq Epi: 0 /hpf / Bacteria: Negative      CAPILLARY BLOOD GLUCOSE      POCT Blood Glucose.: 177 mg/dL (16 Oct 2018 21:01)  POCT Blood Glucose.: 145 mg/dL (16 Oct 2018 16:21)  POCT Blood Glucose.: 227 mg/dL (16 Oct 2018 11:41)  POCT Blood Glucose.: 214 mg/dL (16 Oct 2018 07:33)      Lower Extremity Physical Exam:  Vasc: DP/PT 0/4 b/l, CFT < 3 sec x 8, sluggish on Left 4th/5th digit, cold foot b/l   Neuro: epicritic sensation diminished to level of toes b/l   Msk/Ortho: previous partial hallux amp L foot - closed   Skin: duskiness to entire left 4th and 5th toe, 2nd and 3rd toes cool to touch, no open lesions, no cellulitis, no malodor, no acute signs of infection
Patient is a 68y old  Male who presents with a chief complaint of L foot pain (17 Oct 2018 10:39)      INTERVAL HPI/OVERNIGHT EVENTS:  T(C): 37.2 (10-17-18 @ 12:16), Max: 37.4 (10-16-18 @ 20:27)  HR: 77 (10-17-18 @ 12:16) (76 - 100)  BP: 125/81 (10-17-18 @ 12:16) (114/72 - 129/80)  RR: 18 (10-17-18 @ 12:16) (18 - 18)  SpO2: 99% (10-17-18 @ 12:16) (92% - 99%)  Wt(kg): --  I&O's Summary    16 Oct 2018 07:01  -  17 Oct 2018 07:00  --------------------------------------------------------  IN: 1370 mL / OUT: 2300 mL / NET: -930 mL    17 Oct 2018 07:01  -  17 Oct 2018 14:15  --------------------------------------------------------  IN: 520 mL / OUT: 0 mL / NET: 520 mL        LABS:                        10.6   13.72 )-----------( 394      ( 17 Oct 2018 09:03 )             30.6     10-17    132<L>  |  94<L>  |  16  ----------------------------<  135<H>  3.6   |  25  |  0.93    Ca    8.9      17 Oct 2018 06:20    TPro  6.5  /  Alb  x   /  TBili  x   /  DBili  x   /  AST  x   /  ALT  x   /  AlkPhos  x   10-17    PT/INR - ( 17 Oct 2018 09:01 )   PT: 12.9 sec;   INR: 1.14 ratio         PTT - ( 16 Oct 2018 10:43 )  PTT:28.4 sec  Urinalysis Basic - ( 16 Oct 2018 21:25 )    Color: x / Appearance: x / SG: x / pH: x  Gluc: x / Ketone: x  / Bili: x / Urobili: x   Blood: x / Protein: x / Nitrite: x   Leuk Esterase: x / RBC: 2 /hpf / WBC 1 /hpf   Sq Epi: x / Non Sq Epi: 0 /hpf / Bacteria: Negative      CAPILLARY BLOOD GLUCOSE      POCT Blood Glucose.: 219 mg/dL (17 Oct 2018 11:41)  POCT Blood Glucose.: 158 mg/dL (17 Oct 2018 07:37)  POCT Blood Glucose.: 177 mg/dL (16 Oct 2018 21:01)  POCT Blood Glucose.: 145 mg/dL (16 Oct 2018 16:21)        Urinalysis Basic - ( 16 Oct 2018 21:25 )    Color: x / Appearance: x / SG: x / pH: x  Gluc: x / Ketone: x  / Bili: x / Urobili: x   Blood: x / Protein: x / Nitrite: x   Leuk Esterase: x / RBC: 2 /hpf / WBC 1 /hpf   Sq Epi: x / Non Sq Epi: 0 /hpf / Bacteria: Negative        MEDICATIONS  (STANDING):  ALBUTerol    0.083%. 2.5 milliGRAM(s) Nebulizer once  aspirin enteric coated 81 milliGRAM(s) Oral daily  atorvastatin 40 milliGRAM(s) Oral at bedtime  cyanocobalamin 1000 MICROGram(s) Oral daily  dextrose 50% Injectable 12.5 Gram(s) IV Push once  dextrose 50% Injectable 25 Gram(s) IV Push once  dextrose 50% Injectable 25 Gram(s) IV Push once  docusate sodium 100 milliGRAM(s) Oral three times a day  enoxaparin Injectable 40 milliGRAM(s) SubCutaneous daily  folic acid 1 milliGRAM(s) Oral daily  furosemide   Injectable 40 milliGRAM(s) IV Push once  gabapentin 300 milliGRAM(s) Oral three times a day  insulin glargine Injectable (LANTUS) 25 Unit(s) SubCutaneous at bedtime  insulin lispro (HumaLOG) corrective regimen sliding scale   SubCutaneous three times a day before meals  insulin lispro (HumaLOG) corrective regimen sliding scale   SubCutaneous at bedtime  insulin lispro Injectable (HumaLOG) 9 Unit(s) SubCutaneous three times a day before meals  metoprolol tartrate 25 milliGRAM(s) Oral two times a day  multivitamin 1 Tablet(s) Oral daily  piperacillin/tazobactam IVPB. 3.375 Gram(s) IV Intermittent every 8 hours  senna 2 Tablet(s) Oral at bedtime  sertraline 100 milliGRAM(s) Oral daily  tamsulosin 0.4 milliGRAM(s) Oral at bedtime  ticagrelor 90 milliGRAM(s) Oral two times a day  vancomycin  IVPB 1250 milliGRAM(s) IV Intermittent every 12 hours    MEDICATIONS  (PRN):  dextrose 40% Gel 15 Gram(s) Oral once PRN Blood Glucose LESS THAN 70 milliGRAM(s)/deciliter  glucagon  Injectable 1 milliGRAM(s) IntraMuscular once PRN Glucose LESS THAN 70 milligrams/deciliter          PHYSICAL EXAM:  GENERAL: NAD, well-groomed, well-developed  HEAD:  Atraumatic, Normocephalic  CHEST/LUNG: Clear to percussion bilaterally; No rales, rhonchi, wheezing, or rubs  HEART: Regular rate and rhythm; No murmurs, rubs, or gallops  ABDOMEN: Soft, Nontender, Nondistended; Bowel sounds present  EXTREMITIES:  foot dressing present  LYMPH: No lymphadenopathy noted  SKIN: No rashes or lesions    Care Discussed with Consultants/Other Providers [ +] YES  [ ] NO
Patient is a 68y old  Male who presents with a chief complaint of L foot pain (18 Oct 2018 11:25)       INTERVAL HPI/OVERNIGHT EVENTS:  Patient seen and evaluated at bedside.  Pt is resting comfortable in NAD. Denies N/V/F/C.    Allergies    No Known Allergies    Intolerances        Vital Signs Last 24 Hrs  T(C): 36.8 (18 Oct 2018 12:14), Max: 37.7 (17 Oct 2018 22:44)  T(F): 98.3 (18 Oct 2018 12:14), Max: 99.9 (17 Oct 2018 22:44)  HR: 83 (18 Oct 2018 12:14) (77 - 118)  BP: 125/79 (18 Oct 2018 12:14) (100/64 - 160/90)  BP(mean): --  RR: 18 (18 Oct 2018 12:14) (17 - 20)  SpO2: 95% (18 Oct 2018 12:14) (92% - 99%)    LABS:                        10.6   13.75 )-----------( 399      ( 18 Oct 2018 07:51 )             31.5     10-18    131<L>  |  92<L>  |  23  ----------------------------<  267<H>  4.1   |  25  |  1.03    Ca    8.8      18 Oct 2018 06:16    TPro  6.5  /  Alb  2.5<L>  /  TBili  x   /  DBili  x   /  AST  x   /  ALT  x   /  AlkPhos  x   10-17    PT/INR - ( 17 Oct 2018 09:01 )   PT: 12.9 sec;   INR: 1.14 ratio           Urinalysis Basic - ( 16 Oct 2018 21:25 )    Color: x / Appearance: x / SG: x / pH: x  Gluc: x / Ketone: x  / Bili: x / Urobili: x   Blood: x / Protein: x / Nitrite: x   Leuk Esterase: x / RBC: 2 /hpf / WBC 1 /hpf   Sq Epi: x / Non Sq Epi: 0 /hpf / Bacteria: Negative      CAPILLARY BLOOD GLUCOSE      POCT Blood Glucose.: 219 mg/dL (18 Oct 2018 11:36)  POCT Blood Glucose.: 274 mg/dL (18 Oct 2018 07:44)  POCT Blood Glucose.: 333 mg/dL (17 Oct 2018 21:26)  POCT Blood Glucose.: 311 mg/dL (17 Oct 2018 21:06)  POCT Blood Glucose.: 203 mg/dL (17 Oct 2018 17:58)      Lower Extremity Physical Exam:  Vasc: DP/PT 0/4 b/l, CFT < 3 sec x 8, sluggish on Left 4th/5th digit, cold foot b/l   Neuro: epicritic sensation diminished to level of toes b/l   Msk/Ortho: previous partial hallux amp L foot - closed   Skin: duskiness to entire left 4th and 5th toe, 2nd and 3rd toes cool to touch, no open lesions, no cellulitis, no malodor, no acute signs of infection     < from: Cardiac Cath Lab - Adult (10.17.18 @ 13:50) >    Case Physician(s):  Dimas Mcconnell  Referring Physician:  INDICATIONS: LEV ulcer/gangrene location: heal/mid foot.  PROCEDURE:  --  [id number] angiography.  --  Right leg angiography.  --  Sonosite - Diagnostic.  --  PTA Femoral - Popliteal Arteries.  --  PTA each additional Peripheral Vessel.  --  Hemostasis with Mynx-Intervention.  --  Intervention on left popliteal: self-expanding stent.  --  Intervention on left superficial femoral: self-expanding stent.  --  Intervention on left superficial femoral: self-expanding stent.  TECHNIQUE: Cardiac catheterization performed electively.  Local anesthetic given. Right femoral artery access. [id number]  angiography.A catheter was positioned. Right leg angiography. A catheter  was positioned. Sonosite - Diagnostic. A self-expanding stent was  performed on the 100 % lesion in the left popliteal artery. Following  intervention there was a 1 % residual stenosis. Vessel setup was  performed. A AMPLATZ SUPER STIFF  X 260 wire was used to cross the  lesion. A 5 X 120 X 120 EVERFLEX stent was placed across the lesion and  deployed. Vessel setup was performed. A AMPLATZ SUPER STIFF  X 260  wire was used to cross the lesion. Balloon angioplasty was performed, with  1 inflations and a maximum inflation pressure of 10 edwin. A self-expanding  stent was performed on the 100 % lesion in the left superficial femoral  artery. Following intervention there was a 1 % residual stenosis. Vessel  setup was performed. A AMPLATZ SUPER STIFF  X 260 wire was used to  cross the lesion. A 5 X 120 X 120 EVERFLEX stent was placed across the  lesion and deployed. Vessel setup was performed. A AMPLATZ SUPER STIFF STR  035 X 260 wire was used to cross the lesion. Balloon angioplasty was  performed, with 1 inflations and a maximum inflation pressure of 10 edwin. A  self-expanding stent was performed on the 100 % lesion in the left  superficial femoral artery. Following intervention there was a 1 %  residual stenosis. Vessel setup was performed. A AMPLATZ SUPER STIFF STR  035 X 260 wire was used to cross the lesion. A 6 X 200 X 120 EVERFLEX  stent was placed across the lesion and deployed. Vessel setup was  performed. A AMPLATZ SUPER STIFF  X 260 wire was used to cross the  lesion. A 6 X 80 X 120 EVERFLEX stent was placed across the lesion and  deployed. Vessel setup was performed. A AMPLATZ SUPER STIFF  X 260  wire was used to cross the lesion. Balloon angioplasty was performed,  using a 6 X 200 X 135CM MUSTANG balloon, with 2 inflations and a maximum  inflation pressure of 10 edwin. PTA Femoral - Popliteal Arteries. PTA each  additional Peripheral Vessel. Hemostasis with Mynx-Intervention.  CONTRAST GIVEN: Visipaque 20 ml. Visipaque 47 ml.  MEDICATIONS GIVEN: Fentanyl, 25 mcg, IV. Heparin, 6000 units, IV. Heparin,  1000 units, IV. Heparin, 1000 units, IV.  LEFT LOWER EXTREMITY VESSELS: thrombosed bypass.  50% cfa lesion  diffuse stenosis prox sfa with distal occlusion (20 cm)  above knee pop reconstitution, then 99 % tp trunk stenosis, dominant  peroneal runoff Left superficial femoral: There was a 100 % stenosis. Left  popliteal: There was a 100 % stenosis.  RIGHT LOWER EXTREMITY VESSELS: cfa access confirmed  INTERVENTIONAL RECOMMENDATIONS: cont asa/brillinta.  will need to return for repeat angio and tibial revascularization  Prepared and signed by  Dimas Mcconnell M.D.  Signed 10/17/2018 17:35:38    < end of copied text >
Patient is a 68y old  Male who presents with a chief complaint of L foot pain (18 Oct 2018 17:28)      INTERVAL HPI/OVERNIGHT EVENTS:  T(C): 37.2 (10-18-18 @ 16:01), Max: 37.7 (10-17-18 @ 22:44)  HR: 94 (10-18-18 @ 17:36) (77 - 118)  BP: 130/81 (10-18-18 @ 17:36) (100/64 - 160/90)  RR: 18 (10-18-18 @ 16:01) (17 - 20)  SpO2: 93% (10-18-18 @ 16:01) (92% - 99%)  Wt(kg): --  I&O's Summary    17 Oct 2018 07:01  -  18 Oct 2018 07:00  --------------------------------------------------------  IN: 1520 mL / OUT: 8101 mL / NET: -6581 mL    18 Oct 2018 07:01  -  18 Oct 2018 18:22  --------------------------------------------------------  IN: 960 mL / OUT: 200 mL / NET: 760 mL        LABS:                        10.6   13.75 )-----------( 399      ( 18 Oct 2018 07:51 )             31.5     10-18    131<L>  |  92<L>  |  23  ----------------------------<  267<H>  4.1   |  25  |  1.03    Ca    8.8      18 Oct 2018 06:16    TPro  6.5  /  Alb  2.5<L>  /  TBili  x   /  DBili  x   /  AST  x   /  ALT  x   /  AlkPhos  x   10-17    PT/INR - ( 17 Oct 2018 09:01 )   PT: 12.9 sec;   INR: 1.14 ratio           Urinalysis Basic - ( 16 Oct 2018 21:25 )    Color: x / Appearance: x / SG: x / pH: x  Gluc: x / Ketone: x  / Bili: x / Urobili: x   Blood: x / Protein: x / Nitrite: x   Leuk Esterase: x / RBC: 2 /hpf / WBC 1 /hpf   Sq Epi: x / Non Sq Epi: 0 /hpf / Bacteria: Negative      CAPILLARY BLOOD GLUCOSE      POCT Blood Glucose.: 180 mg/dL (18 Oct 2018 17:26)  POCT Blood Glucose.: 219 mg/dL (18 Oct 2018 11:36)  POCT Blood Glucose.: 274 mg/dL (18 Oct 2018 07:44)  POCT Blood Glucose.: 333 mg/dL (17 Oct 2018 21:26)  POCT Blood Glucose.: 311 mg/dL (17 Oct 2018 21:06)        Urinalysis Basic - ( 16 Oct 2018 21:25 )    Color: x / Appearance: x / SG: x / pH: x  Gluc: x / Ketone: x  / Bili: x / Urobili: x   Blood: x / Protein: x / Nitrite: x   Leuk Esterase: x / RBC: 2 /hpf / WBC 1 /hpf   Sq Epi: x / Non Sq Epi: 0 /hpf / Bacteria: Negative        MEDICATIONS  (STANDING):  aspirin enteric coated 81 milliGRAM(s) Oral daily  atorvastatin 40 milliGRAM(s) Oral at bedtime  cyanocobalamin 1000 MICROGram(s) Oral daily  dextrose 50% Injectable 12.5 Gram(s) IV Push once  dextrose 50% Injectable 25 Gram(s) IV Push once  dextrose 50% Injectable 25 Gram(s) IV Push once  docusate sodium 100 milliGRAM(s) Oral three times a day  enoxaparin Injectable 40 milliGRAM(s) SubCutaneous daily  folic acid 1 milliGRAM(s) Oral daily  furosemide   Injectable 40 milliGRAM(s) IV Push daily  gabapentin 300 milliGRAM(s) Oral three times a day  insulin glargine Injectable (LANTUS) 25 Unit(s) SubCutaneous at bedtime  insulin lispro (HumaLOG) corrective regimen sliding scale   SubCutaneous three times a day before meals  insulin lispro (HumaLOG) corrective regimen sliding scale   SubCutaneous at bedtime  insulin lispro Injectable (HumaLOG) 9 Unit(s) SubCutaneous three times a day before meals  metoprolol tartrate 25 milliGRAM(s) Oral two times a day  multivitamin 1 Tablet(s) Oral daily  piperacillin/tazobactam IVPB. 3.375 Gram(s) IV Intermittent every 8 hours  senna 2 Tablet(s) Oral at bedtime  sertraline 100 milliGRAM(s) Oral daily  tamsulosin 0.4 milliGRAM(s) Oral at bedtime  ticagrelor 90 milliGRAM(s) Oral two times a day  vancomycin  IVPB 1250 milliGRAM(s) IV Intermittent every 12 hours    MEDICATIONS  (PRN):  dextrose 40% Gel 15 Gram(s) Oral once PRN Blood Glucose LESS THAN 70 milliGRAM(s)/deciliter  glucagon  Injectable 1 milliGRAM(s) IntraMuscular once PRN Glucose LESS THAN 70 milligrams/deciliter          PHYSICAL EXAM:  GENERAL: NAD, well-groomed, well-developed  HEAD:  Atraumatic, Normocephalic  CHEST/LUNG: Clear to percussion bilaterally; No rales, rhonchi, wheezing, or rubs  HEART: Regular rate and rhythm; No murmurs, rubs, or gallops  ABDOMEN: Soft, Nontender, Nondistended; Bowel sounds present  EXTREMITIES:  2+ Peripheral Pulses, No clubbing, cyanosis, or edema  LYMPH: No lymphadenopathy noted  SKIN: No rashes or lesions    Care Discussed with Consultants/Other Providers [ ] YES  [ ] NO
Patient is a 68y old  Male who presents with a chief complaint of L foot pain (19 Oct 2018 15:11)      INTERVAL HPI/OVERNIGHT EVENTS:  T(C): 36.6 (10-19-18 @ 12:27), Max: 37.3 (10-18-18 @ 20:04)  HR: 99 (10-19-18 @ 17:01) (79 - 99)  BP: 129/77 (10-19-18 @ 17:01) (112/64 - 131/79)  RR: 18 (10-19-18 @ 12:27) (18 - 18)  SpO2: 93% (10-19-18 @ 12:27) (90% - 98%)  Wt(kg): --  I&O's Summary    18 Oct 2018 07:01  -  19 Oct 2018 07:00  --------------------------------------------------------  IN: 2260 mL / OUT: 1600 mL / NET: 660 mL    19 Oct 2018 07:01  -  19 Oct 2018 18:24  --------------------------------------------------------  IN: 840 mL / OUT: 500 mL / NET: 340 mL        LABS:                        10.3   14.64 )-----------( 380      ( 19 Oct 2018 07:57 )             30.5     10-19    130<L>  |  92<L>  |  21  ----------------------------<  137<H>  3.8   |  26  |  1.09    Ca    8.9      19 Oct 2018 05:26          CAPILLARY BLOOD GLUCOSE      POCT Blood Glucose.: 223 mg/dL (19 Oct 2018 16:25)  POCT Blood Glucose.: 199 mg/dL (19 Oct 2018 11:46)  POCT Blood Glucose.: 172 mg/dL (19 Oct 2018 07:49)  POCT Blood Glucose.: 177 mg/dL (18 Oct 2018 22:12)  POCT Blood Glucose.: 207 mg/dL (18 Oct 2018 20:56)            MEDICATIONS  (STANDING):  aspirin enteric coated 81 milliGRAM(s) Oral daily  atorvastatin 40 milliGRAM(s) Oral at bedtime  cyanocobalamin 1000 MICROGram(s) Oral daily  dextrose 50% Injectable 12.5 Gram(s) IV Push once  dextrose 50% Injectable 25 Gram(s) IV Push once  dextrose 50% Injectable 25 Gram(s) IV Push once  docusate sodium 100 milliGRAM(s) Oral three times a day  enoxaparin Injectable 40 milliGRAM(s) SubCutaneous daily  folic acid 1 milliGRAM(s) Oral daily  furosemide   Injectable 40 milliGRAM(s) IV Push daily  gabapentin 300 milliGRAM(s) Oral three times a day  insulin glargine Injectable (LANTUS) 25 Unit(s) SubCutaneous at bedtime  insulin lispro (HumaLOG) corrective regimen sliding scale   SubCutaneous three times a day before meals  insulin lispro (HumaLOG) corrective regimen sliding scale   SubCutaneous at bedtime  insulin lispro Injectable (HumaLOG) 9 Unit(s) SubCutaneous three times a day before meals  metoprolol tartrate 25 milliGRAM(s) Oral two times a day  multivitamin 1 Tablet(s) Oral daily  piperacillin/tazobactam IVPB. 3.375 Gram(s) IV Intermittent every 8 hours  senna 2 Tablet(s) Oral at bedtime  sertraline 100 milliGRAM(s) Oral daily  tamsulosin 0.4 milliGRAM(s) Oral at bedtime  ticagrelor 90 milliGRAM(s) Oral two times a day  vancomycin  IVPB 1250 milliGRAM(s) IV Intermittent every 12 hours    MEDICATIONS  (PRN):  dextrose 40% Gel 15 Gram(s) Oral once PRN Blood Glucose LESS THAN 70 milliGRAM(s)/deciliter  glucagon  Injectable 1 milliGRAM(s) IntraMuscular once PRN Glucose LESS THAN 70 milligrams/deciliter          PHYSICAL EXAM:  GENERAL: NAD, well-groomed, well-developed  HEAD:  Atraumatic, Normocephalic  CHEST/LUNG: Clear to percussion bilaterally; No rales, rhonchi, wheezing, or rubs  HEART: Regular rate and rhythm; No murmurs, rubs, or gallops  ABDOMEN: Soft, Nontender, Nondistended; Bowel sounds present  EXTREMITIES:  2+ Peripheral Pulses, No clubbing, cyanosis, or edema  LYMPH: No lymphadenopathy noted  SKIN: No rashes or lesions    Care Discussed with Consultants/Other Providers [ ] YES  [ ] NO
Patient is a 68y old  Male who presents with a chief complaint of L foot pain (20 Oct 2018 10:44)      INTERVAL HPI/OVERNIGHT EVENTS:  T(C): 36.6 (10-20-18 @ 13:19), Max: 37 (10-20-18 @ 04:02)  HR: 87 (10-20-18 @ 17:40) (80 - 90)  BP: 133/77 (10-20-18 @ 17:40) (113/72 - 133/77)  RR: 18 (10-20-18 @ 13:19) (18 - 18)  SpO2: 98% (10-20-18 @ 13:19) (91% - 98%)  Wt(kg): --  I&O's Summary    19 Oct 2018 07:01  -  20 Oct 2018 07:00  --------------------------------------------------------  IN: 1690 mL / OUT: 500 mL / NET: 1190 mL    20 Oct 2018 07:01  -  20 Oct 2018 19:07  --------------------------------------------------------  IN: 930 mL / OUT: 1545 mL / NET: -615 mL        LABS:                        10.7   14.52 )-----------( 422      ( 20 Oct 2018 08:18 )             31.8     10-20    130<L>  |  90<L>  |  26<H>  ----------------------------<  159<H>  3.6   |  24  |  1.16    Ca    8.9      20 Oct 2018 06:53          CAPILLARY BLOOD GLUCOSE      POCT Blood Glucose.: 183 mg/dL (20 Oct 2018 16:25)  POCT Blood Glucose.: 201 mg/dL (20 Oct 2018 11:44)  POCT Blood Glucose.: 185 mg/dL (20 Oct 2018 07:51)  POCT Blood Glucose.: 144 mg/dL (19 Oct 2018 21:01)            MEDICATIONS  (STANDING):  aspirin enteric coated 81 milliGRAM(s) Oral daily  atorvastatin 40 milliGRAM(s) Oral at bedtime  cyanocobalamin 1000 MICROGram(s) Oral daily  dextrose 50% Injectable 12.5 Gram(s) IV Push once  dextrose 50% Injectable 25 Gram(s) IV Push once  dextrose 50% Injectable 25 Gram(s) IV Push once  docusate sodium 100 milliGRAM(s) Oral three times a day  enoxaparin Injectable 40 milliGRAM(s) SubCutaneous daily  folic acid 1 milliGRAM(s) Oral daily  furosemide   Injectable 40 milliGRAM(s) IV Push daily  gabapentin 300 milliGRAM(s) Oral three times a day  insulin glargine Injectable (LANTUS) 25 Unit(s) SubCutaneous at bedtime  insulin lispro (HumaLOG) corrective regimen sliding scale   SubCutaneous three times a day before meals  insulin lispro (HumaLOG) corrective regimen sliding scale   SubCutaneous at bedtime  insulin lispro Injectable (HumaLOG) 9 Unit(s) SubCutaneous three times a day before meals  metoprolol tartrate 25 milliGRAM(s) Oral two times a day  multivitamin 1 Tablet(s) Oral daily  piperacillin/tazobactam IVPB. 3.375 Gram(s) IV Intermittent every 8 hours  senna 2 Tablet(s) Oral at bedtime  sertraline 100 milliGRAM(s) Oral daily  tamsulosin 0.4 milliGRAM(s) Oral at bedtime  ticagrelor 90 milliGRAM(s) Oral two times a day    MEDICATIONS  (PRN):  dextrose 40% Gel 15 Gram(s) Oral once PRN Blood Glucose LESS THAN 70 milliGRAM(s)/deciliter  glucagon  Injectable 1 milliGRAM(s) IntraMuscular once PRN Glucose LESS THAN 70 milligrams/deciliter          PHYSICAL EXAM:  GENERAL: NAD, well-groomed, well-developed  HEAD:  Atraumatic, Normocephalic  CHEST/LUNG: Clear to percussion bilaterally; No rales, rhonchi, wheezing, or rubs  HEART: Regular rate and rhythm; No murmurs, rubs, or gallops  ABDOMEN: Soft, Nontender, Nondistended; Bowel sounds present  EXTREMITIES:  2+ Peripheral Pulses, No clubbing, cyanosis, or edema  LYMPH: No lymphadenopathy noted  SKIN: No rashes or lesions    Care Discussed with Consultants/Other Providers [ ] YES  [ ] NO
Patient is a 68y old  Male who presents with a chief complaint of L foot pain (21 Oct 2018 10:17)       INTERVAL HPI/OVERNIGHT EVENTS:  Patient seen and evaluated at bedside.  Pt is resting comfortable in NAD. Denies N/V/F/C.      Allergies    No Known Allergies    Intolerances        Vital Signs Last 24 Hrs  T(C): 36.6 (21 Oct 2018 04:51), Max: 36.8 (20 Oct 2018 20:05)  T(F): 97.9 (21 Oct 2018 04:51), Max: 98.3 (20 Oct 2018 20:05)  HR: 76 (21 Oct 2018 04:51) (76 - 87)  BP: 124/71 (21 Oct 2018 04:51) (118/74 - 133/77)  BP(mean): --  RR: 18 (21 Oct 2018 04:51) (18 - 18)  SpO2: 94% (21 Oct 2018 04:51) (92% - 98%)    LABS:                        10.6   13.58 )-----------( 411      ( 21 Oct 2018 08:09 )             31.0     10-21    131<L>  |  92<L>  |  27<H>  ----------------------------<  127<H>  3.6   |  25  |  1.19    Ca    8.9      21 Oct 2018 07:08          CAPILLARY BLOOD GLUCOSE      POCT Blood Glucose.: 144 mg/dL (21 Oct 2018 07:37)  POCT Blood Glucose.: 180 mg/dL (20 Oct 2018 21:07)  POCT Blood Glucose.: 183 mg/dL (20 Oct 2018 16:25)  POCT Blood Glucose.: 201 mg/dL (20 Oct 2018 11:44)      Lower Extremity Physical Exam:  Vasc: DP/PT 0/4 b/l, CFT < 3 sec x 8, sluggish on Left 4th/5th digit, cold foot b/l   Neuro: epicritic sensation diminished to level of toes b/l   Msk/Ortho: previous partial hallux amp L foot - closed   Skin: duskiness to entire left 4th and 5th toe, 2nd and 3rd toes cool to touch, no open lesions, no cellulitis, no malodor, no acute signs of infection     RADIOLOGY & ADDITIONAL TESTS:
Patient is a 68y old  Male who presents with a chief complaint of L foot pain (21 Oct 2018 11:28)      INTERVAL HPI/OVERNIGHT EVENTS:  T(C): 36.6 (10-21-18 @ 13:22), Max: 36.8 (10-20-18 @ 20:05)  HR: 86 (10-21-18 @ 17:03) (76 - 86)  BP: 135/81 (10-21-18 @ 17:03) (122/71 - 135/81)  RR: 18 (10-21-18 @ 13:22) (18 - 18)  SpO2: 96% (10-21-18 @ 13:22) (92% - 96%)  Wt(kg): --  I&O's Summary    20 Oct 2018 07:01  -  21 Oct 2018 07:00  --------------------------------------------------------  IN: 930 mL / OUT: 2395 mL / NET: -1465 mL    21 Oct 2018 07:01  -  21 Oct 2018 17:45  --------------------------------------------------------  IN: 240 mL / OUT: 0 mL / NET: 240 mL        LABS:                        10.6   13.58 )-----------( 411      ( 21 Oct 2018 08:09 )             31.0     10-21    131<L>  |  92<L>  |  27<H>  ----------------------------<  127<H>  3.6   |  25  |  1.19    Ca    8.9      21 Oct 2018 07:08          CAPILLARY BLOOD GLUCOSE      POCT Blood Glucose.: 232 mg/dL (21 Oct 2018 16:26)  POCT Blood Glucose.: 240 mg/dL (21 Oct 2018 12:04)  POCT Blood Glucose.: 144 mg/dL (21 Oct 2018 07:37)  POCT Blood Glucose.: 180 mg/dL (20 Oct 2018 21:07)            MEDICATIONS  (STANDING):  aspirin enteric coated 81 milliGRAM(s) Oral daily  atorvastatin 40 milliGRAM(s) Oral at bedtime  cyanocobalamin 1000 MICROGram(s) Oral daily  dextrose 50% Injectable 12.5 Gram(s) IV Push once  dextrose 50% Injectable 25 Gram(s) IV Push once  dextrose 50% Injectable 25 Gram(s) IV Push once  docusate sodium 100 milliGRAM(s) Oral three times a day  enoxaparin Injectable 40 milliGRAM(s) SubCutaneous daily  folic acid 1 milliGRAM(s) Oral daily  furosemide   Injectable 40 milliGRAM(s) IV Push daily  gabapentin 300 milliGRAM(s) Oral three times a day  insulin glargine Injectable (LANTUS) 25 Unit(s) SubCutaneous at bedtime  insulin lispro (HumaLOG) corrective regimen sliding scale   SubCutaneous three times a day before meals  insulin lispro (HumaLOG) corrective regimen sliding scale   SubCutaneous at bedtime  insulin lispro Injectable (HumaLOG) 9 Unit(s) SubCutaneous three times a day before meals  metoprolol tartrate 25 milliGRAM(s) Oral two times a day  multivitamin 1 Tablet(s) Oral daily  piperacillin/tazobactam IVPB. 3.375 Gram(s) IV Intermittent every 8 hours  senna 2 Tablet(s) Oral at bedtime  sertraline 100 milliGRAM(s) Oral daily  tamsulosin 0.4 milliGRAM(s) Oral at bedtime  ticagrelor 90 milliGRAM(s) Oral two times a day    MEDICATIONS  (PRN):  dextrose 40% Gel 15 Gram(s) Oral once PRN Blood Glucose LESS THAN 70 milliGRAM(s)/deciliter  glucagon  Injectable 1 milliGRAM(s) IntraMuscular once PRN Glucose LESS THAN 70 milligrams/deciliter          PHYSICAL EXAM:  GENERAL: NAD, well-groomed, well-developed  HEAD:  Atraumatic, Normocephalic  CHEST/LUNG: Clear to percussion bilaterally; No rales, rhonchi, wheezing, or rubs  HEART: Regular rate and rhythm; No murmurs, rubs, or gallops  ABDOMEN: Soft, Nontender, Nondistended; Bowel sounds present  EXTREMITIES: foot dressing present   LYMPH: No lymphadenopathy noted  SKIN: No rashes or lesions    Care Discussed with Consultants/Other Providers [ ] YES  [ ] NO
Patient is a 68y old  Male who presents with a chief complaint of L foot pain (22 Oct 2018 13:14)      INTERVAL HPI/OVERNIGHT EVENTS:  T(C): 36.6 (10-22-18 @ 11:49), Max: 37.1 (10-22-18 @ 04:26)  HR: 88 (10-22-18 @ 17:06) (70 - 88)  BP: 136/79 (10-22-18 @ 17:06) (118/68 - 136/79)  RR: 18 (10-22-18 @ 11:49) (18 - 18)  SpO2: 96% (10-22-18 @ 11:49) (92% - 96%)  Wt(kg): --  I&O's Summary    21 Oct 2018 07:01  -  22 Oct 2018 07:00  --------------------------------------------------------  IN: 340 mL / OUT: 300 mL / NET: 40 mL    22 Oct 2018 07:01  -  22 Oct 2018 18:20  --------------------------------------------------------  IN: 585 mL / OUT: 1275 mL / NET: -690 mL        LABS:                        10.4   16.66 )-----------( 459      ( 22 Oct 2018 07:26 )             31.6     10-22    131<L>  |  92<L>  |  32<H>  ----------------------------<  164<H>  4.2   |  25  |  1.16    Ca    9.1      22 Oct 2018 05:32          CAPILLARY BLOOD GLUCOSE      POCT Blood Glucose.: 267 mg/dL (22 Oct 2018 16:30)  POCT Blood Glucose.: 243 mg/dL (22 Oct 2018 11:47)  POCT Blood Glucose.: 237 mg/dL (22 Oct 2018 07:45)  POCT Blood Glucose.: 176 mg/dL (21 Oct 2018 21:19)            MEDICATIONS  (STANDING):  aspirin enteric coated 81 milliGRAM(s) Oral daily  atorvastatin 40 milliGRAM(s) Oral at bedtime  cyanocobalamin 1000 MICROGram(s) Oral daily  dextrose 50% Injectable 12.5 Gram(s) IV Push once  dextrose 50% Injectable 25 Gram(s) IV Push once  dextrose 50% Injectable 25 Gram(s) IV Push once  docusate sodium 100 milliGRAM(s) Oral three times a day  enoxaparin Injectable 40 milliGRAM(s) SubCutaneous daily  folic acid 1 milliGRAM(s) Oral daily  furosemide   Injectable 40 milliGRAM(s) IV Push daily  gabapentin 300 milliGRAM(s) Oral three times a day  insulin glargine Injectable (LANTUS) 25 Unit(s) SubCutaneous at bedtime  insulin lispro (HumaLOG) corrective regimen sliding scale   SubCutaneous three times a day before meals  insulin lispro (HumaLOG) corrective regimen sliding scale   SubCutaneous at bedtime  insulin lispro Injectable (HumaLOG) 9 Unit(s) SubCutaneous three times a day before meals  metoprolol tartrate 25 milliGRAM(s) Oral two times a day  multivitamin 1 Tablet(s) Oral daily  piperacillin/tazobactam IVPB. 3.375 Gram(s) IV Intermittent every 8 hours  senna 2 Tablet(s) Oral at bedtime  sertraline 100 milliGRAM(s) Oral daily  tamsulosin 0.4 milliGRAM(s) Oral at bedtime  ticagrelor 90 milliGRAM(s) Oral two times a day  vancomycin  IVPB 1000 milliGRAM(s) IV Intermittent every 12 hours    MEDICATIONS  (PRN):  dextrose 40% Gel 15 Gram(s) Oral once PRN Blood Glucose LESS THAN 70 milliGRAM(s)/deciliter  glucagon  Injectable 1 milliGRAM(s) IntraMuscular once PRN Glucose LESS THAN 70 milligrams/deciliter          PHYSICAL EXAM:  GENERAL: NAD, well-groomed, well-developed  HEAD:  Atraumatic, Normocephalic  CHEST/LUNG: Clear to percussion bilaterally; No rales, rhonchi, wheezing, or rubs  HEART: Regular rate and rhythm; No murmurs, rubs, or gallops  ABDOMEN: Soft, Nontender, Nondistended; Bowel sounds present  EXTREMITIES:  2+ Peripheral Pulses, No clubbing, cyanosis, or edema  LYMPH: No lymphadenopathy noted  SKIN: No rashes or lesions    Care Discussed with Consultants/Other Providers [ ] YES  [ ] NO
Patient is a 68y old  Male who presents with a chief complaint of L foot pain (22 Oct 2018 18:20)       INTERVAL HPI/OVERNIGHT EVENTS:  Patient seen and evaluated at bedside.  Pt is resting comfortable in NAD. Denies N/V/F/C.      Allergies    No Known Allergies    Intolerances        Vital Signs Last 24 Hrs  T(C): 36.3 (23 Oct 2018 08:14), Max: 37 (23 Oct 2018 00:01)  T(F): 97.4 (23 Oct 2018 08:14), Max: 98.6 (23 Oct 2018 00:01)  HR: 64 (23 Oct 2018 08:14) (64 - 88)  BP: 104/67 (23 Oct 2018 08:14) (104/67 - 136/79)  BP(mean): --  RR: 18 (23 Oct 2018 08:14) (18 - 18)  SpO2: 97% (23 Oct 2018 08:14) (93% - 97%)    LABS:                        10.8   15.14 )-----------( 490      ( 23 Oct 2018 08:11 )             33.2     10-23    130<L>  |  92<L>  |  32<H>  ----------------------------<  177<H>  4.3   |  26  |  1.18    Ca    9.1      23 Oct 2018 04:58      PT/INR - ( 23 Oct 2018 08:14 )   PT: 11.6 sec;   INR: 1.03 ratio         PTT - ( 23 Oct 2018 08:14 )  PTT:32.2 sec    CAPILLARY BLOOD GLUCOSE      POCT Blood Glucose.: 257 mg/dL (23 Oct 2018 07:45)  POCT Blood Glucose.: 190 mg/dL (22 Oct 2018 21:17)  POCT Blood Glucose.: 267 mg/dL (22 Oct 2018 16:30)  POCT Blood Glucose.: 243 mg/dL (22 Oct 2018 11:47)      Lower Extremity Physical Exam:  Vasc: DP/PT 0/4 b/l, CFT < 3 sec x 8, sluggish on Left 4th/5th digit, cold foot b/l   Neuro: epicritic sensation diminished to level of toes b/l   Msk/Ortho: previous partial hallux amp L foot - closed   Skin: duskiness to entire left 4th and 5th toe, 2nd and 3rd toes cool to touch, no open lesions, no cellulitis, no malodor, no acute signs of infection     RADIOLOGY & ADDITIONAL TESTS:
Patient is a 68y old  Male who presents with a chief complaint of L foot pain (23 Oct 2018 10:58)      INTERVAL HPI/OVERNIGHT EVENTS:  T(C): 36.3 (10-23-18 @ 08:14), Max: 37 (10-23-18 @ 00:01)  HR: 64 (10-23-18 @ 08:14) (64 - 88)  BP: 104/67 (10-23-18 @ 08:14) (104/67 - 136/79)  RR: 18 (10-23-18 @ 08:14) (18 - 18)  SpO2: 97% (10-23-18 @ 08:14) (93% - 97%)  Wt(kg): --  I&O's Summary    22 Oct 2018 07:01  -  23 Oct 2018 07:00  --------------------------------------------------------  IN: 1175 mL / OUT: 2650 mL / NET: -1475 mL    23 Oct 2018 07:01  -  23 Oct 2018 15:12  --------------------------------------------------------  IN: 0 mL / OUT: 300 mL / NET: -300 mL        LABS:                        10.8   15.14 )-----------( 490      ( 23 Oct 2018 08:11 )             33.2     10-23    130<L>  |  92<L>  |  32<H>  ----------------------------<  177<H>  4.3   |  26  |  1.18    Ca    9.1      23 Oct 2018 04:58      PT/INR - ( 23 Oct 2018 08:14 )   PT: 11.6 sec;   INR: 1.03 ratio         PTT - ( 23 Oct 2018 08:14 )  PTT:32.2 sec    CAPILLARY BLOOD GLUCOSE      POCT Blood Glucose.: 257 mg/dL (23 Oct 2018 07:45)  POCT Blood Glucose.: 190 mg/dL (22 Oct 2018 21:17)  POCT Blood Glucose.: 267 mg/dL (22 Oct 2018 16:30)            MEDICATIONS  (STANDING):  aspirin enteric coated 81 milliGRAM(s) Oral daily  atorvastatin 40 milliGRAM(s) Oral at bedtime  cyanocobalamin 1000 MICROGram(s) Oral daily  dextrose 50% Injectable 12.5 Gram(s) IV Push once  dextrose 50% Injectable 25 Gram(s) IV Push once  dextrose 50% Injectable 25 Gram(s) IV Push once  docusate sodium 100 milliGRAM(s) Oral three times a day  enoxaparin Injectable 40 milliGRAM(s) SubCutaneous daily  folic acid 1 milliGRAM(s) Oral daily  furosemide   Injectable 40 milliGRAM(s) IV Push daily  gabapentin 300 milliGRAM(s) Oral three times a day  insulin glargine Injectable (LANTUS) 25 Unit(s) SubCutaneous at bedtime  insulin lispro (HumaLOG) corrective regimen sliding scale   SubCutaneous three times a day before meals  insulin lispro (HumaLOG) corrective regimen sliding scale   SubCutaneous at bedtime  insulin lispro Injectable (HumaLOG) 9 Unit(s) SubCutaneous three times a day before meals  metoprolol tartrate 25 milliGRAM(s) Oral two times a day  multivitamin 1 Tablet(s) Oral daily  piperacillin/tazobactam IVPB. 3.375 Gram(s) IV Intermittent every 8 hours  senna 2 Tablet(s) Oral at bedtime  sertraline 100 milliGRAM(s) Oral daily  sodium chloride 0.9%. 1000 milliLiter(s) (30 mL/Hr) IV Continuous <Continuous>  tamsulosin 0.4 milliGRAM(s) Oral at bedtime  ticagrelor 90 milliGRAM(s) Oral two times a day  vancomycin  IVPB 1000 milliGRAM(s) IV Intermittent every 12 hours    MEDICATIONS  (PRN):  dextrose 40% Gel 15 Gram(s) Oral once PRN Blood Glucose LESS THAN 70 milliGRAM(s)/deciliter  glucagon  Injectable 1 milliGRAM(s) IntraMuscular once PRN Glucose LESS THAN 70 milligrams/deciliter          PHYSICAL EXAM:  GENERAL: NAD, well-groomed, well-developed  HEAD:  Atraumatic, Normocephalic  CHEST/LUNG: Clear to percussion bilaterally; No rales, rhonchi, wheezing, or rubs  HEART: Regular rate and rhythm; No murmurs, rubs, or gallops  ABDOMEN: Soft, Nontender, Nondistended; Bowel sounds present  EXTREMITIES:  2+ Peripheral Pulses, No clubbing, cyanosis, or edema  LYMPH: No lymphadenopathy noted  SKIN: No rashes or lesions    Care Discussed with Consultants/Other Providers [ ] YES  [ ] NO
Podiatry Pager #: [phone]    Patient is a 68y old  Male who presents with a chief complaint of L foot pain (23 Oct 2018 15:12)      INTERVAL HPI/OVERNIGHT EVENTS:   Pt is scheduled for _left TMA w/ TAL___ with Dr. Burgess____ at _12pm______. Patient is aware of procedure and is NPO since midnight.    MEDICATIONS  (STANDING):  aspirin enteric coated 81 milliGRAM(s) Oral daily  atorvastatin 40 milliGRAM(s) Oral at bedtime  cyanocobalamin 1000 MICROGram(s) Oral daily  dextrose 50% Injectable 12.5 Gram(s) IV Push once  dextrose 50% Injectable 25 Gram(s) IV Push once  dextrose 50% Injectable 25 Gram(s) IV Push once  docusate sodium 100 milliGRAM(s) Oral three times a day  enoxaparin Injectable 40 milliGRAM(s) SubCutaneous daily  folic acid 1 milliGRAM(s) Oral daily  furosemide   Injectable 40 milliGRAM(s) IV Push daily  gabapentin 300 milliGRAM(s) Oral three times a day  insulin glargine Injectable (LANTUS) 25 Unit(s) SubCutaneous at bedtime  insulin lispro (HumaLOG) corrective regimen sliding scale   SubCutaneous three times a day before meals  insulin lispro (HumaLOG) corrective regimen sliding scale   SubCutaneous at bedtime  insulin lispro Injectable (HumaLOG) 9 Unit(s) SubCutaneous three times a day before meals  metoprolol tartrate 25 milliGRAM(s) Oral two times a day  multivitamin 1 Tablet(s) Oral daily  piperacillin/tazobactam IVPB. 3.375 Gram(s) IV Intermittent every 8 hours  senna 2 Tablet(s) Oral at bedtime  sertraline 100 milliGRAM(s) Oral daily  sodium chloride 0.9%. 1000 milliLiter(s) (30 mL/Hr) IV Continuous <Continuous>  tamsulosin 0.4 milliGRAM(s) Oral at bedtime  ticagrelor 90 milliGRAM(s) Oral two times a day  vancomycin  IVPB 1000 milliGRAM(s) IV Intermittent every 12 hours    MEDICATIONS  (PRN):  dextrose 40% Gel 15 Gram(s) Oral once PRN Blood Glucose LESS THAN 70 milliGRAM(s)/deciliter  glucagon  Injectable 1 milliGRAM(s) IntraMuscular once PRN Glucose LESS THAN 70 milligrams/deciliter      Allergies    No Known Allergies    Intolerances        Vital Signs Last 24 Hrs  T(C): 37.2 (24 Oct 2018 08:33), Max: 37.5 (24 Oct 2018 00:15)  T(F): 99 (24 Oct 2018 08:33), Max: 99.5 (24 Oct 2018 00:15)  HR: 98 (24 Oct 2018 08:33) (74 - 111)  BP: 101/72 (24 Oct 2018 08:33) (101/72 - 131/85)  BP(mean): --  RR: 18 (24 Oct 2018 08:33) (18 - 18)  SpO2: 94% (24 Oct 2018 08:33) (86% - 96%)    LABS:                        11.4   16.76 )-----------( 593      ( 24 Oct 2018 07:40 )             34.5     10-24    130<L>  |  89<L>  |  39<H>  ----------------------------<  309<H>  4.7   |  24  |  1.33<H>    Ca    9.3      24 Oct 2018 06:24      PT/INR - ( 24 Oct 2018 07:54 )   PT: 11.8 sec;   INR: 1.04 ratio         PTT - ( 24 Oct 2018 07:54 )  PTT:30.8 sec    CAPILLARY BLOOD GLUCOSE      POCT Blood Glucose.: 356 mg/dL (24 Oct 2018 07:47)  POCT Blood Glucose.: 375 mg/dL (23 Oct 2018 21:15)  POCT Blood Glucose.: 191 mg/dL (23 Oct 2018 16:37)      RADIOLOGY & ADDITIONAL TESTS:
Podiatry pager #: [phone]/ [phone]    Patient is a 68y old  Male who presents with a chief complaint of L foot pain (22 Oct 2018 07:50)       INTERVAL HPI/OVERNIGHT EVENTS:  Patient seen and evaluated at bedside.  Pt is resting comfortable in NAD. Denies N/V/F/C.  Pt denies pain.     Allergies    No Known Allergies    Intolerances        Vital Signs Last 24 Hrs  T(C): 36.4 (22 Oct 2018 08:42), Max: 37.1 (22 Oct 2018 04:26)  T(F): 97.6 (22 Oct 2018 08:42), Max: 98.8 (22 Oct 2018 04:26)  HR: 70 (22 Oct 2018 08:42) (70 - 86)  BP: 118/68 (22 Oct 2018 08:42) (118/68 - 135/81)  BP(mean): --  RR: 18 (22 Oct 2018 08:42) (18 - 18)  SpO2: 96% (22 Oct 2018 08:42) (92% - 96%)    LABS:                        10.4   16.66 )-----------( 459      ( 22 Oct 2018 07:26 )             31.6     10-22    131<L>  |  92<L>  |  32<H>  ----------------------------<  164<H>  4.2   |  25  |  1.16    Ca    9.1      22 Oct 2018 05:32          CAPILLARY BLOOD GLUCOSE      POCT Blood Glucose.: 237 mg/dL (22 Oct 2018 07:45)  POCT Blood Glucose.: 176 mg/dL (21 Oct 2018 21:19)  POCT Blood Glucose.: 232 mg/dL (21 Oct 2018 16:26)  POCT Blood Glucose.: 240 mg/dL (21 Oct 2018 12:04)      Lower Extremity Physical Exam:  dressing left clean dry and intact.
Vascular Surgery     Subjective: Pt seen/examined at bedside. No acute events overnight.     MEDICATIONS  (STANDING):  ALBUTerol    0.083%. 2.5 milliGRAM(s) Nebulizer once  aspirin enteric coated 81 milliGRAM(s) Oral daily  atorvastatin 40 milliGRAM(s) Oral at bedtime  cyanocobalamin 1000 MICROGram(s) Oral daily  dextrose 50% Injectable 12.5 Gram(s) IV Push once  dextrose 50% Injectable 25 Gram(s) IV Push once  dextrose 50% Injectable 25 Gram(s) IV Push once  docusate sodium 100 milliGRAM(s) Oral three times a day  enoxaparin Injectable 40 milliGRAM(s) SubCutaneous daily  folic acid 1 milliGRAM(s) Oral daily  furosemide   Injectable 40 milliGRAM(s) IV Push once  gabapentin 300 milliGRAM(s) Oral three times a day  insulin glargine Injectable (LANTUS) 25 Unit(s) SubCutaneous at bedtime  insulin lispro (HumaLOG) corrective regimen sliding scale   SubCutaneous three times a day before meals  insulin lispro (HumaLOG) corrective regimen sliding scale   SubCutaneous at bedtime  insulin lispro Injectable (HumaLOG) 9 Unit(s) SubCutaneous three times a day before meals  metoprolol tartrate 25 milliGRAM(s) Oral two times a day  multivitamin 1 Tablet(s) Oral daily  piperacillin/tazobactam IVPB. 3.375 Gram(s) IV Intermittent every 8 hours  senna 2 Tablet(s) Oral at bedtime  sertraline 100 milliGRAM(s) Oral daily  tamsulosin 0.4 milliGRAM(s) Oral at bedtime  ticagrelor 90 milliGRAM(s) Oral two times a day  vancomycin  IVPB 1250 milliGRAM(s) IV Intermittent every 12 hours    MEDICATIONS  (PRN):  dextrose 40% Gel 15 Gram(s) Oral once PRN Blood Glucose LESS THAN 70 milliGRAM(s)/deciliter  glucagon  Injectable 1 milliGRAM(s) IntraMuscular once PRN Glucose LESS THAN 70 milligrams/deciliter    ALBUTerol    0.083%. 2.5 milliGRAM(s) Nebulizer once  aspirin enteric coated 81 milliGRAM(s) Oral daily  atorvastatin 40 milliGRAM(s) Oral at bedtime  cyanocobalamin 1000 MICROGram(s) Oral daily  dextrose 40% Gel 15 Gram(s) Oral once PRN  dextrose 50% Injectable 12.5 Gram(s) IV Push once  dextrose 50% Injectable 25 Gram(s) IV Push once  dextrose 50% Injectable 25 Gram(s) IV Push once  docusate sodium 100 milliGRAM(s) Oral three times a day  enoxaparin Injectable 40 milliGRAM(s) SubCutaneous daily  folic acid 1 milliGRAM(s) Oral daily  furosemide   Injectable 40 milliGRAM(s) IV Push once  gabapentin 300 milliGRAM(s) Oral three times a day  glucagon  Injectable 1 milliGRAM(s) IntraMuscular once PRN  insulin glargine Injectable (LANTUS) 25 Unit(s) SubCutaneous at bedtime  insulin lispro (HumaLOG) corrective regimen sliding scale   SubCutaneous three times a day before meals  insulin lispro (HumaLOG) corrective regimen sliding scale   SubCutaneous at bedtime  insulin lispro Injectable (HumaLOG) 9 Unit(s) SubCutaneous three times a day before meals  metoprolol tartrate 25 milliGRAM(s) Oral two times a day  multivitamin 1 Tablet(s) Oral daily  piperacillin/tazobactam IVPB. 3.375 Gram(s) IV Intermittent every 8 hours  senna 2 Tablet(s) Oral at bedtime  sertraline 100 milliGRAM(s) Oral daily  tamsulosin 0.4 milliGRAM(s) Oral at bedtime  ticagrelor 90 milliGRAM(s) Oral two times a day  vancomycin  IVPB 1250 milliGRAM(s) IV Intermittent every 12 hours    Allergies    No Known Allergies    Intolerances          Vital Signs Last 24 Hrs  T(C): 37.4 (17 Oct 2018 08:33), Max: 37.4 (16 Oct 2018 20:27)  T(F): 99.4 (17 Oct 2018 08:33), Max: 99.4 (17 Oct 2018 08:33)  HR: 76 (17 Oct 2018 08:33) (76 - 100)  BP: 114/72 (17 Oct 2018 08:33) (113/70 - 129/80)  BP(mean): --  RR: 18 (17 Oct 2018 08:33) (18 - 18)  SpO2: 92% (17 Oct 2018 08:33) (92% - 94%)    I&O's Summary    16 Oct 2018 07:01  -  17 Oct 2018 07:00  --------------------------------------------------------  IN: 1370 mL / OUT: 2300 mL / NET: -930 mL    17 Oct 2018 07:01  -  17 Oct 2018 10:01  --------------------------------------------------------  IN: 280 mL / OUT: 0 mL / NET: 280 mL        Physical Exam:  General: NAD  Respiratory: Breathing comfortably on RA  Abdominal: Soft, nondistended, nontender  Extremities: Left 4th toe black, ischemic with scant drainage, foul smelling; 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 toe partially amputated; palpable femoral pulses    LABS:                        10.6   13.72 )-----------( 394      ( 17 Oct 2018 09:03 )             30.6     10-17    132<L>  |  94<L>  |  16  ----------------------------<  135<H>  3.6   |  25  |  0.93    Ca    8.9      17 Oct 2018 06:20    TPro  7.1  /  Alb  3.1<L>  /  TBili  0.5  /  DBili  x   /  AST  20  /  ALT  22  /  AlkPhos  63  10-17    PT/INR - ( 17 Oct 2018 09:01 )   PT: 12.9 sec;   INR: 1.14 ratio         PTT - ( 16 Oct 2018 10:43 )  PTT:28.4 sec  Urinalysis Basic - ( 16 Oct 2018 21:25 )    Color: x / Appearance: x / SG: x / pH: x  Gluc: x / Ketone: x  / Bili: x / Urobili: x   Blood: x / Protein: x / Nitrite: x   Leuk Esterase: x / RBC: 2 /hpf / WBC 1 /hpf   Sq Epi: x / Non Sq Epi: 0 /hpf / Bacteria: Negative      LIVER FUNCTIONS - ( 17 Oct 2018 06:20 )  Alb: 3.1 g/dL / Pro: 7.1 g/dL / ALK PHOS: 63 U/L / ALT: 22 U/L / AST: 20 U/L / GGT: x           CAPILLARY BLOOD GLUCOSE      POCT Blood Glucose.: 158 mg/dL (17 Oct 2018 07:37)  POCT Blood Glucose.: 177 mg/dL (16 Oct 2018 21:01)  POCT Blood Glucose.: 145 mg/dL (16 Oct 2018 16:21)  POCT Blood Glucose.: 227 mg/dL (16 Oct 2018 11:41)      RADIOLOGY & ADDITIONAL TESTS:
Vascular Surgery     Subjective: Pt seen/examined at bedside. No acute events overnight.     MEDICATIONS  (STANDING):  aspirin enteric coated 81 milliGRAM(s) Oral daily  atorvastatin 40 milliGRAM(s) Oral at bedtime  cyanocobalamin 1000 MICROGram(s) Oral daily  dextrose 50% Injectable 12.5 Gram(s) IV Push once  dextrose 50% Injectable 25 Gram(s) IV Push once  dextrose 50% Injectable 25 Gram(s) IV Push once  docusate sodium 100 milliGRAM(s) Oral three times a day  enoxaparin Injectable 40 milliGRAM(s) SubCutaneous daily  folic acid 1 milliGRAM(s) Oral daily  furosemide   Injectable 40 milliGRAM(s) IV Push once  gabapentin 300 milliGRAM(s) Oral three times a day  insulin glargine Injectable (LANTUS) 25 Unit(s) SubCutaneous at bedtime  insulin lispro (HumaLOG) corrective regimen sliding scale   SubCutaneous three times a day before meals  insulin lispro (HumaLOG) corrective regimen sliding scale   SubCutaneous at bedtime  insulin lispro Injectable (HumaLOG) 9 Unit(s) SubCutaneous three times a day before meals  metoprolol tartrate 25 milliGRAM(s) Oral two times a day  multivitamin 1 Tablet(s) Oral daily  piperacillin/tazobactam IVPB. 3.375 Gram(s) IV Intermittent every 8 hours  senna 2 Tablet(s) Oral at bedtime  sertraline 100 milliGRAM(s) Oral daily  tamsulosin 0.4 milliGRAM(s) Oral at bedtime  ticagrelor 90 milliGRAM(s) Oral two times a day  vancomycin  IVPB 1250 milliGRAM(s) IV Intermittent every 12 hours    MEDICATIONS  (PRN):  dextrose 40% Gel 15 Gram(s) Oral once PRN Blood Glucose LESS THAN 70 milliGRAM(s)/deciliter  glucagon  Injectable 1 milliGRAM(s) IntraMuscular once PRN Glucose LESS THAN 70 milligrams/deciliter    aspirin enteric coated 81 milliGRAM(s) Oral daily  atorvastatin 40 milliGRAM(s) Oral at bedtime  cyanocobalamin 1000 MICROGram(s) Oral daily  dextrose 40% Gel 15 Gram(s) Oral once PRN  dextrose 50% Injectable 12.5 Gram(s) IV Push once  dextrose 50% Injectable 25 Gram(s) IV Push once  dextrose 50% Injectable 25 Gram(s) IV Push once  docusate sodium 100 milliGRAM(s) Oral three times a day  enoxaparin Injectable 40 milliGRAM(s) SubCutaneous daily  folic acid 1 milliGRAM(s) Oral daily  furosemide   Injectable 40 milliGRAM(s) IV Push once  gabapentin 300 milliGRAM(s) Oral three times a day  glucagon  Injectable 1 milliGRAM(s) IntraMuscular once PRN  insulin glargine Injectable (LANTUS) 25 Unit(s) SubCutaneous at bedtime  insulin lispro (HumaLOG) corrective regimen sliding scale   SubCutaneous three times a day before meals  insulin lispro (HumaLOG) corrective regimen sliding scale   SubCutaneous at bedtime  insulin lispro Injectable (HumaLOG) 9 Unit(s) SubCutaneous three times a day before meals  metoprolol tartrate 25 milliGRAM(s) Oral two times a day  multivitamin 1 Tablet(s) Oral daily  piperacillin/tazobactam IVPB. 3.375 Gram(s) IV Intermittent every 8 hours  senna 2 Tablet(s) Oral at bedtime  sertraline 100 milliGRAM(s) Oral daily  tamsulosin 0.4 milliGRAM(s) Oral at bedtime  ticagrelor 90 milliGRAM(s) Oral two times a day  vancomycin  IVPB 1250 milliGRAM(s) IV Intermittent every 12 hours    Allergies    No Known Allergies    Intolerances          Vital Signs Last 24 Hrs  T(C): 36.7 (16 Oct 2018 08:23), Max: 38.1 (15 Oct 2018 16:05)  T(F): 98.1 (16 Oct 2018 08:23), Max: 100.6 (15 Oct 2018 16:05)  HR: 86 (16 Oct 2018 08:23) (86 - 109)  BP: 106/69 (16 Oct 2018 08:23) (106/69 - 159/82)  BP(mean): 88 (15 Oct 2018 21:06) (88 - 88)  RR: 18 (16 Oct 2018 08:23) (18 - 18)  SpO2: 96% (16 Oct 2018 08:23) (91% - 96%)    I&O's Summary    15 Oct 2018 07:01  -  16 Oct 2018 07:00  --------------------------------------------------------  IN: 1380 mL / OUT: 800 mL / NET: 580 mL        Physical Exam:  General: NAD  Respiratory: Breathing comfortably on RA  Abdominal: Soft, nondistended, nontender  Extremities: Left 4th toe black, ischemic with scant drainage, foul smelling; left 1st toe partially amputated; palpable femoral pulses    LABS:                        10.9   14.43 )-----------( 405      ( 16 Oct 2018 08:13 )             32.7     10-16    130<L>  |  93<L>  |  11  ----------------------------<  180<H>  4.1   |  23  |  0.75    Ca    8.4      16 Oct 2018 06:33            CAPILLARY BLOOD GLUCOSE      POCT Blood Glucose.: 214 mg/dL (16 Oct 2018 07:33)  POCT Blood Glucose.: 188 mg/dL (15 Oct 2018 22:31)  POCT Blood Glucose.: 197 mg/dL (15 Oct 2018 20:59)  POCT Blood Glucose.: 133 mg/dL (15 Oct 2018 16:47)  POCT Blood Glucose.: 168 mg/dL (15 Oct 2018 11:48)      RADIOLOGY & ADDITIONAL TESTS:
Vascular Surgery     Subjective: Pt seen/examined at bedside. No acute events overnight. Denied uncontrolled pain, changes in strength or sensation.       MEDICATIONS  (STANDING):  aspirin enteric coated 81 milliGRAM(s) Oral daily  atorvastatin 40 milliGRAM(s) Oral at bedtime  cyanocobalamin 1000 MICROGram(s) Oral daily  dextrose 50% Injectable 12.5 Gram(s) IV Push once  dextrose 50% Injectable 25 Gram(s) IV Push once  dextrose 50% Injectable 25 Gram(s) IV Push once  docusate sodium 100 milliGRAM(s) Oral three times a day  enoxaparin Injectable 40 milliGRAM(s) SubCutaneous daily  folic acid 1 milliGRAM(s) Oral daily  furosemide   Injectable 40 milliGRAM(s) IV Push daily  gabapentin 300 milliGRAM(s) Oral three times a day  insulin glargine Injectable (LANTUS) 25 Unit(s) SubCutaneous at bedtime  insulin lispro (HumaLOG) corrective regimen sliding scale   SubCutaneous three times a day before meals  insulin lispro (HumaLOG) corrective regimen sliding scale   SubCutaneous at bedtime  insulin lispro Injectable (HumaLOG) 9 Unit(s) SubCutaneous three times a day before meals  metoprolol tartrate 25 milliGRAM(s) Oral two times a day  multivitamin 1 Tablet(s) Oral daily  piperacillin/tazobactam IVPB. 3.375 Gram(s) IV Intermittent every 8 hours  senna 2 Tablet(s) Oral at bedtime  sertraline 100 milliGRAM(s) Oral daily  tamsulosin 0.4 milliGRAM(s) Oral at bedtime  ticagrelor 90 milliGRAM(s) Oral two times a day  vancomycin  IVPB 1250 milliGRAM(s) IV Intermittent every 12 hours    MEDICATIONS  (PRN):  dextrose 40% Gel 15 Gram(s) Oral once PRN Blood Glucose LESS THAN 70 milliGRAM(s)/deciliter  glucagon  Injectable 1 milliGRAM(s) IntraMuscular once PRN Glucose LESS THAN 70 milligrams/deciliter    aspirin enteric coated 81 milliGRAM(s) Oral daily  atorvastatin 40 milliGRAM(s) Oral at bedtime  cyanocobalamin 1000 MICROGram(s) Oral daily  dextrose 40% Gel 15 Gram(s) Oral once PRN  dextrose 50% Injectable 12.5 Gram(s) IV Push once  dextrose 50% Injectable 25 Gram(s) IV Push once  dextrose 50% Injectable 25 Gram(s) IV Push once  docusate sodium 100 milliGRAM(s) Oral three times a day  enoxaparin Injectable 40 milliGRAM(s) SubCutaneous daily  folic acid 1 milliGRAM(s) Oral daily  furosemide   Injectable 40 milliGRAM(s) IV Push daily  gabapentin 300 milliGRAM(s) Oral three times a day  glucagon  Injectable 1 milliGRAM(s) IntraMuscular once PRN  insulin glargine Injectable (LANTUS) 25 Unit(s) SubCutaneous at bedtime  insulin lispro (HumaLOG) corrective regimen sliding scale   SubCutaneous three times a day before meals  insulin lispro (HumaLOG) corrective regimen sliding scale   SubCutaneous at bedtime  insulin lispro Injectable (HumaLOG) 9 Unit(s) SubCutaneous three times a day before meals  metoprolol tartrate 25 milliGRAM(s) Oral two times a day  multivitamin 1 Tablet(s) Oral daily  piperacillin/tazobactam IVPB. 3.375 Gram(s) IV Intermittent every 8 hours  senna 2 Tablet(s) Oral at bedtime  sertraline 100 milliGRAM(s) Oral daily  tamsulosin 0.4 milliGRAM(s) Oral at bedtime  ticagrelor 90 milliGRAM(s) Oral two times a day  vancomycin  IVPB 1250 milliGRAM(s) IV Intermittent every 12 hours    Allergies    No Known Allergies    Intolerances          Vital Signs Last 24 Hrs  T(C): 36.6 (18 Oct 2018 08:33), Max: 37.7 (17 Oct 2018 22:44)  T(F): 97.8 (18 Oct 2018 08:33), Max: 99.9 (17 Oct 2018 22:44)  HR: 83 (18 Oct 2018 08:33) (77 - 118)  BP: 100/64 (18 Oct 2018 08:33) (100/64 - 160/90)  BP(mean): --  RR: 18 (18 Oct 2018 08:33) (17 - 20)  SpO2: 92% (18 Oct 2018 08:33) (92% - 99%)    I&O's Summary    17 Oct 2018 07:01  -  18 Oct 2018 07:00  --------------------------------------------------------  IN: 1520 mL / OUT: 8101 mL / NET: -6581 mL    18 Oct 2018 07:01  -  18 Oct 2018 10:16  --------------------------------------------------------  IN: 240 mL / OUT: 0 mL / NET: 240 mL        Physical Exam:  General: NAD  Respiratory: Breathing comfortably on RA  Abdominal: Soft, nondistended, nontender  Extremities: Right groin insertion site CDI, no swelling , skin changes, bruising appreciated. Warm RLE, strength and sensation intact. Left 4th toe black, ischemic with scant drainage, foul smelling; left 1st toe partially amputated; palpable femoral pulses    LABS:                        10.6   13.75 )-----------( 399      ( 18 Oct 2018 07:51 )             31.5     10-18    131<L>  |  92<L>  |  23  ----------------------------<  267<H>  4.1   |  25  |  1.03    Ca    8.8      18 Oct 2018 06:16    TPro  6.5  /  Alb  2.5<L>  /  TBili  x   /  DBili  x   /  AST  x   /  ALT  x   /  AlkPhos  x   10-17    PT/INR - ( 17 Oct 2018 09:01 )   PT: 12.9 sec;   INR: 1.14 ratio         PTT - ( 16 Oct 2018 10:43 )  PTT:28.4 sec  Urinalysis Basic - ( 16 Oct 2018 21:25 )    Color: x / Appearance: x / SG: x / pH: x  Gluc: x / Ketone: x  / Bili: x / Urobili: x   Blood: x / Protein: x / Nitrite: x   Leuk Esterase: x / RBC: 2 /hpf / WBC 1 /hpf   Sq Epi: x / Non Sq Epi: 0 /hpf / Bacteria: Negative      LIVER FUNCTIONS - ( 17 Oct 2018 07:53 )  Alb: 2.5 g/dL / Pro: 6.5 g/dL / ALK PHOS: x     / ALT: x     / AST: x     / GGT: x           CAPILLARY BLOOD GLUCOSE      POCT Blood Glucose.: 274 mg/dL (18 Oct 2018 07:44)  POCT Blood Glucose.: 333 mg/dL (17 Oct 2018 21:26)  POCT Blood Glucose.: 311 mg/dL (17 Oct 2018 21:06)  POCT Blood Glucose.: 203 mg/dL (17 Oct 2018 17:58)  POCT Blood Glucose.: 219 mg/dL (17 Oct 2018 11:41)      RADIOLOGY & ADDITIONAL TESTS:
Vascular Surgery Progress Note      SUBJECTIVE: Patient seen and examined on morning rounds. Fells well this AM. No complaints. Denies pain.      OBJECTIVE:     ** Vital signs / I&O's **    Vital Signs Last 24 Hrs  T(C): 37.2 (24 Oct 2018 08:33), Max: 37.5 (24 Oct 2018 00:15)  T(F): 99 (24 Oct 2018 08:33), Max: 99.5 (24 Oct 2018 00:15)  HR: 98 (24 Oct 2018 08:33) (74 - 111)  BP: 101/72 (24 Oct 2018 08:33) (101/72 - 131/85)  BP(mean): --  RR: 18 (24 Oct 2018 08:33) (18 - 18)  SpO2: 94% (24 Oct 2018 08:33) (86% - 96%)      23 Oct 2018 07:01  -  24 Oct 2018 07:00  --------------------------------------------------------  IN:    Oral Fluid: 240 mL    sodium chloride 0.9%.: 150 mL    Solution: 100 mL  Total IN: 490 mL    OUT:    Voided: 1000 mL  Total OUT: 1000 mL    Total NET: -510 mL          ** Physical Exam **  General: Alert, NAD  Respiratory: Breathing comfortably on RA  Extremities: 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, no purulence, no drainage, no erythema, DP pulse palpable  Right groin dressing c/d/i, no swelling, soft    ** Labs **                          11.4   16.76 )-----------( 593      ( 24 Oct 2018 07:40 )             34.5     24 Oct 2018 06:24    130    |  89     |  39     ----------------------------<  309    4.7     |  24     |  1.33     Ca    9.3        24 Oct 2018 06:24      PT/INR - ( 24 Oct 2018 07:54 )   PT: 11.8 sec;   INR: 1.04 ratio         PTT - ( 24 Oct 2018 07:54 )  PTT:30.8 sec  CAPILLARY BLOOD GLUCOSE      POCT Blood Glucose.: 380 mg/dL (24 Oct 2018 09:07)  POCT Blood Glucose.: 356 mg/dL (24 Oct 2018 07:47)  POCT Blood Glucose.: 375 mg/dL (23 Oct 2018 21:15)  POCT Blood Glucose.: 191 mg/dL (23 Oct 2018 16:37)        MEDICATIONS  (STANDING):  aspirin enteric coated 81 milliGRAM(s) Oral daily  atorvastatin 40 milliGRAM(s) Oral at bedtime  cyanocobalamin 1000 MICROGram(s) Oral daily  dextrose 50% Injectable 12.5 Gram(s) IV Push once  dextrose 50% Injectable 25 Gram(s) IV Push once  dextrose 50% Injectable 25 Gram(s) IV Push once  docusate sodium 100 milliGRAM(s) Oral three times a day  enoxaparin Injectable 40 milliGRAM(s) SubCutaneous daily  folic acid 1 milliGRAM(s) Oral daily  furosemide   Injectable 40 milliGRAM(s) IV Push daily  gabapentin 300 milliGRAM(s) Oral three times a day  insulin glargine Injectable (LANTUS) 25 Unit(s) SubCutaneous at bedtime  insulin lispro (HumaLOG) corrective regimen sliding scale   SubCutaneous three times a day before meals  insulin lispro (HumaLOG) corrective regimen sliding scale   SubCutaneous at bedtime  insulin lispro Injectable (HumaLOG) 9 Unit(s) SubCutaneous three times a day before meals  metoprolol tartrate 25 milliGRAM(s) Oral two times a day  multivitamin 1 Tablet(s) Oral daily  piperacillin/tazobactam IVPB. 3.375 Gram(s) IV Intermittent every 8 hours  senna 2 Tablet(s) Oral at bedtime  sertraline 100 milliGRAM(s) Oral daily  sodium chloride 0.9%. 1000 milliLiter(s) (30 mL/Hr) IV Continuous <Continuous>  tamsulosin 0.4 milliGRAM(s) Oral at bedtime  ticagrelor 90 milliGRAM(s) Oral two times a day  vancomycin  IVPB 1000 milliGRAM(s) IV Intermittent every 12 hours    MEDICATIONS  (PRN):  dextrose 40% Gel 15 Gram(s) Oral once PRN Blood Glucose LESS THAN 70 milliGRAM(s)/deciliter  glucagon  Injectable 1 milliGRAM(s) IntraMuscular once PRN Glucose LESS THAN 70 milligrams/deciliter
Vascular Surgery Progress Note      SUBJECTIVE: Patient seen and examined on morning rounds. No complaints. Denies fevers/chills.       OBJECTIVE:     ** Vital signs / I&O's **    Vital Signs Last 24 Hrs  T(C): 37.1 (22 Oct 2018 04:26), Max: 37.1 (22 Oct 2018 04:26)  T(F): 98.8 (22 Oct 2018 04:26), Max: 98.8 (22 Oct 2018 04:26)  HR: 78 (22 Oct 2018 04:26) (75 - 86)  BP: 126/73 (22 Oct 2018 04:26) (122/75 - 135/81)  BP(mean): --  RR: 18 (22 Oct 2018 04:26) (18 - 18)  SpO2: 93% (22 Oct 2018 04:26) (92% - 96%)      21 Oct 2018 07:01  -  22 Oct 2018 07:00  --------------------------------------------------------  IN:    Oral Fluid: 240 mL    Solution: 100 mL  Total IN: 340 mL    OUT:    Voided: 300 mL  Total OUT: 300 mL    Total NET: 40 mL          Physical Exam:  General: Alert, NAD  Respiratory: Breathing comfortably on RA  Extremities: 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, no purulence, no drainage, no erythema, DP pulse palpable    ** Labs **                          10.6   13.58 )-----------( 411      ( 21 Oct 2018 08:09 )             31.0     22 Oct 2018 05:32    131    |  92     |  32     ----------------------------<  164    4.2     |  25     |  1.16     Ca    9.1        22 Oct 2018 05:32        CAPILLARY BLOOD GLUCOSE      POCT Blood Glucose.: 237 mg/dL (22 Oct 2018 07:45)  POCT Blood Glucose.: 176 mg/dL (21 Oct 2018 21:19)  POCT Blood Glucose.: 232 mg/dL (21 Oct 2018 16:26)  POCT Blood Glucose.: 240 mg/dL (21 Oct 2018 12:04)        MEDICATIONS  (STANDING):  aspirin enteric coated 81 milliGRAM(s) Oral daily  atorvastatin 40 milliGRAM(s) Oral at bedtime  cyanocobalamin 1000 MICROGram(s) Oral daily  dextrose 50% Injectable 12.5 Gram(s) IV Push once  dextrose 50% Injectable 25 Gram(s) IV Push once  dextrose 50% Injectable 25 Gram(s) IV Push once  docusate sodium 100 milliGRAM(s) Oral three times a day  enoxaparin Injectable 40 milliGRAM(s) SubCutaneous daily  folic acid 1 milliGRAM(s) Oral daily  furosemide   Injectable 40 milliGRAM(s) IV Push daily  gabapentin 300 milliGRAM(s) Oral three times a day  insulin glargine Injectable (LANTUS) 25 Unit(s) SubCutaneous at bedtime  insulin lispro (HumaLOG) corrective regimen sliding scale   SubCutaneous three times a day before meals  insulin lispro (HumaLOG) corrective regimen sliding scale   SubCutaneous at bedtime  insulin lispro Injectable (HumaLOG) 9 Unit(s) SubCutaneous three times a day before meals  metoprolol tartrate 25 milliGRAM(s) Oral two times a day  multivitamin 1 Tablet(s) Oral daily  piperacillin/tazobactam IVPB. 3.375 Gram(s) IV Intermittent every 8 hours  senna 2 Tablet(s) Oral at bedtime  sertraline 100 milliGRAM(s) Oral daily  tamsulosin 0.4 milliGRAM(s) Oral at bedtime  ticagrelor 90 milliGRAM(s) Oral two times a day  vancomycin  IVPB 1000 milliGRAM(s) IV Intermittent every 12 hours    MEDICATIONS  (PRN):  dextrose 40% Gel 15 Gram(s) Oral once PRN Blood Glucose LESS THAN 70 milliGRAM(s)/deciliter  glucagon  Injectable 1 milliGRAM(s) IntraMuscular once PRN Glucose LESS THAN 70 milligrams/deciliter
cc: gangrene.     Overnight w/ out acute events.  HD stable, afebrile VSS.    This AM feels well, no pains.     REVIEW OF SYSTEMS:  CONSTITUTIONAL: No fever, weight loss, or fatigue  EYES: No eye pain, visual disturbances, or discharge  ENMT:  No difficulty hearing, tinnitus, vertigo; No sinus or throat pain  NECK: No pain or stiffness  RESPIRATORY: No cough, wheezing, chills or hemoptysis; No shortness of breath  CARDIOVASCULAR: No chest pain, palpitations, dizziness, or leg swelling  GASTROINTESTINAL: No abdominal or epigastric pain. No nausea, vomiting, or hematemesis; No diarrhea or constipation. No melena or hematochezia.  GENITOURINARY: No dysuria, frequency, hematuria, or incontinence  NEUROLOGICAL: No headaches, memory loss, loss of strength, numbness, or tremors  SKIN: No itching, burning, rashes, or lesions   ENDOCRINE: No heat or cold intolerance; No hair loss  MUSCULOSKELETAL: No joint pain or swelling; No muscle, back, or extremity pain  PSYCHIATRIC: No depression, anxiety, mood swings, or difficulty sleeping  HEME/LYMPH: No easy bruising, or bleeding gums    T(C): 37.1 (10-15-18 @ 08:34), Max: 37.1 (10-15-18 @ 08:34)  HR: 92 (10-15-18 @ 08:34) (92 - 101)  BP: 138/72 (10-15-18 @ 08:34) (130/72 - 152/82)  RR: 18 (10-15-18 @ 08:34) (18 - 18)  SpO2: 93% (10-15-18 @ 08:34) (91% - 93%)  Wt(kg): --Vital Signs Last 24 Hrs  T(C): 37.1 (15 Oct 2018 08:34), Max: 37.1 (15 Oct 2018 08:34)  T(F): 98.8 (15 Oct 2018 08:34), Max: 98.8 (15 Oct 2018 08:34)  HR: 92 (15 Oct 2018 08:34) (92 - 101)  BP: 138/72 (15 Oct 2018 08:34) (130/72 - 152/82)  BP(mean): --  RR: 18 (15 Oct 2018 08:34) (18 - 18)  SpO2: 93% (15 Oct 2018 08:34) (91% - 93%)    PHYSICAL EXAM:  GENERAL: NAD, well-groomed and feels well. HOB at 30 degrees on pillow too.   HEAD:  Atraumatic, Normocephalic  EYES: EOMI, conjunctiva and sclera clear  NECK: Supple, + JVD  NERVOUS SYSTEM:  Alert & Oriented X3, Good concentration; Motor Strength 5/5 B/L upper and lower extremities  CHEST/LUNG: mild crackles in b/l bases w/ wheezing.   HEART: Regular rate and rhythm; No murmurs  ABDOMEN: Soft, Nontender, Nondistended; Bowel sounds present  EXTREMITIES:  No clubbing, cyanosis, trace edema, LLE bandaged and fully wrapped.   SKIN: No rashes or lesions    Consultant(s) Notes Reviewed:  [x ] YES  [ ] NO    LABS:  stable labs w/ leukocytosis 13, anemia in 11 hgb. BMP w/ hyponatremia 129, Cr .65.   Trop T 596-->668.                         10.9   13.10 )-----------( 396      ( 15 Oct 2018 07:51 )             32.0     10-15    129<L>  |  95<L>  |  8   ----------------------------<  183<H>  3.7   |  22  |  0.65    Ca    8.5      15 Oct 2018 06:29  Mg     2.0     10-14    CAPILLARY BLOOD GLUCOSE  POCT Blood Glucose.: 168 mg/dL (15 Oct 2018 11:48)  POCT Blood Glucose.: 199 mg/dL (15 Oct 2018 07:38)  POCT Blood Glucose.: 223 mg/dL (14 Oct 2018 21:13)  POCT Blood Glucose.: 242 mg/dL (14 Oct 2018 16:41)    RADIOLOGY & ADDITIONAL TESTS:  Imaging Personally Reviewed:   Care discussed w/ other providers: yes--  Consultant notes reviewed: yes
cc: gangrene.     Overnight w/ out acute events.  HD stable, afebrile other than 1 fever 100.6 which on repeat was 99.5. Otherwise VSS.    This AM feels SOB, otherwise well, no pains.     REVIEW OF SYSTEMS:  CONSTITUTIONAL: +fever x1 last night, quickly resolved, no weight loss, or fatigue  EYES: No eye pain, visual disturbances, or discharge  ENMT:  No difficulty hearing, tinnitus, vertigo; No sinus or throat pain  NECK: No pain or stiffness  RESPIRATORY: +SOB, no cough, wheezing, chills or hemoptysis  CARDIOVASCULAR: No chest pain, palpitations, dizziness, or leg swelling  GASTROINTESTINAL: No abdominal or epigastric pain. No nausea, vomiting, or hematemesis; No diarrhea or constipation. No melena or hematochezia.  GENITOURINARY: No dysuria, frequency, hematuria, or incontinence  NEUROLOGICAL: No headaches, memory loss, loss of strength, numbness, or tremors  SKIN: No itching, burning, rashes, or lesions   ENDOCRINE: No heat or cold intolerance; No hair loss  MUSCULOSKELETAL: No joint pain or swelling; No muscle, back, or extremity pain  PSYCHIATRIC: No depression, anxiety, mood swings, or difficulty sleeping  HEME/LYMPH: No easy bruising, or bleeding gums    T(C): 37.1 (10-15-18 @ 08:34), Max: 37.1 (10-15-18 @ 08:34)  HR: 92 (10-15-18 @ 08:34) (92 - 101)  BP: 138/72 (10-15-18 @ 08:34) (130/72 - 152/82)  RR: 18 (10-15-18 @ 08:34) (18 - 18)  SpO2: 93% (10-15-18 @ 08:34) (91% - 93%)  Wt(kg): --Vital Signs Last 24 Hrs  T(C): 37.1 (15 Oct 2018 08:34), Max: 37.1 (15 Oct 2018 08:34)  T(F): 98.8 (15 Oct 2018 08:34), Max: 98.8 (15 Oct 2018 08:34)  HR: 92 (15 Oct 2018 08:34) (92 - 101)  BP: 138/72 (15 Oct 2018 08:34) (130/72 - 152/82)  BP(mean): --  RR: 18 (15 Oct 2018 08:34) (18 - 18)  SpO2: 93% (15 Oct 2018 08:34) (91% - 93%)    PHYSICAL EXAM:  GENERAL: NAD, well-groomed and feels well. HOB at 30 degrees on pillow too.   HEAD:  Atraumatic, Normocephalic  EYES: EOMI, conjunctiva and sclera clear  NECK: Supple, + JVD  NERVOUS SYSTEM:  Alert & Oriented X3, Good concentration; Motor Strength 5/5 B/L upper and lower extremities  CHEST/LUNG: moderate crackles in b/l bases w/ wheezing.   HEART: Regular rate and rhythm; No murmurs  ABDOMEN: Soft, Nontender, Nondistended; Bowel sounds present  EXTREMITIES:  No clubbing, cyanosis, trace edema, LLE bandaged and fully wrapped.   SKIN: No rashes or lesions    Consultant(s) Notes Reviewed:  [x ] YES  [ ] NO    LABS:  10/16: leukocytosis 14 from 13, stable anemia w/ reactive thrombocytosis.   BMP w/ stable mild hyponatremia.     stable labs w/ leukocytosis 13, anemia in 11 hgb. BMP w/ hyponatremia 129, Cr .65.   Trop T 596-->668-->500s.                         10.9   13.10 )-----------( 396      ( 15 Oct 2018 07:51 )             32.0     10-15    129<L>  |  95<L>  |  8   ----------------------------<  183<H>  3.7   |  22  |  0.65    Ca    8.5      15 Oct 2018 06:29  Mg     2.0     10-14    CAPILLARY BLOOD GLUCOSE  POCT Blood Glucose.: 168 mg/dL (15 Oct 2018 11:48)  POCT Blood Glucose.: 199 mg/dL (15 Oct 2018 07:38)  POCT Blood Glucose.: 223 mg/dL (14 Oct 2018 21:13)  POCT Blood Glucose.: 242 mg/dL (14 Oct 2018 16:41)    RADIOLOGY & ADDITIONAL TESTS:  Imaging Personally Reviewed: yes   Care discussed w/ other providers: yes--cardiology, vascular surgery.   Consultant notes reviewed: yes    Imaging:   VA duplex b/l 10/15:  Impression: The quality of the examination is adversely impacted on the   left by the noncompressible nature of the arteries.  There is bilateral lower extremity arterial vascular disease affecting   the femoral popliteal segments.  The left posterior tibial artery is probably occluded.    VA duplex L side 10/13:  Impression: Significant diffuse calcified atherosclerotic changes seen   throughout the arteries of the left lower extremity.    There is significant stenosis involving the left common femoral artery,   proximal left superficial femoral artery and proximal left deep femoral   artery.    Patient's arterial bypass graft extending from the proximal left   superficial femoral artery to the left popliteal artery is occluded.    Distal segment left posterior tibial artery occluded.    Mid and distal segments left peroneal artery occluded.    Mid and distal segments left anterior tibial artery occluded.    Left dorsalis pedis artery not visualized due to overlying dressing.
CARDIOLOGY FOLLOW UP - Dr. Gavin    CC no chest pain or sob       PHYSICAL EXAM:  T(C): 36.6 (10-21-18 @ 04:51), Max: 36.8 (10-20-18 @ 20:05)  HR: 76 (10-21-18 @ 04:51) (76 - 87)  BP: 124/71 (10-21-18 @ 04:51) (118/74 - 133/77)  RR: 18 (10-21-18 @ 04:51) (18 - 18)  SpO2: 94% (10-21-18 @ 04:51) (92% - 98%)  Wt(kg): --  I&O's Summary    20 Oct 2018 07:01  -  21 Oct 2018 07:00  --------------------------------------------------------  IN: 930 mL / OUT: 2395 mL / NET: -1465 mL        Appearance: Normal	  Cardiovascular: Normal S1 S2,RRR, No JVD, No murmurs  Respiratory: diminished at base 	  Gastrointestinal:  Soft, Non-tender, + BS	  Extremities: Normal range of motion, No clubbing, cyanosis or edema        MEDICATIONS  (STANDING):  aspirin enteric coated 81 milliGRAM(s) Oral daily  atorvastatin 40 milliGRAM(s) Oral at bedtime  cyanocobalamin 1000 MICROGram(s) Oral daily  dextrose 50% Injectable 12.5 Gram(s) IV Push once  dextrose 50% Injectable 25 Gram(s) IV Push once  dextrose 50% Injectable 25 Gram(s) IV Push once  docusate sodium 100 milliGRAM(s) Oral three times a day  enoxaparin Injectable 40 milliGRAM(s) SubCutaneous daily  folic acid 1 milliGRAM(s) Oral daily  furosemide   Injectable 40 milliGRAM(s) IV Push daily  gabapentin 300 milliGRAM(s) Oral three times a day  insulin glargine Injectable (LANTUS) 25 Unit(s) SubCutaneous at bedtime  insulin lispro (HumaLOG) corrective regimen sliding scale   SubCutaneous three times a day before meals  insulin lispro (HumaLOG) corrective regimen sliding scale   SubCutaneous at bedtime  insulin lispro Injectable (HumaLOG) 9 Unit(s) SubCutaneous three times a day before meals  metoprolol tartrate 25 milliGRAM(s) Oral two times a day  multivitamin 1 Tablet(s) Oral daily  piperacillin/tazobactam IVPB. 3.375 Gram(s) IV Intermittent every 8 hours  senna 2 Tablet(s) Oral at bedtime  sertraline 100 milliGRAM(s) Oral daily  tamsulosin 0.4 milliGRAM(s) Oral at bedtime  ticagrelor 90 milliGRAM(s) Oral two times a day      TELEMETRY: NSR  brief PAT yesterday noted 	    ECG:  	  RADIOLOGY:   DIAGNOSTIC TESTING:  [ ] Echocardiogram:  [ ]  Catheterization:  [ ] Stress Test:    OTHER: 	    LABS:	 	                                10.6   13.58 )-----------( 411      ( 21 Oct 2018 08:09 )             31.0     10-21    131<L>  |  92<L>  |  27<H>  ----------------------------<  127<H>  3.6   |  25  |  1.19    Ca    8.9      21 Oct 2018 07:08

## 2018-11-11 PROCEDURE — 86334 IMMUNOFIX E-PHORESIS SERUM: CPT

## 2018-11-11 PROCEDURE — 97110 THERAPEUTIC EXERCISES: CPT

## 2018-11-11 PROCEDURE — 84155 ASSAY OF PROTEIN SERUM: CPT

## 2018-11-11 PROCEDURE — 80202 ASSAY OF VANCOMYCIN: CPT

## 2018-11-11 PROCEDURE — 84132 ASSAY OF SERUM POTASSIUM: CPT

## 2018-11-11 PROCEDURE — 82947 ASSAY GLUCOSE BLOOD QUANT: CPT

## 2018-11-11 PROCEDURE — 99285 EMERGENCY DEPT VISIT HI MDM: CPT | Mod: 25

## 2018-11-11 PROCEDURE — 86146 BETA-2 GLYCOPROTEIN ANTIBODY: CPT

## 2018-11-11 PROCEDURE — 82550 ASSAY OF CK (CPK): CPT

## 2018-11-11 PROCEDURE — 82553 CREATINE MB FRACTION: CPT

## 2018-11-11 PROCEDURE — 86850 RBC ANTIBODY SCREEN: CPT

## 2018-11-11 PROCEDURE — 96375 TX/PRO/DX INJ NEW DRUG ADDON: CPT

## 2018-11-11 PROCEDURE — 82435 ASSAY OF BLOOD CHLORIDE: CPT

## 2018-11-11 PROCEDURE — 97161 PT EVAL LOW COMPLEX 20 MIN: CPT

## 2018-11-11 PROCEDURE — 86900 BLOOD TYPING SEROLOGIC ABO: CPT

## 2018-11-11 PROCEDURE — 83735 ASSAY OF MAGNESIUM: CPT

## 2018-11-11 PROCEDURE — C1887: CPT

## 2018-11-11 PROCEDURE — 84100 ASSAY OF PHOSPHORUS: CPT

## 2018-11-11 PROCEDURE — C1769: CPT

## 2018-11-11 PROCEDURE — 84156 ASSAY OF PROTEIN URINE: CPT

## 2018-11-11 PROCEDURE — 83090 ASSAY OF HOMOCYSTEINE: CPT

## 2018-11-11 PROCEDURE — 73630 X-RAY EXAM OF FOOT: CPT

## 2018-11-11 PROCEDURE — C1894: CPT

## 2018-11-11 PROCEDURE — 84165 PROTEIN E-PHORESIS SERUM: CPT

## 2018-11-11 PROCEDURE — 85610 PROTHROMBIN TIME: CPT

## 2018-11-11 PROCEDURE — 83880 ASSAY OF NATRIURETIC PEPTIDE: CPT

## 2018-11-11 PROCEDURE — 37228: CPT

## 2018-11-11 PROCEDURE — 82330 ASSAY OF CALCIUM: CPT

## 2018-11-11 PROCEDURE — 84484 ASSAY OF TROPONIN QUANT: CPT

## 2018-11-11 PROCEDURE — C1760: CPT

## 2018-11-11 PROCEDURE — 80048 BASIC METABOLIC PNL TOTAL CA: CPT

## 2018-11-11 PROCEDURE — 37226: CPT

## 2018-11-11 PROCEDURE — 93926 LOWER EXTREMITY STUDY: CPT

## 2018-11-11 PROCEDURE — 82803 BLOOD GASES ANY COMBINATION: CPT

## 2018-11-11 PROCEDURE — 83615 LACTATE (LD) (LDH) ENZYME: CPT

## 2018-11-11 PROCEDURE — 87040 BLOOD CULTURE FOR BACTERIA: CPT

## 2018-11-11 PROCEDURE — 85027 COMPLETE CBC AUTOMATED: CPT

## 2018-11-11 PROCEDURE — 85014 HEMATOCRIT: CPT

## 2018-11-11 PROCEDURE — 86901 BLOOD TYPING SEROLOGIC RH(D): CPT

## 2018-11-11 PROCEDURE — 84295 ASSAY OF SERUM SODIUM: CPT

## 2018-11-11 PROCEDURE — 93923 UPR/LXTR ART STDY 3+ LVLS: CPT

## 2018-11-11 PROCEDURE — 83036 HEMOGLOBIN GLYCOSYLATED A1C: CPT

## 2018-11-11 PROCEDURE — 86147 CARDIOLIPIN ANTIBODY EA IG: CPT

## 2018-11-11 PROCEDURE — 97116 GAIT TRAINING THERAPY: CPT

## 2018-11-11 PROCEDURE — 96374 THER/PROPH/DIAG INJ IV PUSH: CPT

## 2018-11-11 PROCEDURE — 71045 X-RAY EXAM CHEST 1 VIEW: CPT

## 2018-11-11 PROCEDURE — 86140 C-REACTIVE PROTEIN: CPT

## 2018-11-11 PROCEDURE — 37224: CPT

## 2018-11-11 PROCEDURE — C1725: CPT

## 2018-11-11 PROCEDURE — C8929: CPT

## 2018-11-11 PROCEDURE — 83605 ASSAY OF LACTIC ACID: CPT

## 2018-11-11 PROCEDURE — 83010 ASSAY OF HAPTOGLOBIN QUANT: CPT

## 2018-11-11 PROCEDURE — 82565 ASSAY OF CREATININE: CPT

## 2018-11-11 PROCEDURE — 85730 THROMBOPLASTIN TIME PARTIAL: CPT

## 2018-11-11 PROCEDURE — 94640 AIRWAY INHALATION TREATMENT: CPT

## 2018-11-11 PROCEDURE — 80053 COMPREHEN METABOLIC PANEL: CPT

## 2018-11-11 PROCEDURE — 85652 RBC SED RATE AUTOMATED: CPT

## 2018-11-11 PROCEDURE — C1889: CPT

## 2018-11-11 PROCEDURE — C1876: CPT

## 2018-11-11 PROCEDURE — 75710 ARTERY X-RAYS ARM/LEG: CPT

## 2018-11-11 PROCEDURE — 82962 GLUCOSE BLOOD TEST: CPT

## 2018-11-30 ENCOUNTER — APPOINTMENT (OUTPATIENT)
Dept: VASCULAR SURGERY | Facility: CLINIC | Age: 68
End: 2018-11-30

## 2019-12-02 NOTE — ED ADULT NURSE NOTE - CAS EDN DISCHARGE ASSESSMENT
"I am bloated and had not have a bowel movement for 10 days" Alert and oriented to person, place and time

## 2019-12-27 NOTE — DISCHARGE NOTE ADULT - WITHDRAWAL SYMPTOMS INCLUDE; NEGATIVE MOOD, URGES TO SMOKE, AND DIFFICULTY CONCENTRATING.
Autonomic Nervous System (ANS) testing done. See Procedure Notes.     Ross Alves MD Statement Selected

## 2021-03-09 NOTE — BRIEF OPERATIVE NOTE - OPERATION/FINDINGS
DATE & TIME: 3/08/21 5129-5078  Cognitive Concerns/ Orientation: A&Ox4;  forgetful at times-  BEHAVIOR & AGGRESSION TOOL COLOR: Green  ABNL VS/O2: VSS on RA. BP has improved   MOBILITY: NWB on L foot, SBA + walker pivot transfer to use urinal/BSC. Wants to walk to BR but non-compliant with NWB on L foot.  PAIN MANAGMENT: IV dilaudid 1 mg given  x 3 (q2h) for L foot pain,   DIET: Mod CHO; carb count and coverage with insulin  BOWEL/BLADDER: Continent, voiding in urinal.   ABNL LAB/BG: , 142  DRAIN/DEVICES: PIV infusing LR at 100mL/h, Second PIV SL is very positional.  TELEMETRY RHYTHM: NSR  SKIN: Scattered bruising on upper extremity. Rash on bilateral legs/shins/ankles. R great toe previously amputated. L transmetatarsal amputation (3/05). Dressing in place CDI, changed by Podiatry.  TESTS/PROCEDURES: Anticipate at least one more surgery per Podiatry note pending completion of pathology.  D/C DAY/GOALS/PLACE: Discharge dependent on surgical procedures, pain management and safe disposition plan/ TCU bed available  OTHER IMPORTANT INFO: Continues on IV antibiotics, ID following for positive culture of staph aureus of foot. Surgery following.  
left femoral to below knee popliteal artery bypass with 6mm ringed PTFE
Partial hallux amputation, closed, patient did not bleed at all. low concern for residual infection, but high concern for viability of the residual toe

## 2021-03-23 NOTE — PATIENT PROFILE ADULT. - NS PRO AD NO ADVANCE DIRECTIVE
[FreeTextEntry8] : This is a 26 y/o male with PMHx significant for Anxiety, Depression, Hypogonadism, OCD presenting to the office s/p Dx with Covid on 2/25, c/o loss of taste and smell, sore throat, headaches, denies any SOB. Pt being seen by Endocrine Dr Luevano, now complaining of Gynecomastia and breast tenderness.
No

## 2021-03-26 NOTE — PHYSICAL THERAPY INITIAL EVALUATION ADULT - LEVEL OF INDEPENDENCE: SIT/STAND, REHAB EVAL
OPERATIVE REPORT  PATIENT NAME: Aníbal Chow    :  1976  MRN: [id number]  Pt Location: [facility] OR ROOM 01    SURGERY DATE: 3/26/2021    Surgeon(s) and Role:     * Flaquita King MD - Primary    Preop Diagnosis:  Cervical radiculopathy [M54 12]    Post-Op Diagnosis Codes:     * Cervical radiculopathy [M54 12]    Procedure(s) (LRB):  C7-T1 interlaminar epidural steroid injection (Right)    Specimen(s):  * No specimens in log *    Estimated Blood Loss:   Minimal    Drains:  * No LDAs found *    Anesthesia Type:   Local    Operative Indications:  Cervical radiculopathy [M54 12]      Operative Findings:  same    Complications:   None    Procedure and Technique:  Fluoroscopically-guided Right C7-T1 interlaminar epidural steroid injection under fluoroscopy      After discussing the risks, benefits, and alternatives to the procedure, the patient expressed understanding and wished to proceed  The patient was brought to the fluoroscopy suite and placed in the prone position  A procedural pause was conducted to verify:  correct patient identity, procedure to be performed and as applicable, correct side and site, correct patient position, and availability of implants, special equipment and special requirements  After identifying the C7-T1 space fluoroscopically, the skin was sterilely prepped and draped in the usual fashion using Chloraprep skin prep  The skin and subcutaneous tissue were anesthetized with 0 5 % lidocaine  Utilizing a loss of resistance technique and intermittent fluoroscopic guidance, a 3 5 20 gauge Tuohy needle was advanced into the epidural space  Proper needle positioning was confirmed using multiple fluoroscopic views  After negative aspiration, Omnipaque 300 contrast was injected confirming epidural spread without evidence of intravascular or intrathecal spread    A 4 ml solution consisting of 10 mg of dexamethasone in sterile saline was injected slowly and incrementally into the
epidural space  Following the injection the needle was withdrawn slightly and flushed with 0 5 % lidocaine as it was fully extracted  The patient tolerated the procedure well and there were no apparent complications  After appropriate observation, the patient was dismissed from the clinic in good condition under their own power       I was present for the entire procedure    Patient Disposition:  hemodynamically stable    SIGNATURE: Berenice Francisco MD  DATE: March 26, 2021  TIME: 10:46 AM
supervision
moderate assist (50% patients effort)

## 2021-12-14 NOTE — DIETITIAN INITIAL EVALUATION ADULT. - % CHANGE
Pt is a 59 y/o M PMHx significant for alcoholic hepatosteatosis, alcohol use disorder (last drink 10/31/2021) c/o persistent abdominal pain, referred to [facility] by GI doc for progressively worsening ascites s/p recent diagnostic and therapeutic paracentesis by IR. In the ED, pt was found to be afebrile but in distress due to pain. Labs showed low albumin, high lactate, melissa and alk phos, along with downtrending HgB. MELD score was 32. CXR showed pleural effusions. CT Chest/ABD/Pelvis was positive for findings suggesting cirrhosis with portal hypertension and large amount of ascites along with portal vein thrombosis. He was given Ceftriaxone + Vancomycin along with normal saline and admitted to the floor where he was started on Unasyn + Vanco along with Spironolactone and lasix. Blood cultures were + for streptococci, ascites sample was negative. ID was on board. GI was following pt and adjusted meds + performed EGD on 12/12/21,  showing trace varices, mild portal gastropathy. TTE came back with mild fibrocalcific changes to mv. but no vegetations on limited study. GI repeated paracentesis then pt was started on anticoagulation for PV thrombosis. Today , pt is feeling well, w/mimimal abdominal pain/distension and negative blood cultures. He is medically optimized and will be discharged on today, Dec 14 2021 for outpatient follow up with gastroenterologist and PCP.    Subjective: No acute overnight events. Pt was examined at bedside in the AM by me. Pt endorses feeling better and feels that abdomen is less distended. He is eager for discharge and amenable to plan. All questions answered. No f/c, cp, n/v, changes in bm. No additional symptoms or complaints reported.    Vital Signs Last 24 Hrs  T(C): 36.6 (14 Dec 2021 09:36), Max: 36.9 (13 Dec 2021 20:28)  T(F): 97.8 (14 Dec 2021 09:36), Max: 98.5 (13 Dec 2021 20:28)  HR: 84 (14 Dec 2021 09:36) (60 - 84)  BP: 111/76 (14 Dec 2021 09:36) (108/62 - 119/70)  BP(mean): 85 (14 Dec 2021 09:36) (79 - 85)  RR: 16 (14 Dec 2021 09:36) (16 - 18)  SpO2: 96% (14 Dec 2021 09:36) (95% - 100%)    PHYSICAL EXAM  Constitutional: NAD, awake and alert, well-developed  HEENT: +jaundice eyes PERR, EOMI, Normal Hearing, MMM  Neck: Soft and supple, No LAD, No JVD  Respiratory: Breath sounds are clear bilaterally, No wheezing, rales or rhonchi  Cardiovascular: S1 and S2, regular rate and rhythm, no Murmurs, gallops or rubs  Gastrointestinal: Bowel Sounds present, soft, +distended, nontender  Extremities: Trace edema b/l  Vascular: 2+ peripheral pulses  Neurological: A/O x 3, no focal deficits  Skin: +spider angiomas in the chest area    EKG/RADIOLOGY  < from: Xray Chest 1 View- PORTABLE-Urgent (Xray Chest 1 View- PORTABLE-Urgent .) (12.10.21 @ 13:44) >  IMPRESSION: There is a moderate-sized left pleural effusion. The cardiac   silhouette is enlarged. The right lung is clear.    < from: CT Chest/Abdomen/Pelvis w/ IV Cont (12.10.21 @ 15:08) >  IMPRESSION:  Findings suggesting cirrhosis withportal hypertension and large amount   of ascites. Portal vein thrombosis is again noted.  Moderate left and mild right pleural effusions which have not   significantly changed. Atelectatic lung. No focal consolidation seen.    < from: IR Procedure (12.10.21 @ 20:10) >  Impression:  Successful sonographic guided paracentesis with aspiration of 00551 cc's   of ascitic fluid.    < from: EGD (12.12.21) >  Trace varices, mild portal gastropathy    < from: TTE Echo Complete w/o Contrast w/ Doppler (12.13.21 @ 10:48) >  Findings   Mitral Valve: Mild fibrocalcific changes noted to the mitral valve leaflets with   preserved leaflet excursion.  Aortic Valve: The aortic valve is trileaflet with thin pliable leaflets.  Tricuspid Valve:  The tricuspid valve leaflets are thin and pliable; valve motion is   normal. Trace tricuspid valve regurgitation is present.  Pulmonic Valve: Pulmonic valve not well seen, appears grossly normal.   Left Ventricle: Limited study to assess for endocarditis.   Pericardial Effusion: No evidence of pericardial effusion.   Pleural Effusion: Pleural effusion - is present.   Miscellaneous: The IVC is not well visualized. Ascites is noted.                 2.2 Pt is a 59 y/o M PMHx significant for alcoholic hepatosteatosis, alcohol use disorder (last drink 10/31/2021) c/o persistent abdominal pain, referred to [facility] by GI doc for progressively worsening ascites s/p recent diagnostic and therapeutic paracentesis by IR. In the ED, pt was found to be afebrile but in distress due to pain. Labs showed low albumin, high lactate, melissa and alk phos, along with downtrending HgB. MELD score was 32. CXR showed pleural effusions. CT Chest/ABD/Pelvis was positive for findings suggesting cirrhosis with portal hypertension and large amount of ascites along with portal vein thrombosis. He was given Ceftriaxone + Vancomycin along with normal saline and admitted to the floor where he was started on Unasyn + Vanco along with Spironolactone and lasix. Blood cultures were + for streptococci, ascites sample was negative. ID was on board. GI was following pt and adjusted meds + performed EGD on 12/12/21,  showing trace varices, mild portal gastropathy. TTE came back with mild fibrocalcific changes to mv. but no vegetations on limited study. GI repeated paracentesis then pt was started on anticoagulation for PV thrombosis. Today , pt is feeling well, w/mimimal abdominal pain/distension and negative blood cultures. He is medically optimized and will be discharged on today, Dec 14 2021 for outpatient follow up with gastroenterologist and PCP.    Subjective: No acute overnight events. Pt was examined at bedside in the AM by me. Pt endorses feeling better and feels that abdomen is less distended. He is eager for discharge and amenable to plan. All questions answered. No f/c, cp, n/v, changes in bm. No additional symptoms or complaints reported.    ROS: ROS as stated above    Vital Signs Last 24 Hrs  T(C): 36.6 (14 Dec 2021 09:36), Max: 36.9 (13 Dec 2021 20:28)  T(F): 97.8 (14 Dec 2021 09:36), Max: 98.5 (13 Dec 2021 20:28)  HR: 84 (14 Dec 2021 09:36) (60 - 84)  BP: 111/76 (14 Dec 2021 09:36) (108/62 - 119/70)  BP(mean): 85 (14 Dec 2021 09:36) (79 - 85)  RR: 16 (14 Dec 2021 09:36) (16 - 18)  SpO2: 96% (14 Dec 2021 09:36) (95% - 100%)    PHYSICAL EXAM  Constitutional: NAD, awake and alert, well-developed  HEENT: +jaundice eyes PERR, EOMI, Normal Hearing, MMM  Neck: Soft and supple, No LAD, No JVD  Respiratory: Breath sounds are clear bilaterally, No wheezing, rales or rhonchi  Cardiovascular: S1 and S2, regular rate and rhythm, no Murmurs, gallops or rubs  Gastrointestinal: Bowel Sounds present, soft, +distended, nontender  Extremities: Trace edema b/l  Vascular: 2+ peripheral pulses  Neurological: A/O x 3, no focal deficits  Skin: +spider angiomas in the chest area    EKG/RADIOLOGY  < from: Xray Chest 1 View- PORTABLE-Urgent (Xray Chest 1 View- PORTABLE-Urgent .) (12.10.21 @ 13:44) >  IMPRESSION: There is a moderate-sized left pleural effusion. The cardiac   silhouette is enlarged. The right lung is clear.    < from: CT Chest/Abdomen/Pelvis w/ IV Cont (12.10.21 @ 15:08) >  IMPRESSION:  Findings suggesting cirrhosis withportal hypertension and large amount   of ascites. Portal vein thrombosis is again noted.  Moderate left and mild right pleural effusions which have not   significantly changed. Atelectatic lung. No focal consolidation seen.    < from: IR Procedure (12.10.21 @ 20:10) >  Impression:  Successful sonographic guided paracentesis with aspiration of [id number] cc's   of ascitic fluid.    < from: EGD (12.12.21) >  Trace varices, mild portal gastropathy    < from: TTE Echo Complete w/o Contrast w/ Doppler (12.13.21 @ 10:48) >  Findings   Mitral Valve: Mild fibrocalcific changes noted to the mitral valve leaflets with   preserved leaflet excursion.  Aortic Valve: The aortic valve is trileaflet with thin pliable leaflets.  Tricuspid Valve:  The tricuspid valve leaflets are thin and pliable; valve motion is   normal. Trace tricuspid valve regurgitation is present.  Pulmonic Valve: Pulmonic valve not well seen, appears grossly normal.   Left Ventricle: Limited study to assess for endocarditis.   Pericardial Effusion: No evidence of pericardial effusion.   Pleural Effusion: Pleural effusion - is present.   Miscellaneous: The IVC is not well visualized. Ascites is noted.                 Pt is a 57 y/o M PMHx significant for alcoholic hepatosteatosis, alcohol use disorder (last drink 10/31/2021) c/o persistent abdominal pain, referred to [facility] by GI doc for progressively worsening ascites s/p recent diagnostic and therapeutic paracentesis by IR. In the ED, pt was found to be afebrile but in distress due to pain. Labs showed low albumin, high lactate, melissa and alk phos, along with downtrending HgB. MELD score was 32. CXR showed pleural effusions. CT Chest/ABD/Pelvis was positive for findings suggesting cirrhosis with portal hypertension and large amount of ascites along with portal vein thrombosis. He was given Ceftriaxone + Vancomycin along with normal saline and admitted to the floor where he was started on Unasyn + Vanco along with Spironolactone and lasix. Blood cultures were + for streptococci, ascites sample was negative. ID was on board. GI was following pt and adjusted meds + performed EGD on 12/12/21,  showing trace varices, mild portal gastropathy. TTE came back with mild fibrocalcific changes to mv. but no vegetations on limited study. GI repeated paracentesis then pt was started on anticoagulation for PV thrombosis on day of discharge 12/14/21. Today , pt is feeling well, w/mimimal abdominal pain/distension and negative blood cultures. He is medically optimized and will be discharged on today, Dec 14 2021 for outpatient follow up with gastroenterologist, PCP, and liver transplant team at [facility] for initial evaluation. Patient will receive a total of 6 weeks of IV antibiotics to treat endocarditis.     Subjective: No acute overnight events. Pt was examined at bedside in the AM by me. Pt endorses feeling better and feels that abdomen is less distended. He is eager for discharge and amenable to plan. All questions answered. No f/c, cp, n/v, changes in bm. No additional symptoms or complaints reported. Patient is stable enough to take a uber home on discharge.      ROS: ROS as stated above    Vital Signs Last 24 Hrs  T(C): 36.6 (14 Dec 2021 09:36), Max: 36.9 (13 Dec 2021 20:28)  T(F): 97.8 (14 Dec 2021 09:36), Max: 98.5 (13 Dec 2021 20:28)  HR: 84 (14 Dec 2021 09:36) (60 - 84)  BP: 111/76 (14 Dec 2021 09:36) (108/62 - 119/70)  BP(mean): 85 (14 Dec 2021 09:36) (79 - 85)  RR: 16 (14 Dec 2021 09:36) (16 - 18)  SpO2: 96% (14 Dec 2021 09:36) (95% - 100%)    PHYSICAL EXAM  Constitutional: NAD, awake and alert, well-developed  HEENT: +jaundice eyes PERR, EOMI, Normal Hearing, MMM  Neck: Soft and supple, No LAD, No JVD  Respiratory: Breath sounds are clear bilaterally, No wheezing, rales or rhonchi  Cardiovascular: S1 and S2, regular rate and rhythm, no Murmurs, gallops or rubs  Gastrointestinal: Bowel Sounds present, soft, +distended, nontender  Extremities: Trace edema b/l  Vascular: 2+ peripheral pulses  Neurological: A/O x 3, no focal deficits  Skin: +spider angiomas in the chest area    EKG/RADIOLOGY  < from: Xray Chest 1 View- PORTABLE-Urgent (Xray Chest 1 View- PORTABLE-Urgent .) (12.10.21 @ 13:44) >  IMPRESSION: There is a moderate-sized left pleural effusion. The cardiac   silhouette is enlarged. The right lung is clear.    < from: CT Chest/Abdomen/Pelvis w/ IV Cont (12.10.21 @ 15:08) >  IMPRESSION:  Findings suggesting cirrhosis withportal hypertension and large amount   of ascites. Portal vein thrombosis is again noted.  Moderate left and mild right pleural effusions which have not   significantly changed. Atelectatic lung. No focal consolidation seen.    < from: IR Procedure (12.10.21 @ 20:10) >  Impression:  Successful sonographic guided paracentesis with aspiration of [id number] cc's   of ascitic fluid.    < from: EGD (12.12.21) >  Trace varices, mild portal gastropathy    < from: TTE Echo Complete w/o Contrast w/ Doppler (12.13.21 @ 10:48) >  Findings   Mitral Valve: Mild fibrocalcific changes noted to the mitral valve leaflets with   preserved leaflet excursion.  Aortic Valve: The aortic valve is trileaflet with thin pliable leaflets.  Tricuspid Valve:  The tricuspid valve leaflets are thin and pliable; valve motion is   normal. Trace tricuspid valve regurgitation is present.  Pulmonic Valve: Pulmonic valve not well seen, appears grossly normal.   Left Ventricle: Limited study to assess for endocarditis.   Pericardial Effusion: No evidence of pericardial effusion.   Pleural Effusion: Pleural effusion - is present.   Miscellaneous: The IVC is not well visualized. Ascites is noted.                 Pt is a 59 y/o M PMHx significant for alcoholic hepatosteatosis, alcohol use disorder (last drink 10/31/2021) c/o persistent abdominal pain, referred to [facility] by GI doc for progressively worsening ascites s/p recent diagnostic and therapeutic paracentesis by IR. In the ED, pt was found to be afebrile but in distress due to pain. Labs showed low albumin, high lactate, melissa and alk phos, along with downtrending HgB. MELD score was 32. CXR showed pleural effusions. CT Chest/ABD/Pelvis was positive for findings suggesting cirrhosis with portal hypertension and large amount of ascites along with portal vein thrombosis. He was given Ceftriaxone + Vancomycin along with normal saline and admitted to the floor where he was started on Unasyn + Vanco along with Spironolactone and lasix. Blood cultures were + for streptococci, ascites sample was negative. ID was on board. GI was following pt and adjusted meds + performed EGD on 12/12/21,  showing trace varices, mild portal gastropathy. TTE came back with mild fibrocalcific changes to mv. but no vegetations on limited study. GI repeated paracentesis then pt was started on anticoagulation for PV thrombosis on day of discharge 12/14/21. Today , pt is feeling well, w/mimimal abdominal pain/distension and negative blood cultures. He is medically optimized and will be discharged on today, Dec 14 2021 for outpatient follow up with gastroenterologist, PCP, and liver transplant team at [facility] for initial evaluation. Patient will receive a total of 6 weeks of IV antibiotics to treat endocarditis. Patient will take Eliquis for a duration of 6 months and follow up with GI for reevaluation of his Portal Vein thrombosis.     Subjective: No acute overnight events. Pt was examined at bedside in the AM by me. Pt endorses feeling better and feels that abdomen is less distended. He is eager for discharge and amenable to plan. All questions answered. No f/c, cp, n/v, changes in bm. No additional symptoms or complaints reported. Patient is stable enough to take a uber home on discharge.      ROS: ROS as stated above    Vital Signs Last 24 Hrs  T(C): 36.6 (14 Dec 2021 09:36), Max: 36.9 (13 Dec 2021 20:28)  T(F): 97.8 (14 Dec 2021 09:36), Max: 98.5 (13 Dec 2021 20:28)  HR: 84 (14 Dec 2021 09:36) (60 - 84)  BP: 111/76 (14 Dec 2021 09:36) (108/62 - 119/70)  BP(mean): 85 (14 Dec 2021 09:36) (79 - 85)  RR: 16 (14 Dec 2021 09:36) (16 - 18)  SpO2: 96% (14 Dec 2021 09:36) (95% - 100%)    PHYSICAL EXAM  Constitutional: NAD, awake and alert, well-developed  HEENT: +jaundice eyes PERR, EOMI, Normal Hearing, MMM  Neck: Soft and supple, No LAD, No JVD  Respiratory: Breath sounds are clear bilaterally, No wheezing, rales or rhonchi  Cardiovascular: S1 and S2, regular rate and rhythm, no Murmurs, gallops or rubs  Gastrointestinal: Bowel Sounds present, soft, +distended, nontender  Extremities: Trace edema b/l  Vascular: 2+ peripheral pulses  Neurological: A/O x 3, no focal deficits  Skin: +spider angiomas in the chest area    EKG/RADIOLOGY  < from: Xray Chest 1 View- PORTABLE-Urgent (Xray Chest 1 View- PORTABLE-Urgent .) (12.10.21 @ 13:44) >  IMPRESSION: There is a moderate-sized left pleural effusion. The cardiac   silhouette is enlarged. The right lung is clear.    < from: CT Chest/Abdomen/Pelvis w/ IV Cont (12.10.21 @ 15:08) >  IMPRESSION:  Findings suggesting cirrhosis withportal hypertension and large amount   of ascites. Portal vein thrombosis is again noted.  Moderate left and mild right pleural effusions which have not   significantly changed. Atelectatic lung. No focal consolidation seen.    < from: IR Procedure (12.10.21 @ 20:10) >  Impression:  Successful sonographic guided paracentesis with aspiration of 80145 cc's   of ascitic fluid.    < from: EGD (12.12.21) >  Trace varices, mild portal gastropathy    < from: TTE Echo Complete w/o Contrast w/ Doppler (12.13.21 @ 10:48) >  Findings   Mitral Valve: Mild fibrocalcific changes noted to the mitral valve leaflets with   preserved leaflet excursion.  Aortic Valve: The aortic valve is trileaflet with thin pliable leaflets.  Tricuspid Valve:  The tricuspid valve leaflets are thin and pliable; valve motion is   normal. Trace tricuspid valve regurgitation is present.  Pulmonic Valve: Pulmonic valve not well seen, appears grossly normal.   Left Ventricle: Limited study to assess for endocarditis.   Pericardial Effusion: No evidence of pericardial effusion.   Pleural Effusion: Pleural effusion - is present.   Miscellaneous: The IVC is not well visualized. Ascites is noted.                 Pt is a 57 y/o M PMHx significant for alcoholic hepatosteatosis, alcohol use disorder (last drink 10/31/2021) c/o persistent abdominal pain, referred to [facility] by GI doc for progressively worsening ascites s/p recent diagnostic and therapeutic paracentesis by IR. In the ED, pt was found to be afebrile but in distress due to pain. Labs showed low albumin, high lactate, melissa and alk phos, along with downtrending HgB. MELD score was 32. CXR showed pleural effusions. CT Chest/ABD/Pelvis was positive for findings suggesting cirrhosis with portal hypertension and large amount of ascites along with portal vein thrombosis. He was given Ceftriaxone + Vancomycin along with normal saline and admitted to the floor where he was started on Unasyn + Vanco along with Spironolactone and lasix. Blood cultures were + for streptococci, ascites sample was negative. ID was on board. GI was following pt and adjusted meds + performed EGD on 12/12/21,  showing trace varices, mild portal gastropathy. TTE came back with mild fibrocalcific changes to mv. but no vegetations on limited study. GI repeated paracentesis then pt was started on anticoagulation for PV thrombosis on day of discharge 12/14/21. Today , pt is feeling well, w/mimimal abdominal pain/distension and negative blood cultures. He is medically optimized and will be discharged on today, Dec 14 2021 for outpatient follow up with gastroenterologist, PCP, and liver transplant team at [facility] for initial evaluation. Patient will receive a total of 6 weeks of IV antibiotics to treat endocarditis. Patient will take Eliquis for a duration of 6 months and follow up with GI for reevaluation of his Portal Vein thrombosis.     Subjective: No acute overnight events. Pt was examined at bedside in the AM by me. Pt endorses feeling better and feels that abdomen is less distended. He is eager for discharge and amenable to plan. All questions answered. No f/c, cp, n/v, changes in bm. No additional symptoms or complaints reported. Patient is stable enough to take a uber home on discharge.      ROS: ROS as stated above    Vital Signs Last 24 Hrs  T(C): 36.6 (14 Dec 2021 09:36), Max: 36.9 (13 Dec 2021 20:28)  T(F): 97.8 (14 Dec 2021 09:36), Max: 98.5 (13 Dec 2021 20:28)  HR: 84 (14 Dec 2021 09:36) (60 - 84)  BP: 111/76 (14 Dec 2021 09:36) (108/62 - 119/70)  BP(mean): 85 (14 Dec 2021 09:36) (79 - 85)  RR: 16 (14 Dec 2021 09:36) (16 - 18)  SpO2: 96% (14 Dec 2021 09:36) (95% - 100%)    PHYSICAL EXAM  Constitutional: NAD, awake and alert, well-developed  HEENT: +jaundice eyes PERR, EOMI, Normal Hearing, MMM  Neck: Soft and supple, No LAD, No JVD  Respiratory: Breath sounds are clear bilaterally, No wheezing, rales or rhonchi  Cardiovascular: S1 and S2, regular rate and rhythm, no Murmurs, gallops or rubs  Gastrointestinal: Bowel Sounds present, soft, +distended, nontender  Extremities: Trace edema b/l  Vascular: 2+ peripheral pulses  Neurological: A/O x 3, no focal deficits  Skin: +spider angiomas in the chest area    EKG/RADIOLOGY  < from: Xray Chest 1 View- PORTABLE-Urgent (Xray Chest 1 View- PORTABLE-Urgent .) (12.10.21 @ 13:44) >  IMPRESSION: There is a moderate-sized left pleural effusion. The cardiac   silhouette is enlarged. The right lung is clear.    < from: CT Chest/Abdomen/Pelvis w/ IV Cont (12.10.21 @ 15:08) >  IMPRESSION:  Findings suggesting cirrhosis withportal hypertension and large amount   of ascites. Portal vein thrombosis is again noted.  Moderate left and mild right pleural effusions which have not   significantly changed. Atelectatic lung. No focal consolidation seen.    < from: IR Procedure (12.10.21 @ 20:10) >  Impression:  Successful sonographic guided paracentesis with aspiration of [id number] cc's   of ascitic fluid.    < from: EGD (12.12.21) >  Trace varices, mild portal gastropathy    < from: TTE Echo Complete w/o Contrast w/ Doppler (12.13.21 @ 10:48) >  Findings   Mitral Valve: Mild fibrocalcific changes noted to the mitral valve leaflets with   preserved leaflet excursion.  Aortic Valve: The aortic valve is trileaflet with thin pliable leaflets.  Tricuspid Valve:  The tricuspid valve leaflets are thin and pliable; valve motion is   normal. Trace tricuspid valve regurgitation is present.  Pulmonic Valve: Pulmonic valve not well seen, appears grossly normal.   Left Ventricle: Limited study to assess for endocarditis.   Pericardial Effusion: No evidence of pericardial effusion.   Pleural Effusion: Pleural effusion - is present.   Miscellaneous: The IVC is not well visualized. Ascites is noted.      medicine attending addendum- this pleasant man with etoh related cirrhosis presented with decompensated cirrhosis- we suspect he had SBP and massive ascites. his ascitic fluid did not grow organisms but blood curtures were positive for a strep- 4/4. we suspect the ascitic fluid may have been influenced by early antibiotic administration- but as we could not be certain we are treating for possible SBE with 4-6 weeks of parenteral therapy after which he will need secondary prophylaxis for SBP as well. he had 2 episodes of large volume paracentesis. he was found to have portal vein thrombosis- and as EGD revealed minor varices the risk benefit ration favors anticoagulation. pros and cons d/w patient. he will be followed up by GI to assess BP and creat/lytes within the week, also to be evaluated bu the transplant team at [facility] in January. deferred MAT for the etoh as he will pursue other support. total time ~40 min

## 2022-01-01 NOTE — DISCHARGE NOTE ADULT - PLAN OF CARE
WOUNDCARE: Keep dressing clean dry and intact until follow-up with Dr. Lobo.  WEIGHTBEARING: Weight bearing as tolerated to left foot in post-op shoe.  FOLLOW-UP: Follow-up with Dr. Lobo within 1 week of discharge. Call [phone] for appointments. 9 left great toe amp WOUNDCARE: Keep dressing clean dry and intact until follow-up with Dr. Joe.  WEIGHTBEARING: Weight bearing as tolerated to left foot in post-op shoe.  FOLLOW-UP: Follow-up with Dr. Lobo within 1 week of discharge. Call [phone] for appointments. WOUNDCARE: Keep dressing clean dry and intact until follow-up with Dr. Joe.  WEIGHTBEARING: Weight bearing as tolerated to left foot in post-op shoe.  FOLLOW-UP: Follow-up with Dr. Joe within 1 week of discharge. Call [phone] for appointments. recover from bypass 1. Follow up with Dr. Fuentes within 1-2 weeks after discharge. Call to make an appointment.  2. Follow up with infectious disease within 1-2 weeks. Call to make an appointment. 1. Continue ASA, Brilinta, and metoprolol.  2. Follow up with Dr. Gavin within 1-2 weeks after discharge. Call to make an appointment. ABX: Continue PO Augmentin 2x/day until course is finished.  WOUNDCARE: Keep dressing clean dry and intact until follow-up with Dr. Joe.  WEIGHTBEARING: Weight bearing as tolerated to left foot in post-op shoe.  FOLLOW-UP: Follow-up with Dr. Joe within 1 week of discharge. Call [phone] for appointments. 1. Follow up with Dr. Fuentes within 1-2 weeks after discharge. Call to make an appointment.

## 2022-07-20 NOTE — PHYSICAL THERAPY INITIAL EVALUATION ADULT - ADDITIONAL COMMENTS
67 year old male who PTA pt was living in a [facility] Stairs and was independent in all functional mobility and ADL's. using a cane for gait,. wife states he was mainly a house hold ambulator, not really leaving the house too often.
Admission

## 2022-11-01 NOTE — CONSULT NOTE ADULT - CONSULT REQUESTED DATE/TIME
Left message to return call
Patient questioning if the ozempic 1xweekly replaces her morning dose of insulin on that day, or if it is addition to her insulin?
Patient to continue taking the insulin every day.
Pt notified and voiced understanding
03-Feb-2018 13:08
07-Feb-2018 16:47
30-Jan-2018 00:31
30-Jan-2018 02:01
30-Jan-2018 13:22
30-Jan-2018 14:33

## 2022-11-21 NOTE — PROGRESS NOTE ADULT - ATTENDING COMMENTS
Rx Refill Note  Requested Prescriptions     Pending Prescriptions Disp Refills   • olmesartan-hydrochlorothiazide (BENICAR HCT) 40-12.5 MG per tablet [Pharmacy Med Name: OLMESARTAN-HCTZ 40-12.5 MG TAB] 90 tablet 1     Sig: TAKE ONE TABLET BY MOUTH DAILY      Last office visit with prescribing clinician: 8/12/2022      Next office visit with prescribing clinician: 8/4/2023            Pili Mares, [facility]  11/21/22, 09:45 EST   as above

## 2022-12-02 NOTE — PATIENT PROFILE ADULT. - MEDICATION HERBAL REMEDIES, PROFILE
Patient Education    BRIGHT FUTURES HANDOUT- PATIENT  15 THROUGH 17 YEAR VISITS  Here are some suggestions from [facility] experts that may be of value to your family.     HOW YOU ARE DOING  Enjoy spending time with your family. Look for ways you can help at home.  Find ways to work with your family to solve problems. Follow your family s rules.  Form healthy friendships and find fun, safe things to do with friends.  Set high goals for yourself in school and activities and for your future.  Try to be responsible for your schoolwork and for getting to school or work on time.  Find ways to deal with stress. Talk with your parents or other trusted adults if you need help.  Always talk through problems and never use violence.  If you get angry with someone, walk away if you can.  Call for help if you are in a situation that feels dangerous.  Healthy dating relationships are built on respect, concern, and doing things both of you like to do.  When you re dating or in a sexual situation,  No  means NO. NO is OK.  Don t smoke, vape, use drugs, or drink alcohol. Talk with us if you are worried about alcohol or drug use in your family.    YOUR DAILY LIFE  Visit the dentist at least twice a year.  Brush your teeth at least twice a day and floss once a day.  Be a healthy eater. It helps you do well in school and sports.  Have vegetables, fruits, lean protein, and whole grains at meals and snacks.  Limit fatty, sugary, and salty foods that are low in nutrients, such as candy, chips, and ice cream.  Eat when you re hungry. Stop when you feel satisfied.  Eat with your family often.  Eat breakfast.  Drink plenty of water. Choose water instead of soda or sports drinks.  Make sure to get enough calcium every day.  Have 3 or more servings of low-fat (1%) or fat-free milk and other low-fat dairy products, such as yogurt and cheese.  Aim for at least 1 hour of physical activity every day.  Wear your mouth guard when playing  sports.  Get enough sleep.    YOUR FEELINGS  Be proud of yourself when you do something good.  Figure out healthy ways to deal with stress.  Develop ways to solve problems and make good decisions.  It s OK to feel up sometimes and down others, but if you feel sad most of the time, let us know so we can help you.  It s important for you to have accurate information about sexuality, your physical development, and your sexual feelings toward the opposite or same sex. Please consider asking us if you have any questions.    HEALTHY BEHAVIOR CHOICES  Choose friends who support your decision to not use tobacco, alcohol, or drugs. Support friends who choose not to use.  Avoid situations with alcohol or drugs.  Don t share your prescription medicines. Don t use other people s medicines.  Not having sex is the safest way to avoid pregnancy and sexually transmitted infections (STIs).  Plan how to avoid sex and risky situations.  If you re sexually active, protect against pregnancy and STIs by correctly and consistently using birth control along with a condom.  Protect your hearing at work, home, and concerts. Keep your earbud volume down.    STAYING SAFE  Always be a safe and cautious .  Insist that everyone use a lap and shoulder seat belt.  Limit the number of friends in the car and avoid driving at night.  Avoid distractions. Never text or talk on the phone while you drive.  Do not ride in a vehicle with someone who has been using drugs or alcohol.  If you feel unsafe driving or riding with someone, call someone you trust to drive you.  Wear helmets and protective gear while playing sports. Wear a helmet when riding a bike, a motorcycle, or an ATV or when skiing or skateboarding. Wear a life jacket when you do water sports.  Always use sunscreen and a hat when you re outside.  Fighting and carrying weapons can be dangerous. Talk with your parents, teachers, or doctor about how to avoid these  situations.        Consistent with Bright Futures: Guidelines for Health Supervision of Infants, Children, and Adolescents, 4th Edition  For more information, go to https://brightfutures.aap.org.           Patient Education    BRIGHT FUTURES HANDOUT- PARENT  15 THROUGH 17 YEAR VISITS  Here are some suggestions from [employer] experts that may be of value to your family.     HOW YOUR FAMILY IS DOING  Set aside time to be with your teen and really listen to her hopes and concerns.  Support your teen in finding activities that interest him. Encourage your teen to help others in the community.  Help your teen find and be a part of positive after-school activities and sports.  Support your teen as she figures out ways to deal with stress, solve problems, and make decisions.  Help your teen deal with conflict.  If you are worried about your living or food situation, talk with us. Community agencies and programs such as SNAP can also provide information.    YOUR GROWING AND CHANGING TEEN  Make sure your teen visits the dentist at least twice a year.  Give your teen a fluoride supplement if the dentist recommends it.  Support your teen s healthy body weight and help him be a healthy eater.  Provide healthy foods.  Eat together as a family.  Be a role model.  Help your teen get enough calcium with low-fat or fat-free milk, low-fat yogurt, and cheese.  Encourage at least 1 hour of physical activity a day.  Praise your teen when she does something well, not just when she looks good.    YOUR TEEN S FEELINGS  If you are concerned that your teen is sad, depressed, nervous, irritable, hopeless, or angry, let us know.  If you have questions about your teen s sexual development, you can always talk with us.    HEALTHY BEHAVIOR CHOICES  Know your teen s friends and their parents. Be aware of where your teen is and what he is doing at all times.  Talk with your teen about your values and your expectations on drinking, drug use,  tobacco use, driving, and sex.  Praise your teen for healthy decisions about sex, tobacco, alcohol, and other drugs.  Be a role model.  Know your teen s friends and their activities together.  Lock your liquor in a cabinet.  Store prescription medications in a locked cabinet.  Be there for your teen when she needs support or help in making healthy decisions about her behavior.    SAFETY  Encourage safe and responsible driving habits.  Lap and shoulder seat belts should be used by everyone.  Limit the number of friends in the car and ask your teen to avoid driving at night.  Discuss with your teen how to avoid risky situations, who to call if your teen feels unsafe, and what you expect of your teen as a .  Do not tolerate drinking and driving.  If it is necessary to keep a gun in your home, store it unloaded and locked with the ammunition locked separately from the gun.      Consistent with Bright Futures: Guidelines for Health Supervision of Infants, Children, and Adolescents, 4th Edition  For more information, go to https://brightfutures.aap.org.            no

## 2023-08-30 NOTE — PROGRESS NOTE ADULT - SUBJECTIVE AND OBJECTIVE BOX
67y old  Male who presents with a chief complaint of Toe infection x one week       Interval history:  Afebrile, no pain today in the foot.     Antimicrobials:    piperacillin/tazobactam IVPB. 3.375 Gram(s) IV Intermittent every 8 hours    REVIEW OF SYSTEMS:    No chest pain or palpitations  No cough, no SOB  No N/V/D, no abdominal pain  No dysuria or frequency  No rash.     Vital Signs Last 24 Hrs  T(C): 37.1 (02-01-18 @ 11:44), Max: 37.7 (01-31-18 @ 20:15)  T(F): 98.8 (02-01-18 @ 11:44), Max: 99.8 (01-31-18 @ 20:15)  HR: 81 (02-01-18 @ 11:44) (71 - 88)  BP: 122/76 (02-01-18 @ 11:44) (122/76 - 179/91)  RR: 18 (02-01-18 @ 11:44) (18 - 18)  SpO2: 98% (02-01-18 @ 11:44) (71% - 98%)    PHYSICAL EXAM:  Patient in no acute distress. Alert, awake.   No icterus, no oral ulcers.  Cardiovascular: S1S2 normal.  Lungs: +air entry B/L lung fields.  Gastrointestinal: soft, nontender, nondistended.  Extremities: dry flaky skin with dressing lt foot.   IV sites not inflamed.                           12.2   9.1   )-----------( 247      ( 01 Feb 2018 06:56 )             34.3   02-01    138  |  101  |  16  ----------------------------<  213<H>  3.6   |  24  |  0.81    Ca    9.2      01 Feb 2018 06:56  Phos  2.9     01-31  Mg     1.9     01-31        Culture - Other (collected 30 Jan 2018 03:57)  Source: Skin left first toe  Preliminary Report (01 Feb 2018 13:42):    Few Klebsiella pneumoniae    Numerous Alpha hemolytic strep    Numerous Streptococcus agalactiae (Group B)    Numerous Staphylococcus aureus Susceptibility to follow.    Culture - Blood (collected 30 Jan 2018 01:33)  Source: .Blood Blood-Venous  Preliminary Report (31 Jan 2018 02:01):    No growth to date.    Culture - Blood (collected 30 Jan 2018 01:33)  Source: .Blood Blood-Peripheral  Preliminary Report (31 Jan 2018 02:01):    No growth to date.        Radiology: Detail Level: Detailed Add 1585x Cpt? (Do Not Bill If You Billed For The Procedure Placing The Sutures. This Is An Add-On Code That Must Be Billed With An E/M Visit Code): No

## 2023-10-11 NOTE — ED PROVIDER NOTE - PROGRESS NOTE DETAILS
Moon: Discussed with podiatry and surgery residents. NO intervention at this time. Podiatry recs admission for IV antibiotics. Will call hospitalist. Elisa: Discussed with Dr. Waller. Admit to his service, he knows patient well from last admission. 11-Oct-2023 21:06

## 2024-02-13 NOTE — PATIENT PROFILE ADULT - BRADEN ACTIVITY
3) walks occasionally

## 2024-04-01 NOTE — PHYSICAL THERAPY INITIAL EVALUATION ADULT - PERTINENT HX OF CURRENT PROBLEM, REHAB EVAL
68yo M presents for worsened infection. Patient has been complaining  left foot pain for 2-3 days. Pt reports pus draining from wound. Recent admission for Left hallux osteomyelitis s/p debridement. CXR no focal opacity. XRAY L foot : cortical heterogeneity and osteal lucency left first digit, concerning for osteomyelitis.
Pt is a 67 y.o male hx CAD s/p cardiac cath CANDIDO x 1 pLAD, CANDIDO x 1 dCx, CANDIDO x 1 pOM1 (2/6) via right radial and RCA (2/6) , DM type II, EtOH abuse c/b pancreatitis, PVD, recent admission for (1/30-2/9) for  left hallux osteomyelitis s/p debridement with LLE angiogram  with multilevel arterial disease, however revascularization postponed until cardiac function optimized. Pt now p/w worsened infection of Left Hallux, with purulent drainage.
Brea/clothing/shoes

## 2024-04-15 NOTE — CONSULT NOTE ADULT - CARDIOVASCULAR
chaperone present in room; external visualization of perianal skin tags, not painful with palpation; TIMUR did not elicit pain and no obvious masses felt; normal sphincter tone; no stool in rectal vault but there was a little spot of blood on the glove afterwards; with anoscopy, noted one nonbleeding internal hemorrhoid. negative